# Patient Record
Sex: FEMALE | Race: WHITE | Employment: OTHER | URBAN - METROPOLITAN AREA
[De-identification: names, ages, dates, MRNs, and addresses within clinical notes are randomized per-mention and may not be internally consistent; named-entity substitution may affect disease eponyms.]

---

## 2017-01-24 ENCOUNTER — OFFICE VISIT (OUTPATIENT)
Dept: FAMILY MEDICINE CLINIC | Age: 72
End: 2017-01-24

## 2017-01-24 VITALS
TEMPERATURE: 98.3 F | WEIGHT: 142 LBS | HEART RATE: 74 BPM | BODY MASS INDEX: 26.83 KG/M2 | DIASTOLIC BLOOD PRESSURE: 65 MMHG | SYSTOLIC BLOOD PRESSURE: 145 MMHG

## 2017-01-24 DIAGNOSIS — K12.2 CELLULITIS AND ABSCESS OF ORAL SOFT TISSUES: Primary | ICD-10-CM

## 2017-01-24 RX ORDER — AZITHROMYCIN 250 MG/1
TABLET, FILM COATED ORAL
Qty: 6 TAB | Refills: 0 | Status: SHIPPED | OUTPATIENT
Start: 2017-01-24 | End: 2017-01-29

## 2017-01-24 NOTE — PATIENT INSTRUCTIONS
Periodontal Conditions: Care Instructions  Your Care Instructions    Periodontal conditions affect the gums, bone, and tissue that surround and support the teeth. The most common problems are caused by plaque. Plaque is a thin film of bacteria that sticks to teeth above and below the gum line. It can build up and harden into tartar. The bacteria in plaque and tartar can cause gum disease. Gingivitis is a disease that affects the gums (gingiva). The gums are the soft tissue that surrounds the teeth. Gingivitis causes red, swollen, tender gums that bleed easily when brushed, persistent bad breath, and sensitive teeth. Because it is not painful, many people do not get treatment when they should. Gingivitis can be reversed with good dental care. Periodontitis is a more advanced disease that affects more than the gums. The gums pull away from the teeth. This leaves deep pockets where bacteria can grow. The disease can damage the bones that support the teeth. The teeth may get loose and fall out. A periodontal condition should be treated as soon as it is found. Finding gum problems early, treating them right away, and having regular checkups bring the best results. You can treat mild periodontal conditions by brushing and flossing your teeth every day. Your dentist may prescribe a mouthwash to kill the bacteria that can damage teeth and gums. Your dentist may have you take antibiotics to treat infection from moderate periodontal disease. If your gums have pulled away from your teeth, you may need cleaning between the teeth and gums right down to the teeth roots. This is called root planing and scaling. If you have severe periodontal disease, you may need surgery to remove diseased gum tissue or repair bone damage. Follow-up care is a key part of your treatment and safety. Be sure to make and go to all appointments, and call your dentist if you are having problems.  It's also a good idea to know your test results and keep a list of the medicines you take. How can you care for yourself at home? · If your dentist prescribed antibiotics, take them as directed. Do not stop taking them just because you feel better. You need to take the full course of antibiotics. · Brush your teeth twice a day, in the morning and at night. ¨ Use a toothbrush with soft, rounded-end bristles and a head that is small enough to reach all parts of your teeth and mouth. Replace your toothbrush every 3 to 4 months. ¨ Use a fluoride toothpaste. ¨ Place the brush at a 45-degree angle where the teeth meet the gums. Press firmly, and gently rock the brush back and forth using small circular movements. ¨ Brush chewing surfaces vigorously with short back-and-forth strokes. ¨ Brush your tongue from back to front. · Floss at least once a day. Choose the type and flavor that you like best.  · Have your teeth cleaned by a professional at least twice a year. · Ask your dentist about using an antibacterial mouthwash to help reduce bacteria. · Rinse your mouth with water or chew sugar-free gum after meals if you can't brush your teeth. · Do not smoke or use smokeless tobacco. Tobacco use can cause periodontal disease. When should you call for help? Call your dentist now or seek immediate medical care if:  · You have symptoms of infection, such as:  ¨ Increased pain, swelling, warmth, or redness. ¨ Red streaks leading from the area. ¨ Pus draining from the area. ¨ A fever. Watch closely for changes in your health, and be sure to contact your dentist if:  · Your gums bleed easily when brushed. · You have other problems with your gums or mouth. · You do not get better as expected. Where can you learn more? Go to http://eleanor-gera.info/. Enter D484 in the search box to learn more about \"Periodontal Conditions: Care Instructions. \"  Current as of: August 9, 2016  Content Version: 11.1  © 7632-5400 [employer]. Care instructions adapted under license by [employer] (which disclaims liability or warranty for this information). If you have questions about a medical condition or this instruction, always ask your healthcare professional. [address] any warranty or liability for your use of this information.

## 2017-01-24 NOTE — PROGRESS NOTES
Subjective  Vero Jenkins is an 80 y.o. female  who presents for the evaluation of the swelling in the left side of the jaw spreading to the upper side of the neck . The swelling started 2 days ago, getting a little worse. The pt reports mild discomfort, but no pain reported. She reports mild dysphagia. She had similar problem before around 10 years ago and was treated with azithromycin. She denies trauma, oral procedure recent infection or travelling. She denies fever, rash, nausea, vomiting, SOB. Allergies - reviewed: Allergies   Allergen Reactions    Levaquin [Levofloxacin] Anaphylaxis     Throat and face became swollen.  Ace Inhibitors Vertigo    Hydrochlorothiazide Other (comments)     Hypotension and severe hyponatremia.  Penicillins Hives     While pregnant         Medications - reviewed:   Current Outpatient Prescriptions   Medication Sig    azithromycin (ZITHROMAX) 250 mg tablet Take 2 tablets on the first day, then take 1 tablet daily for the next 4 days    traMADol (ULTRAM) 50 mg tablet TAKE 1 TABLET BY MOUTH EVERY 6 HOURS AS NEEDED FOR PAIN    sodium chloride 1,000 mg soluble tablet TAKE TWO TABLETS BY MOUTH THREE TIMES DAILY WITH  MEALS    atenolol (TENORMIN) 25 mg tablet TAKE 1 TABLET BY MOUTH DAILY    atenolol (TENORMIN) 50 mg tablet TAKE ONE TABLET BY MOUTH ONCE DAILY    LORazepam (ATIVAN) 0.5 mg tablet TAKE 1 TABLET BY MOUTH TWICE DAILY AS NEEDED FOR ANXIETY    sodium chloride 1 gram tablet TAKE 2 TABLETS BY MOUTH THREE TIMES DAILY WITH MEALS    omeprazole (PRILOSEC) 20 mg capsule TAKE 1 CAPSULE BY MOUTH EVERY DAY    KLOR-CON 10 10 mEq tablet TAKE 1 TABLET BY MOUTH TWICE DAILY    furosemide (LASIX) 40 mg tablet TAKE 1 TABLET BY MOUTH EVERY DAY    amLODIPine (NORVASC) 5 mg tablet TAKE 1 TABLET BY MOUTH EVERY DAY    aspirin (ASPIRIN) 325 mg tablet Take 1 tablet by mouth daily.  multivitamin (ONE A DAY) tablet Take 1 Tab by mouth daily.      No current facility-administered medications for this visit. 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 Social History Main Topics    Smoking status: Former Smoker     Packs/day: 1.00     Years: 40.00     Types: Cigarettes    Smokeless tobacco: Former User     Quit date: 9/11/2014      Comment: Quit smoking September 2014.  Alcohol use 1.8 oz/week     3 Standard drinks or equivalent per week      Comment: Began regular drinking in 30's. Mostly beer. Quit beer 9/11/2014 but drinks mixed drinks on weekends.  Drug use: No    Sexual activity: Not on file     Other Topics Concern    Not on file     Social History Narrative         Family History - reviewed:  Family History   Problem Relation Age of Onset    Kidney Disease Mother      uncertain         Immunizations - reviewed:   Immunization History   Administered Date(s) Administered    Influenza Vaccine 01/21/2010, 11/09/2010, 11/22/2011, 10/31/2014, 11/03/2014    Influenza Vaccine (Quad) PF 11/11/2015, 10/26/2016    Pneumococcal Polysaccharide (PPSV-23) 09/22/2014, 09/30/2014    Varicella Virus Vaccine 05/05/2016         ROS  Review of Systems : negative unless highlighted  CONSTITUTIONAL: fevers. Chills. Anorexia. Weight loss. Weight gain. EYES: diplopia. Blurry vision. Visual changes.  Swelling of the left jaw. ENT: sinus congestion. Rhinorrhea. CARDIOVASCULAR: chest pain. palpitations  RESPIRATORY: dyspnea. Cough. Wheeze. Physical Exam  Visit Vitals    /65 (BP 1 Location: Left arm, BP Patient Position: Sitting)    Pulse 74    Temp 98.3 °F (36.8 °C) (Oral)    Wt 142 lb (64.4 kg)    BMI 26.83 kg/m2       General appearance - NAD. Appropriately conversational  HENT: Atraumatic. EOMI. Oral mucosa moist, slightly erythematous and inflamed on the left lower side . Remnat teeth with erythematous swelling of the gums in the left side. Swelling of the left side of the tissue of the left side of the jaw, hard to palpation, spreading to the upper side of the neck. Skin - normal coloration and normal turgor. No cyanosis, no rash. Slightly erythematous over the left side of the jaw. Assessment/Plan     Cellulitis and abscess of the oral soft tissue  -The Rx for Azithromycin 250 mg was sent to the pharmacy  -Swish the mouth with saline water  -Heating pads  -Tylenol/ ibuprofen for the pain relief if needed     Follow-up Disposition:  Return if symptoms worsen or fail to improve. I discussed the aforementioned diagnoses with the patient as well as the plan of care.      The patient was seen and discussed with Dr. Jere Streeter ( the attending physician)    Rodell Rinne, MD  Family Medicine Resident  PGY 1

## 2017-01-24 NOTE — MR AVS SNAPSHOT
Visit Information Date & Time Provider Department Dept. Phone Encounter #  
 1/24/2017  2:15 PM Salma Hansen, [address] [place] [address] Avenue [phone] [id number] Follow-up Instructions Return if symptoms worsen or fail to improve. Upcoming Health Maintenance Date Due Hepatitis C Screening 1945 DTaP/Tdap/Td series (1 - Tdap) 11/23/1967 GLAUCOMA SCREENING Q2Y 11/23/2011 OSTEOPOROSIS SCREENING (DEXA) 11/23/2011 MEDICARE YEARLY EXAM 11/23/2011 FOBT Q 1 YEAR AGE 50-75 9/2/2015 Pneumococcal 65+ Low/Medium Risk (2 of 2 - PCV13) 9/30/2015 BREAST CANCER SCRN MAMMOGRAM 4/27/2018 Allergies as of 1/24/2017  Review Complete On: 1/24/2017 By: Rico Argueta MD  
  
 Severity Noted Reaction Type Reactions Levaquin [Levofloxacin] High 04/09/2013    Anaphylaxis Throat and face became swollen. Ace Inhibitors  04/09/2013    Vertigo Hydrochlorothiazide  07/31/2013   Intolerance Other (comments) Hypotension and severe hyponatremia. Penicillins  04/09/2013    Hives While pregnant Current Immunizations  Reviewed on 1/24/2017 Name Date Influenza Vaccine 11/3/2014, 10/31/2014, 11/22/2011, 11/9/2010, 1/21/2010 Influenza Vaccine (Quad) PF 10/26/2016, 11/11/2015 Pneumococcal Polysaccharide (PPSV-23) 9/30/2014, 9/22/2014  1:37 PM  
 Varicella Virus Vaccine 5/5/2016 Reviewed by Rico Argueta MD on 1/24/2017 at  2:36 PM  
You Were Diagnosed With   
  
 Codes Comments Periodontal disease, unspecified    -  Primary ICD-10-CM: U92.4 ICD-9-CM: 523.9 Vitals BP Pulse Temp Weight(growth percentile) BMI OB Status 145/65 (BP 1 Location: Left arm, BP Patient Position: Sitting) 74 98.3 °F (36.8 °C) (Oral) 142 lb (64.4 kg) 26.83 kg/m2 Postmenopausal  
 Smoking Status Former Smoker Vitals History BMI and BSA Data  Body Mass Index Body Surface Area  
 26.83 kg/m 2 1.66 m 2  
  
 Preferred Pharmacy Pharmacy Name Phone [facility] [phone] Your Updated Medication List  
  
   
This list is accurate as of: 1/24/17  3:05 PM.  Always use your most recent med list. amLODIPine 5 mg tablet Commonly known as:  Arnav Reddyedson TAKE 1 TABLET BY MOUTH EVERY DAY  
  
 aspirin 325 mg tablet Commonly known as:  ASPIRIN Take 1 tablet by mouth daily. * atenolol 25 mg tablet Commonly known as:  TENORMIN  
TAKE 1 TABLET BY MOUTH DAILY  
  
 * atenolol 50 mg tablet Commonly known as:  TENORMIN  
TAKE ONE TABLET BY MOUTH ONCE DAILY  
  
 azithromycin 250 mg tablet Commonly known as:  Tracy Ax Take 2 tablets on the first day, then take 1 tablet daily for the next 4 days  
  
 furosemide 40 mg tablet Commonly known as:  LASIX TAKE 1 TABLET BY MOUTH EVERY DAY  
  
 KLOR-CON 10 10 mEq tablet Generic drug:  potassium chloride SR  
TAKE 1 TABLET BY MOUTH TWICE DAILY LORazepam 0.5 mg tablet Commonly known as:  ATIVAN  
TAKE 1 TABLET BY MOUTH TWICE DAILY AS NEEDED FOR ANXIETY  
  
 multivitamin tablet Commonly known as:  ONE A DAY Take 1 Tab by mouth daily. omeprazole 20 mg capsule Commonly known as:  PRILOSEC  
TAKE 1 CAPSULE BY MOUTH EVERY DAY  
  
 * sodium chloride 1 gram tablet TAKE 2 TABLETS BY MOUTH THREE TIMES DAILY WITH MEALS  
  
 * sodium chloride 1,000 mg soluble tablet TAKE TWO TABLETS BY MOUTH THREE TIMES DAILY WITH  MEALS  
  
 traMADol 50 mg tablet Commonly known as:  ULTRAM  
TAKE 1 TABLET BY MOUTH EVERY 6 HOURS AS NEEDED FOR PAIN  
  
 * Notice: This list has 4 medication(s) that are the same as other medications prescribed for you. Read the directions carefully, and ask your doctor or other care provider to review them with you. Prescriptions Sent to Pharmacy  Refills  
 azithromycin (ZITHROMAX) 250 mg tablet 0  
 Sig: Take 2 tablets on the first day, then take 1 tablet daily for the next 4 days Class: Normal  
 Pharmacy: [facility] [address] [place] [address] #: [phone] Follow-up Instructions Return if symptoms worsen or fail to improve. Patient Instructions Periodontal Conditions: Care Instructions Your Care Instructions Periodontal conditions affect the gums, bone, and tissue that surround and support the teeth. The most common problems are caused by plaque. Plaque is a thin film of bacteria that sticks to teeth above and below the gum line. It can build up and harden into tartar. The bacteria in plaque and tartar can cause gum disease. Gingivitis is a disease that affects the gums (gingiva). The gums are the soft tissue that surrounds the teeth. Gingivitis causes red, swollen, tender gums that bleed easily when brushed, persistent bad breath, and sensitive teeth. Because it is not painful, many people do not get treatment when they should. Gingivitis can be reversed with good dental care. Periodontitis is a more advanced disease that affects more than the gums. The gums pull away from the teeth. This leaves deep pockets where bacteria can grow. The disease can damage the bones that support the teeth. The teeth may get loose and fall out. A periodontal condition should be treated as soon as it is found. Finding gum problems early, treating them right away, and having regular checkups bring the best results. You can treat mild periodontal conditions by brushing and flossing your teeth every day. Your dentist may prescribe a mouthwash to kill the bacteria that can damage teeth and gums. Your dentist may have you take antibiotics to treat infection from moderate periodontal disease. If your gums have pulled away from your teeth, you may need cleaning between the teeth and gums right down to the teeth roots.  This is called root planing and scaling. If you have severe periodontal disease, you may need surgery to remove diseased gum tissue or repair bone damage. Follow-up care is a key part of your treatment and safety. Be sure to make and go to all appointments, and call your dentist if you are having problems. It's also a good idea to know your test results and keep a list of the medicines you take. How can you care for yourself at home? · If your dentist prescribed antibiotics, take them as directed. Do not stop taking them just because you feel better. You need to take the full course of antibiotics. · Brush your teeth twice a day, in the morning and at night. ¨ Use a toothbrush with soft, rounded-end bristles and a head that is small enough to reach all parts of your teeth and mouth. Replace your toothbrush every 3 to 4 months. ¨ Use a fluoride toothpaste. ¨ Place the brush at a 45-degree angle where the teeth meet the gums. Press firmly, and gently rock the brush back and forth using small circular movements. ¨ Brush chewing surfaces vigorously with short back-and-forth strokes. ¨ Brush your tongue from back to front. · Floss at least once a day. Choose the type and flavor that you like best. 
· Have your teeth cleaned by a professional at least twice a year. · Ask your dentist about using an antibacterial mouthwash to help reduce bacteria. · Rinse your mouth with water or chew sugar-free gum after meals if you can't brush your teeth. · Do not smoke or use smokeless tobacco. Tobacco use can cause periodontal disease. When should you call for help? Call your dentist now or seek immediate medical care if: 
· You have symptoms of infection, such as: 
¨ Increased pain, swelling, warmth, or redness. ¨ Red streaks leading from the area. ¨ Pus draining from the area. ¨ A fever. Watch closely for changes in your health, and be sure to contact your dentist if: 
· Your gums bleed easily when brushed. · You have other problems with your gums or mouth. · You do not get better as expected. Where can you learn more? Go to http://eleanor-gera.info/. Enter H194 in the search box to learn more about \"Periodontal Conditions: Care Instructions. \" Current as of: August 9, 2016 Content Version: 11.1 © 2936-1046 [employer]. Care instructions adapted under license by [employer] (which disclaims liability or warranty for this information). If you have questions about a medical condition or this instruction, always ask your healthcare professional. [address] any warranty or liability for your use of this information. Introducing [address]! Ladarius John introduces [employer] patient portal. Now you can access parts of your medical record, email your doctor's office, and request medication refills online. 1. In your internet browser, go to [email or web address] 2. Click on the First Time User? Click Here link in the Sign In box. You will see the New Member Sign Up page. 3. Enter your [employer] Access Code exactly as it appears below. You will not need to use this code after youve completed the sign-up process. If you do not sign up before the expiration date, you must request a new code. · [employer] Access Code: [email or web address] Expires: 4/24/2017  3:04 PM 
 
4. Enter the last four digits of your Social Security Number (xxxx) and Date of Birth (mm/dd/yyyy) as indicated and click Submit. You will be taken to the next sign-up page. 5. Create a [employer] ID. This will be your [employer] login ID and cannot be changed, so think of one that is secure and easy to remember. 6. Create a [employer] password. You can change your password at any time. 7. Enter your Password Reset Question and Answer. This can be used at a later time if you forget your password. 8. Enter your e-mail address.  You will receive e-mail notification when new information is available in [employer]. 9. Click Sign Up. You can now view and download portions of your medical record. 10. Click the Download Summary menu link to download a portable copy of your medical information. If you have questions, please visit the Frequently Asked Questions section of the [employer] website. Remember, [employer] is NOT to be used for urgent needs. For medical emergencies, dial 911. Now available from your iPhone and Android! Please provide this summary of care documentation to your next provider. Your primary care clinician is listed as Leilani James. If you have any questions after today's visit, please call [phone].

## 2017-01-24 NOTE — PROGRESS NOTES
Coordination of Care  1. Have you been to the ER, urgent care clinic since your last visit? Hospitalized since your last visit? No    2. Have you seen or consulted any other health care providers outside of the [address] [place] [address] since your last visit? Include any pap smears or colon screening. No    Medications  Medication Reconciliation Performed: no  Patient does need refills     Learning Assessment Complete?  yes

## 2017-01-25 RX ORDER — LORAZEPAM 0.5 MG/1
TABLET ORAL
Qty: 60 TAB | Refills: 0 | OUTPATIENT
Start: 2017-01-25 | End: 2017-03-29 | Stop reason: SDUPTHER

## 2017-01-25 RX ORDER — AMLODIPINE BESYLATE 5 MG/1
TABLET ORAL
Qty: 30 TAB | Refills: 5 | Status: SHIPPED | OUTPATIENT
Start: 2017-01-25 | End: 2017-08-23 | Stop reason: SDUPTHER

## 2017-01-26 RX ORDER — SODIUM CHLORIDE TAB 1 GM 1 G
TAB MISCELLANEOUS
Qty: 180 TAB | Refills: 5 | Status: SHIPPED | OUTPATIENT
Start: 2017-01-26 | End: 2017-07-25 | Stop reason: SDUPTHER

## 2017-02-01 RX ORDER — SODIUM CHLORIDE TAB 1 GM 1 G
TAB MISCELLANEOUS
Qty: 180 TAB | Refills: 0 | OUTPATIENT
Start: 2017-02-01

## 2017-02-01 RX ORDER — OMEPRAZOLE 20 MG/1
CAPSULE, DELAYED RELEASE ORAL
Qty: 30 CAP | Refills: 5 | Status: SHIPPED | OUTPATIENT
Start: 2017-02-01 | End: 2018-01-10 | Stop reason: SDUPTHER

## 2017-02-01 NOTE — TELEPHONE ENCOUNTER
Sodium chloride approved by Dr Ramirez Oliver previously.   Omeprazole approved by Dr Ramirez Oliver

## 2017-03-01 ENCOUNTER — OFFICE VISIT (OUTPATIENT)
Dept: FAMILY MEDICINE CLINIC | Age: 72
End: 2017-03-01

## 2017-03-01 VITALS
DIASTOLIC BLOOD PRESSURE: 66 MMHG | SYSTOLIC BLOOD PRESSURE: 153 MMHG | WEIGHT: 141.4 LBS | BODY MASS INDEX: 26.72 KG/M2 | TEMPERATURE: 98.5 F | HEART RATE: 67 BPM

## 2017-03-01 DIAGNOSIS — E22.2 SIADH (SYNDROME OF INAPPROPRIATE ADH PRODUCTION) (HCC): ICD-10-CM

## 2017-03-01 DIAGNOSIS — E87.1 HYPONATREMIA: Primary | ICD-10-CM

## 2017-03-01 RX ORDER — TRAMADOL HYDROCHLORIDE 50 MG/1
TABLET ORAL
Qty: 30 TAB | Refills: 0 | OUTPATIENT
Start: 2017-03-01 | End: 2017-12-04 | Stop reason: SDUPTHER

## 2017-03-01 NOTE — MR AVS SNAPSHOT
Visit Information Date & Time Provider Department Dept. Phone Encounter #  
 3/1/2017 11:10 AM Miri uTttle MD [address] [phone] [id number] Follow-up Instructions Return in about 3 months (around 6/1/2017). Upcoming Health Maintenance Date Due Hepatitis C Screening 1945 DTaP/Tdap/Td series (1 - Tdap) 11/23/1967 GLAUCOMA SCREENING Q2Y 11/23/2011 OSTEOPOROSIS SCREENING (DEXA) 11/23/2011 MEDICARE YEARLY EXAM 11/23/2011 FOBT Q 1 YEAR AGE 50-75 9/2/2015 Pneumococcal 65+ Low/Medium Risk (2 of 2 - PCV13) 9/30/2015 BREAST CANCER SCRN MAMMOGRAM 4/27/2018 Allergies as of 3/1/2017  Review Complete On: 3/1/2017 By: Bridget Del Rosario Severity Noted Reaction Type Reactions Levaquin [Levofloxacin] High 04/09/2013    Anaphylaxis Throat and face became swollen. Ace Inhibitors  04/09/2013    Vertigo Hydrochlorothiazide  07/31/2013   Intolerance Other (comments) Hypotension and severe hyponatremia. Penicillins  04/09/2013    Hives While pregnant Current Immunizations  Reviewed on 1/24/2017 Name Date Influenza Vaccine 11/3/2014, 10/31/2014, 11/22/2011, 11/9/2010, 1/21/2010 Influenza Vaccine (Quad) PF 10/26/2016, 11/11/2015 Pneumococcal Polysaccharide (PPSV-23) 9/30/2014, 9/22/2014  1:37 PM  
 Varicella Virus Vaccine 5/5/2016 Not reviewed this visit You Were Diagnosed With   
  
 Codes Comments Hyponatremia    -  Primary ICD-10-CM: E87.1 ICD-9-CM: 276.1 SIADH (syndrome of inappropriate ADH production) [facility]     ICD-10-CM: E22.2 ICD-9-CM: 253.6 Vitals BP  
  
  
  
  
  
 153/66 (BP 1 Location: Right arm, BP Patient Position: Sitting) Vitals History BMI and BSA Data Body Mass Index Body Surface Area  
 26.72 kg/m 2 1.66 m 2 Preferred Pharmacy Pharmacy Name Phone  [address] [facility] [place] [address] [phone] Your Updated Medication List  
  
   
This list is accurate as of: 3/1/17 11:55 AM.  Always use your most recent med list. amLODIPine 5 mg tablet Commonly known as:  Bonilla Guillermo TAKE 1 TABLET BY MOUTH EVERY DAY  
  
 aspirin 325 mg tablet Commonly known as:  ASPIRIN Take 1 tablet by mouth daily. * atenolol 25 mg tablet Commonly known as:  TENORMIN  
TAKE 1 TABLET BY MOUTH DAILY  
  
 * atenolol 50 mg tablet Commonly known as:  TENORMIN  
TAKE ONE TABLET BY MOUTH ONCE DAILY  
  
 furosemide 40 mg tablet Commonly known as:  LASIX TAKE 1 TABLET BY MOUTH EVERY DAY  
  
 KLOR-CON 10 10 mEq tablet Generic drug:  potassium chloride SR  
TAKE 1 TABLET BY MOUTH TWICE DAILY LORazepam 0.5 mg tablet Commonly known as:  ATIVAN  
TAKE 1 TABLET BY MOUTH TWICE DAILY AS NEEDED FOR ANXIETY  
  
 multivitamin tablet Commonly known as:  ONE A DAY Take 1 Tab by mouth daily. omeprazole 20 mg capsule Commonly known as:  PRILOSEC  
TAKE 1 CAPSULE BY MOUTH EVERY DAY  
  
 sodium chloride 1 gram tablet TAKE 2 TABLETS BY MOUTH THREE TIMES DAILY WITH MEALS  
  
 traMADol 50 mg tablet Commonly known as:  ULTRAM  
TAKE 1 TABLET BY MOUTH EVERY 6 HOURS AS NEEDED FOR PAIN  
  
 * Notice: This list has 2 medication(s) that are the same as other medications prescribed for you. Read the directions carefully, and ask your doctor or other care provider to review them with you. We Performed the Following METABOLIC PANEL, BASIC [41841 CPT(R)] Follow-up Instructions Return in about 3 months (around 6/1/2017). Patient Instructions Hyponatremia: Care Instructions Your Care Instructions Hyponatremia (say \"ap-yo-our-TREE-jadiel-uh\") means that you don't have enough sodium in your blood. It can cause nausea, vomiting, and headaches. Or you may not feel hungry.  In serious cases, it can cause seizures, a coma, or even death. Hyponatremia is not a disease. It is a problem caused by something else, such as medicines or exercising for a long time in hot weather. You can get hyponatremia if you lose a lot of fluids and then you drink a lot of water or other liquids that don't have much sodium. You can also get it if you have kidney, liver, heart, or other health problems. Treatment is focused on getting your sodium levels back to normal. 
Follow-up care is a key part of your treatment and safety. Be sure to make and go to all appointments, and call your doctor if you are having problems. It's also a good idea to know your test results and keep a list of the medicines you take. How can you care for yourself at home? · If your doctor recommends it, drink fluids that have sodium. Sports drinks are a good choice. Or you can eat salty foods. · If your doctor recommends it, limit the amount of water you drink. And limit fluids that are mostly water. These include tea, coffee, and juice. · Take your medicines exactly as prescribed. Call your doctor if you have any problems with your medicine. · Get your sodium levels tested when your doctor tells you to. When should you call for help? Call 911 anytime you think you may need emergency care. For example, call if: 
· You have a seizure. · You passed out (lost consciousness). Call your doctor now or seek immediate medical care if: 
· You are confused or it is hard to focus. · You have little or no appetite. · You feel sick to your stomach or you vomit. · You have a headache. · You have mood changes. · You feel more tired than usual. 
Watch closely for changes in your health, and be sure to contact your doctor if: 
· You do not get better as expected. Where can you learn more? Go to http://eleanor-gera.info/. Enter C239 in the search box to learn more about \"Hyponatremia: Care Instructions. \" Current as of: February 5, 2016 Content Version: 11.1 © 1682-4144 BView, [employer]orporated. Care instructions adapted under license by Familybuilder (which disclaims liability or warranty for this information). If you have questions about a medical condition or this instruction, always ask your healthcare professional. [address] any warranty or liability for your use of this information. Introducing hospitals & HEALTH SERVICES! [facility] introduces Pocket Gems patient portal. Now you can access parts of your medical record, email your doctor's office, and request medication refills online. 1. In your internet browser, go to [email or web address] 2. Click on the First Time User? Click Here link in the Sign In box. You will see the New Member Sign Up page. 3. Enter your Pocket Gems Access Code exactly as it appears below. You will not need to use this code after youve completed the sign-up process. If you do not sign up before the expiration date, you must request a new code. · Pocket Gems Access Code: [email or web address] Expires: 4/24/2017  3:04 PM 
 
4. Enter the last four digits of your Social Security Number (xxxx) and Date of Birth (mm/dd/yyyy) as indicated and click Submit. You will be taken to the next sign-up page. 5. Create a Pocket Gems ID. This will be your Pocket Gems login ID and cannot be changed, so think of one that is secure and easy to remember. 6. Create a Pocket Gems password. You can change your password at any time. 7. Enter your Password Reset Question and Answer. This can be used at a later time if you forget your password. 8. Enter your e-mail address. You will receive e-mail notification when new information is available in [address] 9. Click Sign Up. You can now view and download portions of your medical record. 10. Click the Download Summary menu link to download a portable copy of your medical information. If you have questions, please visit the Frequently Asked Questions section of the [employer] website. Remember, Samasource is NOT to be used for urgent needs. For medical emergencies, dial 911. Now available from your iPhone and Android! Please provide this summary of care documentation to your next provider. Your primary care clinician is listed as Leilani James. If you have any questions after today's visit, please call [phone].

## 2017-03-01 NOTE — PATIENT INSTRUCTIONS
Hyponatremia: Care Instructions  Your Care Instructions  Hyponatremia (say \"vm-no-dqq-TREE-jadiel-uh\") means that you don't have enough sodium in your blood. It can cause nausea, vomiting, and headaches. Or you may not feel hungry. In serious cases, it can cause seizures, a coma, or even death. Hyponatremia is not a disease. It is a problem caused by something else, such as medicines or exercising for a long time in hot weather. You can get hyponatremia if you lose a lot of fluids and then you drink a lot of water or other liquids that don't have much sodium. You can also get it if you have kidney, liver, heart, or other health problems. Treatment is focused on getting your sodium levels back to normal.  Follow-up care is a key part of your treatment and safety. Be sure to make and go to all appointments, and call your doctor if you are having problems. It's also a good idea to know your test results and keep a list of the medicines you take. How can you care for yourself at home? · If your doctor recommends it, drink fluids that have sodium. Sports drinks are a good choice. Or you can eat salty foods. · If your doctor recommends it, limit the amount of water you drink. And limit fluids that are mostly water. These include tea, coffee, and juice. · Take your medicines exactly as prescribed. Call your doctor if you have any problems with your medicine. · Get your sodium levels tested when your doctor tells you to. When should you call for help? Call 911 anytime you think you may need emergency care. For example, call if:  · You have a seizure. · You passed out (lost consciousness). Call your doctor now or seek immediate medical care if:  · You are confused or it is hard to focus. · You have little or no appetite. · You feel sick to your stomach or you vomit. · You have a headache. · You have mood changes.   · You feel more tired than usual.  Watch closely for changes in your health, and be sure to contact your doctor if:  · You do not get better as expected. Where can you learn more? Go to http://eleanor-gera.info/. Enter Q524 in the search box to learn more about \"Hyponatremia: Care Instructions. \"  Current as of: February 5, 2016  Content Version: 11.1  © 5796-4914 Pocket Tales. Care instructions adapted under license by Gymtrack (which disclaims liability or warranty for this information). If you have questions about a medical condition or this instruction, always ask your healthcare professional. [address] any warranty or liability for your use of this information.

## 2017-03-01 NOTE — PROGRESS NOTES
Coordination of Care  1. Have you been to the ER, urgent care clinic since your last visit? Hospitalized since your last visit? No    2. Have you seen or consulted any other health care providers outside of the [facility] [place] since your last visit? Include any pap smears or colon screening. No    Medications  Medication Reconciliation Performed: no  Patient does not need refills     Learning Assessment Complete?  yes

## 2017-03-01 NOTE — PROGRESS NOTES
HISTORY OF PRESENT ILLNESS  Michael Godfrey is a 80 y.o. female. HPI  81 yo female with a hx of HTN, SIADH with chronic hyponatremia presents for a Na check. She has no complaints today and feels well overall. Last Na check in August of 2016 showed Na of 139. She continues to take her sodium supplementation. ROS  Chest: No chest pain, sob   Neuro: No dizziness, HA    Physical Exam  General: NAD, pleasant  Heart: RRR, normal s1 s2 heard  Lungs: CTAB     ASSESSMENT and PLAN    ICD-10-CM ICD-9-CM    1. Hyponatremia D22.8 028.9 METABOLIC PANEL, BASIC   2. SIADH (syndrome of inappropriate ADH production) ([facility]) H44.6 278.2 METABOLIC PANEL, BASIC     Chronic Hyponatremia: Will check BMP today. Continue with sodium chloride 1g tabs BID. Precautions regarding hyponatremia sx provided and given AVS with further information. HTN: BP mildly elevated above JNC8 criteria. Currently on Atenolol 75mg and norvasc 5mg daily. F/U during next visit and make further adjustments as appropriate. Follow-up Disposition:  Return in about 3 months (around 6/1/2017).

## 2017-03-02 LAB
BUN SERPL-MCNC: 8 MG/DL (ref 8–27)
BUN/CREAT SERPL: 9 (ref 11–26)
CALCIUM SERPL-MCNC: 10.2 MG/DL (ref 8.7–10.3)
CHLORIDE SERPL-SCNC: 93 MMOL/L (ref 96–106)
CO2 SERPL-SCNC: 26 MMOL/L (ref 18–29)
CREAT SERPL-MCNC: 0.9 MG/DL (ref 0.57–1)
GLUCOSE SERPL-MCNC: 82 MG/DL (ref 65–99)
POTASSIUM SERPL-SCNC: 4.8 MMOL/L (ref 3.5–5.2)
SODIUM SERPL-SCNC: 137 MMOL/L (ref 134–144)

## 2017-03-29 RX ORDER — LORAZEPAM 0.5 MG/1
TABLET ORAL
Qty: 30 TAB | Refills: 1 | OUTPATIENT
Start: 2017-03-29 | End: 2017-06-15 | Stop reason: SDUPTHER

## 2017-04-27 DIAGNOSIS — I10 ESSENTIAL HYPERTENSION WITH GOAL BLOOD PRESSURE LESS THAN 140/90: ICD-10-CM

## 2017-04-27 RX ORDER — FUROSEMIDE 40 MG/1
TABLET ORAL
Qty: 90 TAB | Refills: 3 | Status: SHIPPED | OUTPATIENT
Start: 2017-04-27 | End: 2019-04-09

## 2017-05-25 DIAGNOSIS — I10 ESSENTIAL HYPERTENSION WITH GOAL BLOOD PRESSURE LESS THAN 140/90: ICD-10-CM

## 2017-05-25 RX ORDER — ATENOLOL 50 MG/1
TABLET ORAL
Qty: 90 TAB | Refills: 1 | Status: SHIPPED | OUTPATIENT
Start: 2017-05-25 | End: 2017-07-25 | Stop reason: SDUPTHER

## 2017-06-15 RX ORDER — LORAZEPAM 0.5 MG/1
TABLET ORAL
Qty: 30 TAB | Refills: 1 | OUTPATIENT
Start: 2017-06-15 | End: 2017-08-10 | Stop reason: SDUPTHER

## 2017-07-25 RX ORDER — ATENOLOL 25 MG/1
TABLET ORAL
Qty: 90 TAB | Refills: 1 | Status: SHIPPED | OUTPATIENT
Start: 2017-07-25 | End: 2017-10-26 | Stop reason: RX

## 2017-07-25 RX ORDER — SODIUM CHLORIDE TAB 1 GM 1 G
TAB MISCELLANEOUS
Qty: 180 TAB | Refills: 5 | Status: SHIPPED | OUTPATIENT
Start: 2017-07-25 | End: 2018-01-31 | Stop reason: SDUPTHER

## 2017-08-10 RX ORDER — LORAZEPAM 0.5 MG/1
TABLET ORAL
Qty: 30 TAB | Refills: 2 | OUTPATIENT
Start: 2017-08-10 | End: 2017-12-04 | Stop reason: ALTCHOICE

## 2017-08-10 RX ORDER — ATENOLOL 50 MG/1
TABLET ORAL
Refills: 1 | COMMUNITY
Start: 2017-05-25 | End: 2017-10-26 | Stop reason: ALTCHOICE

## 2017-08-10 NOTE — TELEPHONE ENCOUNTER
Incoming call from patient this morning requesting refill of lorazepam. Last office visit was March 2017. F/s good through 9/2017.

## 2017-08-24 RX ORDER — AMLODIPINE BESYLATE 5 MG/1
TABLET ORAL
Qty: 30 TAB | Refills: 5 | Status: SHIPPED | OUTPATIENT
Start: 2017-08-24 | End: 2017-12-04 | Stop reason: SDUPTHER

## 2017-09-24 DIAGNOSIS — E87.1 HYPONATREMIA: ICD-10-CM

## 2017-09-24 RX ORDER — POTASSIUM CHLORIDE 750 MG/1
TABLET, FILM COATED, EXTENDED RELEASE ORAL
Qty: 180 TAB | Refills: 0 | Status: CANCELLED | OUTPATIENT
Start: 2017-09-24

## 2017-09-27 ENCOUNTER — OFFICE VISIT (OUTPATIENT)
Dept: FAMILY MEDICINE CLINIC | Age: 72
End: 2017-09-27

## 2017-09-27 VITALS
TEMPERATURE: 98.5 F | DIASTOLIC BLOOD PRESSURE: 65 MMHG | WEIGHT: 142.6 LBS | BODY MASS INDEX: 26.92 KG/M2 | HEART RATE: 62 BPM | HEIGHT: 61 IN | SYSTOLIC BLOOD PRESSURE: 175 MMHG

## 2017-09-27 DIAGNOSIS — I10 ESSENTIAL HYPERTENSION: Primary | ICD-10-CM

## 2017-09-27 DIAGNOSIS — Z11.59 NEED FOR HEPATITIS C SCREENING TEST: ICD-10-CM

## 2017-09-27 DIAGNOSIS — E22.2 SIADH (SYNDROME OF INAPPROPRIATE ADH PRODUCTION) (HCC): ICD-10-CM

## 2017-09-27 DIAGNOSIS — E87.1 HYPONATREMIA: ICD-10-CM

## 2017-09-27 RX ORDER — POTASSIUM CHLORIDE 750 MG/1
TABLET, FILM COATED, EXTENDED RELEASE ORAL
Qty: 180 TAB | Refills: 1 | Status: SHIPPED | OUTPATIENT
Start: 2017-09-27 | End: 2018-10-09 | Stop reason: SDUPTHER

## 2017-09-27 NOTE — PATIENT INSTRUCTIONS

## 2017-09-27 NOTE — MR AVS SNAPSHOT
Visit Information Date & Time Provider Department Dept. Phone Encounter #  
 9/27/2017 10:50 AM Aris Carney [facility] [phone] [id number] Upcoming Health Maintenance Date Due Hepatitis C Screening 1945 DTaP/Tdap/Td series (1 - Tdap) 11/23/1967 GLAUCOMA SCREENING Q2Y 11/23/2011 MEDICARE YEARLY EXAM 11/23/2011 FOBT Q 1 YEAR AGE 50-75 9/2/2015 Pneumococcal 65+ Low/Medium Risk (2 of 2 - PCV13) 9/30/2015 INFLUENZA AGE 9 TO ADULT 8/1/2017 BREAST CANCER SCRN MAMMOGRAM 4/27/2018 Allergies as of 9/27/2017  Review Complete On: 9/27/2017 By: Aris Carney NP Severity Noted Reaction Type Reactions Levaquin [Levofloxacin] High 04/09/2013    Anaphylaxis Throat and face became swollen. Ace Inhibitors  04/09/2013    Vertigo Hydrochlorothiazide  07/31/2013   Intolerance Other (comments) Hypotension and severe hyponatremia. Penicillins  04/09/2013    Hives While pregnant Current Immunizations  Reviewed on 1/24/2017 Name Date Influenza Vaccine 11/3/2014, 10/31/2014, 11/22/2011, 11/9/2010, 1/21/2010 Influenza Vaccine (Quad) PF 10/26/2016, 11/11/2015 Pneumococcal Polysaccharide (PPSV-23) 9/30/2014, 9/22/2014  1:37 PM  
 Varicella Virus Vaccine 5/5/2016 Not reviewed this visit You Were Diagnosed With   
  
 Codes Comments Essential hypertension    -  Primary ICD-10-CM: I10 
ICD-9-CM: 401.9 SIADH (syndrome of inappropriate ADH production) ([facility]     ICD-10-CM: E22.2 ICD-9-CM: 253.6 Hyponatremia     ICD-10-CM: E87.1 ICD-9-CM: 276.1 Need for hepatitis C screening test     ICD-10-CM: Z11.59 
ICD-9-CM: V73.89 Vitals BP Pulse Temp Height(growth percentile) Weight(growth percentile) BMI  
 175/65 (BP 1 Location: Right arm, BP Patient Position: Sitting) 62 98.5 °F (36.9 °C) (Oral) 5' 1\" (1.549 m) 142 lb 9.6 oz (64.7 kg) 26.94 kg/m2 OB Status Smoking Status Postmenopausal Former Smoker Vitals History BMI and BSA Data Body Mass Index Body Surface Area  
 26.94 kg/m 2 1.67 m 2 Preferred Pharmacy Pharmacy Name Phone [facility] [address] [phone] Your Updated Medication List  
  
   
This list is accurate as of: 9/27/17 12:01 PM.  Always use your most recent med list. amLODIPine 5 mg tablet Commonly known as:  Lisa Tulio TAKE 1 TABLET BY MOUTH EVERY DAY  
  
 aspirin 325 mg tablet Commonly known as:  ASPIRIN Take 1 tablet by mouth daily. * atenolol 50 mg tablet Commonly known as:  TENORMIN  
TK 1 T PO QD  
  
 * atenolol 25 mg tablet Commonly known as:  TENORMIN  
TAKE 1 TABLET BY MOUTH DAILY  
  
 furosemide 40 mg tablet Commonly known as:  LASIX TAKE 1 TABLET BY MOUTH EVERY DAY  
  
 LORazepam 0.5 mg tablet Commonly known as:  ATIVAN  
TAKE 1 TABLET BY MOUTH TWICE DAILY AS NEEDED FOR ANXIETY  
  
 multivitamin tablet Commonly known as:  ONE A DAY Take 1 Tab by mouth daily. omeprazole 20 mg capsule Commonly known as:  PRILOSEC  
TAKE 1 CAPSULE BY MOUTH EVERY DAY  
  
 potassium chloride SR 10 mEq tablet Commonly known as:  KLOR-CON 10  
TAKE 1 TABLET BY MOUTH TWICE DAILY  
  
 sodium chloride 1 gram tablet TAKE 2 TABLETS BY MOUTH THREE TIMES DAILY WITH MEALS  
  
 traMADol 50 mg tablet Commonly known as:  ULTRAM  
TAKE 1 TABLET BY MOUTH EVERY 6 HOURS AS NEEDED FOR PAIN  
  
 * Notice: This list has 2 medication(s) that are the same as other medications prescribed for you. Read the directions carefully, and ask your doctor or other care provider to review them with you. Prescriptions Sent to Pharmacy Refills  
 potassium chloride SR (KLOR-CON 10) 10 mEq tablet 1 Sig: TAKE 1 TABLET BY MOUTH TWICE DAILY  Class: Normal  
 Pharmacy: [facility] [address] [place] [address] - [address] [facility] #: [phone] We Performed the Following HCV AB W/REFLEX VERIFICATION [[id number] Custom] To-Do List   
 09/27/2017 Lab:  METABOLIC PANEL, COMPREHENSIVE Patient Instructions DASH Diet: Care Instructions Your Care Instructions The DASH diet is an eating plan that can help lower your blood pressure. DASH stands for Dietary Approaches to Stop Hypertension. Hypertension is high blood pressure. The DASH diet focuses on eating foods that are high in calcium, potassium, and magnesium. These nutrients can lower blood pressure. The foods that are highest in these nutrients are fruits, vegetables, low-fat dairy products, nuts, seeds, and legumes. But taking calcium, potassium, and magnesium supplements instead of eating foods that are high in those nutrients does not have the same effect. The DASH diet also includes whole grains, fish, and poultry. The DASH diet is one of several lifestyle changes your doctor may recommend to lower your high blood pressure. Your doctor may also want you to decrease the amount of sodium in your diet. Lowering sodium while following the DASH diet can lower blood pressure even further than just the DASH diet alone. Follow-up care is a key part of your treatment and safety. Be sure to make and go to all appointments, and call your doctor if you are having problems. It's also a good idea to know your test results and keep a list of the medicines you take. How can you care for yourself at home? Following the DASH diet · Eat 4 to 5 servings of fruit each day. A serving is 1 medium-sized piece of fruit, ½ cup chopped or canned fruit, 1/4 cup dried fruit, or 4 ounces (½ cup) of fruit juice. Choose fruit more often than fruit juice. · Eat 4 to 5 servings of vegetables each day.  A serving is 1 cup of lettuce or raw leafy vegetables, ½ cup of chopped or cooked vegetables, or 4 ounces (½ cup) of vegetable juice. Choose vegetables more often than vegetable juice. · Get 2 to 3 servings of low-fat and fat-free dairy each day. A serving is 8 ounces of milk, 1 cup of yogurt, or 1 ½ ounces of cheese. · Eat 6 to 8 servings of grains each day. A serving is 1 slice of bread, 1 ounce of dry cereal, or ½ cup of cooked rice, pasta, or cooked cereal. Try to choose whole-grain products as much as possible. · Limit lean meat, poultry, and fish to 2 servings each day. A serving is 3 ounces, about the size of a deck of cards. · Eat 4 to 5 servings of nuts, seeds, and legumes (cooked dried beans, lentils, and split peas) each week. A serving is 1/3 cup of nuts, 2 tablespoons of seeds, or ½ cup of cooked beans or peas. · Limit fats and oils to 2 to 3 servings each day. A serving is 1 teaspoon of vegetable oil or 2 tablespoons of salad dressing. · Limit sweets and added sugars to 5 servings or less a week. A serving is 1 tablespoon jelly or jam, ½ cup sorbet, or 1 cup of lemonade. · Eat less than 2,300 milligrams (mg) of sodium a day. If you limit your sodium to 1,500 mg a day, you can lower your blood pressure even more. Tips for success · Start small. Do not try to make dramatic changes to your diet all at once. You might feel that you are missing out on your favorite foods and then be more likely to not follow the plan. Make small changes, and stick with them. Once those changes become habit, add a few more changes. · Try some of the following: ¨ Make it a goal to eat a fruit or vegetable at every meal and at snacks. This will make it easy to get the recommended amount of fruits and vegetables each day. ¨ Try yogurt topped with fruit and nuts for a snack or healthy dessert. ¨ Add lettuce, tomato, cucumber, and onion to sandwiches.  
¨ Combine a ready-made pizza crust with low-fat mozzarella cheese and lots of vegetable toppings. Try using tomatoes, squash, spinach, broccoli, carrots, cauliflower, and onions. ¨ Have a variety of cut-up vegetables with a low-fat dip as an appetizer instead of chips and dip. ¨ Sprinkle sunflower seeds or chopped almonds over salads. Or try adding chopped walnuts or almonds to cooked vegetables. ¨ Try some vegetarian meals using beans and peas. Add garbanzo or kidney beans to salads. Make burritos and tacos with mashed jones beans or black beans. Where can you learn more? Go to http://eleanorspigitgera.info/. Enter M684 in the search box to learn more about \"DASH Diet: Care Instructions. \" Current as of: April 3, 2017 Content Version: 11.3 © 2139-9023 Calera. Care instructions adapted under license by Skulpt (which disclaims liability or warranty for this information). If you have questions about a medical condition or this instruction, always ask your healthcare professional. [address] any warranty or liability for your use of this information. Introducing hospitals & HEALTH SERVICES! [facility] introduces Routezilla patient portal. Now you can access parts of your medical record, email your doctor's office, and request medication refills online. 1. In your internet browser, go to [email or web address] 2. Click on the First Time User? Click Here link in the Sign In box. You will see the New Member Sign Up page. 3. Enter your Routezilla Access Code exactly as it appears below. You will not need to use this code after youve completed the sign-up process. If you do not sign up before the expiration date, you must request a new code. · Routezilla Access Code: [email or web address] Expires: 12/26/2017 12:01 PM 
 
4. Enter the last four digits of your Social Security Number (xxxx) and Date of Birth (mm/dd/yyyy) as indicated and click Submit. You will be taken to the next sign-up page. 5. Create a Shareaholic ID. This will be your Shareaholic login ID and cannot be changed, so think of one that is secure and easy to remember. 6. Create a Shareaholic password. You can change your password at any time. 7. Enter your Password Reset Question and Answer. This can be used at a later time if you forget your password. 8. Enter your e-mail address. You will receive e-mail notification when new information is available in [address] 9. Click Sign Up. You can now view and download portions of your medical record. 10. Click the Download Summary menu link to download a portable copy of your medical information. If you have questions, please visit the Frequently Asked Questions section of the Shareaholic website. Remember, Shareaholic is NOT to be used for urgent needs. For medical emergencies, dial 911. Now available from your iPhone and Android! Please provide this summary of care documentation to your next provider. Your primary care clinician is listed as Duc De Los Santos. If you have any questions after today's visit, please call [phone].

## 2017-09-27 NOTE — PROGRESS NOTES
Subjective:     Chief Complaint   Patient presents with    Follow-up    Medication Refill        She  is a 80 y.o. female who presents for evaluation of HTN and chronic condition management. Pt reports overall good health. Denies any new or concerning CP, dizziness, low ext edema nor urinary retention. Has been on Norvasc 5mg x 2-3 years, well tolerated w/o CP, palpitations nor low ext edema. Denies need for refills. Declines vaccines, colon CA screening, M're wellness exam and FOBT.          ROS  Gen - no fever/chills  Resp - no dyspnea or cough  CV - no chest pain or DUMONT  Rest per HPI    Past Medical History:   Diagnosis Date    History of mammogram 10 years ago    Hypertension     Hyponatremia 09/11/2014    hospitalized for severe hyponatremia due to SIADH    Menopause     at age 37    Pap smear for cervical cancer screening 15 years ago     Past Surgical History:   Procedure Laterality Date    HX CATARACT REMOVAL Right 1/14/2014    HX CHOLECYSTECTOMY      HX THROMBECTOMY Right     leg    HX TUBAL LIGATION       Current Outpatient Prescriptions on File Prior to Visit   Medication Sig Dispense Refill    amLODIPine (NORVASC) 5 mg tablet TAKE 1 TABLET BY MOUTH EVERY DAY 30 Tab 5    atenolol (TENORMIN) 50 mg tablet TK 1 T PO QD  1    LORazepam (ATIVAN) 0.5 mg tablet TAKE 1 TABLET BY MOUTH TWICE DAILY AS NEEDED FOR ANXIETY 30 Tab 2    sodium chloride 1 gram tablet TAKE 2 TABLETS BY MOUTH THREE TIMES DAILY WITH MEALS 180 Tab 5    atenolol (TENORMIN) 25 mg tablet TAKE 1 TABLET BY MOUTH DAILY 90 Tab 1    furosemide (LASIX) 40 mg tablet TAKE 1 TABLET BY MOUTH EVERY DAY 90 Tab 3    potassium chloride SR (KLOR-CON 10) 10 mEq tablet TAKE 1 TABLET BY MOUTH TWICE DAILY 180 Tab 1    traMADol (ULTRAM) 50 mg tablet TAKE 1 TABLET BY MOUTH EVERY 6 HOURS AS NEEDED FOR PAIN 30 Tab 0    omeprazole (PRILOSEC) 20 mg capsule TAKE 1 CAPSULE BY MOUTH EVERY DAY 30 Cap 5    aspirin (ASPIRIN) 325 mg tablet Take 1 tablet by mouth daily. 30 tablet 0    multivitamin (ONE A DAY) tablet Take 1 Tab by mouth daily. No current facility-administered medications on file prior to visit. Objective:     Vitals:    09/27/17 1119 09/27/17 1123   BP: 183/62 175/65   Pulse: 63 62   Temp: 98.5 °F (36.9 °C)    TempSrc: Oral    Weight: 142 lb 9.6 oz (64.7 kg)    Height: 5' 1\" (1.549 m)        Physical Examination:  General appearance - alert, well appearing, and in no distress  Eyes -sclera anicteric  Neck - supple, no significant adenopathy, no thyromegaly  Chest - clear to auscultation, no wheezes, rales or rhonchi, symmetric air entry  Heart - normal rate, regular rhythm, normal S1, S2, no murmurs, rubs, clicks or gallops  Neurological - alert, oriented, no focal findings or movement disorder noted    Manual BP: L arm, sitting:  160/80     Assessment/ Plan:   Diagnoses and all orders for this visit:    1. Essential hypertension  -     METABOLIC PANEL, COMPREHENSIVE; Future    2. SIADH (syndrome of inappropriate ADH production) ([facility] [address]    3. Hyponatremia  -     potassium chloride SR (KLOR-CON 10) 10 mEq tablet; TAKE 1 TABLET BY MOUTH TWICE DAILY  -     METABOLIC PANEL, COMPREHENSIVE; Future    4. Need for hepatitis C screening test  -     HCV AB W/REFLEX VERIFICATION       Encouraged Pt to titrate Norvasc to 10mg as tolerated and to check BP at home/comm pharmacy 3-4x week for the next month. If BP < 150 consistently, will refill Norvasc at 10mg. Pt declined Rx today since she just got new Rx of 5mg and has had concerns r/t insurance. Pt requested to call for refill vs coming back for repeat appt and was agreeable as long as her BP was controlled < 140-150 SBP and she did not have SEs. Repeat BMP and check Hep C.     RTC in 3-6 months if BP improved and labs WNL. I have discussed the diagnosis with the patient and the intended plan as seen in the above orders.   The patient has received an after-visit summary and questions were answered concerning future plans. I have discussed medication side effects and warnings with the patient as well. The patient verbalizes understanding and agreement with the plan.     Follow-up Disposition: Not on File

## 2017-09-27 NOTE — PROGRESS NOTES
Coordination of Care  1. Have you been to the ER, urgent care clinic since your last visit? Hospitalized since your last visit? No    2. Have you seen or consulted any other health care providers outside of the [address] [place] [address] since your last visit? Include any pap smears or colon screening. No    Medications  Medication Reconciliation Performed: yes  Patient does need refills     Learning Assessment Complete?  yes

## 2017-09-28 LAB
ALBUMIN SERPL-MCNC: 4.4 G/DL (ref 3.5–4.8)
ALBUMIN/GLOB SERPL: 1.5 {RATIO} (ref 1.2–2.2)
ALP SERPL-CCNC: 73 IU/L (ref 39–117)
ALT SERPL-CCNC: 28 IU/L (ref 0–32)
AST SERPL-CCNC: 29 IU/L (ref 0–40)
BILIRUB SERPL-MCNC: 0.3 MG/DL (ref 0–1.2)
BUN SERPL-MCNC: 7 MG/DL (ref 8–27)
BUN/CREAT SERPL: 8 (ref 12–28)
CALCIUM SERPL-MCNC: 9.3 MG/DL (ref 8.7–10.3)
CHLORIDE SERPL-SCNC: 96 MMOL/L (ref 96–106)
CO2 SERPL-SCNC: 26 MMOL/L (ref 18–29)
COMMENT, 144067: NORMAL
CREAT SERPL-MCNC: 0.92 MG/DL (ref 0.57–1)
GLOBULIN SER CALC-MCNC: 2.9 G/DL (ref 1.5–4.5)
GLUCOSE SERPL-MCNC: 85 MG/DL (ref 65–99)
HCV AB S/CO SERPL IA: <0.1 S/CO RATIO (ref 0–0.9)
POTASSIUM SERPL-SCNC: 4.8 MMOL/L (ref 3.5–5.2)
PROT SERPL-MCNC: 7.3 G/DL (ref 6–8.5)
SODIUM SERPL-SCNC: 139 MMOL/L (ref 134–144)

## 2017-10-25 ENCOUNTER — TELEPHONE (OUTPATIENT)
Dept: FAMILY MEDICINE CLINIC | Age: 72
End: 2017-10-25

## 2017-10-25 NOTE — TELEPHONE ENCOUNTER
Patient called and left a vm on the [facility] [place] nurse line, to say her atenelol is on backorder through her pharmacy, needs an alternative.  Routing to provider Maxwell Elaine who saw patient last.

## 2017-10-26 RX ORDER — METOPROLOL TARTRATE 25 MG/1
25 TABLET, FILM COATED ORAL 2 TIMES DAILY
Qty: 60 TAB | Refills: 5 | Status: SHIPPED | OUTPATIENT
Start: 2017-10-26 | End: 2017-12-04 | Stop reason: SDUPTHER

## 2017-10-26 NOTE — TELEPHONE ENCOUNTER
Metroprolol 25mg PO BID good sub for atentolol. Recommend Pt check BP and HR 2-3x week and PRN for the next several weeks after starting to make sure her HTN is well controlled. Rx sent.

## 2017-10-27 NOTE — TELEPHONE ENCOUNTER
Patient notified by phone and instructed to check BP and HR for next 2 - 3 weeks to make sure HTN well controlled and to call back clinic with any concerns s/e or questions. This has been fully explained to the patient, who indicates understanding.

## 2017-10-31 ENCOUNTER — CLINICAL SUPPORT (OUTPATIENT)
Dept: FAMILY MEDICINE CLINIC | Age: 72
End: 2017-10-31

## 2017-10-31 DIAGNOSIS — Z23 ENCOUNTER FOR IMMUNIZATION: Primary | ICD-10-CM

## 2017-11-20 DIAGNOSIS — I10 ESSENTIAL HYPERTENSION WITH GOAL BLOOD PRESSURE LESS THAN 140/90: ICD-10-CM

## 2017-11-27 ENCOUNTER — TELEPHONE (OUTPATIENT)
Dept: FAMILY MEDICINE CLINIC | Age: 72
End: 2017-11-27

## 2017-11-27 RX ORDER — LORAZEPAM 0.5 MG/1
TABLET ORAL
Qty: 30 TAB | Refills: 0 | OUTPATIENT
Start: 2017-11-27

## 2017-11-27 RX ORDER — TRAMADOL HYDROCHLORIDE 50 MG/1
TABLET ORAL
Qty: 30 TAB | Refills: 0 | OUTPATIENT
Start: 2017-11-27

## 2017-11-27 RX ORDER — ATENOLOL 50 MG/1
TABLET ORAL
Qty: 90 TAB | Refills: 0 | OUTPATIENT
Start: 2017-11-27

## 2017-11-27 NOTE — TELEPHONE ENCOUNTER
Received fax from [facility], pt's Atenolol 25 mgs has been switched to Metoprolol already as Atenolol is on backorder. They said Pt is asking for the 50 mgs to be switched to Metoprolol as well. Forwarding to HOUSTON Maldonado NP

## 2017-11-27 NOTE — TELEPHONE ENCOUNTER
Patient left a message on the office voicemail stating that she needs refills sent in to her [address] for Tramadol, Lorazepam and her blood pressure medicine. Per chart review, a surescript message was sent to Dr. Sarah Ford on 11-20-17 regarding the requested refills. A Metoprolol prescription with refills was sent to the patient's pharmacy on 10-26-17. The patient was last seen at [facility] on 09-27-17 by Lorri Meléndez. Routing to him for review of the medication refill requests.  Eze Jason RN

## 2017-11-27 NOTE — TELEPHONE ENCOUNTER
Routing the provider's reply to the [facility] nurse pool. Roselia Woodruff, RN    Medication Refill (Lorazepam, Tramadol, blood pressure med.)            Babatunde Friend, NP   You 3 hours ago (11:03 AM)                 Also no UDS in labs nor pain contract on file (I took only a quick glance in media). Thank you,   Izaiah (Routing comment)                        Babatunde Friend, NP   You 3 hours ago (10:59 AM)                   Pt needs to be seen for those Rx, she last was Rx'd Tramadol in March and Ativan in Sept w/ 2 refills. She did not mention the Tramadol at [address] [place] [address].      Thank you,     Izaiah (Routing comment)

## 2017-11-27 NOTE — TELEPHONE ENCOUNTER
Spoke to the patient about the controlled substance refills, she said she understands. She takes the Tramadol for back pain which is aggravated by the cold weather. She understands she needs to be seen to refill. Declined to make appt, will try to see how she does without the meds, will call later this week if she needs to be seen.

## 2017-11-28 NOTE — TELEPHONE ENCOUNTER
Called patient for clarification. This phone call is about getting her refill on metoprolol, her question is, since she was on a total of 75mg of atenelol (she took a 50mg tab plus a 25mg tab daily) she is asking if she should be on 75mg of metoprolol daily as well. She is currently on the alternative metoprolol 25mg x two tablets daily. Per patient report she \"ran out of atenelol 50mg yesterday\" I think patient was taking the 50mg of atenelol PLUS the 25mg bid of metoprolol thinking she had to add it up to 75mg total. Vanita says atenelol is out of production currently. Called provider Kimo MAHONEY who gave verbal order for patient to take two metoprolol 25mg tablets in the morning and one 25 mg metoprolol tablet in the evening, and to instruct patient not to take atenelol plus metoprol. Patient was notified of this and she plans to take as directed above. She may also reverse and take one in the am and two in the pm of the 25mg metoprolol. She stated she tolerated the atenelol 50mg plus the 25mg bid of the metoprolol well. Explained to pt to please monitor her BP and pulse and to notify clinic of any BP changes or symptoms. Explained to patient that once she comes in to clinic on 12/4 the provider can then write a proper Rx for this and send on to her pharmacy.

## 2017-12-04 ENCOUNTER — OFFICE VISIT (OUTPATIENT)
Dept: FAMILY MEDICINE CLINIC | Age: 72
End: 2017-12-04

## 2017-12-04 VITALS
DIASTOLIC BLOOD PRESSURE: 74 MMHG | WEIGHT: 140 LBS | HEIGHT: 61 IN | SYSTOLIC BLOOD PRESSURE: 161 MMHG | HEART RATE: 80 BPM | BODY MASS INDEX: 26.43 KG/M2 | TEMPERATURE: 97.8 F

## 2017-12-04 DIAGNOSIS — G47.00 INSOMNIA, UNSPECIFIED TYPE: ICD-10-CM

## 2017-12-04 DIAGNOSIS — I10 ESSENTIAL HYPERTENSION: Primary | ICD-10-CM

## 2017-12-04 DIAGNOSIS — M54.50 CHRONIC MIDLINE LOW BACK PAIN WITHOUT SCIATICA: ICD-10-CM

## 2017-12-04 DIAGNOSIS — E22.2 SIADH (SYNDROME OF INAPPROPRIATE ADH PRODUCTION) (HCC): ICD-10-CM

## 2017-12-04 DIAGNOSIS — G89.29 CHRONIC MIDLINE LOW BACK PAIN WITHOUT SCIATICA: ICD-10-CM

## 2017-12-04 DIAGNOSIS — J06.9 VIRAL UPPER RESPIRATORY TRACT INFECTION: ICD-10-CM

## 2017-12-04 RX ORDER — TRAMADOL HYDROCHLORIDE 50 MG/1
TABLET ORAL
Qty: 30 TAB | Refills: 1 | Status: SHIPPED | OUTPATIENT
Start: 2017-12-04 | End: 2019-04-09

## 2017-12-04 RX ORDER — METOPROLOL TARTRATE 50 MG/1
50 TABLET ORAL 2 TIMES DAILY
Qty: 60 TAB | Refills: 5 | Status: SHIPPED | OUTPATIENT
Start: 2017-12-04 | End: 2018-06-06 | Stop reason: SDUPTHER

## 2017-12-04 RX ORDER — DOXEPIN HYDROCHLORIDE 10 MG/1
10 CAPSULE ORAL
Qty: 30 CAP | Refills: 5 | Status: SHIPPED | OUTPATIENT
Start: 2017-12-04 | End: 2019-04-09

## 2017-12-04 RX ORDER — AMLODIPINE BESYLATE 10 MG/1
TABLET ORAL
Qty: 30 TAB | Refills: 5 | Status: SHIPPED | OUTPATIENT
Start: 2017-12-04 | End: 2018-06-06 | Stop reason: SDUPTHER

## 2017-12-04 RX ORDER — LORATADINE 10 MG/1
10 TABLET ORAL DAILY
Qty: 30 TAB | Refills: 6 | Status: SHIPPED | OUTPATIENT
Start: 2017-12-04 | End: 2018-12-04 | Stop reason: ALTCHOICE

## 2017-12-04 NOTE — MR AVS SNAPSHOT
Visit Information Date & Time Provider Department Dept. Phone Encounter #  
 12/4/2017 10:50 AM Jennifer Sears [facility] [phone] [id number] Follow-up Instructions Return in about 4 weeks (around 1/1/2018). Upcoming Health Maintenance Date Due  
 GLAUCOMA SCREENING Q2Y 11/23/2011 FOBT Q 1 YEAR AGE 50-75 9/2/2015 BREAST CANCER SCRN MAMMOGRAM 4/27/2018 MEDICARE YEARLY EXAM 9/28/2018 DTaP/Tdap/Td series (2 - Td) 9/27/2027 Allergies as of 12/4/2017  Review Complete On: 12/4/2017 By: Jennifer Sears NP Severity Noted Reaction Type Reactions Levaquin [Levofloxacin] High 04/09/2013    Anaphylaxis Throat and face became swollen. Ace Inhibitors  04/09/2013    Vertigo Hydrochlorothiazide  07/31/2013   Intolerance Other (comments) Hypotension and severe hyponatremia. Penicillins  04/09/2013    Hives While pregnant Current Immunizations  Reviewed on 1/24/2017 Name Date Influenza Vaccine 11/3/2014, 10/31/2014, 11/22/2011, 11/9/2010, 1/21/2010 Influenza Vaccine (Quad) PF 10/31/2017, 10/26/2016, 11/11/2015 Pneumococcal Polysaccharide (PPSV-23) 9/30/2014, 9/22/2014  1:37 PM  
 Varicella Virus Vaccine 5/5/2016 Not reviewed this visit You Were Diagnosed With   
  
 Codes Comments Essential hypertension    -  Primary ICD-10-CM: I10 
ICD-9-CM: 401.9 SIADH (syndrome [facility] inappropriate [facility]) [facility]     ICD-10-CM: E22.2 ICD-9-CM: 253.6 Insomnia, unspecified type     ICD-10-CM: G47.00 ICD-9-CM: 780.52 Chronic midline low back pain without sciatica     ICD-10-CM: M54.5, G89.29 ICD-9-CM: 724.2, 338.29 Viral upper respiratory tract infection     ICD-10-CM: J06.9, B97.89 ICD-9-CM: 465.9 Vitals  BP Pulse Temp Height(growth percentile) Weight(growth percentile) BMI  
 161/74 (BP 1 Location: Right arm, BP Patient Position: Sitting) 80 97.8 °F (36.6 °C) (Oral) 5' 1\" (1.549 m) 140 lb (63.5 kg) 26.45 kg/m2 OB Status Smoking Status Postmenopausal Former Smoker Vitals History BMI and BSA Data Body Mass Index Body Surface Area  
 26.45 kg/m 2 1.65 m 2 Preferred Pharmacy Pharmacy Name Phone [facility] [address] [facility] Audie Hernandez [phone] Your Updated Medication List  
  
   
This list is accurate as of: 12/4/17 11:29 AM.  Always use your most recent med list. amLODIPine 10 mg tablet Commonly known as:  Voncile [place] TAKE 1 TABLET BY MOUTH EVERY DAY  
  
 aspirin 325 mg tablet Commonly known as:  ASPIRIN Take 1 tablet by mouth daily. doxepin 10 mg capsule Commonly known as:  SINEquan Take 1 Cap by mouth nightly. furosemide 40 mg tablet Commonly known as:  LASIX TAKE 1 TABLET BY MOUTH EVERY DAY  
  
 loratadine 10 mg tablet Commonly known as:  Ramandeep Osei Take 1 Tab by mouth daily. metoprolol tartrate 50 mg tablet Commonly known as:  LOPRESSOR Take 1 Tab by mouth two (2) times a day. multivitamin tablet Commonly known as:  ONE A DAY Take 1 Tab by mouth daily. omeprazole 20 mg capsule Commonly known as:  PRILOSEC  
TAKE 1 CAPSULE BY MOUTH EVERY DAY  
  
 potassium chloride SR 10 mEq tablet Commonly known as:  KLOR-CON 10  
TAKE 1 TABLET BY MOUTH TWICE DAILY  
  
 sodium chloride 1 gram tablet TAKE 2 TABLETS BY MOUTH THREE TIMES DAILY WITH MEALS  
  
 traMADol 50 mg tablet Commonly known as:  ULTRAM  
TAKE 1 TABLET BY MOUTH EVERY 6 HOURS AS NEEDED FOR PAIN Prescriptions Printed Refills  
 traMADol (ULTRAM) 50 mg tablet 1 Sig: TAKE 1 TABLET BY MOUTH EVERY 6 HOURS AS NEEDED FOR PAIN Class: Print Prescriptions Sent to Pharmacy Refills  
 metoprolol tartrate (LOPRESSOR) 50 mg tablet 5 Sig: Take 1 Tab by mouth two (2) times a day. Class: Normal  
 Pharmacy: [facility] [address] Ph #: [phone] Route: Oral  
 amLODIPine (NORVASC) 10 mg tablet 5 Sig: TAKE 1 TABLET BY MOUTH EVERY DAY Class: Normal  
 Pharmacy: [facility] [address] Ph #: [phone]  
 doxepin (SINEQUAN) 10 mg capsule 5 Sig: Take 1 Cap by mouth nightly. Class: Normal  
 Pharmacy: [facility] [address] Ph #: [phone] Route: Oral  
 loratadine (CLARITIN) 10 mg tablet 6 Sig: Take 1 Tab by mouth daily. Class: Normal  
 Pharmacy: [facility] [address] Ph #: [phone] Route: Oral  
  
Follow-up Instructions Return in about 4 weeks (around 1/1/2018). Patient Instructions Upper Respiratory Infection (Cold): Care Instructions Your Care Instructions An upper respiratory infection, or URI, is an infection of the nose, sinuses, or throat. URIs are spread by coughs, sneezes, and direct contact. The common cold is the most frequent kind of URI. The flu and sinus infections are other kinds of URIs. Almost all URIs are caused by viruses. Antibiotics won't cure them. But you can treat most infections with home care. This may include drinking lots of fluids and taking over-the-counter pain medicine. You will probably feel better in 4 to 10 days. The doctor has checked you carefully, but problems can develop later. If you notice any problems or new symptoms, get medical treatment right away. Follow-up care is a key part of your treatment and safety. Be sure to make and go to all appointments, and call your doctor if you are having problems. It's also a good idea to know your test results and keep a list of the medicines you take. How can you care for yourself at home? · To prevent dehydration, drink plenty of fluids, enough so that your urine is light yellow or clear like water. Choose water and other caffeine-free clear liquids until you feel better. If you have kidney, heart, or liver disease and have to limit fluids, talk with your doctor before you increase the amount of fluids you drink. · Take an over-the-counter pain medicine, such as acetaminophen (Tylenol), ibuprofen (Advil, Motrin), or naproxen (Aleve). Read and follow all instructions on the label. · Before you use cough and cold medicines, check the label. These medicines may not be safe for young children or for people with certain health problems. · Be careful when taking over-the-counter cold or flu medicines and Tylenol at the same time. Many of these medicines have acetaminophen, which is Tylenol. Read the labels to make sure that you are not taking more than the recommended dose. Too much acetaminophen (Tylenol) can be harmful. · Get plenty of rest. 
· Do not smoke or allow others to smoke around you. If you need help quitting, talk to your doctor about stop-smoking programs and medicines. These can increase your chances of quitting for good. When should you call for help? Call 911 anytime you think you may need emergency care. For example, call if: 
? · You have severe trouble breathing. ?Call your doctor now or seek immediate medical care if: 
? · You seem to be getting much sicker. ? · You have new or worse trouble breathing. ? · You have a new or higher fever. ? · You have a new rash. ? Watch closely for changes in your health, and be sure to contact your doctor if: 
? · You have a new symptom, such as a sore throat, an earache, or sinus pain. ? · You cough more deeply or more often, especially if you notice more mucus or a change in the color of your mucus. ? · You do not get better as expected. Where can you learn more? Go to http://eleanor-gera.info/. Enter A260 in the search box to learn more about \"Upper Respiratory Infection (Cold): Care Instructions. \" Current as of: May 12, 2017 Content Version: 11.4 © 2731-6624 Healthwise, Incorporated. Care instructions adapted under license by [employer] (which disclaims liability or warranty for this information). If you have questions about a medical condition or this instruction, always ask your healthcare professional. [facility] any warranty or liability for your use of this information. Introducing [facility] [place] [facility]! [facility] introduces [employer] patient portal. Now you can access parts of your medical record, email your doctor's office, and request medication refills online. 1. In your internet browser, go to [email or web address] 2. Click on the First Time User? Click Here link in the Sign In box. You will see the New Member Sign Up page. 3. Enter your [employer] Access Code exactly as it appears below. You will not need to use this code after youve completed the sign-up process. If you do not sign up before the expiration date, you must request a new code. · [employer] Access Code: [email or web address] Expires: 12/26/2017 11:01 AM 
 
4. Enter the last four digits of your Social Security Number (xxxx) and Date of Birth (mm/dd/yyyy) as indicated and click Submit. You will be taken to the next sign-up page. 5. Create a [employer] ID. This will be your [employer] login ID and cannot be changed, so think of one that is secure and easy to remember. 6. Create a [employer] password. You can change your password at any time. 7. Enter your Password Reset Question and Answer. This can be used at a later time if you forget your password. 8. Enter your e-mail address. You will receive e-mail notification when new information is available in [address] 9. Click Sign Up. You can now view and download portions of your medical record. 10. Click the Download Summary menu link to download a portable copy of your medical information. If you have questions, please visit the Frequently Asked Questions section of the [employer] website. Remember, [employer] is NOT to be used for urgent needs. For medical emergencies, dial 911. Now available from your iPhone and Android! Please provide this summary of care documentation to your next provider. Your primary care clinician is listed as Tico Fulton. If you have any questions after today's visit, please call [phone].

## 2017-12-04 NOTE — PROGRESS NOTES
Subjective:     Chief Complaint   Patient presents with    Hypertension        She  is a 71 y.o. female who presents for evaluation of HTN and SIADH. Has been taking BPs at home and notes her BPs have been running in the 180s/  HRs have been in the 80s. Has been taking total 75mg daily (1 tab in AM and 2 tabs at night). Started having \"chest congestion\" x  1 1/2 weeks. ROS  Gen - no fever/chills  Resp - no dyspnea or cough  CV - no chest pain or DUMONT  Rest per HPI    Past Medical History:   Diagnosis Date    History of mammogram 10 years ago    Hypertension     Hyponatremia 09/11/2014    hospitalized for severe hyponatremia due to SIADH    Menopause     at age 37    Pap smear for cervical cancer screening 15 years ago     Past Surgical History:   Procedure Laterality Date    HX CATARACT REMOVAL Right 1/14/2014    HX CHOLECYSTECTOMY      HX THROMBECTOMY Right     leg    HX TUBAL LIGATION       Current Outpatient Prescriptions on File Prior to Visit   Medication Sig Dispense Refill    metoprolol tartrate (LOPRESSOR) 25 mg tablet Take 1 Tab by mouth two (2) times a day. 60 Tab 5    potassium chloride SR (KLOR-CON 10) 10 mEq tablet TAKE 1 TABLET BY MOUTH TWICE DAILY 180 Tab 1    amLODIPine (NORVASC) 5 mg tablet TAKE 1 TABLET BY MOUTH EVERY DAY 30 Tab 5    LORazepam (ATIVAN) 0.5 mg tablet TAKE 1 TABLET BY MOUTH TWICE DAILY AS NEEDED FOR ANXIETY 30 Tab 2    sodium chloride 1 gram tablet TAKE 2 TABLETS BY MOUTH THREE TIMES DAILY WITH MEALS 180 Tab 5    furosemide (LASIX) 40 mg tablet TAKE 1 TABLET BY MOUTH EVERY DAY 90 Tab 3    omeprazole (PRILOSEC) 20 mg capsule TAKE 1 CAPSULE BY MOUTH EVERY DAY 30 Cap 5    aspirin (ASPIRIN) 325 mg tablet Take 1 tablet by mouth daily. 30 tablet 0    multivitamin (ONE A DAY) tablet Take 1 Tab by mouth daily.       traMADol (ULTRAM) 50 mg tablet TAKE 1 TABLET BY MOUTH EVERY 6 HOURS AS NEEDED FOR PAIN 30 Tab 0     No current facility-administered medications on file prior to visit. Objective:     Vitals:    12/04/17 1052 12/04/17 1055   BP: 169/74 161/74   Pulse: 80    Temp: 97.8 °F (36.6 °C)    TempSrc: Oral    Weight: 140 lb (63.5 kg)    Height: 5' 1\" (1.549 m)        Physical Examination:  General appearance - alert, well appearing, and in no distress  Eyes -sclera anicteric  Neck - supple, no significant adenopathy, no thyromegaly  Chest - clear to auscultation, no wheezes, rales or rhonchi, symmetric air entry  Heart - normal rate, regular rhythm, normal S1, S2, no murmurs, rubs, clicks or gallops  Neurological - alert, oriented, no focal findings or movement disorder noted  Moderate cerumen in bilateral canals, mod erythema in turbinates, HENT otherwise WNL        reviewed, matches Pt's record in CC/report. No other controlled substances given in last year by another provider. Manual BP:  160/90 L arm     Assessment/ Plan:   Diagnoses and all orders for this visit:    1. Essential hypertension  -     metoprolol tartrate (LOPRESSOR) 50 mg tablet; Take 1 Tab by mouth two (2) times a day. -     amLODIPine (NORVASC) 10 mg tablet; TAKE 1 TABLET BY MOUTH EVERY DAY    2. SIADH (syndrome of inappropriate ADH production) ([facility] [address]    3. Insomnia, unspecified type  -     doxepin (SINEQUAN) 10 mg capsule; Take 1 Cap by mouth nightly. 4. Chronic midline low back pain without sciatica  -     traMADol (ULTRAM) 50 mg tablet; TAKE 1 TABLET BY MOUTH EVERY 6 HOURS AS NEEDED FOR PAIN    5. Viral upper respiratory tract infection  -     loratadine (CLARITIN) 10 mg tablet; Take 1 Tab by mouth daily. Stop PRN ativan for sleep and swap to low dose doxepin, encouraged to start 1 cap 1 hour before bed and q2-3 nights as needed, titrate up to max 3 caps/QHS. Increase metoprolol to 50mg PO BID and increase Norvasc to 10mg. Pulse is WNL, low expectation of bradycardia from combination. Refill PRN tramadol given contraindications to NSAIDs. Start Tx for suspected viral URI w/ saline nasal spray/gargles and allergy Rx. ENT mildly abnormal, no suspicion of PNA given exam/Hx. RTC in 4-6 weeks for repeat BP     Spent > 15 minutes w/ patient on direct patient education and disease counseling. I have discussed the diagnosis with the patient and the intended plan as seen in the above orders. The patient has received an after-visit summary and questions were answered concerning future plans. I have discussed medication side effects and warnings with the patient as well. The patient verbalizes understanding and agreement with the plan. Follow-up Disposition:  Return in about 4 weeks (around 1/1/2018).

## 2017-12-04 NOTE — PROGRESS NOTES
Chief Complaint   Patient presents with    Hypertension     Coordination of Care  1. Have you been to the ER, urgent care clinic since your last visit? Hospitalized since your last visit? No    2. Have you seen or consulted any other health care providers outside of the [address] [place] [address] since your last visit? Include any pap smears or colon screening. No    Medications  Does the patient need refills? NO    Learning Assessment Complete?  yes

## 2017-12-04 NOTE — PATIENT INSTRUCTIONS
Upper Respiratory Infection (Cold): Care Instructions  Your Care Instructions    An upper respiratory infection, or URI, is an infection of the nose, sinuses, or throat. URIs are spread by coughs, sneezes, and direct contact. The common cold is the most frequent kind of URI. The flu and sinus infections are other kinds of URIs. Almost all URIs are caused by viruses. Antibiotics won't cure them. But you can treat most infections with home care. This may include drinking lots of fluids and taking over-the-counter pain medicine. You will probably feel better in 4 to 10 days. The doctor has checked you carefully, but problems can develop later. If you notice any problems or new symptoms, get medical treatment right away. Follow-up care is a key part of your treatment and safety. Be sure to make and go to all appointments, and call your doctor if you are having problems. It's also a good idea to know your test results and keep a list of the medicines you take. How can you care for yourself at home? · To prevent dehydration, drink plenty of fluids, enough so that your urine is light yellow or clear like water. Choose water and other caffeine-free clear liquids until you feel better. If you have kidney, heart, or liver disease and have to limit fluids, talk with your doctor before you increase the amount of fluids you drink. · Take an over-the-counter pain medicine, such as acetaminophen (Tylenol), ibuprofen (Advil, Motrin), or naproxen (Aleve). Read and follow all instructions on the label. · Before you use cough and cold medicines, check the label. These medicines may not be safe for young children or for people with certain health problems. · Be careful when taking over-the-counter cold or flu medicines and Tylenol at the same time. Many of these medicines have acetaminophen, which is Tylenol. Read the labels to make sure that you are not taking more than the recommended dose.  Too much acetaminophen (Tylenol) can be harmful. · Get plenty of rest.  · Do not smoke or allow others to smoke around you. If you need help quitting, talk to your doctor about stop-smoking programs and medicines. These can increase your chances of quitting for good. When should you call for help? Call 911 anytime you think you may need emergency care. For example, call if:  ? · You have severe trouble breathing. ?Call your doctor now or seek immediate medical care if:  ? · You seem to be getting much sicker. ? · You have new or worse trouble breathing. ? · You have a new or higher fever. ? · You have a new rash. ? Watch closely for changes in your health, and be sure to contact your doctor if:  ? · You have a new symptom, such as a sore throat, an earache, or sinus pain. ? · You cough more deeply or more often, especially if you notice more mucus or a change in the color of your mucus. ? · You do not get better as expected. Where can you learn more? Go to http://eleanor-gera.info/. Enter O885 in the search box to learn more about \"Upper Respiratory Infection (Cold): Care Instructions. \"  Current as of: May 12, 2017  Content Version: 11.4  © 3299-8286 Healthwise, Incorporated. Care instructions adapted under license by [employer] (which disclaims liability or warranty for this information). If you have questions about a medical condition or this instruction, always ask your healthcare professional. [place] [address] any warranty or liability for your use of this information.

## 2018-01-10 ENCOUNTER — OFFICE VISIT (OUTPATIENT)
Dept: FAMILY MEDICINE CLINIC | Age: 73
End: 2018-01-10

## 2018-01-10 VITALS
DIASTOLIC BLOOD PRESSURE: 76 MMHG | TEMPERATURE: 97.6 F | HEART RATE: 73 BPM | WEIGHT: 141.2 LBS | RESPIRATION RATE: 18 BRPM | BODY MASS INDEX: 26.68 KG/M2 | SYSTOLIC BLOOD PRESSURE: 162 MMHG

## 2018-01-10 DIAGNOSIS — K21.9 GASTROESOPHAGEAL REFLUX DISEASE, ESOPHAGITIS PRESENCE NOT SPECIFIED: ICD-10-CM

## 2018-01-10 DIAGNOSIS — I10 ESSENTIAL HYPERTENSION: Primary | ICD-10-CM

## 2018-01-10 RX ORDER — OMEPRAZOLE 20 MG/1
CAPSULE, DELAYED RELEASE ORAL
Qty: 30 CAP | Refills: 5 | Status: SHIPPED | OUTPATIENT
Start: 2018-01-10 | End: 2019-04-09

## 2018-01-10 NOTE — PATIENT INSTRUCTIONS

## 2018-01-10 NOTE — MR AVS SNAPSHOT
Visit Information Date & Time Provider Department Dept. Phone Encounter #  
 1/10/2018 10:50 AM Eric [facility] [phone] [id number] Follow-up Instructions Return in about 6 weeks (around 2/21/2018). Upcoming Health Maintenance Date Due  
 GLAUCOMA SCREENING Q2Y 11/23/2011 FOBT Q 1 YEAR AGE 50-75 9/2/2015 BREAST CANCER SCRN MAMMOGRAM 4/27/2018 MEDICARE YEARLY EXAM 9/28/2018 DTaP/Tdap/Td series (2 - Td) 9/27/2027 Allergies as of 1/10/2018  Review Complete On: 1/10/2018 By: Ben Del Toro RN Severity Noted Reaction Type Reactions Levaquin [Levofloxacin] High 04/09/2013    Anaphylaxis Throat and face became swollen. Ace Inhibitors  04/09/2013    Vertigo Hydrochlorothiazide  07/31/2013   Intolerance Other (comments) Hypotension and severe hyponatremia. Penicillins  04/09/2013    Hives While pregnant Current Immunizations  Reviewed on 1/24/2017 Name Date Influenza Vaccine 11/3/2014, 10/31/2014, 11/22/2011, 11/9/2010, 1/21/2010 Influenza Vaccine (Quad) PF 10/31/2017, 10/26/2016, 11/11/2015 Pneumococcal Polysaccharide (PPSV-23) 9/30/2014, 9/22/2014  1:37 PM  
 Varicella Virus Vaccine 5/5/2016 Not reviewed this visit You Were Diagnosed With   
  
 Codes Comments Essential hypertension    -  Primary ICD-10-CM: I10 
ICD-9-CM: 401.9 Gastroesophageal reflux disease, esophagitis presence not specified     ICD-10-CM: K21.9 ICD-9-CM: 530.81 Vitals BP Pulse Temp Resp Weight(growth percentile) BMI  
 162/76 (BP 1 Location: Right arm, BP Patient Position: Sitting) 73 97.6 °F (36.4 °C) (Oral) 18 141 lb 3.2 oz (64 kg) 26.68 kg/m2 OB Status Smoking Status Postmenopausal Former Smoker Vitals History BMI and BSA Data Body Mass Index Body Surface Area  
 26.68 kg/m 2 1.66 m 2 Preferred Pharmacy Pharmacy Name Phone [phone] [facility] Audie Hernandez [phone] Your Updated Medication List  
  
   
This list is accurate as of: 1/10/18 11:52 AM.  Always use your most recent med list. amLODIPine 10 mg tablet Commonly known as:  Lucie Callander TAKE 1 TABLET BY MOUTH EVERY DAY  
  
 aspirin 325 mg tablet Commonly known as:  ASPIRIN Take 1 tablet by mouth daily. doxepin 10 mg capsule Commonly known as:  SINEquan Take 1 Cap by mouth nightly. furosemide 40 mg tablet Commonly known as:  LASIX TAKE 1 TABLET BY MOUTH EVERY DAY  
  
 loratadine 10 mg tablet Commonly known as:  Andi Or Take 1 Tab by mouth daily. metoprolol tartrate 50 mg tablet Commonly known as:  LOPRESSOR Take 1 Tab by mouth two (2) times a day. multivitamin tablet Commonly known as:  ONE A DAY Take 1 Tab by mouth daily. omeprazole 20 mg capsule Commonly known as:  PRILOSEC  
TAKE 1 CAPSULE BY MOUTH EVERY DAY  
  
 potassium chloride SR 10 mEq tablet Commonly known as:  KLOR-CON 10  
TAKE 1 TABLET BY MOUTH TWICE DAILY  
  
 sodium chloride 1 gram tablet TAKE 2 TABLETS BY MOUTH THREE TIMES DAILY WITH MEALS  
  
 traMADol 50 mg tablet Commonly known as:  ULTRAM  
TAKE 1 TABLET BY MOUTH EVERY 6 HOURS AS NEEDED FOR PAIN Prescriptions Sent to Pharmacy Refills  
 omeprazole (PRILOSEC) 20 mg capsule 5 Sig: TAKE 1 CAPSULE BY MOUTH EVERY DAY Class: Normal  
 Pharmacy: [facility] [address] [place] [address] #: [phone] Follow-up Instructions Return in about 6 weeks (around 2/21/2018). Patient Instructions DASH Diet: Care Instructions Your Care Instructions The DASH diet is an eating plan that can help lower your blood pressure. DASH stands for Dietary Approaches to Stop Hypertension. Hypertension is high blood pressure. The DASH diet focuses on eating foods that are high in calcium, potassium, and magnesium. These nutrients can lower blood pressure. The foods that are highest in these nutrients are fruits, vegetables, low-fat dairy products, nuts, seeds, and legumes. But taking calcium, potassium, and magnesium supplements instead of eating foods that are high in those nutrients does not have the same effect. The DASH diet also includes whole grains, fish, and poultry. The DASH diet is one of several lifestyle changes your doctor may recommend to lower your high blood pressure. Your doctor may also want you to decrease the amount of sodium in your diet. Lowering sodium while following the DASH diet can lower blood pressure even further than just the DASH diet alone. Follow-up care is a key part of your treatment and safety. Be sure to make and go to all appointments, and call your doctor if you are having problems. It's also a good idea to know your test results and keep a list of the medicines you take. How can you care for yourself at home? Following the DASH diet · Eat 4 to 5 servings of fruit each day. A serving is 1 medium-sized piece of fruit, ½ cup chopped or canned fruit, 1/4 cup dried fruit, or 4 ounces (½ cup) of fruit juice. Choose fruit more often than fruit juice. · Eat 4 to 5 servings of vegetables each day. A serving is 1 cup of lettuce or raw leafy vegetables, ½ cup of chopped or cooked vegetables, or 4 ounces (½ cup) of vegetable juice. Choose vegetables more often than vegetable juice. · Get 2 to 3 servings of low-fat and fat-free dairy each day. A serving is 8 ounces of milk, 1 cup of yogurt, or 1 ½ ounces of cheese. · Eat 6 to 8 servings of grains each day. A serving is 1 slice of bread, 1 ounce of dry cereal, or ½ cup of cooked rice, pasta, or cooked cereal. Try to choose whole-grain products as much as possible. · Limit lean meat, poultry, and fish to 2 servings each day.  A serving is 3 ounces, about the size of a deck of cards. · Eat 4 to 5 servings of nuts, seeds, and legumes (cooked dried beans, lentils, and split peas) each week. A serving is 1/3 cup of nuts, 2 tablespoons of seeds, or ½ cup of cooked beans or peas. · Limit fats and oils to 2 to 3 servings each day. A serving is 1 teaspoon of vegetable oil or 2 tablespoons of salad dressing. · Limit sweets and added sugars to 5 servings or less a week. A serving is 1 tablespoon jelly or jam, ½ cup sorbet, or 1 cup of lemonade. · Eat less than 2,300 milligrams (mg) of sodium a day. If you limit your sodium to 1,500 mg a day, you can lower your blood pressure even more. Tips for success · Start small. Do not try to make dramatic changes to your diet all at once. You might feel that you are missing out on your favorite foods and then be more likely to not follow the plan. Make small changes, and stick with them. Once those changes become habit, add a few more changes. · Try some of the following: ¨ Make it a goal to eat a fruit or vegetable at every meal and at snacks. This will make it easy to get the recommended amount of fruits and vegetables each day. ¨ Try yogurt topped with fruit and nuts for a snack or healthy dessert. ¨ Add lettuce, tomato, cucumber, and onion to sandwiches. ¨ Combine a ready-made pizza crust with low-fat mozzarella cheese and lots of vegetable toppings. Try using tomatoes, squash, spinach, broccoli, carrots, cauliflower, and onions. ¨ Have a variety of cut-up vegetables with a low-fat dip as an appetizer instead of chips and dip. ¨ Sprinkle sunflower seeds or chopped almonds over salads. Or try adding chopped walnuts or almonds to cooked vegetables. ¨ Try some vegetarian meals using beans and peas. Add garbanzo or kidney beans to salads. Make burritos and tacos with mashed jones beans or black beans. Where can you learn more? Go to http://webster-gera.info/. Enter J157 in the search box to learn more about \"DASH Diet: Care Instructions. \" Current as of: September 21, 2016 Content Version: 11.4 © 0028-9160 Healthwise, [employer]. Care instructions adapted under license by [employer] (which disclaims liability or warranty for this information). If you have questions about a medical condition or this instruction, always ask your healthcare professional. [address] any warranty or liability for your use of this information. Introducing [facility]! Anabel Darling introduces woohoo mobile marketing patient portal. Now you can access parts of your medical record, email your doctor's office, and request medication refills online. 1. In your internet browser, go to [email or web address] 2. Click on the First Time User? Click Here link in the Sign In box. You will see the New Member Sign Up page. 3. Enter your woohoo mobile marketing Access Code exactly as it appears below. You will not need to use this code after youve completed the sign-up process. If you do not sign up before the expiration date, you must request a new code. · woohoo mobile marketing Access Code: [email or web address] Expires: 4/10/2018 11:52 AM 
 
4. Enter the last four digits of your Social Security Number (xxxx) and Date of Birth (mm/dd/yyyy) as indicated and click Submit. You will be taken to the next sign-up page. 5. Create a woohoo mobile marketing ID. This will be your woohoo mobile marketing login ID and cannot be changed, so think of one that is secure and easy to remember. 6. Create a woohoo mobile marketing password. You can change your password at any time. 7. Enter your Password Reset Question and Answer. This can be used at a later time if you forget your password. 8. Enter your e-mail address. You will receive e-mail notification when new information is available in [address] 9. Click Sign Up. You can now view and download portions of your medical record. 10. Click the Download Summary menu link to download a portable copy of your medical information. If you have questions, please visit the Frequently Asked Questions section of the Leaders2020 website. Remember, Leaders2020 is NOT to be used for urgent needs. For medical emergencies, dial 911. Now available from your iPhone and Android! Please provide this summary of care documentation to your next provider. Your primary care clinician is listed as Katesvetlana Henry. Millicent Allison. If you have any questions after today's visit, please call [phone].

## 2018-01-10 NOTE — PROGRESS NOTES
Subjective:     Chief Complaint   Patient presents with    Follow-up     BP        She  is a 71 y.o. female who presents for evaluation of HTN and SIADH. Since LOV, Pt reports tolerating new regimen. Pt has not been checking BPs at home r/t holidays. Sleep is improved since starting low dose Doxepin. No other new health concerns nor S&S. Takes Lasix and omeprazole PRN. Is almost out of refills r/t prior Rx expiring. Confirms she took both Rx this AM, approx 1 1/2 hours before appt. Does report poor sleep last night. No other acute concerns today. ROS  Gen - no fever/chills  Resp - no dyspnea or cough  CV - no chest pain or DUMONT  Rest per HPI    Past Medical History:   Diagnosis Date    History of mammogram 10 years ago    Hypertension     Hyponatremia 09/11/2014    hospitalized for severe hyponatremia due to SIADH    Menopause     at age 37    Pap smear for cervical cancer screening 15 years ago     Past Surgical History:   Procedure Laterality Date    HX CATARACT REMOVAL Right 1/14/2014    HX CHOLECYSTECTOMY      HX THROMBECTOMY Right     leg    HX TUBAL LIGATION       Current Outpatient Prescriptions on File Prior to Visit   Medication Sig Dispense Refill    metoprolol tartrate (LOPRESSOR) 50 mg tablet Take 1 Tab by mouth two (2) times a day. 60 Tab 5    amLODIPine (NORVASC) 10 mg tablet TAKE 1 TABLET BY MOUTH EVERY DAY 30 Tab 5    doxepin (SINEQUAN) 10 mg capsule Take 1 Cap by mouth nightly. 30 Cap 5    traMADol (ULTRAM) 50 mg tablet TAKE 1 TABLET BY MOUTH EVERY 6 HOURS AS NEEDED FOR PAIN 30 Tab 1    loratadine (CLARITIN) 10 mg tablet Take 1 Tab by mouth daily.  30 Tab 6    potassium chloride SR (KLOR-CON 10) 10 mEq tablet TAKE 1 TABLET BY MOUTH TWICE DAILY 180 Tab 1    sodium chloride 1 gram tablet TAKE 2 TABLETS BY MOUTH THREE TIMES DAILY WITH MEALS 180 Tab 5    furosemide (LASIX) 40 mg tablet TAKE 1 TABLET BY MOUTH EVERY DAY 90 Tab 3    omeprazole (PRILOSEC) 20 mg capsule TAKE 1 CAPSULE BY MOUTH EVERY DAY 30 Cap 5    aspirin (ASPIRIN) 325 mg tablet Take 1 tablet by mouth daily. 30 tablet 0    multivitamin (ONE A DAY) tablet Take 1 Tab by mouth daily. No current facility-administered medications on file prior to visit. Objective:     Vitals:    01/10/18 1051 01/10/18 1055   BP: 146/66 162/76   Pulse: 71 73   Resp: 18    Temp: 97.6 °F (36.4 °C)    TempSrc: Oral    Weight: 141 lb 3.2 oz (64 kg)        Physical Examination:  General appearance - alert, well appearing, and in no distress  Eyes -sclera anicteric  Neck - supple, no significant adenopathy, no thyromegaly  Chest - clear to auscultation, no wheezes, rales or rhonchi, symmetric air entry  Heart - normal rate, regular rhythm, normal S1, S2, no murmurs, rubs, clicks or gallops  Neurological - alert, oriented, no focal findings or movement disorder noted    Manual BP, L Arm:  160/85    Assessment/ Plan:   Diagnoses and all orders for this visit:    1. Essential hypertension    2. Gastroesophageal reflux disease, esophagitis presence not specified  -     omeprazole (PRILOSEC) 20 mg capsule; TAKE 1 CAPSULE BY MOUTH EVERY DAY      ? Elevation r/t sleep vs poorly controlled HTN on new regimen? Advised Pt to check BPs 2-3x week for the next month and bring log to NOV. If > 150s consistently, urged Pt to check BP more frequently and/or call clinic for HTN guidance. Check labs at [facility]. Refill Prilosec per Pt request.       I have discussed the diagnosis with the patient and the intended plan as seen in the above orders. The patient has received an after-visit summary and questions were answered concerning future plans. I have discussed medication side effects and warnings with the patient as well. The patient verbalizes understanding and agreement with the plan. Follow-up Disposition:  Return in about 6 weeks (around 2/21/2018).

## 2018-01-10 NOTE — PROGRESS NOTES
Coordination of Care  1. Have you been to the ER, urgent care clinic since your last visit? Hospitalized since your last visit? No    2. Have you seen or consulted any other health care providers outside of the [address] [place] [address] since your last visit? Include any pap smears or colon screening. No    Medications  Does the patient need refills? YES    Learning Assessment Complete?  yes

## 2018-02-05 RX ORDER — SODIUM CHLORIDE TAB 1 GM 1 G
TAB MISCELLANEOUS
Qty: 180 TAB | Refills: 0 | Status: SHIPPED | OUTPATIENT
Start: 2018-02-05 | End: 2018-03-12 | Stop reason: SDUPTHER

## 2018-03-12 RX ORDER — SODIUM CHLORIDE TAB 1 GM 1 G
TAB MISCELLANEOUS
Qty: 180 TAB | Refills: 0 | Status: SHIPPED | OUTPATIENT
Start: 2018-03-12 | End: 2018-04-09 | Stop reason: SDUPTHER

## 2018-04-09 RX ORDER — SODIUM CHLORIDE TAB 1 GM 1 G
TAB MISCELLANEOUS
Qty: 180 TAB | Refills: 0 | Status: SHIPPED | OUTPATIENT
Start: 2018-04-09 | End: 2018-05-08 | Stop reason: SDUPTHER

## 2018-05-09 RX ORDER — SODIUM CHLORIDE TAB 1 GM 1 G
TAB MISCELLANEOUS
Qty: 180 TAB | Refills: 0 | Status: SHIPPED | OUTPATIENT
Start: 2018-05-09 | End: 2018-06-06 | Stop reason: SDUPTHER

## 2018-06-06 DIAGNOSIS — I10 ESSENTIAL HYPERTENSION: ICD-10-CM

## 2018-06-06 RX ORDER — AMLODIPINE BESYLATE 10 MG/1
TABLET ORAL
Qty: 30 TAB | Refills: 0 | Status: SHIPPED | OUTPATIENT
Start: 2018-06-06 | End: 2018-07-10 | Stop reason: SDUPTHER

## 2018-06-06 RX ORDER — SODIUM CHLORIDE TAB 1 GM 1 G
TAB MISCELLANEOUS
Qty: 180 TAB | Refills: 0 | Status: SHIPPED | OUTPATIENT
Start: 2018-06-06 | End: 2018-07-10 | Stop reason: SDUPTHER

## 2018-07-10 DIAGNOSIS — I10 ESSENTIAL HYPERTENSION: ICD-10-CM

## 2018-07-11 RX ORDER — SODIUM CHLORIDE TAB 1 GM 1 G
TAB MISCELLANEOUS
Qty: 180 TAB | Refills: 0 | Status: SHIPPED | OUTPATIENT
Start: 2018-07-11 | End: 2018-08-08 | Stop reason: SDUPTHER

## 2018-07-11 RX ORDER — AMLODIPINE BESYLATE 10 MG/1
TABLET ORAL
Qty: 30 TAB | Refills: 0 | Status: SHIPPED | OUTPATIENT
Start: 2018-07-11 | End: 2018-08-08 | Stop reason: SDUPTHER

## 2018-08-08 DIAGNOSIS — I10 ESSENTIAL HYPERTENSION: ICD-10-CM

## 2018-08-09 DIAGNOSIS — I10 ESSENTIAL HYPERTENSION: ICD-10-CM

## 2018-08-09 RX ORDER — SODIUM CHLORIDE TAB 1 GM 1 G
TAB MISCELLANEOUS
Qty: 180 TAB | Refills: 0 | Status: SHIPPED | OUTPATIENT
Start: 2018-08-09 | End: 2018-09-06 | Stop reason: SDUPTHER

## 2018-08-09 RX ORDER — AMLODIPINE BESYLATE 10 MG/1
TABLET ORAL
Qty: 30 TAB | Refills: 0 | Status: SHIPPED | OUTPATIENT
Start: 2018-08-09 | End: 2018-08-09 | Stop reason: SDUPTHER

## 2018-08-10 ENCOUNTER — OFFICE VISIT (OUTPATIENT)
Dept: FAMILY MEDICINE CLINIC | Age: 73
End: 2018-08-10

## 2018-08-10 VITALS
TEMPERATURE: 97.9 F | WEIGHT: 126 LBS | HEART RATE: 71 BPM | DIASTOLIC BLOOD PRESSURE: 69 MMHG | BODY MASS INDEX: 23.81 KG/M2 | SYSTOLIC BLOOD PRESSURE: 150 MMHG

## 2018-08-10 DIAGNOSIS — E22.2 SIADH (SYNDROME OF INAPPROPRIATE ADH PRODUCTION) (HCC): Primary | ICD-10-CM

## 2018-08-10 RX ORDER — AMLODIPINE BESYLATE 10 MG/1
TABLET ORAL
Qty: 30 TAB | Refills: 0 | Status: SHIPPED | OUTPATIENT
Start: 2018-08-10 | End: 2018-09-06 | Stop reason: SDUPTHER

## 2018-08-10 NOTE — PROGRESS NOTES
Coordination of Care  1. Have you been to the ER, urgent care clinic since your last visit? Hospitalized since your last visit? No    2. Have you seen or consulted any other health care providers outside of the [address] [place] [address] since your last visit? Include any pap smears or colon screening. No    Does the patient need refills? YES    Learning Assessment Complete?  yes  Depression Screening complete in the past 12 months? yes

## 2018-08-10 NOTE — MR AVS SNAPSHOT
[address] [address] [phone] Patient: Ilana Rodarte MRN:  [id number] Visit Information Date & Time Provider Department Dept. Phone Encounter #  
 8/10/2018  1:15 PM Dariana Yadav Eric [facility] [phone] [id number] Follow-up Instructions Return in about 6 months (around 2/10/2019). Upcoming Health Maintenance Date Due  
 GLAUCOMA SCREENING Q2Y 2011 FOBT Q 1 YEAR AGE 50-75 2015 BREAST CANCER SCRN MAMMOGRAM 2018 Influenza Age 5 to Adult 2018 MEDICARE YEARLY EXAM 2018 DTaP/Tdap/Td series (2 - Td) 2027 Allergies as of 8/10/2018  Review Complete On: 8/10/2018 By: Dariana Yadav NP Severity Noted Reaction Type Reactions Levaquin [Levofloxacin] High 2013    Anaphylaxis Throat and face became swollen. Ace Inhibitors  2013    Vertigo Hydrochlorothiazide  2013   Intolerance Other (comments) Hypotension and severe hyponatremia. Penicillins  2013    Hives While pregnant Current Immunizations  Reviewed on 2017 Name Date Influenza Vaccine 11/3/2014, 10/31/2014, 2011, 2010, 2010 Influenza Vaccine (Quad) PF 10/31/2017, 10/26/2016, 2015 Pneumococcal Polysaccharide (PPSV-23) 2014, 2014  1:37 PM  
 Varicella Virus Vaccine 2016 Not reviewed this visit You Were Diagnosed With   
  
 Codes Comments SIADH (syndrome of inappropriate ADH production) ([facility]    -  Primary ICD-10-CM: E22.2 ICD-9-CM: 253.6 Vitals BP Pulse Temp Weight(growth percentile) BMI OB Status 150/69 (BP 1 Location: Left arm, BP Patient Position: Sitting) 71 97.9 °F (36.6 °C) (Oral) 126 lb (57.2 kg) 23.81 kg/m2 Postmenopausal  
 Smoking Status Former Smoker Vitals History BMI and BSA Data Body Mass Index Body Surface Area 23.81 kg/m 2 1.57 m 2 Preferred Pharmacy Pharmacy Name Phone [phone] [facility] Audie Hernandez [phone] Your Updated Medication List  
  
   
This list is accurate as of 8/10/18  1:42 PM.  Always use your most recent med list. amLODIPine 10 mg tablet Commonly known as:  Simona Carole TAKE 1 TABLET BY MOUTH EVERY DAY  
  
 aspirin 325 mg tablet Commonly known as:  ASPIRIN Take 1 tablet by mouth daily. doxepin 10 mg capsule Commonly known as:  SINEquan Take 1 Cap by mouth nightly. furosemide 40 mg tablet Commonly known as:  LASIX TAKE 1 TABLET BY MOUTH EVERY DAY  
  
 loratadine 10 mg tablet Commonly known as:  Hiram Cortes Take 1 Tab by mouth daily. metoprolol tartrate 50 mg tablet Commonly known as:  LOPRESSOR  
TAKE 1 TABLET BY MOUTH TWICE DAILY  
  
 multivitamin tablet Commonly known as:  ONE A DAY Take 1 Tab by mouth daily. omeprazole 20 mg capsule Commonly known as:  PRILOSEC  
TAKE 1 CAPSULE BY MOUTH EVERY DAY  
  
 potassium chloride SR 10 mEq tablet Commonly known as:  KLOR-CON 10  
TAKE 1 TABLET BY MOUTH TWICE DAILY  
  
 sodium chloride 1 gram tablet TAKE 2 TABLETS BY MOUTH THREE TIMES DAILY WITH FOOD  
  
 traMADol 50 mg tablet Commonly known as:  ULTRAM  
TAKE 1 TABLET BY MOUTH EVERY 6 HOURS AS NEEDED FOR PAIN Follow-up Instructions Return in about 6 months (around 2/10/2019). To-Do List   
 08/10/2018 Lab:  METABOLIC PANEL, BASIC Introducing [facility]! [facility] introduces Pharmalink patient portal. Now you can access parts of your medical record, email your doctor's office, and request medication refills online. 1. In your internet browser, go to [email or web address] 2. Click on the First Time User? Click Here link in the Sign In box. You will see the New Member Sign Up page. 3. Enter your TRELYS Access Code exactly as it appears below. You will not need to use this code after youve completed the sign-up process. If you do not sign up before the expiration date, you must request a new code. · TRELYS Access Code: [email or web address] Expires: 11/8/2018  1:42 PM 
 
4. Enter the last four digits of your Social Security Number (xxxx) and Date of Birth (mm/dd/yyyy) as indicated and click Submit. You will be taken to the next sign-up page. 5. Create a TRELYS ID. This will be your TRELYS login ID and cannot be changed, so think of one that is secure and easy to remember. 6. Create a TRELYS password. You can change your password at any time. 7. Enter your Password Reset Question and Answer. This can be used at a later time if you forget your password. 8. Enter your e-mail address. You will receive e-mail notification when new information is available in [address] 9. Click Sign Up. You can now view and download portions of your medical record. 10. Click the Download Summary menu link to download a portable copy of your medical information. If you have questions, please visit the Frequently Asked Questions section of the TRELYS website. Remember, TRELYS is NOT to be used for urgent needs. For medical emergencies, dial 911. Now available from your iPhone and Android! Please provide this summary of care documentation to your next provider. Your primary care clinician is listed as Mahin Pham. If you have any questions after today's visit, please call [phone].

## 2018-08-10 NOTE — PROGRESS NOTES
Assessment/Plan:       ICD-10-CM ICD-9-CM    1. SIADH (syndrome of inappropriate ADH production) ([facility]) Q67.2 948.2 METABOLIC PANEL, COMPREHENSIVE      CANCELED: METABOLIC PANEL, BASIC     Follow-up Disposition:  Return in about 6 months (around 2/10/2019). [facility] [address] [place] [address] - [address] [place] [id number]  Phone: [phone] Fax: [phone]      [facility]  Subjective:     Chief Complaint   Patient presents with    Hypertension     f/u   Judi Aly is a 71 y.o. WHITE OR  female. HPI:   She  has a past medical history of History of mammogram (10 years ago); Hypertension; Hyponatremia (09/11/2014); Menopause; and Pap smear for cervical cancer screening (15 years ago). Review of Systems: Negative for: fever, chest pain, shortness of breath, leg swelling, exertional dyspnea, palpitations. Current Medications:   Current Outpatient Prescriptions on File Prior to Visit   Medication Sig    amLODIPine (NORVASC) 10 mg tablet TAKE 1 TABLET BY MOUTH EVERY DAY    sodium chloride 1 gram tablet TAKE 2 TABLETS BY MOUTH THREE TIMES DAILY WITH FOOD    metoprolol tartrate (LOPRESSOR) 50 mg tablet TAKE 1 TABLET BY MOUTH TWICE DAILY    omeprazole (PRILOSEC) 20 mg capsule TAKE 1 CAPSULE BY MOUTH EVERY DAY    doxepin (SINEQUAN) 10 mg capsule Take 1 Cap by mouth nightly.  traMADol (ULTRAM) 50 mg tablet TAKE 1 TABLET BY MOUTH EVERY 6 HOURS AS NEEDED FOR PAIN    loratadine (CLARITIN) 10 mg tablet Take 1 Tab by mouth daily.  potassium chloride SR (KLOR-CON 10) 10 mEq tablet TAKE 1 TABLET BY MOUTH TWICE DAILY    furosemide (LASIX) 40 mg tablet TAKE 1 TABLET BY MOUTH EVERY DAY    aspirin (ASPIRIN) 325 mg tablet Take 1 tablet by mouth daily.  multivitamin (ONE A DAY) tablet Take 1 Tab by mouth daily. No current facility-administered medications on file prior to visit. FIT testing?   Social History: She  reports that she has quit smoking. Her smoking use included Cigarettes. She has a 40.00 pack-year smoking history. She quit smokeless tobacco use about 3 years ago. She reports that she drinks about 1.8 oz of alcohol per week  She reports that she does not use illicit drugs. Vodka 3 on Sat and Sun with oj and pineapple. It used to be considerably more than this. Objective:   1.5 miles a day, about 2 weeks ago. Vitals:    08/10/18 1311   BP: 150/69   Pulse: 71   Temp: 97.9 °F (36.6 °C)   TempSrc: Oral   Weight: 126 lb (57.2 kg)    No LMP recorded. Patient is postmenopausal.   Wt Readings from Last 2 Encounters:   08/10/18 126 lb (57.2 kg)   01/10/18 141 lb 3.2 oz (64 kg)     Wt Readings from Last 3 Encounters:   08/10/18 126 lb (57.2 kg)   01/10/18 141 lb 3.2 oz (64 kg)   12/04/17 140 lb (63.5 kg)   Physical Examination:  General appearance - well developed, no acute distress. Chest - clear to auscultation. Heart - regular rate and rhythm without murmurs, rubs, or gallops. Abdomen - bowel sounds present x 4, NT, ND. Extremities - pulses intact. No peripheral edema. Assessment/Plan:   Diagnoses and all orders for this visit:    1. SIADH (syndrome of inappropriate ADH production) ([facility] [address]  -     METABOLIC PANEL, COMPREHENSIVE    Follow-up Disposition:  Return in about 6 months (around 2/10/2019). OK for 6 months  Azalea Landin, MICHAEL, FNP-BC, BC-ADM  Dori Leal expressed understanding of this plan.

## 2018-08-11 LAB
ALBUMIN SERPL-MCNC: 4.4 G/DL (ref 3.5–4.8)
ALBUMIN/GLOB SERPL: 1.5 {RATIO} (ref 1.2–2.2)
ALP SERPL-CCNC: 128 IU/L (ref 39–117)
ALT SERPL-CCNC: 19 IU/L (ref 0–32)
AST SERPL-CCNC: 23 IU/L (ref 0–40)
BILIRUB SERPL-MCNC: 0.6 MG/DL (ref 0–1.2)
BUN SERPL-MCNC: 8 MG/DL (ref 8–27)
BUN/CREAT SERPL: 9 (ref 12–28)
CALCIUM SERPL-MCNC: 9.7 MG/DL (ref 8.7–10.3)
CHLORIDE SERPL-SCNC: 98 MMOL/L (ref 96–106)
CO2 SERPL-SCNC: 26 MMOL/L (ref 20–29)
CREAT SERPL-MCNC: 0.91 MG/DL (ref 0.57–1)
GLOBULIN SER CALC-MCNC: 3 G/DL (ref 1.5–4.5)
GLUCOSE SERPL-MCNC: 92 MG/DL (ref 65–99)
POTASSIUM SERPL-SCNC: 4.2 MMOL/L (ref 3.5–5.2)
PROT SERPL-MCNC: 7.4 G/DL (ref 6–8.5)
SODIUM SERPL-SCNC: 143 MMOL/L (ref 134–144)

## 2018-09-05 DIAGNOSIS — I10 ESSENTIAL HYPERTENSION: ICD-10-CM

## 2018-09-06 RX ORDER — SODIUM CHLORIDE TAB 1 GM 1 G
TAB MISCELLANEOUS
Qty: 180 TAB | Refills: 0 | Status: SHIPPED | OUTPATIENT
Start: 2018-09-06 | End: 2018-10-09 | Stop reason: SDUPTHER

## 2018-09-06 RX ORDER — AMLODIPINE BESYLATE 10 MG/1
TABLET ORAL
Qty: 30 TAB | Refills: 0 | Status: SHIPPED | OUTPATIENT
Start: 2018-09-06 | End: 2018-10-09 | Stop reason: SDUPTHER

## 2018-09-06 RX ORDER — METOPROLOL TARTRATE 50 MG/1
TABLET ORAL
Qty: 180 TAB | Refills: 0 | Status: SHIPPED | OUTPATIENT
Start: 2018-09-06 | End: 2018-10-30 | Stop reason: SDUPTHER

## 2018-09-06 NOTE — TELEPHONE ENCOUNTER
There are 3 separate refill request encounters for this pt. One is for pt's metoprolol filled by Jaskaran Manrique NP. One is for Amlodipine and one is for Sodium Chloride 1 gm tablets both filled by Dr. Margarita Lyn. Routing to Jaskaran Manrique NP for review. Pt had OV on 8/10/18 with LK and was told to return in 6 months. There was one RX sent on 8/10 by Dr. Margarita Lyn for pt's Amlodipine for #30. Pt is requesting 90 day supply for refills.  Christa Diallo RN

## 2018-09-07 RX ORDER — AMLODIPINE BESYLATE 10 MG/1
TABLET ORAL
Qty: 30 TAB | Refills: 0 | Status: SHIPPED | OUTPATIENT
Start: 2018-09-07 | End: 2018-10-13 | Stop reason: SDUPTHER

## 2018-09-07 NOTE — TELEPHONE ENCOUNTER
I refilled this for the pt but you saw her a few weeks ago and I just wanted to let you know.  (I am doing refills for Dr Herber Lopez and I have never met the pt)

## 2018-10-02 ENCOUNTER — OFFICE VISIT (OUTPATIENT)
Dept: URGENT CARE | Age: 73
End: 2018-10-02

## 2018-10-02 VITALS
TEMPERATURE: 97.6 F | SYSTOLIC BLOOD PRESSURE: 184 MMHG | HEART RATE: 106 BPM | HEIGHT: 62 IN | RESPIRATION RATE: 20 BRPM | BODY MASS INDEX: 22.26 KG/M2 | OXYGEN SATURATION: 95 % | DIASTOLIC BLOOD PRESSURE: 85 MMHG | WEIGHT: 121 LBS

## 2018-10-02 DIAGNOSIS — R05.9 COUGH: ICD-10-CM

## 2018-10-02 DIAGNOSIS — J40 BRONCHITIS: Primary | ICD-10-CM

## 2018-10-02 RX ORDER — AZITHROMYCIN 250 MG/1
TABLET, FILM COATED ORAL
Qty: 6 TAB | Refills: 1 | Status: SHIPPED | OUTPATIENT
Start: 2018-10-02 | End: 2018-12-04 | Stop reason: ALTCHOICE

## 2018-10-02 RX ORDER — PREDNISONE 5 MG/1
5 TABLET ORAL DAILY
Qty: 21 TAB | Refills: 0 | Status: SHIPPED | OUTPATIENT
Start: 2018-10-02 | End: 2018-12-04 | Stop reason: ALTCHOICE

## 2018-10-02 RX ORDER — ALBUTEROL SULFATE 90 UG/1
1 AEROSOL, METERED RESPIRATORY (INHALATION)
Qty: 1 INHALER | Refills: 0 | Status: SHIPPED | OUTPATIENT
Start: 2018-10-02 | End: 2018-12-04 | Stop reason: ALTCHOICE

## 2018-10-02 RX ORDER — BENZONATATE 100 MG/1
100 CAPSULE ORAL
Qty: 21 CAP | Refills: 0 | Status: SHIPPED | OUTPATIENT
Start: 2018-10-02 | End: 2018-10-09

## 2018-10-02 NOTE — MR AVS SNAPSHOT
[place] [address] 
[phone] Patient: Vita Velazquez MRN: [id number] :1945 Visit Information Date & Time Provider Department Dept. Phone Encounter #  
 10/2/2018  9:30 AM [address] [facility] [phone] [id number] Upcoming Health Maintenance Date Due Shingrix Vaccine Age 50> (1 of 2) 1996 GLAUCOMA SCREENING Q2Y 2011 FOBT Q 1 YEAR AGE 50-75 2015 BREAST CANCER SCRN MAMMOGRAM 2018 Influenza Age 5 to Adult 2018 MEDICARE YEARLY EXAM 2018 DTaP/Tdap/Td series (2 - Td) 2027 Allergies as of 10/2/2018  Review Complete On: 10/2/2018 By: Bill Nugent DO Severity Noted Reaction Type Reactions Levaquin [Levofloxacin] High 2013    Anaphylaxis Throat and face became swollen. Ace Inhibitors  2013    Vertigo Hydrochlorothiazide  2013   Intolerance Other (comments) Hypotension and severe hyponatremia. Penicillins  2013    Hives While pregnant Current Immunizations  Reviewed on 2017 Name Date Influenza Vaccine 11/3/2014, 10/31/2014, 2011, 2010, 2010 Influenza Vaccine (Quad) PF 10/31/2017, 10/26/2016, 2015 Pneumococcal Polysaccharide (PPSV-23) 2014, 2014  1:37 PM  
 Varicella Virus Vaccine 2016 Not reviewed this visit You Were Diagnosed With   
  
 Codes Comments Bronchitis    -  Primary ICD-10-CM: F35 ICD-9-CM: 705 Cough     ICD-10-CM: R05 ICD-9-CM: 965. 2 Vitals BP Pulse Temp Resp Height(growth percentile) Weight(growth percentile) 184/85 (!) 106 97.6 °F (36.4 °C) 20 5' 2\" (1.575 m) 121 lb (54.9 kg) SpO2 BMI OB Status Smoking Status 95% 22.13 kg/m2 Postmenopausal Former Smoker Vitals History BMI and BSA Data  Body Mass Index Body Surface Area  
 22.13 kg/m 2 1.55 m 2  
  
  
 Preferred Pharmacy Pharmacy Name Phone [address] [facility] Audie Hernandez [phone] Your Updated Medication List  
  
   
This list is accurate as of 10/2/18 10:18 AM.  Always use your most recent med list.  
  
  
  
  
 albuterol 90 mcg/actuation inhaler Commonly known as:  PROVENTIL HFA, VENTOLIN HFA, PROAIR HFA Take 1 Puff by inhalation every four (4) hours as needed for Wheezing. * amLODIPine 10 mg tablet Commonly known as:  Lilly Pee TAKE 1 TABLET BY MOUTH EVERY DAY  
  
 * amLODIPine 10 mg tablet Commonly known as:  Lilly Pee TAKE 1 TABLET BY MOUTH EVERY DAY  
  
 aspirin 325 mg tablet Commonly known as:  ASPIRIN Take 1 tablet by mouth daily. azithromycin 250 mg tablet Commonly known as:  Serg La Take as directed but follow first pack with the second taking one pill daily until the second pack is complete  
  
 benzonatate 100 mg capsule Commonly known as:  TESSALON PERLES Take 1 Cap by mouth three (3) times daily as needed for Cough for up to 7 days. doxepin 10 mg capsule Commonly known as:  SINEquan Take 1 Cap by mouth nightly. furosemide 40 mg tablet Commonly known as:  LASIX TAKE 1 TABLET BY MOUTH EVERY DAY  
  
 inhalational spacing device 1 Each by Does Not Apply route as needed. loratadine 10 mg tablet Commonly known as:  Karn Justus Take 1 Tab by mouth daily. metoprolol tartrate 50 mg tablet Commonly known as:  LOPRESSOR  
TAKE 1 TABLET BY MOUTH TWICE DAILY  
  
 multivitamin tablet Commonly known as:  ONE A DAY Take 1 Tab by mouth daily. omeprazole 20 mg capsule Commonly known as:  PRILOSEC  
TAKE 1 CAPSULE BY MOUTH EVERY DAY  
  
 potassium chloride SR 10 mEq tablet Commonly known as:  KLOR-CON 10  
TAKE 1 TABLET BY MOUTH TWICE DAILY predniSONE 5 mg tablet Commonly known as:  Michelle Eugene  
 Take 1 Tab by mouth daily. Take 6 pills on day 1, 5 pills on day 2, 4 pills on day 3, 3 pills on day 4, 2 pills on day 5 and one pill on day 6  
  
 sodium chloride 1 gram tablet TAKE 2 TABLETS BY MOUTH THREE TIMES DAILY WITH FOOD  
  
 traMADol 50 mg tablet Commonly known as:  ULTRAM  
TAKE 1 TABLET BY MOUTH EVERY 6 HOURS AS NEEDED FOR PAIN  
  
 * Notice: This list has 2 medication(s) that are the same as other medications prescribed for you. Read the directions carefully, and ask your doctor or other care provider to review them with you. Prescriptions Printed Refills  
 predniSONE (DELTASONE) 5 mg tablet 0 Sig: Take 1 Tab by mouth daily. Take 6 pills on day 1, 5 pills on day 2, 4 pills on day 3, 3 pills on day 4, 2 pills on day 5 and one pill on day 6 Class: Print Route: Oral  
  
Prescriptions Sent to Pharmacy Refills  
 albuterol (PROVENTIL HFA, VENTOLIN HFA, PROAIR HFA) 90 mcg/actuation inhaler 0 Sig: Take 1 Puff by inhalation every four (4) hours as needed for Wheezing. Class: Normal  
 [facility]acy: [facility] [address] Ph #: [phone] Route: Inhalation  
 inhalational spacing device 0 Si Each by Does Not Apply route as needed. Class: Normal  
 Pharmacy: [facility] [address] Ph #: [phone] Route: Does Not Apply  
 benzonatate (TESSALON PERLES) 100 mg capsule 0 Sig: Take 1 Cap by mouth three (3) times daily as needed for Cough for up to 7 days. Class: Normal  
 Pharmacy: [facility] [address] Ph #: [phone] Route: Oral  
 azithromycin (ZITHROMAX Z-DOMINICK) 250 mg tablet 1 Sig: Take as directed but follow first pack with the second taking one pill daily until the second pack is complete  Class: Normal  
 Pharmacy: [facility] [address] [place] [address] - [address] [facility] #: [phone] To-Do List   
 10/02/2018 Imaging:  XR CHEST PA LAT Patient Instructions Rest and seek medical care for increased problems, any questions or concern including but not limited to the ones discussed with you here today. You need to seek immediate emergency evaluation for any increased or persistent symptoms. At a minimum, you need a 2 day recheck as your original oxygen level here was low. Stop smoking. Bronchitis: Care Instructions Your Care Instructions Bronchitis is inflammation of the bronchial tubes, which carry air to the lungs. The tubes swell and produce mucus, or phlegm. The mucus and inflamed bronchial tubes make you cough. You may have trouble breathing. Most cases of bronchitis are caused by viruses like those that cause colds. Antibiotics usually do not help and they may be harmful. Bronchitis usually develops rapidly and lasts about 2 to 3 weeks in otherwise healthy people. Follow-up care is a key part of your treatment and safety. Be sure to make and go to all appointments, and call your doctor if you are having problems. It's also a good idea to know your test results and keep a list of the medicines you take. How can you care for yourself at home? · Take all medicines exactly as prescribed. Call your doctor if you think you are having a problem with your medicine. · Get some extra rest. 
· Take an over-the-counter pain medicine, such as acetaminophen (Tylenol), ibuprofen (Advil, Motrin), or naproxen (Aleve) to reduce fever and relieve body aches. Read and follow all instructions on the label. · Do not take two or more pain medicines at the same time unless the doctor told you to. Many pain medicines have acetaminophen, which is Tylenol. Too much acetaminophen (Tylenol) can be harmful. · Take an over-the-counter cough medicine that contains dextromethorphan to help quiet a dry, hacking cough so that you can sleep. Avoid cough medicines that have more than one active ingredient. Read and follow all instructions on the label. · Breathe moist air from a humidifier, hot shower, or sink filled with hot water. The heat and moisture will thin mucus so you can cough it out. · Do not smoke. Smoking can make bronchitis worse. If you need help quitting, talk to your doctor about stop-smoking programs and medicines. These can increase your chances of quitting for good. When should you call for help? Call 911 anytime you think you may need emergency care. For example, call if: 
  · You have severe trouble breathing.  
 Call your doctor now or seek immediate medical care if: 
  · You have new or worse trouble breathing.  
  · You cough up dark brown or bloody mucus (sputum).  
  · You have a new or higher fever.  
  · You have a new rash.  
 Watch closely for changes in your health, and be sure to contact your doctor if: 
  · You cough more deeply or more often, especially if you notice more mucus or a change in the color of your mucus.  
  · You are not getting better as expected. Where can you learn more? Go to http://eleanor-gera.info/. Enter H333 in the search box to learn more about \"Bronchitis: Care Instructions. \" Current as of: December 6, 2017 Content Version: 11.7 © 4594-2028 [employer]. Care instructions adapted under license by [employer] (which disclaims liability or warranty for this information). If you have questions about a medical condition or this instruction, always ask your healthcare professional. [address] any warranty or liability for your use of this information. Introducing [facility]!    
 [facility] [employer] introduces [employer] patient portal. Now you can access parts of your medical record, email your doctor's office, and request medication refills online. 1. In your internet browser, go to [email or web address]. [email or web address] 2. Click on the First Time User? Click Here link in the Sign In box. You will see the New Member Sign Up page. 3. Enter your [employer] Access Code exactly as it appears below. You will not need to use this code after youve completed the sign-up process. If you do not sign up before the expiration date, you must request a new code. · [employer] Access Code: [email or web address] Expires: 11/8/2018  1:42 PM 
 
4. Enter the last four digits of your Social Security Number (xxxx) and Date of Birth (mm/dd/yyyy) as indicated and click Submit. You will be taken to the next sign-up page. 5. Create a [employer] ID. This will be your [employer] login ID and cannot be changed, so think of one that is secure and easy to remember. 6. Create a [employer] password. You can change your password at any time. 7. Enter your Password Reset Question and Answer. This can be used at a later time if you forget your password. 8. Enter your e-mail address. You will receive e-mail notification when new information is available in [address] 9. Click Sign Up. You can now view and download portions of your medical record. 10. Click the Download Summary menu link to download a portable copy of your medical information. If you have questions, please visit the Frequently Asked Questions section of the [employer] website. Remember, [employer] is NOT to be used for urgent needs. For medical emergencies, dial 911. Now available from your iPhone and Android! Please provide this summary of care documentation to your next provider. Your primary care clinician is listed as Duyen Booker. Esa Demarco. If you have any questions after today's visit, please call [phone].

## 2018-10-02 NOTE — PROGRESS NOTES
Patient is a 71 y.o. female presenting with cold symptoms. The history is provided by the patient. Cold Symptoms   The history is provided by the patient. This is a new problem. The current episode started more than 2 days ago (6 days of cold and then progressed to her chest). The problem occurs constantly. The problem has been gradually worsening. The cough is non-productive. There has been no fever. Associated symptoms include rhinorrhea and wheezing (some on and off-none now). Pertinent negatives include no chest pain, no sore throat, no shortness of breath, no nausea and no vomiting. She has tried decongestants for the symptoms. The treatment provided no relief. She is a smoker. Her past medical history is significant for bronchitis. Past Medical History:   Diagnosis Date    History of mammogram 10 years ago    Hypertension     Hyponatremia 09/11/2014    hospitalized for severe hyponatremia due to SIADH    Menopause     at age 37    Pap smear for cervical cancer screening 15 years ago        Past Surgical History:   Procedure Laterality Date    HX CATARACT REMOVAL Right 1/14/2014    HX CHOLECYSTECTOMY      HX THROMBECTOMY Right     leg    HX TUBAL LIGATION           Family History   Problem Relation Age of Onset    Kidney Disease Mother      uncertain        Social History     Social History    Marital status:      Spouse name: N/A    Number of children: N/A    Years of education: N/A     Occupational History    Not on file. Social History Main Topics    Smoking status: Former Smoker     Packs/day: 1.00     Years: 40.00     Types: Cigarettes    Smokeless tobacco: Former User     Quit date: 9/11/2014      Comment: Quit smoking September 2014.     Alcohol use 1.8 oz/week     3 Standard drinks or equivalent per week    Drug use: No    Sexual activity: Not on file     Other Topics Concern    Not on file     Social History Narrative                ALLERGIES: Levaquin [levofloxacin]; Ace inhibitors; Hydrochlorothiazide; and Penicillins    Review of Systems   HENT: Positive for congestion, postnasal drip and rhinorrhea. Negative for sinus pressure and sore throat. Respiratory: Positive for cough, chest tightness (some on and off better with cough) and wheezing (some on and off-none now). Negative for shortness of breath. Cardiovascular: Negative for chest pain. Gastrointestinal: Negative for nausea and vomiting. Musculoskeletal: Negative. Neurological: Negative. Vitals:    10/02/18 0932 10/02/18 1016   BP: 184/85    Pulse: (!) 110 (!) 106   Resp: 20    Temp: 97.6 °F (36.4 °C)    SpO2: 95%    Weight: 121 lb (54.9 kg)    Height: 5' 2\" (1.575 m)        Physical Exam   Constitutional: She is oriented to person, place, and time. She appears well-developed and well-nourished. HENT:   Head: Normocephalic. Mouth/Throat: Oropharynx is clear and moist. No oropharyngeal exudate. PND, boggy nasal mucosa, no sinus pain   Eyes: Conjunctivae are normal.   Neck: Normal range of motion. Neck supple. No tracheal deviation present. No thyromegaly present. Cardiovascular: Regular rhythm and normal heart sounds. No murmur heard. Tachy 106   Pulmonary/Chest: Effort normal. No respiratory distress. She has no wheezes. She has no rales. She exhibits no tenderness. Decreased breath sounds thrroughout, coarse cough. Pt speakign in full sentences and with nonlabored respirations. Ambulatory   Musculoskeletal: Normal range of motion. She exhibits no edema or tenderness. Lymphadenopathy:     She has no cervical adenopathy. Neurological: She is alert and oriented to person, place, and time. Skin: Skin is warm. No erythema. Psychiatric: She has a normal mood and affect. Her behavior is normal.   Nursing note and vitals reviewed. MDM    Procedures                       Pt instructed to stop smoking and avoid decongestant cold medications.    She is aware of needing a follow up CT of her chest as the CXR was not normal. She verbalized understanding and will follow up. ICD-10-CM ICD-9-CM    1. Bronchitis J40 490    2. Cough R05 786.2 XR CHEST PA LAT     Medications Ordered Today   Medications    predniSONE (DELTASONE) 5 mg tablet     Sig: Take 1 Tab by mouth daily. Take 6 pills on day 1, 5 pills on day 2, 4 pills on day 3, 3 pills on day 4, 2 pills on day 5 and one pill on day 6     Dispense:  21 Tab     Refill:  0    albuterol (PROVENTIL HFA, VENTOLIN HFA, PROAIR HFA) 90 mcg/actuation inhaler     Sig: Take 1 Puff by inhalation every four (4) hours as needed for Wheezing. Dispense:  1 Inhaler     Refill:  0    inhalational spacing device     Si Each by Does Not Apply route as needed. Dispense:  1 Device     Refill:  0    benzonatate (TESSALON PERLES) 100 mg capsule     Sig: Take 1 Cap by mouth three (3) times daily as needed for Cough for up to 7 days. Dispense:  21 Cap     Refill:  0    azithromycin (ZITHROMAX Z-DOMINICK) 250 mg tablet     Sig: Take as directed but follow first pack with the second taking one pill daily until the second pack is complete     Dispense:  6 Tab     Refill:  1     The patients condition was discussed with the patient and they understand. The patient is to follow up with primary care doctor ,If signs and symptoms become worse the pt is to go to the ER. The patient is to take medications as prescribed.

## 2018-10-02 NOTE — PATIENT INSTRUCTIONS
Rest and seek medical care for increased problems, any questions or concern including but not limited to the ones discussed with you here today. You need to seek immediate emergency evaluation for any increased or persistent symptoms. At a minimum, you need a 2 day recheck as your original oxygen level here was low. Stop smoking. Bronchitis: Care Instructions  Your Care Instructions    Bronchitis is inflammation of the bronchial tubes, which carry air to the lungs. The tubes swell and produce mucus, or phlegm. The mucus and inflamed bronchial tubes make you cough. You may have trouble breathing. Most cases of bronchitis are caused by viruses like those that cause colds. Antibiotics usually do not help and they may be harmful. Bronchitis usually develops rapidly and lasts about 2 to 3 weeks in otherwise healthy people. Follow-up care is a key part of your treatment and safety. Be sure to make and go to all appointments, and call your doctor if you are having problems. It's also a good idea to know your test results and keep a list of the medicines you take. How can you care for yourself at home? · Take all medicines exactly as prescribed. Call your doctor if you think you are having a problem with your medicine. · Get some extra rest.  · Take an over-the-counter pain medicine, such as acetaminophen (Tylenol), ibuprofen (Advil, Motrin), or naproxen (Aleve) to reduce fever and relieve body aches. Read and follow all instructions on the label. · Do not take two or more pain medicines at the same time unless the doctor told you to. Many pain medicines have acetaminophen, which is Tylenol. Too much acetaminophen (Tylenol) can be harmful. · Take an over-the-counter cough medicine that contains dextromethorphan to help quiet a dry, hacking cough so that you can sleep. Avoid cough medicines that have more than one active ingredient. Read and follow all instructions on the label.   · Breathe moist air from a humidifier, hot shower, or sink filled with hot water. The heat and moisture will thin mucus so you can cough it out. · Do not smoke. Smoking can make bronchitis worse. If you need help quitting, talk to your doctor about stop-smoking programs and medicines. These can increase your chances of quitting for good. When should you call for help? Call 911 anytime you think you may need emergency care. For example, call if:    · You have severe trouble breathing.    Call your doctor now or seek immediate medical care if:    · You have new or worse trouble breathing.     · You cough up dark brown or bloody mucus (sputum).     · You have a new or higher fever.     · You have a new rash.    Watch closely for changes in your health, and be sure to contact your doctor if:    · You cough more deeply or more often, especially if you notice more mucus or a change in the color of your mucus.     · You are not getting better as expected. Where can you learn more? Go to http://eleanor-gera.info/. Enter H333 in the search box to learn more about \"Bronchitis: Care Instructions. \"  Current as of: December 6, 2017  Content Version: 11.7  © 8493-8325 [employer]. Care instructions adapted under license by [employer] (which disclaims liability or warranty for this information). If you have questions about a medical condition or this instruction, always ask your healthcare professional. [place] [address] any warranty or liability for your use of this information.

## 2018-10-09 DIAGNOSIS — E87.1 HYPONATREMIA: ICD-10-CM

## 2018-10-09 DIAGNOSIS — I10 ESSENTIAL HYPERTENSION: ICD-10-CM

## 2018-10-11 RX ORDER — AMLODIPINE BESYLATE 10 MG/1
TABLET ORAL
Qty: 30 TAB | Refills: 0 | Status: SHIPPED | OUTPATIENT
Start: 2018-10-11 | End: 2018-10-13

## 2018-10-11 RX ORDER — POTASSIUM CHLORIDE 750 MG/1
TABLET, FILM COATED, EXTENDED RELEASE ORAL
Qty: 60 TAB | Refills: 0 | Status: SHIPPED | OUTPATIENT
Start: 2018-10-11 | End: 2018-10-13

## 2018-10-11 RX ORDER — SODIUM CHLORIDE TAB 1 GM 1 G
TAB MISCELLANEOUS
Qty: 180 TAB | Refills: 0 | Status: SHIPPED | OUTPATIENT
Start: 2018-10-11 | End: 2018-10-13

## 2018-10-11 NOTE — TELEPHONE ENCOUNTER
Nurse spoke with Valeriy ORTA.P. Nurse received verbal order from N.P to approved for patient Amlodipine, Sodium and Potassium for 1 month supply no refills, verbal orders was repeated back to the provider. chart to be send to N. P. for further review. Nurse also spoke with patient, patient is very thankful that she will be able to  her medications but for the future would like to receive prescription until her follow up appt. Is due, patient states that recently she is having trouble getting refills approved. Per patient \"this is the fourth time in 6 months that I have to keep going and calling the pharmacy and your office for refills\". Routing to the provider for review as well as our clinic manager.

## 2018-10-11 NOTE — TELEPHONE ENCOUNTER
Patient called yesterday and today and left messages that she had gone to her pharmacy to request refills and has gotten no reply, patient states that she is running out of her Amlopine, sodium and potassium and is very concern. Routing to provider for review.

## 2018-10-13 DIAGNOSIS — E87.1 HYPONATREMIA: ICD-10-CM

## 2018-10-13 DIAGNOSIS — I10 ESSENTIAL HYPERTENSION: ICD-10-CM

## 2018-10-13 DIAGNOSIS — E22.2 SIADH (SYNDROME OF INAPPROPRIATE ADH PRODUCTION) (HCC): Primary | ICD-10-CM

## 2018-10-13 RX ORDER — SODIUM CHLORIDE TAB 1 GM 1 G
TAB MISCELLANEOUS
Qty: 180 TAB | Refills: 4 | Status: SHIPPED | OUTPATIENT
Start: 2018-10-13 | End: 2019-04-09 | Stop reason: SDUPTHER

## 2018-10-13 RX ORDER — POTASSIUM CHLORIDE 750 MG/1
TABLET, FILM COATED, EXTENDED RELEASE ORAL
Qty: 60 TAB | Refills: 4 | Status: SHIPPED | OUTPATIENT
Start: 2018-10-13 | End: 2019-11-15 | Stop reason: SDUPTHER

## 2018-10-13 RX ORDER — AMLODIPINE BESYLATE 10 MG/1
TABLET ORAL
Qty: 30 TAB | Refills: 4 | Status: SHIPPED | OUTPATIENT
Start: 2018-10-13 | End: 2018-12-04 | Stop reason: SDUPTHER

## 2018-10-30 DIAGNOSIS — I10 ESSENTIAL HYPERTENSION: ICD-10-CM

## 2018-10-30 RX ORDER — METOPROLOL TARTRATE 50 MG/1
TABLET ORAL
Qty: 180 TAB | Refills: 0 | Status: SHIPPED | OUTPATIENT
Start: 2018-10-30 | End: 2018-12-04 | Stop reason: SDUPTHER

## 2018-10-30 NOTE — TELEPHONE ENCOUNTER
Patient stopped by the clinic today to ask about more refills for her maintenance medications. In light of the closing of [facility], she stated that she plans to establish care at a practice not far from here on [address]. She would like enough refills of her metoprolol, amlodipine, potassium and sodium to give her time to establish care at the other clinic. Routing to [facility] who agreed to refill patient's meds.  Mg Wilkins RN

## 2018-11-09 RX ORDER — SODIUM CHLORIDE TAB 1 GM 1 G
TAB MISCELLANEOUS
Qty: 180 TAB | Refills: 0 | OUTPATIENT
Start: 2018-11-09

## 2018-11-09 NOTE — TELEPHONE ENCOUNTER
Patient called and left voicemail that she had gone to her pharmacy to request refills on her maintenance medications. Also nurse noted that this refill request was open for request for sodium chloride 1 gram. Chart has been reviewed, and it is noted that patient has recently received refills for metoprolol, sodium chloride, potassium chloride and amlodipine. Patient has been made aware of this. Nurse called patient's pharmacy and they do have RXs on file and will fill them for patient and will inform patient once it is ready.  Delia Coker RN

## 2018-12-04 ENCOUNTER — OFFICE VISIT (OUTPATIENT)
Dept: FAMILY MEDICINE CLINIC | Age: 73
End: 2018-12-04

## 2018-12-04 VITALS
BODY MASS INDEX: 22.07 KG/M2 | DIASTOLIC BLOOD PRESSURE: 70 MMHG | OXYGEN SATURATION: 97 % | HEART RATE: 65 BPM | RESPIRATION RATE: 17 BRPM | HEIGHT: 62 IN | TEMPERATURE: 96.6 F | WEIGHT: 119.9 LBS | SYSTOLIC BLOOD PRESSURE: 140 MMHG

## 2018-12-04 DIAGNOSIS — I10 ESSENTIAL HYPERTENSION: ICD-10-CM

## 2018-12-04 RX ORDER — METOPROLOL TARTRATE 50 MG/1
TABLET ORAL
Qty: 180 TAB | Refills: 0 | Status: SHIPPED | OUTPATIENT
Start: 2018-12-04 | End: 2019-04-09 | Stop reason: SDUPTHER

## 2018-12-04 RX ORDER — AMLODIPINE BESYLATE 10 MG/1
TABLET ORAL
Qty: 90 TAB | Refills: 1 | Status: SHIPPED | OUTPATIENT
Start: 2018-12-04 | End: 2019-04-09 | Stop reason: SDUPTHER

## 2018-12-04 RX ORDER — ASPIRIN 81 MG/1
81 TABLET ORAL DAILY
COMMUNITY

## 2018-12-04 NOTE — PROGRESS NOTES
Here to establish. Prev clinic closing as PCP sick and retired. CMPs include HTN O.K. Today. Takes meds for SIADH. Last lytes nml. Needs BP meds refilled. Discussed new Shingles vaccine. Visit Vitals /70 (BP 1 Location: Right arm, BP Patient Position: Sitting) Pulse 65 Temp 96.6 °F (35.9 °C) (Oral) Resp 17 Ht 5' 1.81\" (1.57 m) Wt 119 lb 14.4 oz (54.4 kg) SpO2 97% BMI 22.06 kg/m² Patient alert and cooperative. Reviewed above. Assessment: 1. HTN, borderline systolic today. Plan: 1. Refilled current meds for six months. 2. Return in three months for annual labs and wellness. 3. Follow here otherwise prn.

## 2018-12-04 NOTE — PROGRESS NOTES
Marissa Leal  Identified pt with two pt identifiers(name and ). Chief Complaint Patient presents with  New Patient [facility] 1. Have you been to the ER, urgent care clinic since your last visit? Hospitalized since your last visit? No 
 
2. Have you seen or consulted any other health care providers outside of the [address] [place] [address] since your last visit? Include any pap smears or colon screening. No 
 
In the event something were to happen to you and you were unable to speak on your behalf, do you have an Advance Directive/ Living Will in place stating your wishes? YES If yes, do we have a copy on file NO If no, would you like information:     
 
 
 
Would you like to sign up for [employer] today, if you have not already done so? No 
If not, would you like information on MyChart, and how to sign up at a later time? No 
 
 
Medication reconciliation up to date and corrected with patient at this time. Today's provider has been notified of reason for visit, vitals and flowsheets obtained on patients. Reviewed record in preparation for visit, huddled with provider and have obtained necessary documentation. Health Maintenance Due Topic  Shingrix Vaccine Age 50> (1 of 2)  MEDICARE YEARLY EXAM   
 
 
Wt Readings from Last 3 Encounters:  
18 119 lb 14.4 oz (54.4 kg) 10/02/18 121 lb (54.9 kg) 08/10/18 126 lb (57.2 kg) Temp Readings from Last 3 Encounters:  
10/02/18 97.6 °F (36.4 °C)  
08/10/18 97.9 °F (36.6 °C) (Oral) 01/10/18 97.6 °F (36.4 °C) (Oral) BP Readings from Last 3 Encounters:  
10/02/18 184/85  
08/10/18 150/69  
01/10/18 162/76 Pulse Readings from Last 3 Encounters:  
10/02/18 (!) 106  
08/10/18 71  
01/10/18 73 Vitals:  
 18 1256 18 1305 BP: 144/61 140/70 Pulse: 65 Resp: 17 Temp: 96.6 °F (35.9 °C) TempSrc: Oral   
SpO2: 97% Weight: 119 lb 14.4 oz (54.4 kg) Height: 5' 1.81\" (1.57 m) PainSc:   0 - No pain Learning Assessment: 
:  
 
Learning Assessment 5/12/2014 PRIMARY LEARNER Patient PRIMARY LANGUAGE ENGLISH  
LEARNER PREFERENCE PRIMARY READING  
ANSWERED BY patient RELATIONSHIP SELF Depression Screening: 
:  
 
PHQ over the last two weeks 8/10/2018 Little interest or pleasure in doing things Not at all Feeling down, depressed, irritable, or hopeless Not at all Total Score PHQ 2 0 Fall Risk Assessment: 
:  
 
Fall Risk Assessment, last 12 mths 12/4/2018 Able to walk? Yes Fall in past 12 months? No  
 
 
Abuse Screening: 
:  
 
Abuse Screening Questionnaire 12/4/2018 Do you ever feel afraid of your partner? Gloriperla Slesolomon Are you in a relationship with someone who physically or mentally threatens you? Rachel Bill Is it safe for you to go home? Y  
 
 
ADL Screening: 
:  
 

## 2019-04-09 ENCOUNTER — OFFICE VISIT (OUTPATIENT)
Dept: FAMILY MEDICINE CLINIC | Age: 74
End: 2019-04-09

## 2019-04-09 VITALS
HEART RATE: 66 BPM | BODY MASS INDEX: 22.45 KG/M2 | TEMPERATURE: 97.1 F | RESPIRATION RATE: 24 BRPM | SYSTOLIC BLOOD PRESSURE: 149 MMHG | DIASTOLIC BLOOD PRESSURE: 66 MMHG | OXYGEN SATURATION: 95 % | HEIGHT: 62 IN | WEIGHT: 122 LBS

## 2019-04-09 DIAGNOSIS — I10 ESSENTIAL HYPERTENSION: ICD-10-CM

## 2019-04-09 DIAGNOSIS — I10 ELEVATED BLOOD PRESSURE READING IN OFFICE WITH DIAGNOSIS OF HYPERTENSION: ICD-10-CM

## 2019-04-09 DIAGNOSIS — E22.2 SIADH (SYNDROME OF INAPPROPRIATE ADH PRODUCTION) (HCC): ICD-10-CM

## 2019-04-09 DIAGNOSIS — Z71.89 ADVANCED DIRECTIVES, COUNSELING/DISCUSSION: ICD-10-CM

## 2019-04-09 DIAGNOSIS — Z00.00 MEDICARE ANNUAL WELLNESS VISIT, INITIAL: Primary | ICD-10-CM

## 2019-04-09 DIAGNOSIS — E83.119 HEMOCHROMATOSIS, UNSPECIFIED HEMOCHROMATOSIS TYPE: ICD-10-CM

## 2019-04-09 RX ORDER — METOPROLOL TARTRATE 50 MG/1
TABLET ORAL
Qty: 180 TAB | Refills: 3 | Status: SHIPPED | OUTPATIENT
Start: 2019-04-09 | End: 2020-04-13 | Stop reason: SDUPTHER

## 2019-04-09 RX ORDER — SODIUM CHLORIDE TAB 1 GM 1 G
TAB MISCELLANEOUS
Qty: 540 TAB | Refills: 3 | Status: SHIPPED | OUTPATIENT
Start: 2019-04-09 | End: 2020-04-13 | Stop reason: SDUPTHER

## 2019-04-09 RX ORDER — AMLODIPINE BESYLATE 10 MG/1
TABLET ORAL
Qty: 90 TAB | Refills: 3 | Status: SHIPPED | OUTPATIENT
Start: 2019-04-09 | End: 2020-04-13 | Stop reason: SDUPTHER

## 2019-04-09 NOTE — PATIENT INSTRUCTIONS
Medicare Wellness Visit, Female The best way to live healthy is to have a lifestyle where you eat a well-balanced diet, exercise regularly, limit alcohol use, and quit all forms of tobacco/nicotine, if applicable. Regular preventive services are another way to keep healthy. Preventive services (vaccines, screening tests, monitoring & exams) can help personalize your care plan, which helps you manage your own care. Screening tests can find health problems at the earliest stages, when they are easiest to treat. [facility] follows the current, evidence-based guidelines published by the [facility] ([facility]) when recommending preventive services for our patients. Because we follow these guidelines, sometimes recommendations change over time as research supports it. (For example, mammograms used to be recommended annually. Even though Medicare will still pay for an annual mammogram, the newer guidelines recommend a mammogram every two years for women of average risk.) Of course, you and your doctor may decide to screen more often for some diseases, based on your risk and your health status. Preventive services for you include: - Medicare offers their members a free annual wellness visit, which is time for you and your primary care provider to discuss and plan for your preventive service needs. Take advantage of this benefit every year! 
-All adults over the age of 72 should receive the recommended pneumonia vaccines. Current USPSTF guidelines recommend a series of two vaccines for the best pneumonia protection.  
-All adults should have a flu vaccine yearly and a tetanus vaccine every 10 years. All adults age 61 and older should receive a shingles vaccine once in their lifetime.   
-A bone mass density test is recommended when a woman turns 65 to screen for osteoporosis. This test is only recommended one time, as a screening. Some providers will use this same test as a disease monitoring tool if you already have osteoporosis. -All adults age 38-68 who are overweight should have a diabetes screening test once every three years.  
-Other screening tests and preventive services for persons with diabetes include: an eye exam to screen for diabetic retinopathy, a kidney function test, a foot exam, and stricter control over your cholesterol.  
-Cardiovascular screening for adults with routine risk involves an electrocardiogram (ECG) at intervals determined by your doctor.  
-Colorectal cancer screenings should be done for adults age 54-65 with no increased risk factors for colorectal cancer. There are a number of acceptable methods of screening for this type of cancer. Each test has its own benefits and drawbacks. Discuss with your doctor what is most appropriate for you during your annual wellness visit. The different tests include: colonoscopy (considered the best screening method), a fecal occult blood test, a fecal DNA test, and sigmoidoscopy. -Breast cancer screenings are recommended every other year for women of normal risk, age 54-69. 
-Cervical cancer screenings for women over age 72 are only recommended with certain risk factors.  
-All adults born between [facility] should be screened once for Hepatitis C. Here is a list of your current Health Maintenance items (your personalized list of preventive services) with a due date: 
Health Maintenance Due Topic Date Due  
 Annual Well Visit  09/28/2018

## 2019-04-09 NOTE — PROGRESS NOTES
Takes sodium tabs daily. HTN-stable. Needs refills on meds. Will stay on LS ASA for hx of mini CVA and blood clot right leg. Needs labs done. On replacement for SIADH-stable. Takes K+ replacement as well. Patient denies chest pain, dyspnea, unexpected weight change, unexpected pain, mood or memory changes. Visit Vitals /66 (BP 1 Location: Left arm, BP Patient Position: Sitting) Pulse 66 Temp 97.1 °F (36.2 °C) (Oral) Resp 24 Ht 5' 1.61\" (1.565 m) Wt 122 lb (55.3 kg) SpO2 95% BMI 22.59 kg/m² Patient alert and cooperative. Reviewed above. Assessment: 1. HTN, high reading in office, but otherwise controlled on meds. 2. SIADH, on potassium and sodium supplements. Plan: 1. Check labs. 2. Refilled BP meds and sodium tablets. 3. Return for wellness in a year. 4. Follow otherwise here prn.

## 2019-04-09 NOTE — PROGRESS NOTES
This is an Initial Medicare Annual Wellness Exam (AWV) (Performed 12 months after IPPE or effective date of Medicare Part B enrollment, Once in a lifetime) I have reviewed the patient's medical history in detail and updated the computerized patient record. Eye doctor 4 yrs but has a appt this this month. No dentist.  Full upper. No mammo. No colonoscopy. DEXA 4 yrs ago. No signif hx past yr. History Past Medical History:  
Diagnosis Date  History of mammogram 10 years ago  Hypertension  Hyponatremia 09/11/2014  
 hospitalized for severe hyponatremia due to SIADH  Menopause   
 at age 37  
 Pap smear for cervical cancer screening 15 years ago Past Surgical History:  
Procedure Laterality Date  HX CATARACT REMOVAL Right 1/14/2014  HX CHOLECYSTECTOMY  HX THROMBECTOMY Right   
 leg  HX TUBAL LIGATION Current Outpatient Medications Medication Sig Dispense Refill  aspirin delayed-release 81 mg tablet Take  by mouth daily.  calcium carbonate/vitamin D3 (CALCIUM 500 + D PO) Take  by mouth.  amLODIPine (NORVASC) 10 mg tablet TAKE 1 TABLET BY MOUTH EVERY DAY 90 Tab 1  
 metoprolol tartrate (LOPRESSOR) 50 mg tablet TAKE 1 TABLET BY MOUTH TWICE DAILY 180 Tab 0  
 sodium chloride 1 gram tablet TAKE 2 TABLETS BY MOUTH THREE TIMES DAILY WITH FOOD 180 Tab 4  potassium chloride SR (KLOR-CON 10) 10 mEq tablet TAKE 1 TABLET BY MOUTH TWICE DAILY (Patient taking differently: Take  by mouth daily. TAKE 1 TABLET BY MOUTH TWICE DAILY) 60 Tab 4  
 multivitamin (ONE A DAY) tablet Take 1 Tab by mouth daily.  omeprazole (PRILOSEC) 20 mg capsule TAKE 1 CAPSULE BY MOUTH EVERY DAY 30 Cap 5  
 doxepin (SINEQUAN) 10 mg capsule Take 1 Cap by mouth nightly. 30 Cap 5  
 traMADol (ULTRAM) 50 mg tablet TAKE 1 TABLET BY MOUTH EVERY 6 HOURS AS NEEDED FOR PAIN 30 Tab 1  
 furosemide (LASIX) 40 mg tablet TAKE 1 TABLET BY MOUTH EVERY DAY 90 Tab 3 Allergies Allergen Reactions  Levaquin [Levofloxacin] Anaphylaxis Throat and face became swollen.  Ace Inhibitors Vertigo  Hydrochlorothiazide Other (comments) Hypotension and severe hyponatremia.  Penicillins Hives While pregnant Family History Problem Relation Age of Onset  Kidney Disease Mother   
     uncertain Social History Tobacco Use  Smoking status: Former Smoker Packs/day: 1.00 Years: 40.00 Pack years: 40.00 Types: Cigarettes  Smokeless tobacco: Former User Quit date: 9/11/2014  Tobacco comment: Quit smoking September 2014. Substance Use Topics  Alcohol use: Yes Alcohol/week: 1.8 oz Types: 3 Standard drinks or equivalent per week Patient Active Problem List  
Diagnosis Code  Essential hypertension I10  
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  
 
3 most recent PHQ Screens 4/9/2019 Little interest or pleasure in doing things Not at all Feeling down, depressed, irritable, or hopeless Not at all Total Score PHQ 2 0 Alcohol Risk Factor Screening: On any occasion in the past three months you have had more than 3 drinks containing alcohol. Vodka on weekends. Functional Ability and Level of Safety:  
 
Hearing Loss Hearing is good. Activities of Daily Living The home contains: grab bars Patient does total self care Fall Risk Fall Risk Assessment, last 12 mths 4/9/2019 Able to walk? Yes Fall in past 12 months? No  
 
 
Abuse Screen Patient is not abused Cognitive Screening Evaluation of Cognitive Function: 
Has your family/caregiver stated any concerns about your memory: no 
Normal 
 
Patient Care Team  
Patient Care Team: 
Chris Perez MD as PCP - General (Family Practice) Camille Carney Assessment/Plan Education and counseling provided: Are appropriate based on today's review and evaluation End-of-Life planning (with patient's consent) Pneumococcal Vaccine Influenza Vaccine Cardiovascular screening blood test 
Bone mass measurement (DEXA) Screening for glaucoma Diabetes screening test 
 
Diagnoses and all orders for this visit: 
 
1. Medicare annual wellness visit, initial 
 
2. Essential hypertension 
-     amLODIPine (NORVASC) 10 mg tablet; TAKE 1 TABLET BY MOUTH EVERY DAY 
-     metoprolol tartrate (LOPRESSOR) 50 mg tablet; TAKE 1 TABLET BY MOUTH TWICE DAILY 
-     CBC WITH AUTOMATED DIFF 
-     URINALYSIS W/ RFLX MICROSCOPIC 
-     TSH 3RD GENERATION 
-     METABOLIC PANEL, COMPREHENSIVE 
-     LIPID PANEL 3. Advanced directives, counseling/discussion 4. Elevated blood pressure reading in office with diagnosis of hypertension 5. SIADH (syndrome of inappropriate ADH production) ([address] -     METABOLIC PANEL, COMPREHENSIVE Other orders 
-     sodium chloride 1 gram tablet; TAKE 3 TABLETS BY MOUTH TWO TIMES DAILY WITH FOOD Health Maintenance Due Topic Date Due  MEDICARE YEARLY EXAM  09/28/2018

## 2019-04-09 NOTE — PROGRESS NOTES
Marie Leal  Identified pt with two pt identifiers(name and ). Chief Complaint Patient presents with [address] [place] [address] Annual Wellness Visit  Labs 1. Have you been to the ER, urgent care clinic since your last visit? Hospitalized since your last visit? No 
 
2. Have you seen or consulted any other health care providers outside of the [address] [place] [address] since your last visit? Include any pap smears or colon screening. No 
 
 
Would you like to sign up for MyChart today, if you have not already done so? No 
If not, would you like information on MyChart, and how to sign up at a later time? No 
 
 
Medication reconciliation up to date and corrected with patient at this time. Today's provider has been notified of reason for visit, vitals and flowsheets obtained on patients. Reviewed record in preparation for visit, huddled with provider and have obtained necessary documentation. Health Maintenance Due Topic  Pneumococcal 65+ years (2 of 2 - PCV13)  MEDICARE YEARLY EXAM   
 
 
Wt Readings from Last 3 Encounters:  
19 122 lb (55.3 kg) 18 119 lb 14.4 oz (54.4 kg) 10/02/18 121 lb (54.9 kg) Temp Readings from Last 3 Encounters:  
19 97.1 °F (36.2 °C) (Oral) 18 96.6 °F (35.9 °C) (Oral) 10/02/18 97.6 °F (36.4 °C) BP Readings from Last 3 Encounters:  
19 149/66  
18 140/70  
10/02/18 184/85 Pulse Readings from Last 3 Encounters:  
19 66  
18 65  
10/02/18 (!) 106 Vitals:  
 19 1054 19 1059 BP: 158/73 149/66 Pulse: 66 Resp: 24 Temp: 97.1 °F (36.2 °C) TempSrc: Oral   
SpO2: 95% Weight: 122 lb (55.3 kg) Height: 5' 1.61\" (1.565 m) PainSc:   0 - No pain Learning Assessment: 
:  
 
Learning Assessment 2014 PRIMARY LEARNER Patient PRIMARY LANGUAGE ENGLISH  
LEARNER PREFERENCE PRIMARY READING  
ANSWERED BY patient RELATIONSHIP SELF Depression Screening: 
:  
 
 3 most recent PHQ Screens 4/9/2019 Little interest or pleasure in doing things Not at all Feeling down, depressed, irritable, or hopeless Not at all Total Score PHQ 2 0 Fall Risk Assessment: 
:  
 
Fall Risk Assessment, last 12 mths 4/9/2019 Able to walk? Yes Fall in past 12 months? No  
 
 
Abuse Screening: 
:  
 
Abuse Screening Questionnaire 4/9/2019 12/4/2018 Do you ever feel afraid of your partner? Densvetlana Juarezs Are you in a relationship with someone who physically or mentally threatens you? Den Rylan Is it safe for you to go home? Y Y  
 
 
ADL Screening: 
:  
 

## 2019-04-09 NOTE — ACP (ADVANCE CARE PLANNING)
Advance Care Planning    Advance Care Planning (ACP) Provider Conversation Snapshot    Date of ACP Conversation: 04/09/19  Persons included in Conversation:  patient  Length of ACP Conversation in minutes:  <16 minutes (Non-Billable)    Authorized Decision Maker (if patient is incapable of making informed decisions): This person is:    Other Legally Authorized Decision Maker (e.g. Next of Kin)  Sister-Karen        For Patients with Decision Making Capacity:   Values/Goals: Exploration of values, goals, and preferences if recovery is not expected, even with continued medical treatment in the event of:  Imminent death  Severe, permanent brain injury    Conversation Outcomes / Follow-Up Plan:   Reviewed existing Advance Directive

## 2019-04-09 NOTE — ACP (ADVANCE CARE PLANNING)
In the event something were to happen to you and you were unable to speak on your behalf, do you have an Advance Directive/ Living Will in place stating your wishes? YES    If yes, do we have a copy on file NO    Patient brought in ACP with her to office visit today for Dr. Brittaney Salinas to review and be scanned into her chart.

## 2019-04-10 LAB
ALBUMIN SERPL-MCNC: 4.4 G/DL (ref 3.5–4.8)
ALBUMIN/GLOB SERPL: 1.4 {RATIO} (ref 1.2–2.2)
ALP SERPL-CCNC: 112 IU/L (ref 39–117)
ALT SERPL-CCNC: 18 IU/L (ref 0–32)
AST SERPL-CCNC: 26 IU/L (ref 0–40)
BASOPHILS # BLD AUTO: 0 X10E3/UL (ref 0–0.2)
BASOPHILS NFR BLD AUTO: 0 %
BILIRUB SERPL-MCNC: 0.7 MG/DL (ref 0–1.2)
BUN SERPL-MCNC: 7 MG/DL (ref 8–27)
BUN/CREAT SERPL: 9 (ref 12–28)
CALCIUM SERPL-MCNC: 9.7 MG/DL (ref 8.7–10.3)
CHLORIDE SERPL-SCNC: 97 MMOL/L (ref 96–106)
CHOLEST SERPL-MCNC: 188 MG/DL (ref 100–199)
CO2 SERPL-SCNC: 27 MMOL/L (ref 20–29)
CREAT SERPL-MCNC: 0.8 MG/DL (ref 0.57–1)
EOSINOPHIL # BLD AUTO: 0.2 X10E3/UL (ref 0–0.4)
EOSINOPHIL NFR BLD AUTO: 2 %
ERYTHROCYTE [DISTWIDTH] IN BLOOD BY AUTOMATED COUNT: 14.7 % (ref 12.3–15.4)
GLOBULIN SER CALC-MCNC: 3.1 G/DL (ref 1.5–4.5)
GLUCOSE SERPL-MCNC: 98 MG/DL (ref 65–99)
GLUCOSE UR QL: NORMAL
HCT VFR BLD AUTO: 49.9 % (ref 34–46.6)
HDLC SERPL-MCNC: 56 MG/DL
HGB BLD-MCNC: 17.2 G/DL (ref 11.1–15.9)
IMM GRANULOCYTES # BLD AUTO: 0 X10E3/UL (ref 0–0.1)
IMM GRANULOCYTES NFR BLD AUTO: 0 %
INTERPRETATION, 910389: NORMAL
KETONES UR QL STRIP: NORMAL
LDLC SERPL CALC-MCNC: 103 MG/DL (ref 0–99)
LYMPHOCYTES # BLD AUTO: 3.6 X10E3/UL (ref 0.7–3.1)
LYMPHOCYTES NFR BLD AUTO: 37 %
MCH RBC QN AUTO: 34.3 PG (ref 26.6–33)
MCHC RBC AUTO-ENTMCNC: 34.5 G/DL (ref 31.5–35.7)
MCV RBC AUTO: 100 FL (ref 79–97)
MONOCYTES # BLD AUTO: 1 X10E3/UL (ref 0.1–0.9)
MONOCYTES NFR BLD AUTO: 10 %
NEUTROPHILS # BLD AUTO: 4.8 X10E3/UL (ref 1.4–7)
NEUTROPHILS NFR BLD AUTO: 51 %
PH UR STRIP: NORMAL [PH]
PLATELET # BLD AUTO: 320 X10E3/UL (ref 150–379)
POTASSIUM SERPL-SCNC: 4 MMOL/L (ref 3.5–5.2)
PROT SERPL-MCNC: 7.5 G/DL (ref 6–8.5)
PROT UR QL STRIP: NORMAL
RBC # BLD AUTO: 5.01 X10E6/UL (ref 3.77–5.28)
SODIUM SERPL-SCNC: 140 MMOL/L (ref 134–144)
SP GR UR: NORMAL
TRIGL SERPL-MCNC: 147 MG/DL (ref 0–149)
TSH SERPL DL<=0.005 MIU/L-ACNC: 1.27 UIU/ML (ref 0.45–4.5)
VLDLC SERPL CALC-MCNC: 29 MG/DL (ref 5–40)
WBC # BLD AUTO: 9.6 X10E3/UL (ref 3.4–10.8)

## 2019-04-10 NOTE — PROGRESS NOTES
Writer left message on the voice mail informing the pt to call the clinic when available. Message: review lab results with patient

## 2019-04-12 LAB
FERRITIN SERPL-MCNC: 70 NG/ML (ref 15–150)
SPECIMEN STATUS REPORT, ROLRST: NORMAL

## 2019-04-16 ENCOUNTER — TELEPHONE (OUTPATIENT)
Dept: FAMILY MEDICINE CLINIC | Age: 74
End: 2019-04-16

## 2019-04-16 NOTE — TELEPHONE ENCOUNTER
Writer called patient regarding results and recommendations from Dr. Deidre Paz. Writer spoke with patient. Patient verified . Writer notified patient. Patient verbalized understanding and appreciation.

## 2019-04-16 NOTE — TELEPHONE ENCOUNTER
----- Message from Beckie Rizzo sent at 4/16/2019 12:02 PM EDT -----  Regarding: Dr. Monroe Salinas   Pt is inquiring about her lab results. Best contact number is [phone].

## 2019-09-03 NOTE — PROGRESS NOTES
----- Message from Carly Soria NP sent at 9/3/2019  3:53 PM CDT -----  Regarding: OCP Refill  Please call pt and let her know  refilled her OCP's to last her until her annual in November.  Thanks   I saw and evaluated the patient, performing the key elements of the service. I discussed the findings, assessment and plan with the resident and agree with the resident's findings and plan as documented in the resident's note. Her BP IS within JNC-8 criteria for the elderly without diabetes or CKD.

## 2019-11-13 DIAGNOSIS — E87.1 HYPONATREMIA: ICD-10-CM

## 2019-11-15 DIAGNOSIS — E87.1 HYPONATREMIA: ICD-10-CM

## 2019-11-15 NOTE — TELEPHONE ENCOUNTER
PCP: Christopher Sun MD    Last appt: Visit date not found  No future appointments.     Requested Prescriptions     Pending Prescriptions Disp Refills    potassium chloride SR (KLOR-CON 10) 10 mEq tablet [Pharmacy Med Name: POTASSIUM CL 10MEQ ER TABLETS] 60 Tab 0     Sig: TAKE 1 TABLET BY MOUTH TWICE DAILY       Prior labs and Blood pressures:  BP Readings from Last 3 Encounters:   04/09/19 149/66   12/04/18 140/70   10/02/18 184/85     Lab Results   Component Value Date/Time    Sodium 140 04/09/2019 12:06 PM    Potassium 4.0 04/09/2019 12:06 PM    Chloride 97 04/09/2019 12:06 PM    CO2 27 04/09/2019 12:06 PM    Anion gap 9 09/22/2014 03:05 AM    Glucose 98 04/09/2019 12:06 PM    BUN 7 (L) 04/09/2019 12:06 PM    Creatinine 0.80 04/09/2019 12:06 PM    BUN/Creatinine ratio 9 (L) 04/09/2019 12:06 PM    GFR est AA 85 04/09/2019 12:06 PM    GFR est non-AA 74 04/09/2019 12:06 PM    Calcium 9.7 04/09/2019 12:06 PM     No results found for: HBA1C, HGBE8, YFB3VPMC, IVV3TMFU  Lab Results   Component Value Date/Time    Cholesterol, total 188 04/09/2019 12:06 PM    HDL Cholesterol 56 04/09/2019 12:06 PM    LDL, calculated 103 (H) 04/09/2019 12:06 PM    VLDL, calculated 29 04/09/2019 12:06 PM    Triglyceride 147 04/09/2019 12:06 PM    CHOL/HDL Ratio 3.7 09/13/2014 04:32 AM     Lab Results   Component Value Date/Time    Vitamin D 25-Hydroxy 36.3 04/10/2013 09:27 AM       Lab Results   Component Value Date/Time    TSH 1.270 04/09/2019 12:06 PM

## 2019-11-17 RX ORDER — POTASSIUM CHLORIDE 750 MG/1
TABLET, FILM COATED, EXTENDED RELEASE ORAL
Qty: 60 TAB | Refills: 0 | OUTPATIENT
Start: 2019-11-17

## 2019-11-18 RX ORDER — POTASSIUM CHLORIDE 750 MG/1
20 TABLET, FILM COATED, EXTENDED RELEASE ORAL 2 TIMES DAILY
Qty: 180 TAB | Refills: 1 | Status: SHIPPED | OUTPATIENT
Start: 2019-11-18 | End: 2020-04-13 | Stop reason: SDUPTHER

## 2020-04-13 ENCOUNTER — VIRTUAL VISIT (OUTPATIENT)
Dept: FAMILY MEDICINE CLINIC | Age: 75
End: 2020-04-13

## 2020-04-13 DIAGNOSIS — Z71.89 ADVANCED DIRECTIVES, COUNSELING/DISCUSSION: ICD-10-CM

## 2020-04-13 DIAGNOSIS — I10 ESSENTIAL HYPERTENSION: ICD-10-CM

## 2020-04-13 DIAGNOSIS — E22.2 SIADH (SYNDROME OF INAPPROPRIATE ADH PRODUCTION) (HCC): ICD-10-CM

## 2020-04-13 DIAGNOSIS — E87.1 HYPONATREMIA: ICD-10-CM

## 2020-04-13 DIAGNOSIS — Z00.00 MEDICARE ANNUAL WELLNESS VISIT, SUBSEQUENT: Primary | ICD-10-CM

## 2020-04-13 RX ORDER — SODIUM CHLORIDE TAB 1 GM 1 G
TAB MISCELLANEOUS
Qty: 540 TAB | Refills: 0 | Status: SHIPPED | OUTPATIENT
Start: 2020-04-13 | End: 2020-05-19

## 2020-04-13 RX ORDER — METOPROLOL TARTRATE 50 MG/1
TABLET ORAL
Qty: 180 TAB | Refills: 0 | Status: SHIPPED | OUTPATIENT
Start: 2020-04-13 | End: 2020-09-10

## 2020-04-13 RX ORDER — POTASSIUM CHLORIDE 750 MG/1
20 TABLET, FILM COATED, EXTENDED RELEASE ORAL 2 TIMES DAILY
Qty: 180 TAB | Refills: 0 | Status: SHIPPED | OUTPATIENT
Start: 2020-04-13 | End: 2020-04-15

## 2020-04-13 RX ORDER — AMLODIPINE BESYLATE 10 MG/1
TABLET ORAL
Qty: 90 TAB | Refills: 0 | Status: SHIPPED | OUTPATIENT
Start: 2020-04-13 | End: 2020-09-10

## 2020-04-13 NOTE — PROGRESS NOTES
**THIS IS A VIRTUAL VISIT VIA A VIDEO SYNCHRONOUS DISCUSSION OVER DOXY. ME PATIENT AGREED TO HAVE THEIR CARE DELIVERED OVER A [employer] VIDEO VISIT IN PLACE OF THEIR REGULARLY SCHEDULED OFFICE VISIT**     This is the Subsequent Medicare Annual Wellness Exam, performed 12 months or more after the Initial AWV or the last Subsequent AWV    Consent: Chaitanya Peterson, who was seen by synchronous (real-time) audio-video technology, and/or her healthcare decision maker, is aware that this patient-initiated, Telehealth encounter on 4/13/2020 is a billable service. While AWVs are fully covered by Medicare, any services rendered on this date that are not included in an AWV are subject to additional billing, with coverage as determined by her insurance carrier. She is aware that she may receive a bill for any such additional services and has provided verbal consent to proceed: Yes. I have reviewed the patient's medical history in detail and updated the computerized patient record. Not driving. Eye doctor past yr. Had cataracts 4-5 yrs ago. Left eye film. Full upper. No dentist.  No mammo. No colon. DEXA 4-5 yrs ago. No signif hx past yr. Will get routine labs 2-3 mos. Will refill current meds for CMPs of HTN, SIADH both stable.     History     Patient Active Problem List   Diagnosis Code    Essential hypertension I10    Menopause Z78.0    Glaucoma H40.9    SIADH (syndrome of inappropriate ADH production) ([facility] [address] E22.2    Alcohol abuse, in remission F10.11    Elevated blood pressure reading in office with diagnosis of hypertension I10     Past Medical History:   Diagnosis Date    History of mammogram 10 years ago    Hypertension     Hyponatremia 09/11/2014    hospitalized for severe hyponatremia due to SIADH    Menopause     at age 37    Pap smear for cervical cancer screening 15 years ago      Past Surgical History:   Procedure Laterality Date    HX CATARACT REMOVAL Right 1/14/2014    HX CHOLECYSTECTOMY  HX THROMBECTOMY Right     leg    HX TUBAL LIGATION       Current Outpatient Medications   Medication Sig Dispense Refill    potassium chloride SR (KLOR-CON 10) 10 mEq tablet Take 2 Tabs by mouth two (2) times a day. TAKE 1 TABLET BY MOUTH TWICE DAILY 180 Tab 1    amLODIPine (NORVASC) 10 mg tablet TAKE 1 TABLET BY MOUTH EVERY DAY 90 Tab 3    sodium chloride 1 gram tablet TAKE 3 TABLETS BY MOUTH TWO TIMES DAILY WITH FOOD 540 Tab 3    metoprolol tartrate (LOPRESSOR) 50 mg tablet TAKE 1 TABLET BY MOUTH TWICE DAILY 180 Tab 3    aspirin delayed-release 81 mg tablet Take  by mouth daily.  multivitamin (ONE A DAY) tablet Take 1 Tab by mouth daily.  calcium carbonate/vitamin D3 (CALCIUM 500 + D PO) Take  by mouth. Allergies   Allergen Reactions    Levaquin [Levofloxacin] Anaphylaxis     Throat and face became swollen.  Ace Inhibitors Vertigo    Hydrochlorothiazide Other (comments)     Hypotension and severe hyponatremia.  Penicillins Hives     While pregnant       Family History   Problem Relation Age of Onset    Kidney Disease Mother         uncertain     Social History     Tobacco Use    Smoking status: Former Smoker     Packs/day: 1.00     Years: 40.00     Pack years: 40.00     Types: Cigarettes    Smokeless tobacco: Former User     Quit date: 9/11/2014    Tobacco comment: Quit smoking September 2014. Substance Use Topics    Alcohol use:  Yes     Alcohol/week: 3.0 standard drinks     Types: 3 Standard drinks or equivalent per week       Depression Risk Factor Screening:     3 most recent PHQ Screens 4/9/2019   Little interest or pleasure in doing things Not at all   Feeling down, depressed, irritable, or hopeless Not at all   Total Score PHQ 2 0       Alcohol Risk Factor Screening:   Do you average 1 drink per night or more than 7 drinks a week:  No    On any one occasion in the past three months have you have had more than 3 drinks containing alcohol:  No      Functional Ability and Level of Safety:   Hearing: Hearing is good. Activities of Daily Living: The home contains: grab bars  Patient does total self care    Ambulation: with no difficulty    Fall Risk:  Fall Risk Assessment, last 12 mths 4/13/2020   Able to walk? Yes   Fall in past 12 months? No       Abuse Screen:  Patient is not abused    Cognitive Screening   Has your family/caregiver stated any concerns about your memory: no  Cognitive Screening: ND    Patient Care Team   Patient Care Team:  Nelson Plata MD as PCP - [facility] Empaneled Provider  Crystal Prince    Assessment/Plan   Education and counseling provided:  Are appropriate based on today's review and evaluation  End-of-Life planning (with patient's consent)  Pneumococcal Vaccine  Influenza Vaccine  Cardiovascular screening blood test  Bone mass measurement (DEXA)  Screening for glaucoma  Diabetes screening test    Diagnoses and all orders for this visit:    1. Medicare annual wellness visit, subsequent    2. SIADH (syndrome of inappropriate ADH production) (HCC)  -     METABOLIC PANEL, COMPREHENSIVE    3. Essential hypertension  -     CBC WITH AUTOMATED DIFF  -     URINALYSIS W/ RFLX MICROSCOPIC  -     TSH 3RD GENERATION  -     METABOLIC PANEL, COMPREHENSIVE  -     LIPID PANEL  -     amLODIPine (NORVASC) 10 mg tablet; TAKE 1 TABLET BY MOUTH EVERY DAY  -     metoprolol tartrate (LOPRESSOR) 50 mg tablet; TAKE 1 TABLET BY MOUTH TWICE DAILY    4. Hyponatremia  -     potassium chloride SR (KLOR-CON 10) 10 mEq tablet; Take 2 Tabs by mouth two (2) times a day.  TAKE 1 TABLET BY MOUTH TWICE DAILY    Other orders  -     sodium chloride 1 gram tablet; TAKE 3 TABLETS BY MOUTH TWO TIMES DAILY WITH FOOD        Health Maintenance Due   Topic Date Due    GLAUCOMA SCREENING Q2Y  11/23/2011    FOBT Q1Y Age 50-75  09/02/2015    Breast Cancer Screen Mammogram  04/27/2018    Medicare Yearly Exam  04/09/2020       Ilene Walsh is a 69 y.o. female being evaluated by a video visit encounter for concerns as above. A caregiver was present when appropriate. Due to this being a TeleHealth encounter (During JCNTJ-61 public health emergency), evaluation of the following organ systems was limited: Vitals/Constitutional/EENT/Resp/CV/GI//MS/Neuro/Skin/Heme-Lymph-Imm. Pursuant to the emergency declaration under the [facility], [facility] waiver authority and the [employer] Act, this Virtual  Visit was conducted, with patient's (and/or legal guardian's) consent, to reduce the patient's risk of exposure to COVID-19 and provide necessary medical care. Services were provided through a video synchronous discussion virtually to substitute for in-person clinic visit. Patient and provider were located at their individual homes.     Etelvina Lobo MD

## 2020-04-13 NOTE — PROGRESS NOTES
Chief Complaint   Patient presents with   [facility] Annual Wellness Visit       1. Have you been to the ER, urgent care clinic since your last visit? Hospitalized since your last visit? No    2. Have you seen or consulted any other health care providers outside of the [address] [place] [address] since your last visit? Include any pap smears or colon screening.  No

## 2020-04-13 NOTE — ACP (ADVANCE CARE PLANNING)
Advance Care Planning    Advance Care Planning (ACP) Provider Conversation Snapshot    Date of ACP Conversation: 04/13/20  Persons included in Conversation:  patient  Length of ACP Conversation in minutes:  <16 minutes (Non-Billable)    Authorized Decision Maker (if patient is incapable of making informed decisions):    This person is:   Next of Kin by law (only applies in absence of above)  Sister-Karen Tovarer          For Patients with Decision Making Capacity:   Values/Goals: Exploration of values, goals, and preferences if recovery is not expected, even with continued medical treatment in the event of:  Imminent death  Severe, permanent brain injury    Conversation Outcomes / Follow-Up Plan:   Reviewed current documents

## 2020-04-13 NOTE — PATIENT INSTRUCTIONS
Medicare Wellness Visit, Female The best way to live healthy is to have a lifestyle where you eat a well-balanced diet, exercise regularly, limit alcohol use, and quit all forms of tobacco/nicotine, if applicable. Regular preventive services are another way to keep healthy. Preventive services (vaccines, screening tests, monitoring & exams) can help personalize your care plan, which helps you manage your own care. Screening tests can find health problems at the earliest stages, when they are easiest to treat. [facility] follows the current, evidence-based guidelines published by the [facility] [place] ([facility]) when recommending preventive services for our patients. Because we follow these guidelines, sometimes recommendations change over time as research supports it. (For example, mammograms used to be recommended annually. Even though Medicare will still pay for an annual mammogram, the newer guidelines recommend a mammogram every two years for women of average risk). Of course, you and your doctor may decide to screen more often for some diseases, based on your risk and your co-morbidities (chronic disease you are already diagnosed with). Preventive services for you include: - Medicare offers their members a free annual wellness visit, which is time for you and your primary care provider to discuss and plan for your preventive service needs. Take advantage of this benefit every year! 
-All adults over the age of 72 should receive the recommended pneumonia vaccines. Current USPSTF guidelines recommend a series of two vaccines for the best pneumonia protection.  
-All adults should have a flu vaccine yearly and a tetanus vaccine every 10 years.  
-All adults age 48 and older should receive the shingles vaccines (series of two vaccines). -All adults age 38-68 who are overweight should have a diabetes screening test once every three years. -All adults born between [birth year] and 1964 should be screened once for Hepatitis C. 
-Other screening tests and preventive services for persons with diabetes include: an eye exam to screen for diabetic retinopathy, a kidney function test, a foot exam, and stricter control over your cholesterol.  
-Cardiovascular screening for adults with routine risk involves an electrocardiogram (ECG) at intervals determined by your doctor.  
-Colorectal cancer screenings should be done for adults age 54-65 with no increased risk factors for colorectal cancer. There are a number of acceptable methods of screening for this type of cancer. Each test has its own benefits and drawbacks. Discuss with your doctor what is most appropriate for you during your annual wellness visit. The different tests include: colonoscopy (considered the best screening method), a fecal occult blood test, a fecal DNA test, and sigmoidoscopy. 
 
-A bone mass density test is recommended when a woman turns 65 to screen for osteoporosis. This test is only recommended one time, as a screening. Some providers will use this same test as a disease monitoring tool if you already have osteoporosis. -Breast cancer screenings are recommended every other year for women of normal risk, age 54-69. 
-Cervical cancer screenings for women over age 72 are only recommended with certain risk factors. Here is a list of your current Health Maintenance items (your personalized list of preventive services) with a due date: 
Health Maintenance Due Topic Date Due  Glaucoma Screening   11/23/2011  Colon Cancer Stool Test  09/02/2015  Mammogram  04/27/2018 [facility] Annual Well Visit  04/09/2020

## 2020-04-14 ENCOUNTER — TELEPHONE (OUTPATIENT)
Dept: FAMILY MEDICINE CLINIC | Age: 75
End: 2020-04-14

## 2020-04-14 DIAGNOSIS — E87.1 HYPONATREMIA: ICD-10-CM

## 2020-04-14 NOTE — TELEPHONE ENCOUNTER
Spoke with pharmacist, Agueda Jason. Informed Agueda Jason that per Dr. Thompson Doing 2 tabs twice daily.

## 2020-04-14 NOTE — TELEPHONE ENCOUNTER
Pharmacy states rx sig is unclear for potassium chloride. Does pt Take 2 Tabs by mouth two (2) times a day or TAKE 1 TABLET BY MOUTH TWICE DAILY.

## 2020-04-14 NOTE — TELEPHONE ENCOUNTER
Judi Leonard MD  You 22 minutes ago (2:08 PM)      Brandon Scottie should be 2 tabs twice daily as per her old script.     Routing comment

## 2020-04-15 RX ORDER — POTASSIUM CHLORIDE 750 MG/1
TABLET, FILM COATED, EXTENDED RELEASE ORAL
Qty: 360 TAB | Refills: 0 | Status: SHIPPED | OUTPATIENT
Start: 2020-04-15 | End: 2020-12-09 | Stop reason: SDUPTHER

## 2020-05-13 ENCOUNTER — APPOINTMENT (OUTPATIENT)
Dept: MRI IMAGING | Age: 75
DRG: 074 | End: 2020-05-13
Attending: INTERNAL MEDICINE
Payer: MEDICARE

## 2020-05-13 ENCOUNTER — HOSPITAL ENCOUNTER (INPATIENT)
Age: 75
LOS: 6 days | Discharge: NURSING, REHAB OR LONG-TERM CARE | DRG: 074 | End: 2020-05-19
Attending: STUDENT IN AN ORGANIZED HEALTH CARE EDUCATION/TRAINING PROGRAM | Admitting: INTERNAL MEDICINE
Payer: MEDICARE

## 2020-05-13 ENCOUNTER — APPOINTMENT (OUTPATIENT)
Dept: VASCULAR SURGERY | Age: 75
DRG: 074 | End: 2020-05-13
Attending: INTERNAL MEDICINE
Payer: MEDICARE

## 2020-05-13 ENCOUNTER — APPOINTMENT (OUTPATIENT)
Dept: MRI IMAGING | Age: 75
DRG: 074 | End: 2020-05-13
Attending: PSYCHIATRY & NEUROLOGY
Payer: MEDICARE

## 2020-05-13 ENCOUNTER — APPOINTMENT (OUTPATIENT)
Dept: CT IMAGING | Age: 75
DRG: 074 | End: 2020-05-13
Attending: STUDENT IN AN ORGANIZED HEALTH CARE EDUCATION/TRAINING PROGRAM
Payer: MEDICARE

## 2020-05-13 ENCOUNTER — APPOINTMENT (OUTPATIENT)
Dept: CT IMAGING | Age: 75
DRG: 074 | End: 2020-05-13
Attending: INTERNAL MEDICINE
Payer: MEDICARE

## 2020-05-13 ENCOUNTER — APPOINTMENT (OUTPATIENT)
Dept: GENERAL RADIOLOGY | Age: 75
DRG: 074 | End: 2020-05-13
Attending: STUDENT IN AN ORGANIZED HEALTH CARE EDUCATION/TRAINING PROGRAM
Payer: MEDICARE

## 2020-05-13 ENCOUNTER — APPOINTMENT (OUTPATIENT)
Dept: NON INVASIVE DIAGNOSTICS | Age: 75
DRG: 074 | End: 2020-05-13
Attending: INTERNAL MEDICINE
Payer: MEDICARE

## 2020-05-13 DIAGNOSIS — G82.20 PARAPARESIS (HCC): ICD-10-CM

## 2020-05-13 DIAGNOSIS — I63.9 CEREBROVASCULAR ACCIDENT (CVA), UNSPECIFIED MECHANISM (HCC): Primary | ICD-10-CM

## 2020-05-13 DIAGNOSIS — R29.898 WEAKNESS OF BOTH LOWER EXTREMITIES: ICD-10-CM

## 2020-05-13 LAB
ALBUMIN SERPL-MCNC: 3.3 G/DL (ref 3.5–5)
ALBUMIN/GLOB SERPL: 0.8 {RATIO} (ref 1.1–2.2)
ALP SERPL-CCNC: 136 U/L (ref 45–117)
ALT SERPL-CCNC: 26 U/L (ref 12–78)
ANION GAP SERPL CALC-SCNC: 6 MMOL/L (ref 5–15)
APPEARANCE UR: CLEAR
AST SERPL-CCNC: 27 U/L (ref 15–37)
AV PEAK GRADIENT: 22.16 MMHG
AV VELOCITY RATIO: 0.79
BACTERIA URNS QL MICRO: NEGATIVE /HPF
BASOPHILS # BLD: 0.1 K/UL (ref 0–0.1)
BASOPHILS NFR BLD: 1 % (ref 0–1)
BILIRUB SERPL-MCNC: 0.2 MG/DL (ref 0.2–1)
BILIRUB UR QL: NEGATIVE
BUN SERPL-MCNC: 6 MG/DL (ref 6–20)
BUN/CREAT SERPL: 7 (ref 12–20)
CALCIUM SERPL-MCNC: 8.8 MG/DL (ref 8.5–10.1)
CHLORIDE SERPL-SCNC: 101 MMOL/L (ref 97–108)
CHOLEST SERPL-MCNC: 171 MG/DL
CO2 SERPL-SCNC: 29 MMOL/L (ref 21–32)
COLOR UR: ABNORMAL
COMMENT, HOLDF: NORMAL
CREAT SERPL-MCNC: 0.85 MG/DL (ref 0.55–1.02)
CRP SERPL-MCNC: 0.4 MG/DL (ref 0–0.6)
DIFFERENTIAL METHOD BLD: ABNORMAL
ECHO AV AREA PEAK VELOCITY: 2.2 CM2
ECHO AV PEAK GRADIENT: 3.7 MMHG
ECHO AV PEAK VELOCITY: 95.96 CM/S
ECHO AV REGURGITANT PHT: 983.4 CM
ECHO LV E' LATERAL VELOCITY: 5.94 CM/S
ECHO LV E' SEPTAL VELOCITY: 4.71 CM/S
ECHO LV INTERNAL DIMENSION DIASTOLIC: 3.48 CM (ref 3.9–5.3)
ECHO LV INTERNAL DIMENSION SYSTOLIC: 2.36 CM
ECHO LV IVSD: 0.88 CM (ref 0.6–0.9)
ECHO LV MASS 2D: 92.9 G (ref 67–162)
ECHO LV MASS INDEX 2D: 61.3 G/M2 (ref 43–95)
ECHO LV POSTERIOR WALL DIASTOLIC: 0.89 CM (ref 0.6–0.9)
ECHO LVOT DIAM: 1.9 CM
ECHO LVOT PEAK GRADIENT: 2.3 MMHG
ECHO LVOT PEAK VELOCITY: 75.36 CM/S
ECHO MV A VELOCITY: 69.13 CM/S
ECHO MV AREA PHT: 4.2 CM2
ECHO MV E DECELERATION TIME (DT): 182.2 MS
ECHO MV E VELOCITY: 56.26 CM/S
ECHO MV E/A RATIO: 0.81
ECHO MV E/E' LATERAL: 9.47
ECHO MV E/E' RATIO (AVERAGED): 10.71
ECHO MV E/E' SEPTAL: 11.94
ECHO MV PRESSURE HALF TIME (PHT): 52.8 MS
ECHO RV TAPSE: 1.83 CM (ref 1.5–2)
ECHO TV REGURGITANT MAX VELOCITY: 209.13 CM/S
ECHO TV REGURGITANT PEAK GRADIENT: 17.5 MMHG
EOSINOPHIL # BLD: 0.5 K/UL (ref 0–0.4)
EOSINOPHIL NFR BLD: 6 % (ref 0–7)
EPITH CASTS URNS QL MICRO: ABNORMAL /LPF
ERYTHROCYTE [DISTWIDTH] IN BLOOD BY AUTOMATED COUNT: 13.3 % (ref 11.5–14.5)
EST. AVERAGE GLUCOSE BLD GHB EST-MCNC: 97 MG/DL
FOLATE SERPL-MCNC: 33.3 NG/ML (ref 5–21)
GLOBULIN SER CALC-MCNC: 3.9 G/DL (ref 2–4)
GLUCOSE BLD STRIP.AUTO-MCNC: 82 MG/DL (ref 65–100)
GLUCOSE SERPL-MCNC: 91 MG/DL (ref 65–100)
GLUCOSE UR STRIP.AUTO-MCNC: NEGATIVE MG/DL
HBA1C MFR BLD: 5 % (ref 4–5.6)
HCT VFR BLD AUTO: 48.7 % (ref 35–47)
HDLC SERPL-MCNC: 54 MG/DL
HDLC SERPL: 3.2 {RATIO} (ref 0–5)
HGB BLD-MCNC: 16 G/DL (ref 11.5–16)
HGB UR QL STRIP: ABNORMAL
IMM GRANULOCYTES # BLD AUTO: 0 K/UL (ref 0–0.04)
IMM GRANULOCYTES NFR BLD AUTO: 0 % (ref 0–0.5)
INR PPP: 1.1 (ref 0.9–1.1)
KETONES UR QL STRIP.AUTO: NEGATIVE MG/DL
LDLC SERPL CALC-MCNC: 86.6 MG/DL (ref 0–100)
LEUKOCYTE ESTERASE UR QL STRIP.AUTO: ABNORMAL
LIPID PROFILE,FLP: ABNORMAL
LVFS 2D: 32.11 %
LYMPHOCYTES # BLD: 3.6 K/UL (ref 0.8–3.5)
LYMPHOCYTES NFR BLD: 40 % (ref 12–49)
MAGNESIUM SERPL-MCNC: 2 MG/DL (ref 1.6–2.4)
MCH RBC QN AUTO: 32.6 PG (ref 26–34)
MCHC RBC AUTO-ENTMCNC: 32.9 G/DL (ref 30–36.5)
MCV RBC AUTO: 99.2 FL (ref 80–99)
MONOCYTES # BLD: 0.5 K/UL (ref 0–1)
MONOCYTES NFR BLD: 6 % (ref 5–13)
MV DEC SLOPE: 3.09
NEUTS SEG # BLD: 4.3 K/UL (ref 1.8–8)
NEUTS SEG NFR BLD: 47 % (ref 32–75)
NITRITE UR QL STRIP.AUTO: NEGATIVE
NRBC # BLD: 0 K/UL (ref 0–0.01)
NRBC BLD-RTO: 0 PER 100 WBC
PH UR STRIP: 6 [PH] (ref 5–8)
PHOSPHATE SERPL-MCNC: 3.4 MG/DL (ref 2.6–4.7)
PISA AR MAX VEL: 235.36 CM/S
PLATELET # BLD AUTO: 293 K/UL (ref 150–400)
PMV BLD AUTO: 8.7 FL (ref 8.9–12.9)
POTASSIUM SERPL-SCNC: 3.4 MMOL/L (ref 3.5–5.1)
PROT SERPL-MCNC: 7.2 G/DL (ref 6.4–8.2)
PROT UR STRIP-MCNC: NEGATIVE MG/DL
PROTHROMBIN TIME: 10.9 SEC (ref 9–11.1)
RBC # BLD AUTO: 4.91 M/UL (ref 3.8–5.2)
RBC #/AREA URNS HPF: ABNORMAL /HPF (ref 0–5)
SAMPLES BEING HELD,HOLD: NORMAL
SERVICE CMNT-IMP: NORMAL
SODIUM SERPL-SCNC: 136 MMOL/L (ref 136–145)
SP GR UR REFRACTOMETRY: >1.03 (ref 1–1.03)
TRIGL SERPL-MCNC: 152 MG/DL (ref ?–150)
TROPONIN I SERPL-MCNC: <0.05 NG/ML
TROPONIN I SERPL-MCNC: <0.05 NG/ML
TSH SERPL DL<=0.05 MIU/L-ACNC: 1.13 UIU/ML (ref 0.36–3.74)
UR CULT HOLD, URHOLD: NORMAL
UROBILINOGEN UR QL STRIP.AUTO: 0.2 EU/DL (ref 0.2–1)
VIT B12 SERPL-MCNC: 310 PG/ML (ref 193–986)
VLDLC SERPL CALC-MCNC: 30.4 MG/DL
WBC # BLD AUTO: 9 K/UL (ref 3.6–11)
WBC URNS QL MICRO: ABNORMAL /HPF (ref 0–4)

## 2020-05-13 PROCEDURE — 70551 MRI BRAIN STEM W/O DYE: CPT

## 2020-05-13 PROCEDURE — 36415 COLL VENOUS BLD VENIPUNCTURE: CPT

## 2020-05-13 PROCEDURE — 93306 TTE W/DOPPLER COMPLETE: CPT

## 2020-05-13 PROCEDURE — 70450 CT HEAD/BRAIN W/O DYE: CPT

## 2020-05-13 PROCEDURE — 84100 ASSAY OF PHOSPHORUS: CPT

## 2020-05-13 PROCEDURE — 97535 SELF CARE MNGMENT TRAINING: CPT

## 2020-05-13 PROCEDURE — 74011636320 HC RX REV CODE- 636/320: Performed by: RADIOLOGY

## 2020-05-13 PROCEDURE — 82746 ASSAY OF FOLIC ACID SERUM: CPT

## 2020-05-13 PROCEDURE — 0042T CT CODE NEURO PERF W CBF: CPT

## 2020-05-13 PROCEDURE — 74011250637 HC RX REV CODE- 250/637: Performed by: STUDENT IN AN ORGANIZED HEALTH CARE EDUCATION/TRAINING PROGRAM

## 2020-05-13 PROCEDURE — 97530 THERAPEUTIC ACTIVITIES: CPT

## 2020-05-13 PROCEDURE — 93005 ELECTROCARDIOGRAM TRACING: CPT

## 2020-05-13 PROCEDURE — 84484 ASSAY OF TROPONIN QUANT: CPT

## 2020-05-13 PROCEDURE — 82607 VITAMIN B-12: CPT

## 2020-05-13 PROCEDURE — 86140 C-REACTIVE PROTEIN: CPT

## 2020-05-13 PROCEDURE — 82962 GLUCOSE BLOOD TEST: CPT

## 2020-05-13 PROCEDURE — 85025 COMPLETE CBC W/AUTO DIFF WBC: CPT

## 2020-05-13 PROCEDURE — 70496 CT ANGIOGRAPHY HEAD: CPT

## 2020-05-13 PROCEDURE — 74011250637 HC RX REV CODE- 250/637: Performed by: INTERNAL MEDICINE

## 2020-05-13 PROCEDURE — 83036 HEMOGLOBIN GLYCOSYLATED A1C: CPT

## 2020-05-13 PROCEDURE — 74011250636 HC RX REV CODE- 250/636: Performed by: INTERNAL MEDICINE

## 2020-05-13 PROCEDURE — 72146 MRI CHEST SPINE W/O DYE: CPT

## 2020-05-13 PROCEDURE — 81001 URINALYSIS AUTO W/SCOPE: CPT

## 2020-05-13 PROCEDURE — 97161 PT EVAL LOW COMPLEX 20 MIN: CPT

## 2020-05-13 PROCEDURE — 51798 US URINE CAPACITY MEASURE: CPT

## 2020-05-13 PROCEDURE — 74011250637 HC RX REV CODE- 250/637: Performed by: PSYCHIATRY & NEUROLOGY

## 2020-05-13 PROCEDURE — 80061 LIPID PANEL: CPT

## 2020-05-13 PROCEDURE — 93970 EXTREMITY STUDY: CPT

## 2020-05-13 PROCEDURE — 80053 COMPREHEN METABOLIC PANEL: CPT

## 2020-05-13 PROCEDURE — 94762 N-INVAS EAR/PLS OXIMTRY CONT: CPT

## 2020-05-13 PROCEDURE — 72148 MRI LUMBAR SPINE W/O DYE: CPT

## 2020-05-13 PROCEDURE — 85610 PROTHROMBIN TIME: CPT

## 2020-05-13 PROCEDURE — 77010033678 HC OXYGEN DAILY

## 2020-05-13 PROCEDURE — 74011000258 HC RX REV CODE- 258: Performed by: RADIOLOGY

## 2020-05-13 PROCEDURE — 65660000000 HC RM CCU STEPDOWN

## 2020-05-13 PROCEDURE — 94760 N-INVAS EAR/PLS OXIMETRY 1: CPT

## 2020-05-13 PROCEDURE — 74176 CT ABD & PELVIS W/O CONTRAST: CPT

## 2020-05-13 PROCEDURE — 84443 ASSAY THYROID STIM HORMONE: CPT

## 2020-05-13 PROCEDURE — 99285 EMERGENCY DEPT VISIT HI MDM: CPT

## 2020-05-13 PROCEDURE — 71045 X-RAY EXAM CHEST 1 VIEW: CPT

## 2020-05-13 PROCEDURE — 83735 ASSAY OF MAGNESIUM: CPT

## 2020-05-13 PROCEDURE — 97165 OT EVAL LOW COMPLEX 30 MIN: CPT

## 2020-05-13 RX ORDER — BISACODYL 5 MG
5 TABLET, DELAYED RELEASE (ENTERIC COATED) ORAL DAILY PRN
Status: DISCONTINUED | OUTPATIENT
Start: 2020-05-13 | End: 2020-05-19 | Stop reason: HOSPADM

## 2020-05-13 RX ORDER — IBUPROFEN 200 MG
1 TABLET ORAL EVERY 24 HOURS
Status: DISCONTINUED | OUTPATIENT
Start: 2020-05-13 | End: 2020-05-19 | Stop reason: HOSPADM

## 2020-05-13 RX ORDER — HEPARIN SODIUM 5000 [USP'U]/ML
5000 INJECTION, SOLUTION INTRAVENOUS; SUBCUTANEOUS EVERY 8 HOURS
Status: DISCONTINUED | OUTPATIENT
Start: 2020-05-13 | End: 2020-05-19 | Stop reason: HOSPADM

## 2020-05-13 RX ORDER — ASPIRIN 325 MG
325 TABLET ORAL ONCE
Status: COMPLETED | OUTPATIENT
Start: 2020-05-13 | End: 2020-05-13

## 2020-05-13 RX ORDER — ASPIRIN 325 MG
325 TABLET, DELAYED RELEASE (ENTERIC COATED) ORAL DAILY
Status: DISCONTINUED | OUTPATIENT
Start: 2020-05-13 | End: 2020-05-14

## 2020-05-13 RX ORDER — POTASSIUM CHLORIDE 750 MG/1
20 TABLET, FILM COATED, EXTENDED RELEASE ORAL 2 TIMES DAILY
Status: DISCONTINUED | OUTPATIENT
Start: 2020-05-13 | End: 2020-05-13

## 2020-05-13 RX ORDER — ACETAMINOPHEN 325 MG/1
650 TABLET ORAL
Status: DISCONTINUED | OUTPATIENT
Start: 2020-05-13 | End: 2020-05-19 | Stop reason: HOSPADM

## 2020-05-13 RX ORDER — AMLODIPINE BESYLATE 5 MG/1
10 TABLET ORAL DAILY
Status: DISCONTINUED | OUTPATIENT
Start: 2020-05-13 | End: 2020-05-19 | Stop reason: HOSPADM

## 2020-05-13 RX ORDER — METOPROLOL TARTRATE 50 MG/1
50 TABLET ORAL 2 TIMES DAILY
Status: DISCONTINUED | OUTPATIENT
Start: 2020-05-13 | End: 2020-05-19 | Stop reason: HOSPADM

## 2020-05-13 RX ORDER — GADOTERATE MEGLUMINE 376.9 MG/ML
10 INJECTION INTRAVENOUS
Status: DISPENSED | OUTPATIENT
Start: 2020-05-13 | End: 2020-05-14

## 2020-05-13 RX ORDER — SODIUM CHLORIDE 0.9 % (FLUSH) 0.9 %
10 SYRINGE (ML) INJECTION
Status: COMPLETED | OUTPATIENT
Start: 2020-05-13 | End: 2020-05-13

## 2020-05-13 RX ORDER — ATORVASTATIN CALCIUM 20 MG/1
20 TABLET, FILM COATED ORAL
Status: DISCONTINUED | OUTPATIENT
Start: 2020-05-13 | End: 2020-05-14

## 2020-05-13 RX ORDER — POTASSIUM CHLORIDE 750 MG/1
40 TABLET, FILM COATED, EXTENDED RELEASE ORAL
Status: COMPLETED | OUTPATIENT
Start: 2020-05-13 | End: 2020-05-13

## 2020-05-13 RX ORDER — ONDANSETRON 2 MG/ML
4 INJECTION INTRAMUSCULAR; INTRAVENOUS
Status: DISCONTINUED | OUTPATIENT
Start: 2020-05-13 | End: 2020-05-19 | Stop reason: HOSPADM

## 2020-05-13 RX ORDER — POTASSIUM CHLORIDE 750 MG/1
20 TABLET, FILM COATED, EXTENDED RELEASE ORAL 2 TIMES DAILY
Status: DISCONTINUED | OUTPATIENT
Start: 2020-05-13 | End: 2020-05-19 | Stop reason: HOSPADM

## 2020-05-13 RX ORDER — SODIUM CHLORIDE TAB 1 GM 1 G
3 TAB MISCELLANEOUS 2 TIMES DAILY WITH MEALS
Status: DISCONTINUED | OUTPATIENT
Start: 2020-05-13 | End: 2020-05-19 | Stop reason: HOSPADM

## 2020-05-13 RX ORDER — ATORVASTATIN CALCIUM 40 MG/1
40 TABLET, FILM COATED ORAL
Status: DISCONTINUED | OUTPATIENT
Start: 2020-05-13 | End: 2020-05-13

## 2020-05-13 RX ORDER — SODIUM CHLORIDE, SODIUM LACTATE, POTASSIUM CHLORIDE, CALCIUM CHLORIDE 600; 310; 30; 20 MG/100ML; MG/100ML; MG/100ML; MG/100ML
75 INJECTION, SOLUTION INTRAVENOUS CONTINUOUS
Status: DISCONTINUED | OUTPATIENT
Start: 2020-05-13 | End: 2020-05-14

## 2020-05-13 RX ORDER — CLONAZEPAM 0.5 MG/1
0.5 TABLET ORAL
Status: DISPENSED | OUTPATIENT
Start: 2020-05-13 | End: 2020-05-14

## 2020-05-13 RX ORDER — CLONAZEPAM 1 MG/1
0.5 TABLET ORAL
Status: COMPLETED | OUTPATIENT
Start: 2020-05-13 | End: 2020-05-13

## 2020-05-13 RX ADMIN — SODIUM CHLORIDE 100 ML: 900 INJECTION, SOLUTION INTRAVENOUS at 04:11

## 2020-05-13 RX ADMIN — AMLODIPINE BESYLATE 10 MG: 5 TABLET ORAL at 10:01

## 2020-05-13 RX ADMIN — HEPARIN SODIUM 5000 UNITS: 5000 INJECTION INTRAVENOUS; SUBCUTANEOUS at 10:02

## 2020-05-13 RX ADMIN — HEPARIN SODIUM 5000 UNITS: 5000 INJECTION INTRAVENOUS; SUBCUTANEOUS at 17:51

## 2020-05-13 RX ADMIN — METOPROLOL TARTRATE 50 MG: 50 TABLET, FILM COATED ORAL at 17:51

## 2020-05-13 RX ADMIN — METOPROLOL TARTRATE 50 MG: 50 TABLET, FILM COATED ORAL at 10:01

## 2020-05-13 RX ADMIN — CLONAZEPAM 0.5 MG: 1 TABLET ORAL at 10:29

## 2020-05-13 RX ADMIN — Medication 10 ML: at 21:18

## 2020-05-13 RX ADMIN — IOPAMIDOL 20 ML: 755 INJECTION, SOLUTION INTRAVENOUS at 04:12

## 2020-05-13 RX ADMIN — SODIUM CHLORIDE, SODIUM LACTATE, POTASSIUM CHLORIDE, AND CALCIUM CHLORIDE 75 ML/HR: 600; 310; 30; 20 INJECTION, SOLUTION INTRAVENOUS at 13:34

## 2020-05-13 RX ADMIN — IOPAMIDOL 100 ML: 755 INJECTION, SOLUTION INTRAVENOUS at 04:12

## 2020-05-13 RX ADMIN — ASPIRIN 325 MG: 325 TABLET, DELAYED RELEASE ORAL at 12:32

## 2020-05-13 RX ADMIN — ASPIRIN 325 MG ORAL TABLET 325 MG: 325 PILL ORAL at 05:30

## 2020-05-13 RX ADMIN — Medication 10 ML: at 04:12

## 2020-05-13 RX ADMIN — POTASSIUM CHLORIDE 20 MEQ: 750 TABLET, FILM COATED, EXTENDED RELEASE ORAL at 17:51

## 2020-05-13 RX ADMIN — Medication 3 G: at 12:32

## 2020-05-13 RX ADMIN — POTASSIUM CHLORIDE 40 MEQ: 750 TABLET, FILM COATED, EXTENDED RELEASE ORAL at 10:01

## 2020-05-13 RX ADMIN — Medication 3 G: at 17:51

## 2020-05-13 NOTE — PROGRESS NOTES
Speech pathology note  Reviewed chart and note patient admitted with bilateral lower extremity numbness and tingling with concern for CVA. Note MRI showed no acute intracranial abnormality, mild chronic microvascular ischemic disease, and small chronic infarcts in the left corona radiata/basal ganglia, left cerebellum and bilateral anita. Note patient passed the STAND and a regular diet was ordered. Note SLP evaluated patient after last CV[facility] in 2014 [facility]. Discussed case with RN who reported no SLP-related concerns. Formal SLP evaluation not clinically indicated at this time. Will sign off. Please re-consult if further needs arise. Thank you.     Palma Gasser, Glorious Snellen., CCC-SLP

## 2020-05-13 NOTE — PROGRESS NOTES
Problem: Self Care Deficits Care Plan (Adult)  Goal: *Acute Goals and Plan of Care (Insert Text)  Description:   FUNCTIONAL STATUS PRIOR TO ADMISSION: Patient was independent and active without use of DME. Patient was independent for basic and instrumental ADLs, robsoniyl assists with driving but she is typically active, walking multiple times/day. HOME SUPPORT: The patient lived alone with granddaughter & daughter to provide assistance for driving. Occupational Therapy Goals  Initiated 5/13/2020  1. Patient will perform standing grooming tasks x5 minutes with minimal assistance/contact guard assist within 7 day(s). 2.  Patient will perform lower body dressing with minimal assistance/contact guard assist within 7 day(s). 3.  Patient will perform simple home management with minimal assistance/contact guard assist within 7 day(s). 4.  Patient will perform toilet transfers with minimal assistance/contact guard assist within 7 day(s). 5.  Patient will perform all aspects of toileting with minimal assistance/contact guard assist within 7 day(s). 6.  Patient will participate in upper extremity therapeutic exercise/activities with supervision/set-up for 5 minutes within 7 day(s). 7.  Patient will utilize energy conservation techniques during functional activities with verbal and visual cues within 7 day(s). Outcome: Not Met     OCCUPATIONAL THERAPY EVALUATION  Patient: Augusto Pantoja (69 y.o. female)  Date: 5/13/2020  Primary Diagnosis: Acute CVA (cerebrovascular accident) [facility]) [I63.9]        Precautions: Fall, Bed Alarm      ASSESSMENT  Based on the objective data described below, the patient presents with impaired balance, BLE sensation (LLE>RLE with LLE hypersensitivity), strength, coordination (BLE>BUE), safety awareness, activity tolerance, pain management in LLE, and lower body access s/p admission for CVA workup, imaging negative for acute infarct.  PTA, patient IND, living alone in an apartment, relies on family for transportation. She now presents far from her baseline, requiring Min-Mod A for brief bed mobility & standing balance, unable to flatten L foot onto the floor. She was able to don her socks seated EOB however required up to Mod A for fair sitting balance and decreased reach to LLE. Educated on BE FAST, patient acknowledging understanding. Recommend d/c to IPR as she is far from her IND baseline. Current Level of Function Impacting Discharge (ADLs/self-care): Min A for upper body ADLs, Mod-Total A for lower body ADLs, and Mod A for brief standing     Functional Outcome Measure: The patient scored Total: 20/100 on the Barthel Index outcome measure which is indicative of 80% impaired ability to care for basic self needs/dependency on others; inferred 75% dependency on others for instrumental ADLs. Other factors to consider for discharge: fall risk, poor balance, PLOF, impaired BLE ROM & sensation     Patient will benefit from skilled therapy intervention to address the above noted impairments. PLAN :  Recommendations and Planned Interventions: self care training, functional mobility training, therapeutic exercise, balance training, therapeutic activities, endurance activities, neuromuscular re-education, patient education, home safety training, and family training/education    Frequency/Duration: Patient will be followed by occupational therapy 5 times a week to address goals. Recommendation for discharge: (in order for the patient to meet his/her long term goals)  Therapy 3 hours per day 5-7 days per week    This discharge recommendation:  A follow-up discussion with the attending provider and/or case management is planned    IF patient discharges home will need the following DME: To be determined (TBD)         SUBJECTIVE:   Patient stated I've had spasms in my legs before but nothing like this.     OBJECTIVE DATA SUMMARY:   HISTORY:   Past Medical History: Diagnosis Date    History of mammogram 10 years ago    Hypertension     Hyponatremia 09/11/2014    hospitalized for severe hyponatremia due to SIADH    Menopause     at age 37    Pap smear for cervical cancer screening 15 years ago     Past Surgical History:   Procedure Laterality Date    HX CATARACT REMOVAL Right 1/14/2014    HX CHOLECYSTECTOMY      HX THROMBECTOMY Right     leg    HX TUBAL LIGATION         Expanded or extensive additional review of patient history:     Home Situation  Home Environment: Private residence  # Steps to Enter: 0  One/Two Story Residence: One story  Living Alone: Yes  Support Systems: Child(jc)  Patient Expects to be Discharged to[de-identified] Private residence  Current DME Used/Available at Home: Shower chair, Grab bars, Walker, rolling, Cane, straight  Tub or Shower Type: Tub/Shower combination    Hand dominance: Right    EXAMINATION OF PERFORMANCE DEFICITS:  Cognitive/Behavioral Status:  Neurologic State: Alert  Orientation Level: Oriented X4  Cognition: Appropriate decision making; Follows commands  Perception: Appears intact  Perseveration: No perseveration noted  Safety/Judgement: Awareness of environment;Decreased awareness of need for assistance;Decreased awareness of need for safety;Decreased insight into deficits    Skin: Appears intact    Edema: None    Hearing: Auditory  Auditory Impairment: None    Vision/Perceptual:    Tracking: Able to track stimulus in all quadrants w/o difficulty                 Diplopia: No    Acuity: Within Defined Limits         Range of Motion:  AROM: Generally decreased, functional  PROM: Within functional limits                      Strength:  Strength: Generally decreased, functional                Coordination:  Coordination: Generally decreased, functional  Fine Motor Skills-Upper: Left Intact; Right Intact(generally decreased)    Gross Motor Skills-Upper: Left Intact; Right Intact(generally decreased)    Tone & Sensation:     Sensation: Impaired(BLE L>R decreased sensation & hypersensitive LLE)                      Balance:  Sitting: Impaired; Without support  Sitting - Static: Good (unsupported); Fair (occasional)  Sitting - Dynamic: Fair (occasional)  Standing: Impaired; Without support  Standing - Static: Fair;Constant support  Standing - Dynamic : Poor;Constant support    Functional Mobility and Transfers for ADLs:  Bed Mobility:  Supine to Sit: Minimum assistance  Sit to Supine: Moderate assistance  Scooting: Minimum assistance    Transfers:  Sit to Stand: Moderate assistance  Stand to Sit: Moderate assistance  Toilet Transfer : Moderate assistance;Assist x2(infer to [facility] per mobility, unsafe at this time)    ADL Assessment:  Feeding: Setup    Oral Facial Hygiene/Grooming: Setup(infer seated EOB d/t poor stand balance)    Bathing: Moderate assistance    Upper Body Dressing: Contact guard assistance    Lower Body Dressing: Moderate assistance    Toileting: Maximum assistance                ADL Intervention and task modifications:     Lower Body Dressing Assistance  Dressing Assistance: Moderate assistance  Pants With Elastic Waist: Moderate assistance(simulated)  Socks: Moderate assistance(A for distal reach to RLE & sit balance)  Leg Crossed Method Used: Yes  Position Performed: Seated edge of bed;Standing  Cues: Physical assistance; Tactile cues provided;Verbal cues provided;Visual cues provided    Toileting  Toileting Assistance: Total assistance(dependent)  Bladder Hygiene:  Total assistance (dependent)(purewick, poor balance)    Cognitive Retraining  Safety/Judgement: Awareness of environment;Decreased awareness of need for assistance;Decreased awareness of need for safety;Decreased insight into deficits    Therapeutic Exercise:  Sit<>stand x1 with Mod A, extreme difficulty with R heel sliding, returned to supine with Mod A     Functional Measure:  Barthel Index:    Bathin  Bladder: 5  Bowels: 5  Groomin  Dressin  Feedin  Mobility: 0  Stairs: 0  Toilet Use: 0  Transfer (Bed to Chair and Back): 0  Total: 20/100        The Barthel ADL Index: Guidelines  1. The index should be used as a record of what a patient does, not as a record of what a patient could do. 2. The main aim is to establish degree of independence from any help, physical or verbal, however minor and for whatever reason. 3. The need for supervision renders the patient not independent. 4. A patient's performance should be established using the best available evidence. Asking the patient, friends/relatives and nurses are the usual sources, but direct observation and common sense are also important. However direct testing is not needed. 5. Usually the patient's performance over the preceding 24-48 hours is important, but occasionally longer periods will be relevant. 6. Middle categories imply that the patient supplies over 50 per cent of the effort. 7. Use of aids to be independent is allowed. Ruslan Thomas., Barthel, D.W. ([id number]). Functional evaluation: the Barthel Index. [address] [facility] (14)2. ERIKA Hayden, Yuan Bio., [facility] [place], [address] [place] [address] (1999). Measuring the change indisability after inpatient rehabilitation; comparison of the responsiveness of the Barthel Index and Functional [place] Measure. Journal of Neurology, Neurosurgery, and Psychiatry, 66(4), 123-781. STACY Solares, ZACH Brewer, & Wai Marie, MMuniraA. (2004.) Assessment of post-stroke quality of life in cost-effectiveness studies: The usefulness of the Barthel Index and the EuroQoL-5D.  Quality of Life Research, 15, 122-34         Occupational Therapy Evaluation Charge Determination   History Examination Decision-Making   LOW Complexity : Brief history review  LOW Complexity : 1-3 performance deficits relating to physical, cognitive , or psychosocial skils that result in activity limitations and / or participation restrictions  LOW Complexity : No comorbidities that affect functional and no verbal or physical assistance needed to complete eval tasks       Based on the above components, the patient evaluation is determined to be of the following complexity level: LOW   Pain Rating:  BLE soreness (LLE>RLE, hypersensitive)    Activity Tolerance:   Fair  Please refer to the flowsheet for vital signs taken during this treatment. After treatment patient left in no apparent distress:    Supine in bed, Call bell within reach, and Side rails x 3    COMMUNICATION/EDUCATION:   The patients plan of care was discussed with: Physical therapist and Registered nurse. Patient was educated regarding her deficit(s) of impaired balance, sensation, and functional reach as this relates to her CVA workup. She demonstrated Good understanding as evidenced by verbal acknowledgment. Patient and/or family was verbally educated on the BE FAST acronym for signs/symptoms of CVA and TIA. BE FAST was written on patient's communication board  for visual education and reinforcement. All questions answered with patient indicating good understanding. Home safety education was provided and the patient/caregiver indicated understanding., Patient/family have participated as able in goal setting and plan of care. , and Patient/family agree to work toward stated goals and plan of care. This patients plan of care is appropriate for delegation to JEWEL.     Thank you for this referral.  Miles Claude, OTD, OTR/L  Time Calculation: 26 mins

## 2020-05-13 NOTE — PROGRESS NOTES
Neurocritical Care Code Stroke Documentation    Symptoms:   Numbness and tingling to left lower leg   Last Known Well:     Medical hx:  HTN, SIADH   Anticoagulation:  ASA 81 mg    VAN:   Negative   NIHSS:   1a-LOC:0    1b-Month/Age:0    1c-Open/Close Hand:0    2-Best Gaze:0    3-Visual Fields:0    4-Facial Palsy:0    5a-Left Arm:0    5b-Right Arm:0    6a-Left Le    6b-Right Le    7-Limb Ataxia:0    8-Sensory:1    9-Best Language:0    10-Dysarthria:0    11-Extinction/Inattention:0  TOTAL SCORE:2   Imaging:   CT: no acute process    CTA: Negative for LVO   Plan:   TPA Candidate: NO, out of window     Discussed with: Eugene Gary NP  Neurocritical Care Nurse Practitioner  [phone]

## 2020-05-13 NOTE — ED NOTES
Bedside and Verbal shift change report given to Bogdan (oncoming nurse) by Roselia Callaway (offgoing nurse). Report included the following information SBAR, ED Summary, MAR and Recent Results.

## 2020-05-13 NOTE — ED PROVIDER NOTES
Patient is a 76-year-old female with a history of hypertension and hyponatremia who was brought in by EMS from home with a chief complaint of left leg cramping and weakness. Patient states she woke up at 2:30 AM to use the bathroom, felt due to left leg weakness and since then is not been able to ambulate independently. Last known well was when she went to bed at 10 PM yesterday evening, approximately 6 hours ago. No other symptoms reported. Patient states history of hyponatremia that has caused cramps in the past, however, this feels different. Patient states she typically walks 1 mile a day and is otherwise healthy. She denies any other focal symptoms, including headache, vision changes, speech problems, facial droop, weakness in upper extremities. A Code S level 2 was called upon my initial eval.      Leg Pain    Associated symptoms include numbness (L leg). Pertinent negatives include no back pain and no neck pain.         Past Medical History:   Diagnosis Date    History of mammogram 10 years ago    Hypertension     Hyponatremia 09/11/2014    hospitalized for severe hyponatremia due to SIADH    Menopause     at age 37    Pap smear for cervical cancer screening 15 years ago       Past Surgical History:   Procedure Laterality Date    HX CATARACT REMOVAL Right 1/14/2014    HX CHOLECYSTECTOMY      HX THROMBECTOMY Right     leg    HX TUBAL LIGATION           Family History:   Problem Relation Age of Onset    Kidney Disease Mother         uncertain       Social History     Socioeconomic History    Marital status:      Spouse name: Not on file    Number of children: Not on file    Years of education: Not on file    Highest education level: Not on file   Occupational History    Not on file   Social Needs    Financial resource strain: Not on file    Food insecurity     Worry: Not on file     Inability: Not on file    Transportation needs     Medical: Not on file     Non-medical: Not on file   Tobacco Use    Smoking status: Former Smoker     Packs/day: 1.00     Years: 40.00     Pack years: 40.00     Types: Cigarettes    Smokeless tobacco: Former User     Quit date: 9/11/2014    Tobacco comment: Quit smoking September 2014. Substance and Sexual Activity    Alcohol use: Yes     Alcohol/week: 3.0 standard drinks     Types: 3 Standard drinks or equivalent per week    Drug use: No    Sexual activity: Not on file   Lifestyle    Physical activity     Days per week: Not on file     Minutes per session: Not on file    Stress: Not on file   Relationships    Social connections     Talks on phone: Not on file     Gets together: Not on file     Attends Latter-day service: Not on file     Active member of club or organization: Not on file     Attends meetings of clubs or organizations: Not on file     Relationship status: Not on file    Intimate partner violence     Fear of current or ex partner: Not on file     Emotionally abused: Not on file     Physically abused: Not on file     Forced sexual activity: Not on file   Other Topics Concern    Not on file   Social History Narrative    Not on file         ALLERGIES: Levaquin [levofloxacin]; Ace inhibitors; Hydrochlorothiazide; and Penicillins    Review of Systems   Constitutional: Negative for fever. Respiratory: Negative for cough and shortness of breath. Cardiovascular: Negative for chest pain. Gastrointestinal: Negative for abdominal pain and vomiting. Genitourinary: Negative for dysuria. Musculoskeletal: Negative for back pain and neck pain. Neurological: Positive for weakness (L leg) and numbness (L leg). Negative for facial asymmetry, speech difficulty and headaches. All other systems reviewed and are negative. Vitals:    05/13/20 0338   BP: 116/51   Pulse: 62   Resp: 16   Temp: 97.7 °F (36.5 °C)   SpO2: 93%            Physical Exam  Vitals signs and nursing note reviewed.    Constitutional:       General: She is not in acute distress. Appearance: She is well-developed. HENT:      Head: Normocephalic and atraumatic. Eyes:      Conjunctiva/sclera: Conjunctivae normal.   Neck:      Musculoskeletal: Normal range of motion and neck supple. Cardiovascular:      Rate and Rhythm: Normal rate and regular rhythm. Heart sounds: Normal heart sounds. Pulmonary:      Effort: Pulmonary effort is normal. No respiratory distress. Breath sounds: Normal breath sounds. Abdominal:      Palpations: Abdomen is soft. Tenderness: There is no abdominal tenderness. There is no guarding. Musculoskeletal: Normal range of motion. Skin:     General: Skin is warm and dry. Neurological:      Mental Status: She is alert and oriented to person, place, and time. Cranial Nerves: No dysarthria or facial asymmetry. Motor: Weakness (3/5 motor strength in LLE. All other extremities 5/5. ) present. MDM  Number of Diagnoses or Management Options  Cerebrovascular accident (CVA), unspecified mechanism ([address]): new and requires workup  Risk of Complications, Morbidity, and/or Mortality  Presenting problems: high  Diagnostic procedures: high  Management options: high    Critical Care  Total time providing critical care: 30-74 minutes (Total critical care time spend exclusive of procedures:  32 minutes.  )         Procedures    3:57 AM  I consulted Dr. Emmie Moon, telemetry neurologist on call. Patient out of TPA window. Agrees with obtaining CTA. Recommends admission for full stroke work-up. Can start ASA, give 325 mg. EKG interpretation: 4:24  Rhythm: normal sinus rhythm; and regular . Rate (approx.): 68; Axis: left axis deviation; Intervals: normal ; ST/T wave: no STEMI; EKG documented and interpreted by Mony Finch MD, ED MD. Princess Diaz for Admission  5:04 AM    ED Room Number: QF38/28  Patient Name and age: Alejandro Leal 69 y.o.  female  Working Diagnosis:   1.  Cerebrovascular accident (CVA), unspecified mechanism ([address]        COVID-19 Suspicion:  no    Code Status:  Full Code  Readmission: no  Isolation Requirements:  no  Recommended Level of Care:  telemetry  Department:[facility] Adult ED - 21   Other:  Teleneuro consulted, not tPA candidate.

## 2020-05-13 NOTE — PROGRESS NOTES
Occupational Therapy  05/13/20    Order acknowledged, chart reviewed. Neurology at bedside upon attempt, will follow up later today vs tomorrow as able & appropriate.      Thank you,   Chrissy Cheek, OTFRNAKIE, OTR/L

## 2020-05-13 NOTE — PROGRESS NOTES
Admission Medication Reconciliation:    Information obtained from:  patient, rx query, chart review/encounter notes  RxQuery data available¹:  YES    Comments/Recommendations: Updated PTA meds/reviewed patient's allergies. 1)  Confirmed medications with patient. Unable to note last doses. Current medication history aligns with refill history. 2)  Medication changes (since last review): Added  - none    Adjusted  - none    Removed  - none       ¹RxQuery pharmacy benefit data reflects medications filled and processed through the patient's insurance, however   this data does NOT capture whether the medication was picked up or is currently being taken by the patient. Allergies:  Levaquin [levofloxacin]; Ace inhibitors; Hydrochlorothiazide; and Penicillins    Significant PMH/Disease States:   Past Medical History:   Diagnosis Date    History of mammogram 10 years ago    Hypertension     Hyponatremia 09/11/2014    hospitalized for severe hyponatremia due to SIADH    Menopause     at age 37    Pap smear for cervical cancer screening 15 years ago     Chief Complaint for this Admission:    Chief Complaint   Patient presents with    Leg Pain     Prior to Admission Medications:   Prior to Admission Medications   Prescriptions Last Dose Informant Taking? amLODIPine (NORVASC) 10 mg tablet   Yes   Sig: TAKE 1 TABLET BY MOUTH EVERY DAY   aspirin delayed-release 81 mg tablet   Yes   Sig: Take 81 mg by mouth daily. calcium carbonate/vitamin D3 (CALCIUM 500 + D PO)   Yes   Sig: Take 1 Tab by mouth two (2) times daily (with meals). metoprolol tartrate (LOPRESSOR) 50 mg tablet   Yes   Sig: TAKE 1 TABLET BY MOUTH TWICE DAILY   multivitamin (ONE A DAY) tablet   Yes   Sig: Take 1 Tab by mouth daily.    potassium chloride SR (KLOR-CON 10) 10 mEq tablet   Yes   Sig: TAKE 2 TABLETS BY MOUTH TWICE DAILY   sodium chloride 1 gram tablet   Yes   Sig: TAKE 3 TABLETS BY MOUTH TWO TIMES DAILY WITH FOOD      Facility-Administered Medications: None       Please contact the main inpatient pharmacy with any questions or concerns at [phone] and we will direct you to the clinical pharmacist covering this patient's care while in-house.    Nery Solis

## 2020-05-13 NOTE — CONSULTS
NEUROLOGY   INPATIENT EVALUATION/CONSULTATION       PATIENT NAME: Spencer Caputo    MRN: [id number]    REASON FOR CONSULTATION: Bilateral lower extremity sensorimotor deficits, urinary retention    05/13/20      HISTORY OF PRESENT ILLNESS:  Spencer Caputo is a 69 y.o. right hand dominant female with history significant for multiple medical problems as noted below who presented to [facility] ER this morning with complaints of predominantly left lower extremity weakness and numbness. States that she went to bed without incident and at baseline around 10pm but upon awakening noted left leg weakness with cramps, numbness up to her hip and inability to urinate, with milder symptoms in the right leg. In the ER, code stroke was activated with the teleneurologist recommending CTA with plans to admit for a stroke evaluation. These things were completed with no significant findings other than 60% stenosis of right carotid, including MRI which demonstrates chronic findings. She denies any illness, injury or past symptoms such as her current ones. Other than urinary retention she does not comment on bowel dysfunction and denies maryann saddle anesthesia. Is unable to identify anything which may have precipitated this, denies back pain. No symptoms in the arms or above the neck. No recent falls. Typically is active, walking 1 mile per day on average.      PAST MEDICAL HISTORY:  Past Medical History:   Diagnosis Date    History of mammogram 10 years ago    Hypertension     Hyponatremia 09/11/2014    hospitalized for severe hyponatremia due to SIADH    Menopause     at age 37    Pap smear for cervical cancer screening 15 years ago       PAST SURGICAL HISTORY:  Past Surgical History:   Procedure Laterality Date    HX CATARACT REMOVAL Right 1/14/2014    HX CHOLECYSTECTOMY      HX THROMBECTOMY Right     leg    HX TUBAL LIGATION         FAMILY HISTORY:   Family History   Problem Relation Age of Onset    Kidney Disease Mother         uncertain         SOCIAL HISTORY:  Social History     Socioeconomic History    Marital status:      Spouse name: Not on file    Number of children: Not on file    Years of education: Not on file    Highest education level: Not on file   Tobacco Use    Smoking status: Former Smoker     Packs/day: 1.00     Years: 40.00     Pack years: 40.00     Types: Cigarettes    Smokeless tobacco: Former User     Quit date: 9/11/2014    Tobacco comment: Quit smoking September 2014. Substance and Sexual Activity    Alcohol use:  Yes     Alcohol/week: 3.0 standard drinks     Types: 3 Standard drinks or equivalent per week    Drug use: No         MEDICATIONS:   Current Facility-Administered Medications   Medication Dose Route Frequency Provider Last Rate Last Dose    amLODIPine (NORVASC) tablet 10 mg  10 mg Oral DAILY Trenton Sanz MD   10 mg at 05/13/20 1001    aspirin delayed-release tablet 325 mg  325 mg Oral DAILY Trenton Sanz MD   325 mg at 05/13/20 1232    metoprolol tartrate (LOPRESSOR) tablet 50 mg  50 mg Oral BID Trenton Sanz MD   50 mg at 05/13/20 1001    lactated Ringers infusion  75 mL/hr IntraVENous CONTINUOUS Trenton Sanz MD 75 mL/hr at 05/13/20 1334 75 mL/hr at 05/13/20 1334    ondansetron (ZOFRAN) injection 4 mg  4 mg IntraVENous Q6H PRN Trenton Sanz MD        bisacodyL (DULCOLAX) tablet 5 mg  5 mg Oral DAILY PRN Trenton Sanz MD        acetaminophen (TYLENOL) tablet 650 mg  650 mg Oral Q4H PRN Trenton Sanz MD        heparin (porcine) injection 5,000 Units  5,000 Units SubCUTAneous Q8H Trenton Sanz MD   5,000 Units at 05/13/20 1002    sodium chloride tablet 3 g  3 g Oral BID WITH MEALS Trenton Sanz MD   3 g at 05/13/20 1232    potassium chloride SR (KLOR-CON 10) tablet 20 mEq  20 mEq Oral BID Trenton Sanz MD        atorvastatin (LIPITOR) tablet 20 mg  20 mg Oral QHS Georgia Rodriguez MD        nicotine (Eleanora Mansi) 14 mg/24 hr patch 1 Patch  1 Patch TransDERmal Q24H Jessy Rodriguez MD             ALLERGIES:  Allergies   Allergen Reactions    Levaquin [Levofloxacin] Anaphylaxis     Throat and face became swollen.  Ace Inhibitors Vertigo    Hydrochlorothiazide Other (comments)     Hypotension and severe hyponatremia.  Penicillins Hives     While pregnant         REVIEW OF SYSTEMS:  Pertinent positives/negatives as per HPI, otherwise 14 point review of systems is negative. PHYSICAL EXAM:  Vital Signs:   Visit Vitals  /88 (BP 1 Location: Left arm, BP Patient Position: At rest)   Pulse 72   Temp 97.7 °F (36.5 °C)   Resp 20   Ht 5' 1\" (1.549 m)   Wt 54 kg (119 lb 0.8 oz)   SpO2 95%   BMI 22.49 kg/m²     Physical examination:  Pleasant female resting comfortably in bed in no distress. HEENT is unremarkable, neck is supple. Cardiovascular demonstrates constant S1/S2, regular rate/rhythm. Pulmonary demonstrates equal air entry bilaterally, distant breath sounds noted. Abdomen is non tender, non distended. Extremities are warm/dry, no peripheral edema is noted. Neurologically is alert and oriented X 4, attention is intact. Speech is clear, language is fluent without evidence for aphasia. Cranial nerves II-XII are grossly intact. Motorically, bulk/tone intact in upper extremities where 5/5 strength is noted. In right leg, iliopsoas is 4-/5, knee extension 5/5, knee flexion 4/5, dorsiflexion/plantar flexion 4+/5. In left leg, iliopsoas is 4-/5, knee extension approximately 4+/5 (limited by pain), knee flexion 4-/5 (?, limited by pain in hamstring), dorsiflexion < 3/5, plantar flexion is 4+/5. Sensation is diminshed to cold touch up to patella, no sensory level noted on trunk. Coordination intact in upper extremities. Reflexes 1+ in upper extremity, 2+ patella with babinski appearing present bilaterally (versus strong withdrawal from tickle). PERTINENT DATA:    CT Results (maximum last 3):       MRI Results (maximum last 3): Results from [place] encounter on 05/13/20   MRI BRAIN WO CONT    Narrative EXAM: MRI BRAIN WO CONT    INDICATION: CVA. Bilateral lower extremity numbness and tingling. COMPARISON: CTA head and neck 5/13/2020, MRI brain 9/13/2014. CONTRAST: None. TECHNIQUE:    Multiplanar multisequence acquisition without contrast of the brain. FINDINGS:  The ventricles are normal in size and position. Scattered periventricular deep  white matter T2/FLAIR hyperintensities, consistent with mild chronic  microvascular ischemic disease. There are small chronic infarcts noted in the  left corona radiata/basal ganglia, left cerebellum and bilateral anita. There is  no acute infarct, hemorrhage, extra-axial fluid collection, or mass effect. There is no cerebellar tonsillar herniation. Expected arterial flow-voids are  present. Scattered mucosal thickening in the bilateral frontal sinuses and ethmoidal air  cells without air-fluid level. Small bilateral mastoid effusions. The orbital  contents are within normal limits with bilateral lens implants. No significant  osseous or scalp lesions are identified. Impression IMPRESSION:     1. No acute intracranial abnormality. 2. Mild chronic microvascular ischemic disease. Small chronic infarcts in the  left corona radiata/basal ganglia, left cerebellum and bilateral anita. Results from [place] encounter on 09/11/14   MRI BRAIN WO CONT    Narrative **Final Report**       ICD Codes / Adm. Diagnosis: 276.1  805.4 / Hyposmolality and/or hyponatre    Closed fracture of lumbar ve  Examination:  MR BRAIN WO CON  - [id number] - Sep 13 2014 10:02AM  Accession No:  [id number]  Reason:        REPORT:  INDICATION:  Stroke. Abnormal CT. MRI of the brain is performed without contrast.    Diffusion imaging shows restriction in the central posterior lower anita   which could represent ischemia or possibly central pontine myelinolysis.     There are no other areas of diffusion restriction/ischemia. The area seen on   CT in the left basal ganglia/periventricular white matter has MRI appearance   of T2 hyperintensity similar to other additional bilateral periventricular   white matter foci, nonspecific, but suggesting chronic ischemic foci. There is no mass, bleed, shift, hydrocephalus or extra-axial fluid   collection. IMPRESSION:  1. Abnormal pontine signal as discussed. 2. Chronic bilateral cerebral periventricular white matter ischemic foci. 3. No bleed or shift.               Signing/Reading Doctor: Isabella Alejandro ([id number])    Approved: Isabella Alejandro ([id number])  Sep 13 2014 10:19AM                                 ASSESSMENT:      Sherrie Schulz is a 69year old RH dominant female with history of hypertension and tobacco dependence as well as chronic hyponatremia who presents with bilateral lower extremity sensorimotor deficits along with urinary retention    RECOMMENDATIONS:  Acute onset paraparesis, urinary retention:  MRI brain negative, history/examination more concerning for possible throracolumbar process, including conus medullaris as well as sacral nerve roots (caudia equina)  Would also consider inflammatory causes and structural as one  Lastly, could consider exaggerated symptoms secondary to left leg pain, but not a prominent concern  Request stat MRI of thoracic and lumbar spines to begin with, will follow-up and proceed accordingly  Regarding stroke, does not fit clinically or radiographically at this time, would continue aspirin and atorvastatin however given findings of chronic infarct on MRI    Neurology will continue to follow, please call with questions, concerns      Aida Vivar MD

## 2020-05-13 NOTE — PROGRESS NOTES
Problem: Pain  Goal: *Control of Pain  Outcome: Progressing Towards Goal     Problem: Pressure Injury - Risk of  Goal: *Prevention of pressure injury  Description: Document Sameer Scale and appropriate interventions in the flowsheet. Outcome: Progressing Towards Goal  Note: Pressure Injury Interventions: Activity Interventions: Pressure redistribution bed/mattress(bed type), PT/OT evaluation    Mobility Interventions: PT/OT evaluation, Pressure redistribution bed/mattress (bed type)                          Problem: Falls - Risk of  Goal: *Absence of Falls  Description: Document Katey Fall Risk and appropriate interventions in the flowsheet.   Outcome: Progressing Towards Goal  Note: Fall Risk Interventions:  Mobility Interventions: Bed/chair exit alarm, Communicate number of staff needed for ambulation/transfer, PT Consult for mobility concerns, PT Consult for assist device competence         Medication Interventions: Bed/chair exit alarm, Evaluate medications/consider consulting pharmacy, Patient to call before getting OOB    Elimination Interventions: Call light in reach, Stay With Me (per policy), Toileting schedule/hourly rounds, Toilet paper/wipes in reach

## 2020-05-13 NOTE — H&P
[address]  HISTORY AND PHYSICAL    Name:  Bright Craig  MR#:  [id number]  :  1945  ACCOUNT #:  [de-identified]  ADMIT DATE:  2020      The patient was seen, evaluated and admitted by me on 2020. PRIMARY CARE PHYSICIAN:  Анна Nagel MD    SOURCE OF INFORMATION:  Patient. CHIEF COMPLAINT:  Bilateral lower extremity numbness and tingling. HISTORY OF PRESENT ILLNESS:  This is a 71-year-old woman with past medical history significant for hypertension and SIADH, who was in her usual state of health until the day of her presentation at the emergency room when the patient developed bilateral lower extremity numbness and tingling. The numbness and tingling is worse in the left lower extremity than the right lower extremity. The patient woke up at about 02:30 a.m. to use the bathroom, that is when she developed the symptoms and as the result of the numbness and tingling, the patient fell on the way to the bathroom. Since then, the patient has not been able to ambulate independently. The patient did not have any symptoms at 10:00 p.m. when she went to bed. The patient was brought to the emergency room for further evaluation. When the patient arrived at the emergency room, code stroke was called. CT scan of the head was obtained and this was negative for acute pathology. CTA of the head and neck was also obtained. 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 This was discussed with the neurointerventional surgeon, who did not advise any intervention. The patient was also seen in the emergency room by neurologist through the Teleneurology Service at the request of the emergency room physician. It was stated that the patient is clearly outside the t-PA window but the teleneurologist advised aspirin therapy and admission for full stroke workup. The patient was then referred to the hospitalist service for that purpose.   She was last admitted to this [facility] from 09/11/2014 to 09/22/2014. The patient was admitted with significant hyponatremia attributed to SIADH. The patient has been on sodium as well as potassium supplement since then. The patient denies sick contact or contact with any person with COVID-19 virus infection. She stated that she has been self-quarantine at home. PAST MEDICAL HISTORY:  1. Hypertension. 2.  Hyponatremia secondary to SIADH. ALLERGIES:  THE PATIENT IS ALLERGIC TO LEVAQUIN, ACE INHIBITOR, HYDROCHLOROTHIAZIDE, PENICILLIN. MEDICATIONS:  1.  Norvasc 10 mg daily. 2.  Aspirin 81 mg daily. 3.  Lopressor 50 mg twice daily. 4.  Multivitamin 1 tablet daily. 5.  Potassium chloride 10 mEq 2 tablets twice daily. 6.  Sodium chloride 1 g three tablets twice daily. FAMILY HISTORY:  This was reviewed. Her mother had kidney disease, the type of kidney disease is not known. PAST SURGICAL HISTORY:  This is significant for:  1. Right cataract extraction. 2.  Cholecystectomy. 3.  Tubal ligation. 4.  Right leg thrombectomy. SOCIAL HISTORY:  The patient smokes about a pack of cigarettes daily. Admits to social consumption of alcohol. REVIEW OF SYSTEMS:  HEAD, EYES, EARS, NOSE, AND THROAT:  No headache, no dizziness, no blurring of vision, no photophobia. RESPIRATORY SYSTEM:  No cough, no shortness of breath, no hemoptysis. CARDIOVASCULAR SYSTEM:  No chest pain, no orthopnea, no palpitations. GASTROINTESTINAL SYSTEM:  No nausea or vomiting. No diarrhea, no constipation. GENITOURINARY SYSTEM:  No dysuria, no urgency, no frequency. All other systems are reviewed and they are negative. PHYSICAL EXAMINATION:  GENERAL APPEARANCE:  The patient appeared ill, in moderate distress. VITAL SIGNS:  On arrival at the emergency room; temperature 97.5, pulse 62, respiratory rate 16, blood pressure 116/51, oxygen saturation 93% on room air. HEENT:  Head:  Normocephalic, atraumatic. Eyes:  Normal eye movement.   No redness, no drainage, no discharge. Ears:  Normal external ears with no evidence of drainage. Nose:  No deformity, no drainage. Mouth and Throat:  No visible oral lesion. Dry oral mucosa. NECK:  Neck is supple. No JVD, no thyromegaly. CHEST:  Clear breath sounds. No wheezing, no crackles. HEART:  Normal S1 and S2, regular. No clinically appreciable murmur. ABDOMEN:  Soft, nontender. Normal bowel sounds. CNS:  Alert and oriented x3. No gross focal neurological deficit. EXTREMITIES:  No edema. Pulses 2+ bilaterally. MUSCULOSKELETAL SYSTEM:  Bilateral calf tenderness noted. SKIN:  No active skin lesions seen in the exposed part of the body. PSYCHIATRY:  Normal mood and affect. LYMPHATIC SYSTEM:  No cervical lymphadenopathy. DIAGNOSTIC DATA:  EKG shows normal sinus rhythm, and nonspecific ST and T-waves abnormalities. CT scan of the head without contrast, no acute findings. CTA of the head and neck shows left vertebral artery occlusion. Chest x-ray, no acute findings. LABORATORY DATA:  Hematology:  WBC 9.0, hemoglobin 16.0, hematocrit 48.7, platelets 762. Coagulation profile:  INR 1.1, PT 10.9. Chemistry:  Sodium 136, potassium 3.4, chloride 101, CO2 of 29, glucose 91, BUN 6, creatinine 0.85, calcium 8.8, total bilirubin 0.2, ALT 26, AST 27, alkaline phosphatase 136, total protein 7.2, albumin level 3.3, globulin 3.9. Coagulation profile, troponin less than 0.05.    ASSESSMENT:  1. Suspected acute cerebrovascular accident. 2.  Hypertension. 3.  Syndrome of inappropriate antidiuretic hormone secretion. 4.  Bilateral leg pain. 5.  Fall. 6.  Left vertebral artery occlusion. 7.  Tobacco abuse. 8.  Hypokalemia. PLAN:  1. Suspected acute CVA:  We will admit the patient for further evaluation and treatment. We will start the patient on full-dose aspirin 325 mg daily as advised by the teleneurologist.  We will obtain MRI of the brain.   We will obtain echocardiogram.  We will check lipid profile. We will check hemoglobin A1c level. We will check C-reactive protein level to evaluate the patient for systemic inflammation. We will check A89 and folic acid level. Inpatient neurology consult will be requested. We will await further recommendation from the inpatient neurologist to assist in further evaluation and management of suspected acute CVA. We will also start the patient on Lipitor. 2.  Hypertension: We will resume preadmission medication. We will monitor the patient's blood pressure closely. 3.  SIADH:  We will continue with sodium supplements. 4.  Bilateral leg pain:  The patient presented with bilateral calf tingling and numbness as well as cramps. We will obtain ultrasound of the lower extremity to evaluate the patient for DVT. 5.  Fall:  The patient fell at home as the result of the leg numbness and tingling and unable to ambulate independently after the fall. CT scan of the head did not show any acute pathology. We will obtain a CT scan of the abdomen and pelvis to evaluate the patient for fracture and internal organ injury including hematoma. 6.  Left vertebral artery occlusion: This is based on the CTA of the head and neck. This has been discussed with the neurointerventional surgeon by the emergency room physician. No intervention advised. This is chronic according to the report. We will continue with aspirin and Lipitor. We will await further recommendation from the inpatient neurologist.  7.  Tobacco abuse: The patient advised to quit smoking. We will place the patient on Nicoderm patch. 8.  Hypokalemia: We will replace potassium and repeat potassium level. We will also check magnesium level. OTHER ISSUES:  Code status: The patient is a full code. We will place the patient on heparin for DVT prophylaxis. FUNCTIONAL STATUS PRIOR TO ADMISSION:  The patient is ambulatory with no assistant or device.     COVID PRECAUTION:  The patient was wearing a face mask. I was wearing a cap, face mask, and gloves for this patient's encounter.         Jerry Castillo MD      [id number]  D:  05/13/2020 6:36  T:  05/13/2020 8:12  JOB #:  [id number]  CC:  Azalia Brock MD

## 2020-05-13 NOTE — PROGRESS NOTES
Orders received, chart reviewed and patient evaluated by physical therapy. Pending progression with skilled acute physical therapy, recommend:  Therapy 3 hours per day 5-7 days per week    Recommend with nursing patient to complete as able in order to maintain strength, endurance and independence: modified chair position in bed. Thank you for your assistance. Full evaluation to follow.

## 2020-05-13 NOTE — ED TRIAGE NOTES
Pt complaint of left calf numbness/tingling and right with tingling started around 2200 (LKW) when pt was going to bed. Pt has a hx of low sodium and takes potassium pills.  Pt states , \"mild pain 3/10'\" B/S 86

## 2020-05-13 NOTE — PROGRESS NOTES
Problem: Mobility Impaired (Adult and Pediatric)  Goal: *Acute Goals and Plan of Care (Insert Text)  Description:   FUNCTIONAL STATUS PRIOR TO ADMISSION: Patient was independent and active without use of DME.    HOME SUPPORT PRIOR TO ADMISSION: The patient lived alone with daughter and neighbors to provide assistance with groceries. Physical Therapy Goals  Initiated 5/13/2020  1. Patient will move from supine to sit and sit to supine , scoot up and down and roll side to side in bed with modified independence within 7 day(s). 2.  Patient will transfer from bed to chair and chair to bed with modified independence using the least restrictive device within 7 day(s). 3.  Patient will perform sit to stand with modified independence within 7 day(s). 4.  Patient will ambulate with supervision/set-up for 150 feet with the least restrictive device within 7 day(s). 5.  Patient will improve Meyer Balance score by 7 points within 7 days. Outcome: Progressing Towards Goal   PHYSICAL THERAPY EVALUATION- NEURO POPULATION  Patient: Rd Cox (69 y.o. female)  Date: 5/13/2020  Primary Diagnosis: Acute CVA (cerebrovascular accident) [facility]) [I63.9]        Precautions:          ASSESSMENT  Based on the objective data described below, the patient presents with decreased activity tolerance, balance deficits, LLE pain, and weakness. Pt required increased time to transfer toward EOB requiring assistance with LLE and trunk. Pt seated EOB for approx 8-10 minutes presenting with 2 episodes of excessive posterior trunk leaning requiring min A to correct. Pt performed sit<>stand x mod A and unable to WB through L foot reporting pain. Pt assisted back to EOB then supine. Pt able to assist with scooting toward HOB. Pt then with episode of emesis. Pt assisted and positioned for comfort. RN aware of emesis and L foot pain.      Current Level of Function Impacting Discharge (mobility/balance): mod A for transfers    Functional Outcome Measure: The patient scored Total: 4/56 on the [facility] Assessment which is indicative of high fall risk. Other factors to consider for discharge: high fall risk, lives alone, L foot pain, weakness, requires assistance to stand, unable to transfer to chair     Patient will benefit from skilled therapy intervention to address the above noted impairments. PLAN :  Recommendations and Planned Interventions: bed mobility training, transfer training, gait training, therapeutic exercises, neuromuscular re-education, patient and family training/education, and therapeutic activities      Frequency/Duration: Patient will be followed by physical therapy:  5 times a week to address goals. Recommendation for discharge: (in order for the patient to meet his/her long term goals)  Therapy 3 hours per day 5-7 days per week    This discharge recommendation:  Has not yet been discussed the attending provider and/or case management    IF patient discharges home will need the following DME: wheelchair         SUBJECTIVE:   Patient stated my daughter lives near me.     OBJECTIVE DATA SUMMARY:   HISTORY:    Past Medical History:   Diagnosis Date    History of mammogram 10 years ago    Hypertension     Hyponatremia 09/11/2014    hospitalized for severe hyponatremia due to SIADH    Menopause     at age 37    Pap smear for cervical cancer screening 15 years ago     Past Surgical History:   Procedure Laterality Date    HX CATARACT REMOVAL Right 1/14/2014    HX CHOLECYSTECTOMY      HX THROMBECTOMY Right     leg    HX TUBAL LIGATION         Personal factors and/or comorbidities impacting plan of care: [facility]    Home Situation  Home Environment: Private residence  # Steps to Enter: 0  One/Two Story Residence: One story  Living Alone: Yes  Support Systems: Child(jc), Friends \ neighbors  Current DME Used/[facility] at [facility]: Shower chair, Grab bars, Walker, rolling, Cane, straight  Tub or Shower Type: Tub/Shower combination    EXAMINATION/PRESENTATION/DECISION MAKING:   Critical Behavior:  Neurologic State: Alert, Appropriate for age, Eyes open spontaneously  Orientation Level: Appropriate for age, Oriented X4  Cognition: Appropriate decision making, Appropriate for age attention/concentration, Appropriate safety awareness, Follows commands     Hearing: Auditory  Auditory Impairment: None  Skin:  intact  Edema: none noted  Range Of Motion:  AROM: Generally decreased, functional           PROM: Within functional limits           Strength:    Strength: Generally decreased, functional                    Tone & Sensation:                  Sensation: Impaired               Coordination:  Coordination: Generally decreased, functional  Vision:      Functional Mobility:  Bed Mobility:     Supine to Sit: Minimum assistance;Bed Modified  Sit to Supine: Moderate assistance  Scooting: Minimum assistance  Transfers:  Sit to Stand: Moderate assistance  Stand to Sit: Moderate assistance                       Balance:   Sitting: Impaired; Without support  Sitting - Static: Good (unsupported)  Sitting - Dynamic: Fair (occasional)  Standing: Impaired; With support  Standing - Static: Fair  Standing - Dynamic : Fair    Functional Measure  Meyer Balance Test:    Sitting to Standin  Standing Unsupported: 0  Sitting with Back Unsupported: 3  Standing to Sittin  Transfers: 1  Standing Unsupported with Eyes Closed: 0  Standing Unsupported with Feet Together: 0  Reach Forward with Outstretched Arm: 0   Object: 0  Turn to Look Over Shoulders: 0  Turn 360 Degrees: 0  Alternate Foot on Step/Stool: 0  Standing Unsupported One Foot in Front: 0  Stand on One Le  Total:          56=Maximum possible score;   0-20=High fall risk  21-40=Moderate fall risk   41-56=Low fall risk        Physical Therapy Evaluation Charge Determination   History Examination Presentation Decision-Making   MEDIUM  Complexity : 1-2 comorbidities / personal factors will impact the outcome/ POC  LOW Complexity : 1-2 Standardized tests and measures addressing body structure, function, activity limitation and / or participation in recreation  MEDIUM Complexity : Evolving with changing characteristics  LOW Complexity : FOTO score of       Based on the above components, the patient evaluation is determined to be of the following complexity level: LOW         Activity Tolerance:   Fair and requires rest breaks  Please refer to the flowsheet for vital signs taken during this treatment. After treatment patient left in no apparent distress:   Supine in bed, Call bell within reach, and Side rails x 3    COMMUNICATION/EDUCATION:   The patients plan of care was discussed with: Occupational therapist and Registered nurse. Patient was educated regarding her deficit(s) of balance and weakness as this relates to her diagnosis of CVA. She demonstrated Good understanding as evidenced by head nodding. Patient and/or family was verbally educated on the BE FAST acronym for signs/symptoms of CVA and TIA. BE FAST was written on patient's communication board  for visual education and reinforcement. All questions answered with patient indicating good understanding. Fall prevention education was provided and the patient/caregiver indicated understanding., Patient/family have participated as able in goal setting and plan of care. , and Patient/family agree to work toward stated goals and plan of care.     Thank you for this referral.  Abhishek Velasco, PT, DPT   Time Calculation: 22 mins

## 2020-05-13 NOTE — ROUTINE PROCESS
TRANSFER - OUT REPORT: 
 
Verbal report given to Mag RN on [address]  being transferred to [facility]  for routine progression of care Report consisted of patients Situation, Background, Assessment and  
Recommendations(SBAR). Information from the following report(s) SBAR, ED Summary, [facility] and Recent Results was reviewed with the receiving nurse. Lines:  
Peripheral IV 05/13/20 Right Antecubital (Active) Site Assessment Clean, dry, & intact 5/13/2020  4:01 AM  
Phlebitis Assessment 0 5/13/2020  4:01 AM  
Infiltration Assessment 0 5/13/2020  4:01 AM  
Dressing Status Clean, dry, & intact 5/13/2020  4:01 AM  
Dressing Type Transparent 5/13/2020  4:01 AM  
Hub Color/Line Status Pink;Patent; Flushed 5/13/2020  4:01 AM  
Action Taken Blood drawn 5/13/2020  4:01 AM  
  
 
Opportunity for questions and clarification was provided.

## 2020-05-13 NOTE — PROGRESS NOTES
Bedside and Verbal shift change report given to Antonina Lilly RN (oncoming nurse) by Sammy Becerra RN (offgoing nurse). Report included the following information SBAR, Kardex, MAR, Cardiac Rhythm NSR and Dual Neuro Assessment.

## 2020-05-14 LAB
ALBUMIN SERPL-MCNC: 3.4 G/DL (ref 3.5–5)
ALBUMIN/GLOB SERPL: 0.8 {RATIO} (ref 1.1–2.2)
ALP SERPL-CCNC: 129 U/L (ref 45–117)
ALT SERPL-CCNC: 24 U/L (ref 12–78)
ANION GAP SERPL CALC-SCNC: 7 MMOL/L (ref 5–15)
AST SERPL-CCNC: 28 U/L (ref 15–37)
ATRIAL RATE: 68 BPM
BASOPHILS # BLD: 0.1 K/UL (ref 0–0.1)
BASOPHILS NFR BLD: 1 % (ref 0–1)
BILIRUB SERPL-MCNC: 0.7 MG/DL (ref 0.2–1)
BUN SERPL-MCNC: 8 MG/DL (ref 6–20)
BUN/CREAT SERPL: 12 (ref 12–20)
CALCIUM SERPL-MCNC: 9 MG/DL (ref 8.5–10.1)
CALCULATED P AXIS, ECG09: 85 DEGREES
CALCULATED R AXIS, ECG10: -57 DEGREES
CALCULATED T AXIS, ECG11: -31 DEGREES
CHLORIDE SERPL-SCNC: 100 MMOL/L (ref 97–108)
CK SERPL-CCNC: 165 U/L (ref 26–192)
CO2 SERPL-SCNC: 26 MMOL/L (ref 21–32)
CREAT SERPL-MCNC: 0.65 MG/DL (ref 0.55–1.02)
DIAGNOSIS, 93000: NORMAL
DIFFERENTIAL METHOD BLD: ABNORMAL
EOSINOPHIL # BLD: 0 K/UL (ref 0–0.4)
EOSINOPHIL NFR BLD: 0 % (ref 0–7)
ERYTHROCYTE [DISTWIDTH] IN BLOOD BY AUTOMATED COUNT: 13.2 % (ref 11.5–14.5)
GLOBULIN SER CALC-MCNC: 4.1 G/DL (ref 2–4)
GLUCOSE SERPL-MCNC: 86 MG/DL (ref 65–100)
HCT VFR BLD AUTO: 47.4 % (ref 35–47)
HCYS SERPL-SCNC: 13.4 UMOL/L (ref 3.7–13.9)
HGB BLD-MCNC: 15.9 G/DL (ref 11.5–16)
IMM GRANULOCYTES # BLD AUTO: 0 K/UL (ref 0–0.04)
IMM GRANULOCYTES NFR BLD AUTO: 0 % (ref 0–0.5)
LYMPHOCYTES # BLD: 2.7 K/UL (ref 0.8–3.5)
LYMPHOCYTES NFR BLD: 24 % (ref 12–49)
MCH RBC QN AUTO: 32.6 PG (ref 26–34)
MCHC RBC AUTO-ENTMCNC: 33.5 G/DL (ref 30–36.5)
MCV RBC AUTO: 97.3 FL (ref 80–99)
MONOCYTES # BLD: 1 K/UL (ref 0–1)
MONOCYTES NFR BLD: 9 % (ref 5–13)
NEUTS SEG # BLD: 7.4 K/UL (ref 1.8–8)
NEUTS SEG NFR BLD: 66 % (ref 32–75)
NRBC # BLD: 0 K/UL (ref 0–0.01)
NRBC BLD-RTO: 0 PER 100 WBC
P-R INTERVAL, ECG05: 176 MS
PLATELET # BLD AUTO: 282 K/UL (ref 150–400)
PMV BLD AUTO: 8.8 FL (ref 8.9–12.9)
POTASSIUM SERPL-SCNC: 4.1 MMOL/L (ref 3.5–5.1)
PROT SERPL-MCNC: 7.5 G/DL (ref 6.4–8.2)
Q-T INTERVAL, ECG07: 456 MS
QRS DURATION, ECG06: 68 MS
QTC CALCULATION (BEZET), ECG08: 484 MS
RBC # BLD AUTO: 4.87 M/UL (ref 3.8–5.2)
SODIUM SERPL-SCNC: 133 MMOL/L (ref 136–145)
VENTRICULAR RATE, ECG03: 68 BPM
WBC # BLD AUTO: 11.2 K/UL (ref 3.6–11)

## 2020-05-14 PROCEDURE — 97110 THERAPEUTIC EXERCISES: CPT

## 2020-05-14 PROCEDURE — 97535 SELF CARE MNGMENT TRAINING: CPT

## 2020-05-14 PROCEDURE — 51798 US URINE CAPACITY MEASURE: CPT

## 2020-05-14 PROCEDURE — 85025 COMPLETE CBC W/AUTO DIFF WBC: CPT

## 2020-05-14 PROCEDURE — 97530 THERAPEUTIC ACTIVITIES: CPT

## 2020-05-14 PROCEDURE — 94760 N-INVAS EAR/PLS OXIMETRY 1: CPT

## 2020-05-14 PROCEDURE — 74011250637 HC RX REV CODE- 250/637: Performed by: INTERNAL MEDICINE

## 2020-05-14 PROCEDURE — 74011250637 HC RX REV CODE- 250/637: Performed by: PSYCHIATRY & NEUROLOGY

## 2020-05-14 PROCEDURE — 77010033678 HC OXYGEN DAILY

## 2020-05-14 PROCEDURE — 82550 ASSAY OF CK (CPK): CPT

## 2020-05-14 PROCEDURE — 65660000000 HC RM CCU STEPDOWN

## 2020-05-14 PROCEDURE — 83921 ORGANIC ACID SINGLE QUANT: CPT

## 2020-05-14 PROCEDURE — 83090 ASSAY OF HOMOCYSTEINE: CPT

## 2020-05-14 PROCEDURE — 74011250636 HC RX REV CODE- 250/636: Performed by: INTERNAL MEDICINE

## 2020-05-14 PROCEDURE — 80053 COMPREHEN METABOLIC PANEL: CPT

## 2020-05-14 PROCEDURE — 36415 COLL VENOUS BLD VENIPUNCTURE: CPT

## 2020-05-14 PROCEDURE — 86341 ISLET CELL ANTIBODY: CPT

## 2020-05-14 RX ORDER — BACLOFEN 10 MG/1
5 TABLET ORAL
Status: DISCONTINUED | OUTPATIENT
Start: 2020-05-14 | End: 2020-05-19 | Stop reason: HOSPADM

## 2020-05-14 RX ADMIN — POTASSIUM CHLORIDE 20 MEQ: 750 TABLET, FILM COATED, EXTENDED RELEASE ORAL at 09:26

## 2020-05-14 RX ADMIN — HEPARIN SODIUM 5000 UNITS: 5000 INJECTION INTRAVENOUS; SUBCUTANEOUS at 02:05

## 2020-05-14 RX ADMIN — POTASSIUM CHLORIDE 20 MEQ: 750 TABLET, FILM COATED, EXTENDED RELEASE ORAL at 17:25

## 2020-05-14 RX ADMIN — AMLODIPINE BESYLATE 10 MG: 5 TABLET ORAL at 09:25

## 2020-05-14 RX ADMIN — Medication 3 G: at 17:25

## 2020-05-14 RX ADMIN — ASPIRIN 325 MG: 325 TABLET, DELAYED RELEASE ORAL at 09:26

## 2020-05-14 RX ADMIN — HEPARIN SODIUM 5000 UNITS: 5000 INJECTION INTRAVENOUS; SUBCUTANEOUS at 17:26

## 2020-05-14 RX ADMIN — METOPROLOL TARTRATE 50 MG: 50 TABLET, FILM COATED ORAL at 18:00

## 2020-05-14 RX ADMIN — METOPROLOL TARTRATE 50 MG: 50 TABLET, FILM COATED ORAL at 09:25

## 2020-05-14 RX ADMIN — Medication 3 G: at 09:26

## 2020-05-14 NOTE — PROGRESS NOTES
Transition of Care Plan   RUR- 11 % Low Risks    Disposition: Patient presents from home and is expected to be going to an inpatient rehab facility,Therapy recommends-Inpatient Rehab, referral sent to [employer]; pending medical progression CVA work up  [address]: [facility] ([address] [place] [address]) phone [phone]/DEANA TBD at discharge   [address] Follow up: PCP/Specialist(s)     Reason for Admission:   Left calf numbness/tingling, right side tingling                    RUR Score:    11 % low                  Plan for utilizing home health: Therapy recommends IPR        PCP: First and Last name:  Dr. Chandrakant Abreu MD    Name of Practice: [facility] [address] [facility]    Are you a current patient: Yes/No:  Yes    Approximate date of last visit:                      Current Advanced Directive/Advance Care Plan: Advanced Directive is on file; Dave Grossman [phone] (sister who lives in [place]) is the mPOA, Edith Mckeon is ; offered to re-do the AMD, declined and wants to leave the one on file     ACP conversation completed and documented. Transition of Care Plan:    Patient is expected to go to an inpatient rehab facility, pending medical progression. Reviewed chart for transitions of care,and discussed in rounds. CM met with patient at bedside to explain role and offer support. Patient is alert and oriented x4, and confirmed demographics. Patient lives alone in an apartment on the first floor with no steps to enter. Patient is very independent up until this incident which brought her to the hospital. Ms. Hood Barrera states NO use of   DMEs to ambulate. She utilizes [facility] located on the [facility].  Patient is expected to be transported by daughter if she progresses medically and becomes able to transfer/ambulate with minimum assistance, or may utilize ambulance in the even of needing max assist.     The Plan for Transition of Care is related to the following treatment goals: Inpatient Rehab     The Patient  was provided with a choice of provider and agrees   with the discharge plan. [x] Yes [] No    Freedom of choice list was provided with basic dialogue that supports the patient's individualized plan of care/goals, treatment preferences and shares the quality data associated with the providers. [x] Yes [] No                1st choice: [address]   2nd choice: [address] Acute Rehab      A [place] of Choice letter was provided, signed by the patient/family, placed on the bedside chart for record. A referral was sent to [address]  via all [facility]. CM advised that the chosen rehab location may not be available sometimes due to various reasons including bed unavailability, and patient may have to go to a different location, verbalized understanding and agreeable. Care Management Interventions  PCP Verified by CM: Yes(April 2020)  Palliative Care Criteria Met (RRAT>21 & CHF Dx)?: No  Mode of Transport at Discharge:  Other (see comment)(family vs [facility] TBD at discharge )  Transition of Care Consult (CM Consult): Discharge Planning  Physical Therapy Consult: Yes  Occupational Therapy Consult: Yes  Speech Therapy Consult: Yes  Current Support Network: Own Home  Confirm Follow Up Transport: Family(family vs [facility] TBD at discharge )  The Patient and/or Patient Representative was Provided with a Choice of Provider and Agrees with the Discharge Plan?: Yes  Freedom of Choice List was Provided with Basic Dialogue that Supports the Patient's Individualized Plan of Care/Goals, Treatment Preferences and Shares the Quality Data Associated with the Providers?: Yes   Resource Information Provided?: No  Discharge Location  Discharge Placement: Rehab hospital/unit acute    LEONIE Baez

## 2020-05-14 NOTE — PROGRESS NOTES
0730- assumed care of pt this am. Pt is alert and oriented with bilateral LE numbness and tingling from foot to hips. Pt has been retaining urine and had to be straight cath'ed twice since admission( no urge). 1330- pt has not voided today, suggested bladder scan, pt would like to wait. 1426- pt still has not voided, waiting on bladder scan machine. 1442- 1024 ml in bladder on bladder scan, MD paged. 1500- MD paced order for mariscal, mariscal placed, 1100ml out.

## 2020-05-14 NOTE — ACP (ADVANCE CARE PLANNING)
Advance Care Planning     Advance Care Planning Clinical Specialist  Conversation Note      Date of ACP Conversation: 5/14/2020    Conversation Conducted with:   Patient with Decision Making Capacity    ACP Clinical Specialist: LEONIE Lozano    *When Decision Maker makes decisions on behalf of the incapacitated patient: Decision Maker is asked to consider and make decisions based on patient values, known preferences, or best interests. Health Care Decision Maker:    Current Designated Health Care Decision Maker:   Primary Decision Maker: Gladys Loja [facility] - [phone]  (If there is a [address] named in the [facility] Makers\" box in the ACP activity, but it is not visible above, be sure to open that field and then select the health care decision maker relationship (ie \"primary\") in the blank space to the right of the name.) Validate  this information as still accurate & up-to-date; edit [place] field as needed.)    Note: Assess and validate information in current ACP documents, as indicated. Note: If the relationship of these Decision-Makers to the patient does NOT follow your state's Next of Kin hierarchy, recommend that patient complete ACP document that meets state-specific requirements to allow them to act on the patient's behalf when appropriate. Care Preferences    Hospitalization: \"If your health worsens and it becomes clear that your chance of recovery is unlikely, what would your preference be regarding hospitalization? \"    Choice:  []  The patient wants hospitalization  [x]  The patient prefers comfort-focused treatment without hospitalization. Ventilation: \"If you were in your present state of health and suddenly became very ill and were unable to breathe on your own, what would your preference be about the use of a ventilator (breathing machine) if it were available to you? \"      If patient would desire the use of a ventilator (breathing machine), answer \"yes\", if not \"no\":    \"If your health worsens and it becomes clear that your chance of recovery is unlikely, what would your preference be about the use of a ventilator (breathing machine) if it were available to you? \"     If patient would desire the use of a ventilator (breathing machine), answer \"yes\", if not \"no\" NO      Resuscitation  \"CPR works best to restart the heart when there is a sudden event, like a heart attack, in someone who is otherwise healthy. Unfortunately, CPR does not typically restart the heart for people who have serious health conditions or who are very sick. \"    \"In the event your heart stopped as a result of an underlying serious health condition, would you want attempts to be made to restart your heart (answer \"yes\" for attempt to resuscitate) or would you prefer a natural death (answer \"no\" for do not attempt to resuscitate)? \" yes      NOTE: If the patient has a valid advance directive AND now provides care preference(s) that are inconsistent with that prior directive, advise the patient to consider either: creating a new advance directive that complies with state-specific requirements; or, if that is not possible, orally revoking that prior directive in accordance with state-specific requirements, which must be documented in the EHR. [x] Yes  [] No   Educated Patient / Jarvis Renee regarding differences between Advance Directives and portable DNR orders.     Length of ACP Conversation in minutes:      Conversation Outcomes:  [x] ACP discussion completed  [x] Existing advance directive reviewed with patient; no changes to patient's previously recorded wishes   [] New Advance Directive completed   [] Portable Do Not Rescitate prepared for Provider review and signature  [] POLST/POST/MOLST/MOST prepared for Provider review and signature      Follow-up plan:    [] Schedule follow-up conversation to continue planning  [] Referred individual to Provider for additional questions/concerns   [] Advised patient/agent/surrogate to review completed ACP document and update if needed with changes in condition, patient preferences or care setting     [] This note routed to one or more involved healthcare providers       LEONIE Garcia

## 2020-05-14 NOTE — PROGRESS NOTES
[address] [facility] Adult  Hospitalist Group                                                                                          Hospitalist Progress Note  Noemi Alarcon MD  Answering service: [phone] from in house phone        Date of Service:  2020  NAME:  Carmen Cuellar  :  1945  MRN:  [id number]      Admission Summary:     Patient was in her usual state of health until the day of her presentation at the emergency room when the patient developed bilateral lower extremity numbness and tingling. The numbness and tingling is worse in the left lower extremity than the right lower extremity. The patient woke up at about 02:30 a.m. to use the bathroom, that is when she developed the symptoms and as the result of the numbness and tingling, the patient fell on the way to the bathroom. Since then, the patient has not been able to ambulate independently. The patient did not have any symptoms at 10:00 p.m. when she went to bed. The patient was brought to the emergency room for further evaluation. When the patient arrived at the emergency room, code stroke was called. CT scan of the head was obtained and this was negative for acute pathology. CTA of the head and neck was also obtained. 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 This was discussed with the neurointerventional surgeon, who did not advise any intervention. The patient was also seen in the emergency room by neurologist through the Teleneurology Service at the request of the emergency room physician. It was stated that the patient is clearly outside the t-PA window but the teleneurologist advised aspirin therapy and admission for full stroke workup. Interval history / Subjective:       No acute complaint today.   Still with weakness in the left leg and numbness in the right leg     Assessment & Plan:     Leg weakness  -Patient with left leg weakness with some sensory disturbance in the right leg  -MRI of the brain shows no CVA, MRI of the thoracic and lumbar spine without explanatory finding for her leg weakness  -Negative for DVT  -Neurology following, working up for metabolic and autoimmune causes  -Physical therapy also following    Urinary retention  -Camacho placed today    Hyponatremia  -History of SIADH   -Discontinue IV fluid and observe    Hypertension  -Blood pressure stable  -Continue amlodipine and metoprolol    Tobacco use  -On nicotine replacement    Code status: FULL  DVT prophylaxis: Heparin    Care Plan discussed with: Patient/Family  Anticipated Disposition: SNF/LTC  Anticipated Discharge: 24 hours to 48 hours     Hospital Problems  Date Reviewed: 5/13/2020          Codes Class Noted POA    * (Principal) Acute CVA (cerebrovascular accident) [facility]) ICD-10-CM: I63.9  ICD-9-CM: 434.91  5/13/2020 Yes                Review of Systems:   A comprehensive review of systems was negative except for that written in the HPI. Vital Signs:    Last 24hrs VS reviewed since prior progress note. Most recent are:  Visit Vitals  BP (!) 126/107   Pulse 69   Temp 98.1 °F (36.7 °C)   Resp 23   Ht 5' 1\" (1.549 m)   Wt 54 kg (119 lb 0.8 oz)   SpO2 97%   BMI 22.49 kg/m²         Intake/Output Summary (Last 24 hours) at 5/14/2020 1704  Last data filed at 5/14/2020 1503  Gross per 24 hour   Intake    Output 2425 ml   Net -2425 ml        Physical Examination:             Constitutional:  No acute distress, cooperative, pleasant    ENT:  Oral mucosa moist, oropharynx benign. Resp:  CTA bilaterally. No wheezing/rhonchi/rales. No accessory muscle use   CV:  Regular rhythm, normal rate, no murmurs, gallops, rubs    GI:  Soft, non distended, non tender. normoactive bowel sounds, no hepatosplenomegaly     Musculoskeletal:  No edema, warm, 2+ pulses throughout    Neurologic:  Moves all extremities.   AAOx3, CN II-XII reviewed     Skin:  Good turgor, no rashes or ulcers       Data Review:    Review and/or order of clinical lab test      Labs:     Recent Labs     05/14/20  0210 05/13/20  0350   WBC 11.2* 9.0   HGB 15.9 16.0   HCT 47.4* 48.7*    293     Recent Labs     05/14/20  0210 05/13/20  1027 05/13/20  0350   *  --  136   K 4.1  --  3.4*     --  101   CO2 26  --  29   BUN 8  --  6   CREA 0.65  --  0.85   GLU 86  --  91   CA 9.0  --  8.8   MG  --  2.0  --    PHOS  --  3.4  --      Recent Labs     05/14/20  0210 05/13/20  0350   SGOT 28 27   ALT 24 26   * 136*   TBILI 0.7 0.2   TP 7.5 7.2   ALB 3.4* 3.3*   GLOB 4.1* 3.9     Recent Labs     05/13/20  0350   INR 1.1   PTP 10.9      No results for input(s): FE, TIBC, PSAT, FERR in the last 72 hours. Lab Results   Component Value Date/Time    Folate 33.3 (H) 05/13/2020 10:27 AM      No results for input(s): PH, PCO2, PO2 in the last 72 hours.   Recent Labs     05/14/20  1401 05/13/20  1027 05/13/20  0350     --   --    TROIQ  --  <0.05 <0.05     Lab Results   Component Value Date/Time    Cholesterol, total 171 05/13/2020 10:27 AM    HDL Cholesterol 54 05/13/2020 10:27 AM    LDL, calculated 86.6 05/13/2020 10:27 AM    Triglyceride 152 (H) 05/13/2020 10:27 AM    CHOL/HDL Ratio 3.2 05/13/2020 10:27 AM     Lab Results   Component Value Date/Time    Glucose (POC) 82 05/13/2020 04:13 AM     Lab Results   Component Value Date/Time    Color YELLOW/STRAW 05/13/2020 08:10 AM    Appearance CLEAR 05/13/2020 08:10 AM    Specific gravity >1.030 (H) 05/13/2020 08:10 AM    pH (UA) 6.0 05/13/2020 08:10 AM    Protein Negative 05/13/2020 08:10 AM    Glucose Negative 05/13/2020 08:10 AM    Ketone Negative 05/13/2020 08:10 AM    Bilirubin Negative 05/13/2020 08:10 AM    Urobilinogen 0.2 05/13/2020 08:10 AM    Nitrites Negative 05/13/2020 08:10 AM    Leukocyte Esterase MODERATE (A) 05/13/2020 08:10 AM    Epithelial cells FEW 05/13/2020 08:10 AM    Bacteria Negative 05/13/2020 08:10 AM    WBC 5-10 05/13/2020 08:10 AM    RBC 0-5 05/13/2020 08:10 AM         Medications Reviewed:     Current Facility-Administered Medications   Medication Dose Route Frequency    baclofen (LIORESAL) tablet 5 mg  5 mg Oral TID PRN    amLODIPine (NORVASC) tablet 10 mg  10 mg Oral DAILY    metoprolol tartrate (LOPRESSOR) tablet 50 mg  50 mg Oral BID    lactated Ringers infusion  75 mL/hr IntraVENous CONTINUOUS    ondansetron (ZOFRAN) injection 4 mg  4 mg IntraVENous Q6H PRN    bisacodyL (DULCOLAX) tablet 5 mg  5 mg Oral DAILY PRN    acetaminophen (TYLENOL) tablet 650 mg  650 mg Oral Q4H PRN    heparin (porcine) injection 5,000 Units  5,000 Units SubCUTAneous Q8H    sodium chloride tablet 3 g  3 g Oral BID WITH MEALS    potassium chloride SR (KLOR-CON 10) tablet 20 mEq  20 mEq Oral BID    nicotine (NICODERM CQ) 14 mg/24 hr patch 1 Patch  1 Patch TransDERmal Q24H     ______________________________________________________________________  EXPECTED LENGTH OF STAY: 3d 21h  ACTUAL LENGTH OF STAY:          1                 Merlyn Jain MD

## 2020-05-14 NOTE — PROGRESS NOTES
Neurology Progress Note    Patient ID:  Trevon Becerra  [id number]  89 y.o.  1945    Subjective:      No significant events noted over the past twenty-four hours. MRI of thoracic and lumbar spine were completed without contrast due to patient tolerance, without significant findings noted. Today, patient feels that her symptoms in the right leg have improved slightly, continues to experience same symptoms on left. Current Facility-Administered Medications   Medication Dose Route Frequency    baclofen (LIORESAL) tablet 5 mg  5 mg Oral TID PRN    amLODIPine (NORVASC) tablet 10 mg  10 mg Oral DAILY    metoprolol tartrate (LOPRESSOR) tablet 50 mg  50 mg Oral BID    lactated Ringers infusion  75 mL/hr IntraVENous CONTINUOUS    ondansetron (ZOFRAN) injection 4 mg  4 mg IntraVENous Q6H PRN    bisacodyL (DULCOLAX) tablet 5 mg  5 mg Oral DAILY PRN    acetaminophen (TYLENOL) tablet 650 mg  650 mg Oral Q4H PRN    heparin (porcine) injection 5,000 Units  5,000 Units SubCUTAneous Q8H    sodium chloride tablet 3 g  3 g Oral BID WITH MEALS    potassium chloride SR (KLOR-CON 10) tablet 20 mEq  20 mEq Oral BID    nicotine (NICODERM CQ) 14 mg/24 hr patch 1 Patch  1 Patch TransDERmal Q24H    clonazePAM (KlonoPIN) tablet 0.5 mg  0.5 mg Oral ONCE PRN          Objective:     Patient Vitals for the past 8 hrs:   BP Temp Pulse Resp SpO2   05/14/20 0557 160/73 98.2 °F (36.8 °C) 91 20 100 %       No intake/output data recorded.   05/12 1901 - 05/14 0700  In: -   Out: 1325 [Urine:1325]    Lab Review   Recent Results (from the past 24 hour(s))   CBC WITH AUTOMATED DIFF    Collection Time: 05/14/20  2:10 AM   Result Value Ref Range    WBC 11.2 (H) 3.6 - 11.0 K/uL    RBC 4.87 3.80 - 5.20 M/uL    HGB 15.9 11.5 - 16.0 g/dL    HCT 47.4 (H) 35.0 - 47.0 %    MCV 97.3 80.0 - 99.0 FL    MCH 32.6 26.0 - 34.0 PG    MCHC 33.5 30.0 - 36.5 g/dL    RDW 13.2 11.5 - 14.5 %    PLATELET 634 122 - 979 K/uL    MPV 8.8 (L) 8.9 - 12.9 FL    NRBC 0.0 0  WBC    ABSOLUTE NRBC 0.00 0.00 - 0.01 K/uL    NEUTROPHILS 66 32 - 75 %    LYMPHOCYTES 24 12 - 49 %    MONOCYTES 9 5 - 13 %    EOSINOPHILS 0 0 - 7 %    BASOPHILS 1 0 - 1 %    IMMATURE GRANULOCYTES 0 0.0 - 0.5 %    ABS. NEUTROPHILS 7.4 1.8 - 8.0 K/UL    ABS. LYMPHOCYTES 2.7 0.8 - 3.5 K/UL    ABS. MONOCYTES 1.0 0.0 - 1.0 K/UL    ABS. EOSINOPHILS 0.0 0.0 - 0.4 K/UL    ABS. BASOPHILS 0.1 0.0 - 0.1 K/UL    ABS. IMM. GRANS. 0.0 0.00 - 0.04 K/UL    DF AUTOMATED     METABOLIC PANEL, COMPREHENSIVE    Collection Time: 05/14/20  2:10 AM   Result Value Ref Range    Sodium 133 (L) 136 - 145 mmol/L    Potassium 4.1 3.5 - 5.1 mmol/L    Chloride 100 97 - 108 mmol/L    CO2 26 21 - 32 mmol/L    Anion gap 7 5 - 15 mmol/L    Glucose 86 65 - 100 mg/dL    BUN 8 6 - 20 MG/DL    Creatinine 0.65 0.55 - 1.02 MG/DL    BUN/Creatinine ratio 12 12 - 20      GFR est AA >60 >60 ml/min/1.73m2    GFR est non-AA >60 >60 ml/min/1.73m2    Calcium 9.0 8.5 - 10.1 MG/DL    Bilirubin, total 0.7 0.2 - 1.0 MG/DL    ALT (SGPT) 24 12 - 78 U/L    AST (SGOT) 28 15 - 37 U/L    Alk. phosphatase 129 (H) 45 - 117 U/L    Protein, total 7.5 6.4 - 8.2 g/dL    Albumin 3.4 (L) 3.5 - 5.0 g/dL    Globulin 4.1 (H) 2.0 - 4.0 g/dL    A-G Ratio 0.8 (L) 1.1 - 2.2       Physical examination:  Pleasant female resting comfortably in chair in no clear distress, legs extended. HEENT is unremarkable, neck appears supple. Cardiovascular, abdominal and pulmonary examinations deferred. Extremities are warm/dry. Neurologically, patient appears alert and oriented, attention intact. Speech clear, language fluent. Cranial nerves II - XII grossly intact. Motorically, patient has 5/5 strength in upper extremities. Right iliopsoas is 4 to 4+/5 appears 5/5 throughout otherwise. Left iliopsoas is 4-/5, hip adduction/abduction is 4+/5 bilaterally. Knee extension 5/5 on left, knee flexion 4/5. Dorsiflexion is <3/5.  Remainder of examination deferred, tone remains increased in left upper extremity.     Assessment:     Laura Lloyd is a 69year old female with past medical history significant for intermittent leg cramps who presented with apparent acute onset of predominantly left lower extremity sensorimotor deficits with stiffness, with minimal symptoms on right    Plan:   Sensorimotor deficits, increased tone:  Remains with ongoing urinary retention, increased tone in left leg with weakness, minimal weakness in right and sensory disturbance  MRI brain, thoracic spine and lumbar spine do not appear particularly explanatory  Will check GAD65, methylmalonic acid, homocysteine, ACE and copper to assess for possible autoimmune disease, metabolic component  May need to consider NCS/EMG, CSF analysis pending course also cervical MRI (though no upper extremity symptoms/neck pain, as well as repeating lower spine MRIs with contrast which patient was not able to tolerate)  For symptomatic treatment, recommend ongoing conservative therapy (stretching, staying mobile) but will add low-dose baclofen as well    Neurology will continue to follow, please call with questions, concerns       Signed:  Rochelle Capellan MD  5/14/2020  1:47 PM

## 2020-05-14 NOTE — PROGRESS NOTES
I have seen and examined and evaluated the patient personally. Agree with assessment and plan documented by my partner Dr. Irlanda Jose on 5/13/2020 in H&P.  -Neurology on board. -MRI brain done. -MRI T LS spine ordered. Done, report pending. -LE duplex unremarkable for DVT 5/13.  -Echocardiogram done, report pending  -Lipid/A1c/B12/folate/CRP noted. LDL 86.6/A1c 5.0/negative troponins x2/B12 310/CRP 0.4/folate 33.  -Would recommend to supplement B12 given level on low normal side, with the goal range of 500600 more appropriate.

## 2020-05-14 NOTE — PROGRESS NOTES
Problem: Self Care Deficits Care Plan (Adult)  Goal: *Acute Goals and Plan of Care (Insert Text)  Description:   FUNCTIONAL STATUS PRIOR TO ADMISSION: Patient was independent and active without use of DME. Patient was independent for basic and instrumental ADLs, robsoniyl assists with driving but she is typically active, walking multiple times/day. HOME SUPPORT: The patient lived alone with granddaughter & daughter to provide assistance for driving. Occupational Therapy Goals  Initiated 5/13/2020  1. Patient will perform standing grooming tasks x5 minutes with minimal assistance/contact guard assist within 7 day(s). 2.  Patient will perform lower body dressing with minimal assistance/contact guard assist within 7 day(s). 3.  Patient will perform simple home management with minimal assistance/contact guard assist within 7 day(s). 4.  Patient will perform toilet transfers with minimal assistance/contact guard assist within 7 day(s). 5.  Patient will perform all aspects of toileting with minimal assistance/contact guard assist within 7 day(s). 6.  Patient will participate in upper extremity therapeutic exercise/activities with supervision/set-up for 5 minutes within 7 day(s). 7.  Patient will utilize energy conservation techniques during functional activities with verbal and visual cues within 7 day(s). Outcome: Progressing Towards Goal    OCCUPATIONAL THERAPY TREATMENT  Patient: Carmen Cuellar (69 y.o. female)  Date: 5/14/2020  Diagnosis: Acute CVA (cerebrovascular accident) [facility]) [I63.9]   Acute CVA (cerebrovascular accident) [facility])       Precautions:    Chart, occupational therapy assessment, plan of care, and goals were reviewed. ASSESSMENT  Patient continues with skilled OT services and is progressing towards goals. Patient cleared by RN to be seen, received in chair and agreeable to treatment.  Patient completed upper and lower body bathing with min A while sitting in chair - required assist for balance in tailor sitting while bathing. Patient completed upper body dressing to benjamín new gown with min A. Patient completed lower body dressing to ebnjamín socks with min A to keep LLE on R knee for tailor sitting. Patient completed grooming sitting in chair with setup. Patient left sitting up in chair with call bell in reach, RN aware and all needs met. Will continue to follow, continue to recommend IPR once medically cleared for D/C. Patient VSS on 3L NC. Current Level of Function Impacting Discharge (ADLs): up to max A for ADLs    Other factors to consider for discharge: was IND/ mod I at baseline. PLAN :  Patient continues to benefit from skilled intervention to address the above impairments. Continue treatment per established plan of care. to address goals. Recommend with staff: Recommend with nursing patient to complete as able in order to maintain strength, endurance and independence: ADLs with supervision/setup and once Egress Test completed OOB to chair 3x/day and mobilizing to the [facility] for toileting with 2 assist. Thank you for your assistance. Recommend next OT session: full dressing routine, toileting in bathroom    Recommendation for discharge: (in order for the patient to meet his/her long term goals)  Therapy 3 hours per day 5-7 days per week    This discharge recommendation:  Has been made in collaboration with the attending provider and/or case management    IF patient discharges home will need the following DME: bedside commode, transfer bench, walker: rolling, and wheelchair       SUBJECTIVE:   Patient stated That feels so much better.     OBJECTIVE DATA SUMMARY:   Cognitive/Behavioral Status:  Neurologic State: Alert  Orientation Level: Oriented X4  Cognition: Appropriate for age attention/concentration; Follows commands  Perception: Appears intact  Perseveration: No perseveration noted  Safety/Judgement: Awareness of environment; Fall prevention    Functional Mobility and Transfers for ADLs:  Bed Mobility: Per PT report  Supine to Sit: Stand-by assistance; Additional time;Assist x1;Bed Modified(HOB elevated; pt using R right rail for assist)  Sit to Supine: (remained OOB in chair)    Transfers: Per PT report  Sit to Stand: Moderate assistance;Assist x1  Bed to Chair: Moderate assistance;Assist x1    Balance:  Sitting: Impaired  Sitting - Static: Good (unsupported)  Sitting - Dynamic: Fair (occasional)  Standing: Impaired; With support  Standing - Static: Constant support; Fair  Standing - Dynamic : Constant support;Poor    ADL Intervention:  Grooming  Position Performed: Seated in chair  Washing Face: Set-up  Brushing Teeth: Set-up  Brushing/Combing Hair: Set-up    Upper Body Bathing  Bathing Assistance: Minimum assistance(back only)  Position Performed: Seated in chair  Cues: Physical assistance;Verbal cues provided    Lower Body Bathing  Lower Body : Minimum assistance(assist to maintain balance in tailor sitting)  Position Performed: Seated in chair  Cues: Physical assistance;Verbal cues provided    Upper Body [address]: Minimum  assistance(to tie behind head)    Lower Body Dressing Assistance  Pants With Button/Zipper: Minimum assistance  Leg Crossed Method Used: Yes  Position Performed: Seated in chair  Cues: Don;Physical assistance;Verbal cues provided    Cognitive Retraining  Safety/Judgement: Awareness of environment; Fall prevention    Pain:  Reporting no pain    Activity Tolerance:   Good, requires rest breaks, and observed SOB with activity  Please refer to the flowsheet for vital signs taken during this treatment. After treatment patient left in no apparent distress:   Sitting in chair, Call bell within reach, and RN aware     COMMUNICATION/COLLABORATION:   The patients plan of care was discussed with: Physical therapist and Registered nurse.      Julia Carpenter OT  Time Calculation: 23 mins

## 2020-05-14 NOTE — PROGRESS NOTES
Problem: TIA/CVA Stroke: Day 2 Until Discharge  Goal: Activity/Safety  Outcome: Progressing Towards Goal  Goal: Diagnostic Test/Procedures  Outcome: Progressing Towards Goal

## 2020-05-14 NOTE — PROGRESS NOTES
Problem: Mobility Impaired (Adult and Pediatric)  Goal: *Acute Goals and Plan of Care (Insert Text)  Description:   FUNCTIONAL STATUS PRIOR TO ADMISSION: Patient was independent and active without use of DME.    HOME SUPPORT PRIOR TO ADMISSION: The patient lived alone with daughter and neighbors to provide assistance with groceries. Physical Therapy Goals  Initiated 5/13/2020  1. Patient will move from supine to sit and sit to supine , scoot up and down and roll side to side in bed with modified independence within 7 day(s). 2.  Patient will transfer from bed to chair and chair to bed with modified independence using the least restrictive device within 7 day(s). 3.  Patient will perform sit to stand with modified independence within 7 day(s). 4.  Patient will ambulate with supervision/set-up for 150 feet with the least restrictive device within 7 day(s). 5.  Patient will improve Meyer Balance score by 7 points within 7 days. Outcome: Progressing Towards Goal  PHYSICAL THERAPY TREATMENT  Patient: Sharron Barlow (69 y.o. female)  Date: 5/14/2020  Diagnosis: Acute CVA (cerebrovascular accident) [facility]) [I63.9]   Acute CVA (cerebrovascular accident) [facility])       Precautions:    Chart, physical therapy assessment, plan of care and goals were reviewed. ASSESSMENT  Patient continues with skilled PT services and is progressing towards goals. She was able to come to EOB w/ HOB elevated and while using R bed rail for assist (SBA +time). She completed sit<>stand and stand pivot to pt's right >chair. Pt only tolerating toe down WBing, still w/ little to no weight. Noted decreased ROM in Left ankle vs Right; left heel cords tight and pt c/o tightness in calf. Strength of LLE  about 2/5 (MMT). Completed passive stretch and ROM to Left ankle. Encouraged DF/PF stretch w/ankle pumps and (B) foot/ankle ROM).         Current Level of Function Impacting Discharge (mobility/balance): SBA-Mod A for functional transfers. PLAN :  Patient continues to benefit from skilled intervention to address the above impairments. Continue treatment per established plan of care. to address goals. Recommendation for discharge: (in order for the patient to meet his/her long term goals)  Therapy 3 hours per day 5-7 days per week    This discharge recommendation:  Has been made in collaboration with the attending provider and/or case management    IF patient discharges home will need the following DME: to be determined (TBD)       SUBJECTIVE:   Patient stated The night everything happened, it felt like a cramp in my calf.     OBJECTIVE DATA SUMMARY:   Critical Behavior:  Neurologic State: Alert  Orientation Level: Oriented X4  Cognition: Appropriate decision making, Appropriate for age attention/concentration, Appropriate safety awareness, Follows commands  Safety/Judgement: Awareness of environment, Decreased awareness of need for assistance, Decreased awareness of need for safety, Decreased insight into deficits  Functional Mobility Training:  Bed Mobility:     Supine to Sit: Stand-by assistance; Additional time;Assist x1;Bed Modified(HOB elevated; pt using R right rail for assist)  Sit to Supine: (remained OOB in chair)           Transfers:  Sit to Stand: Moderate assistance;Assist x1  Stand to Sit: Moderate assistance;Assist x1  Stand Pivot Transfers: Moderate assistance;Assist x1     Bed to Chair: Moderate assistance;Assist x1                    Balance:  Sitting: Impaired  Sitting - Static: Good (unsupported)  Sitting - Dynamic: Fair (occasional)  Standing: Impaired; With support  Standing - Static: Constant support; Fair  Standing - Dynamic : Constant support;Poor          Therapeutic Exercises:   Passive ROM/stretch of LLE (DF/PF)  Pain Rating:  C/o tightness in heel cords and in back of LLE.  Pain w/ WBing (demonstrated toe-down WBing)    Activity Tolerance:   Fair  Please refer to the flowsheet for vital signs taken during this treatment. After treatment patient left in no apparent distress:   Sitting in chair and Call bell within reach    COMMUNICATION/COLLABORATION:   The patients plan of care was discussed with: Registered nurse.      Wendy East PTA   Time Calculation: 37 mins

## 2020-05-15 LAB
ANION GAP SERPL CALC-SCNC: 5 MMOL/L (ref 5–15)
APPEARANCE CSF: CLEAR
BUN SERPL-MCNC: 8 MG/DL (ref 6–20)
BUN/CREAT SERPL: 11 (ref 12–20)
CALCIUM SERPL-MCNC: 9 MG/DL (ref 8.5–10.1)
CHLORIDE SERPL-SCNC: 99 MMOL/L (ref 97–108)
CO2 SERPL-SCNC: 29 MMOL/L (ref 21–32)
COLOR CSF: COLORLESS
COMMENT, HOLDF: NORMAL
CREAT SERPL-MCNC: 0.7 MG/DL (ref 0.55–1.02)
GAD65 AB SER-ACNC: <5 U/ML (ref 0–5)
GLUCOSE CSF-MCNC: 65 MG/DL (ref 40–70)
GLUCOSE SERPL-MCNC: 99 MG/DL (ref 65–100)
POTASSIUM SERPL-SCNC: 3.7 MMOL/L (ref 3.5–5.1)
PROT CSF-MCNC: 51 MG/DL (ref 15–45)
RBC # CSF: 36 /CU MM
SAMPLES BEING HELD,HOLD: NORMAL
SODIUM SERPL-SCNC: 133 MMOL/L (ref 136–145)
TUBE # CSF: 1
TUBE # CSF: 1
TUBE # CSF: 4
WBC # CSF: 1 /CU MM (ref 0–5)

## 2020-05-15 PROCEDURE — 74011250636 HC RX REV CODE- 250/636: Performed by: PSYCHIATRY & NEUROLOGY

## 2020-05-15 PROCEDURE — 74011250636 HC RX REV CODE- 250/636: Performed by: INTERNAL MEDICINE

## 2020-05-15 PROCEDURE — 77010033678 HC OXYGEN DAILY

## 2020-05-15 PROCEDURE — 36415 COLL VENOUS BLD VENIPUNCTURE: CPT

## 2020-05-15 PROCEDURE — 97535 SELF CARE MNGMENT TRAINING: CPT

## 2020-05-15 PROCEDURE — 82784 ASSAY IGA/IGD/IGG/IGM EACH: CPT

## 2020-05-15 PROCEDURE — 94760 N-INVAS EAR/PLS OXIMETRY 1: CPT

## 2020-05-15 PROCEDURE — 82945 GLUCOSE OTHER FLUID: CPT

## 2020-05-15 PROCEDURE — 82164 ANGIOTENSIN I ENZYME TEST: CPT

## 2020-05-15 PROCEDURE — 84157 ASSAY OF PROTEIN OTHER: CPT

## 2020-05-15 PROCEDURE — 83655 ASSAY OF LEAD: CPT

## 2020-05-15 PROCEDURE — 80048 BASIC METABOLIC PNL TOTAL CA: CPT

## 2020-05-15 PROCEDURE — 65660000000 HC RM CCU STEPDOWN

## 2020-05-15 PROCEDURE — 74011250637 HC RX REV CODE- 250/637: Performed by: INTERNAL MEDICINE

## 2020-05-15 PROCEDURE — 82525 ASSAY OF COPPER: CPT

## 2020-05-15 PROCEDURE — 89050 BODY FLUID CELL COUNT: CPT

## 2020-05-15 PROCEDURE — 87205 SMEAR GRAM STAIN: CPT

## 2020-05-15 PROCEDURE — 009U3ZX DRAINAGE OF SPINAL CANAL, PERCUTANEOUS APPROACH, DIAGNOSTIC: ICD-10-PCS | Performed by: PSYCHIATRY & NEUROLOGY

## 2020-05-15 PROCEDURE — 74011250637 HC RX REV CODE- 250/637: Performed by: PSYCHIATRY & NEUROLOGY

## 2020-05-15 RX ORDER — IPRATROPIUM BROMIDE AND ALBUTEROL SULFATE 2.5; .5 MG/3ML; MG/3ML
3 SOLUTION RESPIRATORY (INHALATION)
Status: DISCONTINUED | OUTPATIENT
Start: 2020-05-15 | End: 2020-05-19 | Stop reason: HOSPADM

## 2020-05-15 RX ORDER — LORAZEPAM 2 MG/ML
1 INJECTION INTRAMUSCULAR ONCE
Status: COMPLETED | OUTPATIENT
Start: 2020-05-15 | End: 2020-05-15

## 2020-05-15 RX ADMIN — BACLOFEN 5 MG: 10 TABLET ORAL at 09:49

## 2020-05-15 RX ADMIN — POTASSIUM CHLORIDE 20 MEQ: 750 TABLET, FILM COATED, EXTENDED RELEASE ORAL at 09:44

## 2020-05-15 RX ADMIN — HEPARIN SODIUM 5000 UNITS: 5000 INJECTION INTRAVENOUS; SUBCUTANEOUS at 09:43

## 2020-05-15 RX ADMIN — METOPROLOL TARTRATE 50 MG: 50 TABLET, FILM COATED ORAL at 09:44

## 2020-05-15 RX ADMIN — LORAZEPAM 1 MG: 2 INJECTION INTRAMUSCULAR; INTRAVENOUS at 14:54

## 2020-05-15 RX ADMIN — BACLOFEN 5 MG: 10 TABLET ORAL at 19:07

## 2020-05-15 RX ADMIN — HEPARIN SODIUM 5000 UNITS: 5000 INJECTION INTRAVENOUS; SUBCUTANEOUS at 18:07

## 2020-05-15 RX ADMIN — Medication 3 G: at 18:07

## 2020-05-15 RX ADMIN — Medication 3 G: at 09:45

## 2020-05-15 RX ADMIN — METOPROLOL TARTRATE 50 MG: 50 TABLET, FILM COATED ORAL at 18:07

## 2020-05-15 RX ADMIN — AMLODIPINE BESYLATE 10 MG: 5 TABLET ORAL at 09:44

## 2020-05-15 RX ADMIN — HEPARIN SODIUM 5000 UNITS: 5000 INJECTION INTRAVENOUS; SUBCUTANEOUS at 01:21

## 2020-05-15 RX ADMIN — POTASSIUM CHLORIDE 20 MEQ: 750 TABLET, FILM COATED, EXTENDED RELEASE ORAL at 18:08

## 2020-05-15 NOTE — PROGRESS NOTES
Problem: Self Care Deficits Care Plan (Adult)  Goal: *Acute Goals and Plan of Care (Insert Text)  Description:   FUNCTIONAL STATUS PRIOR TO ADMISSION: Patient was independent and active without use of DME. Patient was independent for basic and instrumental ADLs, robsoniyl assists with driving but she is typically active, walking multiple times/day. HOME SUPPORT: The patient lived alone with granddaughter & daughter to provide assistance for driving. Occupational Therapy Goals  Initiated 5/13/2020  1. Patient will perform standing grooming tasks x5 minutes with minimal assistance/contact guard assist within 7 day(s). 2.  Patient will perform lower body dressing with minimal assistance/contact guard assist within 7 day(s). 3.  Patient will perform simple home management with minimal assistance/contact guard assist within 7 day(s). 4.  Patient will perform toilet transfers with minimal assistance/contact guard assist within 7 day(s). 5.  Patient will perform all aspects of toileting with minimal assistance/contact guard assist within 7 day(s). 6.  Patient will participate in upper extremity therapeutic exercise/activities with supervision/set-up for 5 minutes within 7 day(s). 7.  Patient will utilize energy conservation techniques during functional activities with verbal and visual cues within 7 day(s). Outcome: Progressing Towards Goal    OCCUPATIONAL THERAPY TREATMENT  Patient: Zander Masterson (69 y.o. female)  Date: 5/15/2020  Diagnosis: Acute CVA (cerebrovascular accident) [facility]) [I63.9]   Acute CVA (cerebrovascular accident) [facility])       Precautions:    Chart, occupational therapy assessment, plan of care, and goals were reviewed. ASSESSMENT  Patient continues with skilled OT services and is progressing towards goals. Patient cleared by RN to be seen, received in chair and agreeable to treatment.  Patient required mod Ax2 using RW to transfer to [facility] to  and max Ax2 to transfer back to chair to L side. Noted patient continues to have significant flexor tone in LLE with inability to place foot flat on floor. Patient experiencing cramping/spasms in LLE during mobility. Patient able to complete bowel hygiene sitting with CGA, currently total A for bladder hygiene 2* mariscal (infer CGA without mariscal). Patient returned to chair at end of session with call bell in reach, RN aware and all needs met. VSS throughout session on 2L NC. Patient is enthusiastic about therapy and is an excellent candidate for IPR. Will continue to follow. Current Level of Function Impacting Discharge (ADLs): setup-total A for ADLs, min-max A x1-2 for mobility     Other factors to consider for discharge: was IND/mod I at baseline. PLAN :  Patient continues to benefit from skilled intervention to address the above impairments. Continue treatment per established plan of care. to address goals. Recommend with staff: Recommend with nursing patient to complete as able in order to maintain strength, endurance and independence: ADLs with supervision/setup and once Egress Test completed OOB to chair 3x/day and mobilizing to the [facility] for toileting with 2 assist (with gait belt). Thank you for your assistance. Recommend next OT session: [facility] transfer    Recommendation for discharge: (in order for the patient to meet his/her long term goals)  Therapy 3 hours per day 5-7 days per week    This discharge recommendation:  Has been made in collaboration with the attending provider and/or case management    IF patient discharges home will need the following DME: bedside commode, gait belt, hospital bed, transfer bench, walker: rolling, and wheelchair       SUBJECTIVE:   Patient stated That was way harder than I thought it would be.     OBJECTIVE DATA SUMMARY:   Cognitive/Behavioral Status:  Neurologic State: Alert  Orientation Level: Oriented X4  Cognition: Appropriate for age attention/concentration; Follows commands  Perception: Cues to maintain midline in standing; Tactile;Verbal  Perseveration: No perseveration noted  Safety/Judgement: Awareness of environment; Fall prevention    Functional Mobility and Transfers for ADLs:  Bed Mobility:  Supine to Sit: Stand-by assistance    Transfers:  Sit to Stand: Moderate assistance; Additional time;Assist x2;Adaptive equipment  Functional Transfers  Toilet Transfer : Moderate assistance;Maximum assistance; Adaptive equipment; Additional time;Assist x2(mod to R side, max to L side and 2* fatigue)  Cues: Physical assistance; Tactile cues provided;Verbal cues provided  Adaptive Equipment: Bedside commode;Walker (comment)  Bed to Chair: Moderate assistance    Balance:  Sitting: Impaired  Sitting - Static: Good (unsupported)  Sitting - Dynamic: Fair (occasional)  Standing: Impaired; With support  Standing - Static: Constant support;Fair;Poor  Standing - Dynamic : Constant support;Poor    ADL Intervention:  Toileting  Toileting Assistance: Maximum assistance  Bladder Hygiene: Total assistance (dependent)(mariscal)  Bowel Hygiene: Contact guard assistance  Clothing Management: Total assistance (dependent)  Cues: Physical assistance for pants down;Physical assistance for pants up; Tactile cues provided;Verbal cues provided  Adaptive Equipment: Walker    Cognitive Retraining  Safety/Judgement: Awareness of environment; Fall prevention    Pain:  Reporting spasms in LLE, did not quantify pain level    Activity Tolerance:   Good, desaturates with exertion and requires oxygen, requires rest breaks, and observed SOB with activity  Please refer to the flowsheet for vital signs taken during this treatment. After treatment patient left in no apparent distress:   Sitting in chair, Call bell within reach, Bed / chair alarm activated, and RN aware     COMMUNICATION/COLLABORATION:   The patients plan of care was discussed with: Physical therapist and Registered nurse.      Ellen Saenz OT  Time Calculation: 34 mins

## 2020-05-15 NOTE — PROGRESS NOTES
Problem: Mobility Impaired (Adult and Pediatric)  Goal: *Acute Goals and Plan of Care (Insert Text)  Description:   FUNCTIONAL STATUS PRIOR TO ADMISSION: Patient was independent and active without use of DME.    HOME SUPPORT PRIOR TO ADMISSION: The patient lived alone with daughter and neighbors to provide assistance with groceries. Physical Therapy Goals  Initiated 5/13/2020  1. Patient will move from supine to sit and sit to supine , scoot up and down and roll side to side in bed with modified independence within 7 day(s). 2.  Patient will transfer from bed to chair and chair to bed with modified independence using the least restrictive device within 7 day(s). 3.  Patient will perform sit to stand with modified independence within 7 day(s). 4.  Patient will ambulate with supervision/set-up for 150 feet with the least restrictive device within 7 day(s). 5.  Patient will improve Meyer Balance score by 7 points within 7 days. Outcome: Progressing Towards Goal    PHYSICAL THERAPY TREATMENT  Patient: Elva Bronson (69 y.o. female)  Date: 5/15/2020  Diagnosis: Acute CVA (cerebrovascular accident) [facility]) [I63.9]   Acute CVA (cerebrovascular accident) [facility])       Precautions:  fall  Chart, physical therapy assessment, plan of care and goals were reviewed. ASSESSMENT  Patient continues with skilled PT services and is slowly progressing towards goals. She was received in supine agreeable to PT with her LLE resting in a position of hip and knee flexion and hip external rotation. Focused on gentle AAROM to her LLE in an effort to get her to full knee extension resting on the bed as well as some gentle dorsiflex. ROM of her LLE is hampered by some intermittent spasms. Also worked on some sit <>stand and placement of her left foot flat on the floor with attempts and weightbearing on it (also hampered by spasms as well as pt having to look at her left foot to know where it is, impaired proprioception).  Able to complete a bed to chair transfer with mod assist and increased time. BP elevated and tachycardia with work, discussed with her nurse (see chart below). She tried all that was asked of her and is an excellent candidate for inpatient rehab. For the weekend, recommend patient to complete as able in order to maintain strength, endurance and independence with nursing: OOB to chair 3x/day with assist x 1 and use the gait belt. PT to follow-up with patient after the weekend. Current Level of Function Impacting Discharge (mobility/balance): up to mod assist just for bed to chair, impaired sitting and standing balance. Vitals:       05/15/20 0855 05/15/20 0914 05/15/20 0916   BP:   166/86   174/81   BP 1 Location:   Right arm   Right arm   BP Patient Position:   Supine; At rest Standing during activity Sitting;Post activity   Pulse:   95 (!) 117 (!) 107   Resp:   21   21   Temp:           SpO2: on 2 liters 0f 02   95%   97%           Other factors to consider for discharge: high fall risk, far form baseline of independent, currently using supplemental 02 and at baseline just on room air. PLAN :  Patient continues to benefit from skilled intervention to address the above impairments. Continue treatment per established plan of care. to address goals. Recommendation for discharge: (in order for the patient to meet his/her long term goals)  Therapy 3 hours per day 5-7 days per week    This discharge recommendation:  A follow-up discussion with the attending provider and/or case management is planned    IF patient discharges home will need the following DME: to be determined (TBD), non if rehab. SUBJECTIVE:   Patient stated Yes,I would like to be up to the chair for my meals.     OBJECTIVE DATA SUMMARY:   Chart checked, pt cleared by nursing  Critical Behavior:  Neurologic State: Alert  Orientation Level: Oriented X4  Cognition: Appropriate safety awareness, Appropriate for age attention/concentration, Appropriate decision making, Follows commands  Safety/Judgement: Awareness of environment, Fall prevention  Functional Mobility Training:  Bed Mobility:     Supine to Sit: Stand-by assistance              Transfers:  Sit to Stand: Minimum assistance; Moderate assistance  Stand to Sit: Minimum assistance        Bed to Chair: Moderate assistance                    Balance:  Sitting: Impaired  Sitting - Static: Good (unsupported)  Sitting - Dynamic: Fair (occasional)  Standing: Impaired; With support  Standing - Static: Constant support; Fair  Standing - Dynamic : Constant support;Poor  Ambulation/Gait Training:                                                        Stairs: Therapeutic Exercises:     Pain Rating:      Activity Tolerance:   See assessment above   Please refer to the flowsheet for vital signs taken during this treatment. After treatment patient left in no apparent distress:   Sitting in chair, Call bell within reach, Bed / chair alarm activated, and LEs elevated     COMMUNICATION/COLLABORATION:   The patients plan of care was discussed with: Occupational therapist and Registered nurse.      Kane Khoury

## 2020-05-15 NOTE — PROGRESS NOTES
Full note to follow. Vitals:     05/15/20 0855 05/15/20 0914 05/15/20 0916   BP:  166/86  174/81   BP 1 Location:  Right arm  Right arm   BP Patient Position:  Supine; At rest Standing during activity Sitting;Post activity   Pulse:  95 (!) 117 (!) 107   Resp:  21  21   Temp:       SpO2: on 2 liters 0f 02  95%  97%   Weight:       Height:             Miller [place], PT

## 2020-05-15 NOTE — PROGRESS NOTES
Neurology Progress Note    Patient ID:  Chaitanya Peterson  [id number]  64 y.o.  1945    Subjective:      No significant events noted over the past twenty-four hours. Continues to feel about the same though she may note some weakness in her arms now. Otherwise, other than mentioning increased seafood intake including crab legs, she denies any other relevant antecedent history previously neglected. She states that she is essentially a lifelong smoker.      Current Facility-Administered Medications   Medication Dose Route Frequency    baclofen (LIORESAL) tablet 5 mg  5 mg Oral TID PRN    amLODIPine (NORVASC) tablet 10 mg  10 mg Oral DAILY    metoprolol tartrate (LOPRESSOR) tablet 50 mg  50 mg Oral BID    ondansetron (ZOFRAN) injection 4 mg  4 mg IntraVENous Q6H PRN    bisacodyL (DULCOLAX) tablet 5 mg  5 mg Oral DAILY PRN    acetaminophen (TYLENOL) tablet 650 mg  650 mg Oral Q4H PRN    heparin (porcine) injection 5,000 Units  5,000 Units SubCUTAneous Q8H    sodium chloride tablet 3 g  3 g Oral BID WITH MEALS    potassium chloride SR (KLOR-CON 10) tablet 20 mEq  20 mEq Oral BID    nicotine (NICODERM CQ) 14 mg/24 hr patch 1 Patch  1 Patch TransDERmal Q24H          Objective:     Patient Vitals for the past 8 hrs:   BP Temp Pulse Resp SpO2 Weight   05/15/20 1010 162/74 97.6 °F (36.4 °C) 93 18 97 %    05/15/20 0944 174/81  98      05/15/20 0916 174/81  (!) 107 21 97 %    05/15/20 0914   (!) 117      05/15/20 0855 166/86  95 21 95 %    05/15/20 0800     97 %    05/15/20 0605 144/67 97.9 °F (36.6 °C) 84 21 94 % 55.3 kg (121 lb 14.6 oz)       Lab Review   Recent Results (from the past 24 hour(s))   CK    Collection Time: 05/14/20  2:01 PM   Result Value Ref Range     26 - 192 U/L   HOMOCYSTEINE, PLASMA    Collection Time: 05/14/20  2:01 PM   Result Value Ref Range    Homocysteine, plasma 13.4 3.7 - 40.0 umol/L   METABOLIC PANEL, BASIC    Collection Time: 05/15/20  1:36 AM   Result Value Ref Range    Sodium 133 (L) 136 - 145 mmol/L    Potassium 3.7 3.5 - 5.1 mmol/L    Chloride 99 97 - 108 mmol/L    CO2 29 21 - 32 mmol/L    Anion gap 5 5 - 15 mmol/L    Glucose 99 65 - 100 mg/dL    BUN 8 6 - 20 MG/DL    Creatinine 0.70 0.55 - 1.02 MG/DL    BUN/Creatinine ratio 11 (L) 12 - 20      GFR est AA >60 >60 ml/min/1.73m2    GFR est non-AA >60 >60 ml/min/1.73m2    Calcium 9.0 8.5 - 10.1 MG/DL     Physical examination:  Pleasant female sitting comfortably in chair in no apparent distress. HEENT is unremarkable, neck supple. Cardiovascular, pulmonary and abdominal examinations deferred. Neurologically, patient is alert and oriented, attention intact. Speech is clear, language is fluent. Cranial nerves II -XII appear grossly unremarkable. Motorically, deltoid is 5/5 bilaterally, tricep 4+/5 on right, 4/5 on left, bicep 5/5, wrist extension 4+/5 bilaterally, wrist flexion 5/5. In the lower extremities, right iliopsoas is 4-/5 today, left is 3-/5. Hip abduction is 4-/5 on left, 5/5 on right, knee extension/felxion is 4+/5 on right, not tested on left due to significant pain from spasm. Remains unable to dorsiflex left foot with gravity eliminated, plantar flexion is 4+/5. Sensory testing demonstrates length dependent gradient to knee bilaterally. Assessment:     Laura Lloyd is a 69year old RH dominant female with history of chronic tobacco dependence and hypertension who presented with symptoms of left leg sensorimotor deficits with cramps & variable involvement of the right lower extremity    Plan:   Bilateral lower extremity sensorimotor deficits, cramps:  Patient continues to endorse acute onset of symptoms, just took first dose of baclofen today  Etiology remains unclear, may have previously been missed but patient has some weakness in upper extremities today, will obtain cervical spine with MRI  Homocysteine near upper limit of normal (ordered due to low normal B12), MMA pending.  TSH, HbA1c unremarkable  Also has serum copper pending, ACE, SPEP/CELESTINO, UPEP, heavy metal screen (due to recent increase in shellfish consumption)  Anti-CAROLINA remains pending as well though sensory symptoms would appear atypical for stiff person syndrome  Based on repeat assessment including loss of reflexes, will plan for CSF analysis this afternoon to evaluate for evidence of AIDP   Will cancel MRI cervical spine for now     Signed:  Peggy Benson MD  5/15/2020  1:06 PM

## 2020-05-15 NOTE — PROGRESS NOTES
Bedside and Verbal shift change report given to EVONNE Real (oncoming nurse) by Girish Celis RN (offgoing nurse). Report included the following information SBAR, Kardex, Intake/Output, MAR, Recent Results, Cardiac Rhythm NSR and Dual Neuro Assessment.

## 2020-05-15 NOTE — PROGRESS NOTES
[address] [facility] Adult  Hospitalist Group                                                                                          Hospitalist Progress Note  Home Carreon MD  Answering service: [phone] OR [phone] from in house phone        Date of Service:  5/15/2020  NAME:  Sha Box  :  1945  MRN:  [id number]      Admission Summary:     Patient was in her usual state of health until the day of her presentation at the emergency room when the patient developed bilateral lower extremity numbness and tingling. The numbness and tingling is worse in the left lower extremity than the right lower extremity. The patient woke up at about 02:30 a.m. to use the bathroom, that is when she developed the symptoms and as the result of the numbness and tingling, the patient fell on the way to the bathroom. Since then, the patient has not been able to ambulate independently. The patient did not have any symptoms at 10:00 p.m. when she went to bed. The patient was brought to the emergency room for further evaluation. When the patient arrived at the emergency room, code stroke was called. CT scan of the head was obtained and this was negative for acute pathology. CTA of the head and neck was also obtained. 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 This was discussed with the neurointerventional surgeon, who did not advise any intervention. The patient was also seen in the emergency room by neurologist through the [facility] at the request of the emergency room physician. It was stated that the patient is clearly outside the t-PA window but the teleneurologist advised aspirin therapy and admission for full stroke workup. Interval history / Subjective:       No acute complaint today.   Still with weakness in the left leg and numbness in the right leg     Assessment & Plan:     Leg weakness  -Patient with left leg weakness with some sensory disturbance in the right leg  -MRI of the brain shows no CVA, MRI of the thoracic and lumbar spine without explanatory finding for her leg weakness  -Negative for DVT  -Neurology following, working up for metabolic and autoimmune causes  -LP today to evaluate for AIDP  -Physical therapy also following    Urinary retention  -Camacho placed today    Hyponatremia  -History of SIADH   -Discontinue IV fluid and observe    Hypertension  -Blood pressure stable  -Continue amlodipine and metoprolol    Tobacco use  -On nicotine replacement    Code status: FULL  DVT prophylaxis: Heparin    Care Plan discussed with: Patient/Family  Anticipated Disposition: SNF/LTC  Anticipated Discharge: 24 hours to 48 hours     Hospital Problems  Date Reviewed: 5/13/2020          Codes Class Noted POA    * (Principal) Acute CVA (cerebrovascular accident) [facility]) ICD-10-CM: I63.9  ICD-9-CM: 434.91  5/13/2020 Yes                Review of Systems:   A comprehensive review of systems was negative except for that written in the HPI. Vital Signs:    Last 24hrs VS reviewed since prior progress note. Most recent are:  Visit Vitals  /66 (BP 1 Location: Right arm, BP Patient Position: Sitting)   Pulse 72   Temp 96.6 °F (35.9 °C)   Resp 20   Ht 5' 1\" (1.549 m)   Wt 55.3 kg (121 lb 14.6 oz)   SpO2 96%   BMI 23.04 kg/m²         Intake/Output Summary (Last 24 hours) at 5/15/2020 1515  Last data filed at 5/15/2020 0800  Gross per 24 hour   Intake    Output 2300 ml   Net -2300 ml        Physical Examination:             Constitutional:  No acute distress, cooperative, pleasant    ENT:  Oral mucosa moist, oropharynx benign. Resp:  CTA bilaterally. No wheezing/rhonchi/rales. No accessory muscle use   CV:  Regular rhythm, normal rate, no murmurs, gallops, rubs    GI:  Soft, non distended, non tender. normoactive bowel sounds, no hepatosplenomegaly     Musculoskeletal:  No edema, warm, 2+ pulses throughout    Neurologic:  Moves all extremities.   AAOx3, CN II-XII reviewed Skin:  Good turgor, no rashes or ulcers       Data Review:    Review and/or order of clinical lab test      Labs:     Recent Labs     05/14/20  0210 05/13/20  0350   WBC 11.2* 9.0   HGB 15.9 16.0   HCT 47.4* 48.7*    293     Recent Labs     05/15/20  0136 05/14/20  0210 05/13/20  1027 05/13/20  0350   * 133*  --  136   K 3.7 4.1  --  3.4*   CL 99 100  --  101   CO2 29 26  --  29   BUN 8 8  --  6   CREA 0.70 0.65  --  0.85   GLU 99 86  --  91   CA 9.0 9.0  --  8.8   MG  --   --  2.0  --    PHOS  --   --  3.4  --      Recent Labs     05/14/20  0210 05/13/20  0350   SGOT 28 27   ALT 24 26   * 136*   TBILI 0.7 0.2   TP 7.5 7.2   ALB 3.4* 3.3*   GLOB 4.1* 3.9     Recent Labs     05/13/20  0350   INR 1.1   PTP 10.9      No results for input(s): FE, TIBC, PSAT, FERR in the last 72 hours. Lab Results   Component Value Date/Time    Folate 33.3 (H) 05/13/2020 10:27 AM      No results for input(s): PH, PCO2, PO2 in the last 72 hours.   Recent Labs     05/14/20  1401 05/13/20  1027 05/13/20  0350     --   --    TROIQ  --  <0.05 <0.05     Lab Results   Component Value Date/Time    Cholesterol, total 171 05/13/2020 10:27 AM    HDL Cholesterol 54 05/13/2020 10:27 AM    LDL, calculated 86.6 05/13/2020 10:27 AM    Triglyceride 152 (H) 05/13/2020 10:27 AM    CHOL/HDL Ratio 3.2 05/13/2020 10:27 AM     Lab Results   Component Value Date/Time    Glucose (POC) 82 05/13/2020 04:13 AM     Lab Results   Component Value Date/Time    Color YELLOW/STRAW 05/13/2020 08:10 AM    Appearance CLEAR 05/13/2020 08:10 AM    Specific gravity >1.030 (H) 05/13/2020 08:10 AM    pH (UA) 6.0 05/13/2020 08:10 AM    Protein Negative 05/13/2020 08:10 AM    Glucose Negative 05/13/2020 08:10 AM    Ketone Negative 05/13/2020 08:10 AM    Bilirubin Negative 05/13/2020 08:10 AM    Urobilinogen 0.2 05/13/2020 08:10 AM    Nitrites Negative 05/13/2020 08:10 AM    Leukocyte Esterase MODERATE (A) 05/13/2020 08:10 AM    Epithelial cells FEW 05/13/2020 08:10 AM    Bacteria Negative 05/13/2020 08:10 AM    WBC 5-10 05/13/2020 08:10 AM    RBC 0-5 05/13/2020 08:10 AM         Medications Reviewed:     Current Facility-Administered Medications   Medication Dose Route Frequency    baclofen (LIORESAL) tablet 5 mg  5 mg Oral TID PRN    amLODIPine (NORVASC) tablet 10 mg  10 mg Oral DAILY    metoprolol tartrate (LOPRESSOR) tablet 50 mg  50 mg Oral BID    ondansetron (ZOFRAN) injection 4 mg  4 mg IntraVENous Q6H PRN    bisacodyL (DULCOLAX) tablet 5 mg  5 mg Oral DAILY PRN    acetaminophen (TYLENOL) tablet 650 mg  650 mg Oral Q4H PRN    heparin (porcine) injection 5,000 Units  5,000 Units SubCUTAneous Q8H    sodium chloride tablet 3 g  3 g Oral BID WITH MEALS    potassium chloride SR (KLOR-CON 10) tablet 20 mEq  20 mEq Oral BID    nicotine (NICODERM CQ) 14 mg/24 hr patch 1 Patch  1 Patch TransDERmal Q24H     ______________________________________________________________________  EXPECTED LENGTH OF STAY: 3d 21h  ACTUAL LENGTH OF STAY:          2                 Atif Ronquillo MD

## 2020-05-15 NOTE — PROCEDURES
LUMBAR PUNCTURE PROCEDURE NOTE    INFORMED CONSENT    Patient:  Medical Record:   Date:      Procedure: Lumbar Puncture     The risks, benefits and anticipated outcomes of the procedure, the risks and benefits of the alternatives to the procedure and the roles and tasks of the personnel to be involved were discussed with the [facility] and the patient consents to the procedure and agrees to proceed. I verify that I personally obtained patient's consent. Elier Thomason MD    Date: May 15th, 2020    [facility] TIME OUT:  Time out verification includes: Two Patient Identifiers    PREOPERATIVE/PROCEDURAL VERIFICATION:  Patient verified by: Name and Date of Birth  Procedure to be performed: Lumbar puncture  Site of the procedure confirmed:Yes  Site: L4/L5 intervebral disc space      PROCEDURE NOTE:   Opening pressure not obtained  The patient was prepped and draped in sterile fashion. L4-L5 space palpated. 4 cc of lidocaine used for analgesia. A 20gauge LP needle was inserted and 14 cc of pink-tinged  cerebrospinal fluid was collected. The patient tolerated the procedure well and there were no complications.     Boogie Dupree MD   05/15/20 3:43 PM

## 2020-05-16 PROBLEM — R29.898 LOWER EXTREMITY WEAKNESS: Status: ACTIVE | Noted: 2020-05-13

## 2020-05-16 LAB
COPPER SERPL-MCNC: 120 UG/DL (ref 72–166)
Lab: ABNORMAL
METHYLMALONATE SERPL-SCNC: 409 NMOL/L (ref 0–378)

## 2020-05-16 PROCEDURE — 74011250636 HC RX REV CODE- 250/636: Performed by: INTERNAL MEDICINE

## 2020-05-16 PROCEDURE — 74011250637 HC RX REV CODE- 250/637: Performed by: PSYCHIATRY & NEUROLOGY

## 2020-05-16 PROCEDURE — 77010033678 HC OXYGEN DAILY

## 2020-05-16 PROCEDURE — 65660000000 HC RM CCU STEPDOWN

## 2020-05-16 PROCEDURE — 94760 N-INVAS EAR/PLS OXIMETRY 1: CPT

## 2020-05-16 PROCEDURE — 84630 ASSAY OF ZINC: CPT

## 2020-05-16 PROCEDURE — 74011250637 HC RX REV CODE- 250/637: Performed by: INTERNAL MEDICINE

## 2020-05-16 PROCEDURE — 36415 COLL VENOUS BLD VENIPUNCTURE: CPT

## 2020-05-16 PROCEDURE — 82390 ASSAY OF CERULOPLASMIN: CPT

## 2020-05-16 RX ADMIN — Medication 3 G: at 17:19

## 2020-05-16 RX ADMIN — POTASSIUM CHLORIDE 20 MEQ: 750 TABLET, FILM COATED, EXTENDED RELEASE ORAL at 08:08

## 2020-05-16 RX ADMIN — HEPARIN SODIUM 5000 UNITS: 5000 INJECTION INTRAVENOUS; SUBCUTANEOUS at 01:47

## 2020-05-16 RX ADMIN — BACLOFEN 5 MG: 10 TABLET ORAL at 20:37

## 2020-05-16 RX ADMIN — HEPARIN SODIUM 5000 UNITS: 5000 INJECTION INTRAVENOUS; SUBCUTANEOUS at 17:21

## 2020-05-16 RX ADMIN — METOPROLOL TARTRATE 50 MG: 50 TABLET, FILM COATED ORAL at 17:18

## 2020-05-16 RX ADMIN — METOPROLOL TARTRATE 50 MG: 50 TABLET, FILM COATED ORAL at 08:08

## 2020-05-16 RX ADMIN — BACLOFEN 5 MG: 10 TABLET ORAL at 09:53

## 2020-05-16 RX ADMIN — HEPARIN SODIUM 5000 UNITS: 5000 INJECTION INTRAVENOUS; SUBCUTANEOUS at 09:53

## 2020-05-16 RX ADMIN — Medication 3 G: at 08:08

## 2020-05-16 RX ADMIN — POTASSIUM CHLORIDE 20 MEQ: 750 TABLET, FILM COATED, EXTENDED RELEASE ORAL at 17:19

## 2020-05-16 RX ADMIN — AMLODIPINE BESYLATE 10 MG: 5 TABLET ORAL at 08:08

## 2020-05-16 NOTE — PROGRESS NOTES
Problem: Patient Education: Go to Patient Education Activity  Goal: Patient/Family Education  Outcome: Progressing Towards Goal     Problem: Pain  Goal: *Control of Pain  Outcome: Progressing Towards Goal  Goal: *PALLIATIVE CARE:  Alleviation of Pain  Outcome: Progressing Towards Goal     Problem: Patient Education: Go to Patient Education Activity  Goal: Patient/Family Education  Outcome: Progressing Towards Goal     Problem: Pressure Injury - Risk of  Goal: *Prevention of pressure injury  Description: Document Sameer Scale and appropriate interventions in the flowsheet. Outcome: Progressing Towards Goal  Note: Pressure Injury Interventions:  Sensory Interventions: Check visual cues for pain, Float heels, Keep linens dry and wrinkle-free, Maintain/enhance activity level, Minimize linen layers, Pressure redistribution bed/mattress (bed type), Turn and reposition approx. every two hours (pillows and wedges if needed)    Moisture Interventions: Internal/External urinary devices, Minimize layers, Maintain skin hydration (lotion/cream)    Activity Interventions: Increase time out of bed, Pressure redistribution bed/mattress(bed type), PT/OT evaluation    Mobility Interventions: HOB 30 degrees or less, Float heels, Pressure redistribution bed/mattress (bed type), Turn and reposition approx. every two hours(pillow and wedges)    Nutrition Interventions: Document food/fluid/supplement intake    Friction and Shear Interventions: Foam dressings/transparent film/skin sealants, HOB 30 degrees or less, Lift sheet, Lift team/patient mobility team, Minimize layers                Problem: Patient Education: Go to Patient Education Activity  Goal: Patient/Family Education  Outcome: Progressing Towards Goal     Problem: Falls - Risk of  Goal: *Absence of Falls  Description: Document Katey Fall Risk and appropriate interventions in the flowsheet.   Outcome: Progressing Towards Goal  Note: Fall Risk Interventions:  Mobility Interventions: Communicate number of staff needed for ambulation/transfer, Patient to call before getting OOB, Utilize gait belt for transfers/ambulation    Mentation Interventions: Bed/chair exit alarm, Adequate sleep, hydration, pain control, Toileting rounds, More frequent rounding, Update white board    Medication Interventions: Evaluate medications/consider consulting pharmacy, Patient to call before getting OOB, Teach patient to arise slowly, Utilize gait belt for transfers/ambulation    Elimination Interventions: Call light in reach, Patient to call for help with toileting needs, Toileting schedule/hourly rounds, Toilet paper/wipes in reach, Stay With Me (per policy)              Problem: Patient Education: Go to Patient Education Activity  Goal: Patient/Family Education  Outcome: Progressing Towards Goal     Problem: Patient Education: Go to Patient Education Activity  Goal: Patient/Family Education  Outcome: Progressing Towards Goal     Problem: Patient Education: Go to Patient Education Activity  Goal: Patient/Family Education  Outcome: Progressing Towards Goal     Problem: TIA/CVA Stroke: 0-24 hours  Goal: Off Pathway (Use only if patient is Off Pathway)  Outcome: Progressing Towards Goal  Goal: Activity/Safety  Outcome: Progressing Towards Goal  Goal: Consults, if ordered  Outcome: Progressing Towards Goal  Goal: Diagnostic Test/Procedures  Outcome: Progressing Towards Goal  Goal: Nutrition/Diet  Outcome: Progressing Towards Goal  Goal: Discharge Planning  Outcome: Progressing Towards Goal  Goal: Medications  Outcome: Progressing Towards Goal  Goal: Respiratory  Outcome: Progressing Towards Goal  Goal: Treatments/Interventions/Procedures  Outcome: Progressing Towards Goal  Goal: Minimize risk of bleeding post-thrombolytic infusion  Outcome: Progressing Towards Goal  Goal: Monitor for complications post-thrombolytic infusion  Outcome: Progressing Towards Goal  Goal: Psychosocial  Outcome: Progressing Towards Goal  Goal: *Hemodynamically stable  Outcome: Progressing Towards Goal  Goal: *Neurologically stable  Description: Absence of additional neurological deficits    Outcome: Progressing Towards Goal  Goal: *Verbalizes anxiety and depression are reduced or absent  Outcome: Progressing Towards Goal  Goal: *Absence of Signs of Aspiration on Current Diet  Outcome: Progressing Towards Goal  Goal: *Absence of deep venous thrombosis signs and symptoms(Stroke Metric)  Outcome: Progressing Towards Goal  Goal: *Ability to perform ADLs and demonstrates progressive mobility and function  Outcome: Progressing Towards Goal  Goal: *Stroke education started(Stroke Metric)  Outcome: Progressing Towards Goal  Goal: *Dysphagia screen performed(Stroke Metric)  Outcome: Progressing Towards Goal  Goal: *Rehab consulted(Stroke Metric)  Outcome: Progressing Towards Goal     Problem: TIA/CVA Stroke: Day 2 Until Discharge  Goal: Off Pathway (Use only if patient is Off Pathway)  Outcome: Progressing Towards Goal  Goal: Activity/Safety  Outcome: Progressing Towards Goal  Goal: Diagnostic Test/Procedures  Outcome: Progressing Towards Goal  Goal: Nutrition/Diet  Outcome: Progressing Towards Goal  Goal: Discharge Planning  Outcome: Progressing Towards Goal  Goal: Medications  Outcome: Progressing Towards Goal  Goal: Respiratory  Outcome: Progressing Towards Goal  Goal: Treatments/Interventions/Procedures  Outcome: Progressing Towards Goal  Goal: Psychosocial  Outcome: Progressing Towards Goal  Goal: *Verbalizes anxiety and depression are reduced or absent  Outcome: Progressing Towards Goal  Goal: *Absence of aspiration  Outcome: Progressing Towards Goal  Goal: *Absence of deep venous thrombosis signs and symptoms(Stroke Metric)  Outcome: Progressing Towards Goal  Goal: *Optimal pain control at patient's stated goal  Outcome: Progressing Towards Goal  Goal: *Tolerating diet  Outcome: Progressing Towards Goal  Goal: *Ability to perform ADLs and demonstrates progressive mobility and function  Outcome: Progressing Towards Goal  Goal: *Stroke education continued(Stroke Metric)  Outcome: Progressing Towards Goal     Problem: Ischemic Stroke: Discharge Outcomes  Goal: *Verbalizes anxiety and depression are reduced or absent  Outcome: Progressing Towards Goal  Goal: *Verbalize understanding of risk factor modification(Stroke Metric)  Outcome: Progressing Towards Goal  Goal: *Hemodynamically stable  Outcome: Progressing Towards Goal  Goal: *Absence of aspiration pneumonia  Outcome: Progressing Towards Goal  Goal: *Aware of needed dietary changes  Outcome: Progressing Towards Goal  Goal: *Verbalize understanding of prescribed medications including anti-coagulants, anti-lipid, and/or anti-platelets(Stroke Metric)  Outcome: Progressing Towards Goal  Goal: *Tolerating diet  Outcome: Progressing Towards Goal  Goal: *Aware of follow-up diagnostics related to anticoagulants  Outcome: Progressing Towards Goal  Goal: *Ability to perform ADLs and demonstrates progressive mobility and function  Outcome: Progressing Towards Goal  Goal: *Absence of DVT(Stroke Metric)  Outcome: Progressing Towards Goal  Goal: *Absence of aspiration  Outcome: Progressing Towards Goal  Goal: *Optimal pain control at patient's stated goal  Outcome: Progressing Towards Goal  Goal: *Home safety concerns addressed  Outcome: Progressing Towards Goal  Goal: *Describes available resources and support systems  Outcome: Progressing Towards Goal  Goal: *Verbalizes understanding of activation of EMS(911) for stroke symptoms(Stroke Metric)  Outcome: Progressing Towards Goal  Goal: *Understands and describes signs and symptoms to report to providers(Stroke Metric)  Outcome: Progressing Towards Goal  Goal: *Neurolgocially stable (absence of additional neurological deficits)  Outcome: Progressing Towards Goal  Goal: *Verbalizes importance of follow-up with primary care physician(Stroke Metric)  Outcome: Progressing Towards Goal  Goal: *Smoking cessation discussed,if applicable(Stroke Metric)  Outcome: Progressing Towards Goal  Goal: *Depression screening completed(Stroke Metric)  Outcome: Progressing Towards Goal

## 2020-05-16 NOTE — PROGRESS NOTES
Neurology Progress Note    Patient ID:  Robert Garcia  [id number]  36 y.o.  1945    Subjective:      Patient denies significant change in LE weakness since admission. She reports sx started as cramping into the legs R>LLE 4 days ago. She noted weakness and paresthesias when attempting to stand. She has ongoing urinary/bowel retention requiring catheterization. She is not able to ambulate. She has stiffness into the distal LLE. UE function appears preserved. Admits to longer standing dysesthesias of the plantar surface of both feet x 1 month. Denies significant cervicalgia/LBP. S/P LP yesterday with minimally elevated protein. Current Facility-Administered Medications   Medication Dose Route Frequency    albuterol-ipratropium (DUO-NEB) 2.5 MG-0.5 MG/3 ML  3 mL Nebulization Q4H PRN    baclofen (LIORESAL) tablet 5 mg  5 mg Oral TID PRN    amLODIPine (NORVASC) tablet 10 mg  10 mg Oral DAILY    metoprolol tartrate (LOPRESSOR) tablet 50 mg  50 mg Oral BID    ondansetron (ZOFRAN) injection 4 mg  4 mg IntraVENous Q6H PRN    bisacodyL (DULCOLAX) tablet 5 mg  5 mg Oral DAILY PRN    acetaminophen (TYLENOL) tablet 650 mg  650 mg Oral Q4H PRN    heparin (porcine) injection 5,000 Units  5,000 Units SubCUTAneous Q8H    sodium chloride tablet 3 g  3 g Oral BID WITH MEALS    potassium chloride SR (KLOR-CON 10) tablet 20 mEq  20 mEq Oral BID    nicotine (NICODERM CQ) 14 mg/24 hr patch 1 Patch  1 Patch TransDERmal Q24H          Objective:     Patient Vitals for the past 8 hrs:   BP Temp Pulse Resp SpO2   05/16/20 1343 122/77 98.2 °F (36.8 °C) 77 16 98 %   05/16/20 1001 140/57 97.6 °F (36.4 °C) 78 15 95 %   05/16/20 0838     93 %       No intake/output data recorded.   05/14 1901 - 05/16 0700  In: -   Out: 3600 [Urine:3600]    Lab Review   Recent Results (from the past 24 hour(s))   SAMPLES BEING HELD    Collection Time: 05/15/20  3:30 PM   Result Value Ref Range    SAMPLES BEING HELD 1CSF TUBE3 COMMENT        Add-on orders for these samples will be processed based on acceptable specimen integrity and analyte stability, which may vary by analyte. CELL COUNT, CSF    Collection Time: 05/15/20  3:44 PM   Result Value Ref Range    CSF TUBE NO. 4      CSF COLOR COLORLESS COL      CSF APPEARANCE CLEAR CLEAR      CSF RBCS 36 (H) 0 /cu mm    CSF WBCS 1 0 - 5 /cu mm   CULTURE, CSF W GRAM STAIN    Collection Time: 05/15/20  3:44 PM   Result Value Ref Range    Special Requests: TUBE 2     GRAM STAIN RARE WBCS SEEN      GRAM STAIN NO ORGANISMS SEEN      Culture result: NO GROWTH AFTER 18 HOURS     GLUCOSE, CSF    Collection Time: 05/15/20  3:44 PM   Result Value Ref Range    Tube No. 1      Glucose,CSF 65 40 - 70 MG/DL   PROTEIN, CSF    Collection Time: 05/15/20  3:44 PM   Result Value Ref Range    Tube No. 1      Protein,CSF 51 (H) 15 - 45 MG/DL   NEUROLOGICAL EXAM:    Appearance: The patient is well developed, well nourished, provides a coherent history and is in no acute distress. Mental Status: Oriented to time, place and person. Mood and affect appropriate. Cranial Nerves:   Intact visual fields. Fundi are benign. LUIS, EOM's full, no nystagmus, no ptosis. Facial sensation is normal. Corneal reflexes are intact. Facial movement is symmetric. Hearing is normal bilaterally. Palate is midline with normal sternocleidomastoid and trapezius muscles are normal. Tongue is midline. Motor:  LLE distal increased tone 4/5, prox 4-/5, RLE 4+/5, UE 5/5 b/l   Reflexes:   Deep tendon reflexes 1+/4 and symmetrical UE, 0/4 b/l LE. No clonus, extensor plantar responses b/l   Sensory:   Decreased sensation R>LLE   Gait:  Deferred 2/2 weakness   Tremor:   No tremor noted. Cerebellar:  No cerebellar signs present.              Assessment:     Principal Problem:    Lower extremity weakness (5/13/2020)    69year old female admitted with subacute non-progressive weakness/paresthesias of the distal lower extremities L>R since 5/12/20, extensor plantar response, increased tone distal LLE, urinary/bowel retention. UMN pathology would be atypical for AIDP and CSF analysis shows only mild protein elevation. MRI B/T/L WO reviewed without associated structural pathology. Will obtain imaging with contrast and include DWI sequences including cervical images to exclude any associated cord pathology/lesions or less likely ischemia. Copper levels are pending to exclude copper deficiency myeloneuropathy. Plan:   MRI C/T/L WWO  F/U Copper, CELESTINO, ACE.  Send ceruloplasmin, zinc.     Signed:  Brie Bishop DO  5/16/2020  2:46 PM

## 2020-05-16 NOTE — PROGRESS NOTES
[address] [facility] Adult  Hospitalist Group                                                                                          Hospitalist Progress Note  Yuri Fuentes MD  Answering service: [phone] OR [phone] from in house phone        Date of Service:  2020  NAME:  Zander Masterson  :  1945  MRN:  [id number]      Admission Summary:     Patient was in her usual state of health until the day of her presentation at the emergency room when the patient developed bilateral lower extremity numbness and tingling. The numbness and tingling is worse in the left lower extremity than the right lower extremity. The patient woke up at about 02:30 a.m. to use the bathroom, that is when she developed the symptoms and as the result of the numbness and tingling, the patient fell on the way to the bathroom. Since then, the patient has not been able to ambulate independently. The patient did not have any symptoms at 10:00 p.m. when she went to bed. The patient was brought to the emergency room for further evaluation. When the patient arrived at the emergency room, code stroke was called. CT scan of the head was obtained and this was negative for acute pathology. CTA of the head and neck was also obtained. 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 This was discussed with the neurointerventional surgeon, who did not advise any intervention. The patient was also seen in the emergency room by neurologist through the [facility] at the request of the emergency room physician. It was stated that the patient is clearly outside the t-PA window but the teleneurologist advised aspirin therapy and admission for full stroke workup. Interval history / Subjective:       No acute complaint today.   Still with weakness in the left leg and numbness in the right leg     Assessment & Plan:     Leg weakness  -Patient with left leg weakness with some sensory disturbance in the right leg  -MRI of the brain shows no CVA, MRI of the thoracic and lumbar spine without explanatory finding for her leg weakness  -Negative for DVT  -Neurology following, working up for metabolic and autoimmune causes  -LP 5/15 to evaluate for AIDP  -Physical therapy also following    Urinary retention  -Camacho placed today    Hyponatremia  -History of SIADH   -Discontinue IV fluid and observe    Hypertension  -Blood pressure stable  -Continue amlodipine and metoprolol    Tobacco use  -On nicotine replacement    Code status: FULL  DVT prophylaxis: Heparin    Care Plan discussed with: Patient/Family  Anticipated Disposition: SNF/LTC  Anticipated Discharge: 24 hours to 48 hours     Hospital Problems  Date Reviewed: 5/13/2020          Codes Class Noted POA    * (Principal) Lower extremity weakness ICD-10-CM: R29.898  ICD-9-CM: 729.89  5/13/2020 Yes                Review of Systems:   A comprehensive review of systems was negative except for that written in the HPI. Vital Signs:    Last 24hrs VS reviewed since prior progress note. Most recent are:  Visit Vitals  /77 (BP 1 Location: Right arm, BP Patient Position: At rest)   Pulse 77   Temp 98.2 °F (36.8 °C)   Resp 16   Ht 5' 1\" (1.549 m)   Wt 52.3 kg (115 lb 4.8 oz)   SpO2 98%   BMI 21.79 kg/m²         Intake/Output Summary (Last 24 hours) at 5/16/2020 1535  Last data filed at 5/16/2020 6368  Gross per 24 hour   Intake    Output 1000 ml   Net -1000 ml        Physical Examination:             Constitutional:  No acute distress, cooperative, pleasant    ENT:  Oral mucosa moist, oropharynx benign. Resp:  CTA bilaterally. No wheezing/rhonchi/rales. No accessory muscle use   CV:  Regular rhythm, normal rate, no murmurs, gallops, rubs    GI:  Soft, non distended, non tender. normoactive bowel sounds, no hepatosplenomegaly     Musculoskeletal:  No edema, warm, 2+ pulses throughout    Neurologic:  Moves all extremities.   AAOx3, CN II-XII reviewed     Skin:  Good turgor, no rashes or ulcers       Data Review:    Review and/or order of clinical lab test      Labs:     Recent Labs     05/14/20  0210   WBC 11.2*   HGB 15.9   HCT 47.4*        Recent Labs     05/15/20  0136 05/14/20  0210   * 133*   K 3.7 4.1   CL 99 100   CO2 29 26   BUN 8 8   CREA 0.70 0.65   GLU 99 86   CA 9.0 9.0     Recent Labs     05/14/20  0210   SGOT 28   ALT 24   *   TBILI 0.7   TP 7.5   ALB 3.4*   GLOB 4.1*     No results for input(s): INR, PTP, APTT, INREXT, INREXT in the last 72 hours. No results for input(s): FE, TIBC, PSAT, FERR in the last 72 hours. Lab Results   Component Value Date/Time    Folate 33.3 (H) 05/13/2020 10:27 AM      No results for input(s): PH, PCO2, PO2 in the last 72 hours.   Recent Labs     05/14/20  1401        Lab Results   Component Value Date/Time    Cholesterol, total 171 05/13/2020 10:27 AM    HDL Cholesterol 54 05/13/2020 10:27 AM    LDL, calculated 86.6 05/13/2020 10:27 AM    Triglyceride 152 (H) 05/13/2020 10:27 AM    CHOL/HDL Ratio 3.2 05/13/2020 10:27 AM     Lab Results   Component Value Date/Time    Glucose (POC) 82 05/13/2020 04:13 AM     Lab Results   Component Value Date/Time    Color YELLOW/STRAW 05/13/2020 08:10 AM    Appearance CLEAR 05/13/2020 08:10 AM    Specific gravity >1.030 (H) 05/13/2020 08:10 AM    pH (UA) 6.0 05/13/2020 08:10 AM    Protein Negative 05/13/2020 08:10 AM    Glucose Negative 05/13/2020 08:10 AM    Ketone Negative 05/13/2020 08:10 AM    Bilirubin Negative 05/13/2020 08:10 AM    Urobilinogen 0.2 05/13/2020 08:10 AM    Nitrites Negative 05/13/2020 08:10 AM    Leukocyte Esterase MODERATE (A) 05/13/2020 08:10 AM    Epithelial cells FEW 05/13/2020 08:10 AM    Bacteria Negative 05/13/2020 08:10 AM    WBC 5-10 05/13/2020 08:10 AM    RBC 0-5 05/13/2020 08:10 AM         Medications Reviewed:     Current Facility-Administered Medications   Medication Dose Route Frequency    albuterol-ipratropium (DUO-NEB) 2.5 MG-0.5 MG/3 ML  3 mL Nebulization Q4H PRN    baclofen (LIORESAL) tablet 5 mg  5 mg Oral TID PRN    amLODIPine (NORVASC) tablet 10 mg  10 mg Oral DAILY    metoprolol tartrate (LOPRESSOR) tablet 50 mg  50 mg Oral BID    ondansetron (ZOFRAN) injection 4 mg  4 mg IntraVENous Q6H PRN    bisacodyL (DULCOLAX) tablet 5 mg  5 mg Oral DAILY PRN    acetaminophen (TYLENOL) tablet 650 mg  650 mg Oral Q4H PRN    heparin (porcine) injection 5,000 Units  5,000 Units SubCUTAneous Q8H    sodium chloride tablet 3 g  3 g Oral BID WITH MEALS    potassium chloride SR (KLOR-CON 10) tablet 20 mEq  20 mEq Oral BID    nicotine (NICODERM CQ) 14 mg/24 hr patch 1 Patch  1 Patch TransDERmal Q24H     ______________________________________________________________________  EXPECTED LENGTH OF STAY: 3d 21h  ACTUAL LENGTH OF STAY:          3                 Brenda Chris MD

## 2020-05-17 ENCOUNTER — APPOINTMENT (OUTPATIENT)
Dept: MRI IMAGING | Age: 75
DRG: 074 | End: 2020-05-17
Attending: PSYCHIATRY & NEUROLOGY
Payer: MEDICARE

## 2020-05-17 PROCEDURE — 74011250636 HC RX REV CODE- 250/636: Performed by: FAMILY MEDICINE

## 2020-05-17 PROCEDURE — A9575 INJ GADOTERATE MEGLUMI 0.1ML: HCPCS | Performed by: FAMILY MEDICINE

## 2020-05-17 PROCEDURE — 72156 MRI NECK SPINE W/O & W/DYE: CPT

## 2020-05-17 PROCEDURE — 77010033678 HC OXYGEN DAILY

## 2020-05-17 PROCEDURE — 74011250637 HC RX REV CODE- 250/637: Performed by: FAMILY MEDICINE

## 2020-05-17 PROCEDURE — 74011250636 HC RX REV CODE- 250/636: Performed by: INTERNAL MEDICINE

## 2020-05-17 PROCEDURE — 72149 MRI LUMBAR SPINE W/DYE: CPT

## 2020-05-17 PROCEDURE — 65660000000 HC RM CCU STEPDOWN

## 2020-05-17 PROCEDURE — 74011250637 HC RX REV CODE- 250/637: Performed by: INTERNAL MEDICINE

## 2020-05-17 PROCEDURE — 74011250637 HC RX REV CODE- 250/637: Performed by: PSYCHIATRY & NEUROLOGY

## 2020-05-17 PROCEDURE — 94760 N-INVAS EAR/PLS OXIMETRY 1: CPT

## 2020-05-17 PROCEDURE — 72147 MRI CHEST SPINE W/DYE: CPT

## 2020-05-17 RX ORDER — GADOTERATE MEGLUMINE 376.9 MG/ML
10 INJECTION INTRAVENOUS
Status: COMPLETED | OUTPATIENT
Start: 2020-05-17 | End: 2020-05-17

## 2020-05-17 RX ORDER — LORAZEPAM 1 MG/1
1 TABLET ORAL ONCE
Status: COMPLETED | OUTPATIENT
Start: 2020-05-17 | End: 2020-05-17

## 2020-05-17 RX ADMIN — METOPROLOL TARTRATE 50 MG: 50 TABLET, FILM COATED ORAL at 08:13

## 2020-05-17 RX ADMIN — HEPARIN SODIUM 5000 UNITS: 5000 INJECTION INTRAVENOUS; SUBCUTANEOUS at 01:44

## 2020-05-17 RX ADMIN — AMLODIPINE BESYLATE 10 MG: 5 TABLET ORAL at 08:12

## 2020-05-17 RX ADMIN — HEPARIN SODIUM 5000 UNITS: 5000 INJECTION INTRAVENOUS; SUBCUTANEOUS at 08:46

## 2020-05-17 RX ADMIN — BACLOFEN 5 MG: 10 TABLET ORAL at 08:13

## 2020-05-17 RX ADMIN — GADOTERATE MEGLUMINE 10 ML: 376.9 INJECTION INTRAVENOUS at 12:37

## 2020-05-17 RX ADMIN — BACLOFEN 5 MG: 10 TABLET ORAL at 23:11

## 2020-05-17 RX ADMIN — METOPROLOL TARTRATE 50 MG: 50 TABLET, FILM COATED ORAL at 17:26

## 2020-05-17 RX ADMIN — Medication 3 G: at 17:26

## 2020-05-17 RX ADMIN — Medication 3 G: at 08:13

## 2020-05-17 RX ADMIN — LORAZEPAM 1 MG: 1 TABLET ORAL at 11:22

## 2020-05-17 RX ADMIN — POTASSIUM CHLORIDE 20 MEQ: 750 TABLET, FILM COATED, EXTENDED RELEASE ORAL at 17:27

## 2020-05-17 RX ADMIN — POTASSIUM CHLORIDE 20 MEQ: 750 TABLET, FILM COATED, EXTENDED RELEASE ORAL at 08:13

## 2020-05-17 RX ADMIN — HEPARIN SODIUM 5000 UNITS: 5000 INJECTION INTRAVENOUS; SUBCUTANEOUS at 17:26

## 2020-05-17 NOTE — PROGRESS NOTES
Problem: Patient Education: Go to Patient Education Activity  Goal: Patient/Family Education  Outcome: Progressing Towards Goal     Problem: Pain  Goal: *Control of Pain  Outcome: Progressing Towards Goal  Goal: *PALLIATIVE CARE:  Alleviation of Pain  Outcome: Progressing Towards Goal     Problem: Patient Education: Go to Patient Education Activity  Goal: Patient/Family Education  Outcome: Progressing Towards Goal     Problem: Pressure Injury - Risk of  Goal: *Prevention of pressure injury  Description: Document Sameer Scale and appropriate interventions in the flowsheet. Outcome: Progressing Towards Goal  Note: Pressure Injury Interventions:  Sensory Interventions: Assess changes in LOC, Float heels, Keep linens dry and wrinkle-free, Maintain/enhance activity level, Minimize linen layers, Pressure redistribution bed/mattress (bed type), Turn and reposition approx. every two hours (pillows and wedges if needed)    Moisture Interventions: Absorbent underpads, Internal/External urinary devices, Minimize layers    Activity Interventions: Increase time out of bed, Pressure redistribution bed/mattress(bed type)    Mobility Interventions: HOB 30 degrees or less, Pressure redistribution bed/mattress (bed type), Turn and reposition approx. every two hours(pillow and wedges)    Nutrition Interventions: Document food/fluid/supplement intake    Friction and Shear Interventions: Lift sheet, Lift team/patient mobility team, Minimize layers, Feet elevated on foot rest, HOB 30 degrees or less                Problem: Patient Education: Go to Patient Education Activity  Goal: Patient/Family Education  Outcome: Progressing Towards Goal     Problem: Falls - Risk of  Goal: *Absence of Falls  Description: Document Katey Fall Risk and appropriate interventions in the flowsheet.   Outcome: Progressing Towards Goal  Note: Fall Risk Interventions:  Mobility Interventions: Bed/chair exit alarm, Communicate number of staff needed for ambulation/transfer, Patient to call before getting OOB, Utilize gait belt for transfers/ambulation    Mentation Interventions: Adequate sleep, hydration, pain control, More frequent rounding, Toileting rounds, Update white board    Medication Interventions: Evaluate medications/consider consulting pharmacy, Assess postural VS orthostatic hypotension, Patient to call before getting OOB, Teach patient to arise slowly    Elimination Interventions: Call light in reach, Patient to call for help with toileting needs, Stay With Me (per policy)              Problem: Patient Education: Go to Patient Education Activity  Goal: Patient/Family Education  Outcome: Progressing Towards Goal     Problem: Patient Education: Go to Patient Education Activity  Goal: Patient/Family Education  Outcome: Progressing Towards Goal     Problem: Patient Education: Go to Patient Education Activity  Goal: Patient/Family Education  Outcome: Progressing Towards Goal     Problem: TIA/CVA Stroke: Day 2 Until Discharge  Goal: Off Pathway (Use only if patient is Off Pathway)  Outcome: Progressing Towards Goal  Goal: Activity/Safety  Outcome: Progressing Towards Goal  Goal: Diagnostic Test/Procedures  Outcome: Progressing Towards Goal  Goal: Nutrition/Diet  Outcome: Progressing Towards Goal  Goal: Discharge Planning  Outcome: Progressing Towards Goal  Goal: Medications  Outcome: Progressing Towards Goal  Goal: Respiratory  Outcome: Progressing Towards Goal  Goal: Treatments/Interventions/Procedures  Outcome: Progressing Towards Goal  Goal: Psychosocial  Outcome: Progressing Towards Goal  Goal: *Verbalizes anxiety and depression are reduced or absent  Outcome: Progressing Towards Goal  Goal: *Absence of aspiration  Outcome: Progressing Towards Goal  Goal: *Absence of deep venous thrombosis signs and symptoms(Stroke Metric)  Outcome: Progressing Towards Goal  Goal: *Optimal pain control at patient's stated goal  Outcome: Progressing Towards Goal  Goal: *Tolerating diet  Outcome: Progressing Towards Goal  Goal: *Ability to perform ADLs and demonstrates progressive mobility and function  Outcome: Progressing Towards Goal  Goal: *Stroke education continued(Stroke Metric)  Outcome: Progressing Towards Goal     Problem: Ischemic Stroke: Discharge Outcomes  Goal: *Verbalizes anxiety and depression are reduced or absent  Outcome: Progressing Towards Goal  Goal: *Verbalize understanding of risk factor modification(Stroke Metric)  Outcome: Progressing Towards Goal  Goal: *Hemodynamically stable  Outcome: Progressing Towards Goal  Goal: *Absence of aspiration pneumonia  Outcome: Progressing Towards Goal  Goal: *Aware of needed dietary changes  Outcome: Progressing Towards Goal  Goal: *Verbalize understanding of prescribed medications including anti-coagulants, anti-lipid, and/or anti-platelets(Stroke Metric)  Outcome: Progressing Towards Goal  Goal: *Tolerating diet  Outcome: Progressing Towards Goal  Goal: *Aware of follow-up diagnostics related to anticoagulants  Outcome: Progressing Towards Goal  Goal: *Ability to perform ADLs and demonstrates progressive mobility and function  Outcome: Progressing Towards Goal  Goal: *Absence of DVT(Stroke Metric)  Outcome: Progressing Towards Goal  Goal: *Absence of aspiration  Outcome: Progressing Towards Goal  Goal: *Optimal pain control at patient's stated goal  Outcome: Progressing Towards Goal  Goal: *Home safety concerns addressed  Outcome: Progressing Towards Goal  Goal: *Describes available resources and support systems  Outcome: Progressing Towards Goal  Goal: *Verbalizes understanding of activation of EMS(911) for stroke symptoms(Stroke Metric)  Outcome: Progressing Towards Goal  Goal: *Understands and describes signs and symptoms to report to providers(Stroke Metric)  Outcome: Progressing Towards Goal  Goal: *Neurolgocially stable (absence of additional neurological deficits)  Outcome: Progressing Towards Goal  Goal: *Verbalizes importance of follow-up with primary care physician(Stroke Metric)  Outcome: Progressing Towards Goal  Goal: *Smoking cessation discussed,if applicable(Stroke Metric)  Outcome: Progressing Towards Goal  Goal: *Depression screening completed(Stroke Metric)  Outcome: Progressing Towards Goal

## 2020-05-17 NOTE — PROGRESS NOTES
[facility]                                                                                          Hospitalist Progress Note  Yuri Fuentes MD  Answering service: [phone] from in house phone        Date of Service:  2020  NAME:  Zander Masterson  :  1945  MRN:  [id number]      Admission Summary:     Patient was in her usual state of health until the day of her presentation at the emergency room when the patient developed bilateral lower extremity numbness and tingling. The numbness and tingling is worse in the left lower extremity than the right lower extremity. The patient woke up at about 02:30 a.m. to use the bathroom, that is when she developed the symptoms and as the result of the numbness and tingling, the patient fell on the way to the bathroom. Since then, the patient has not been able to ambulate independently. The patient did not have any symptoms at 10:00 p.m. when she went to bed. The patient was brought to the emergency room for further evaluation. When the patient arrived at the emergency room, code stroke was called. CT scan of the head was obtained and this was negative for acute pathology. CTA of the head and neck was also obtained. 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 This was discussed with the neurointerventional surgeon, who did not advise any intervention. The patient was also seen in the emergency room by neurologist through the Telene[facility]rology [facility] at the request of the emergency room physician. It was stated that the patient is clearly outside the t-PA window but the teleneurologist advised aspirin therapy and admission for full stroke workup. Interval history / Subjective:       No acute complaint today.   Still with weakness in the left leg and numbness in the right leg     Assessment & Plan:     Leg weakness  -Patient with left leg weakness with some sensory disturbance in the right leg  -MRI of the brain shows no CVA, MRI of the thoracic and lumbar spine without explanatory finding for her leg weakness  -Negative for DVT  -Neurology following, working up for metabolic and autoimmune causes  -LP 5/15 to evaluate for AIDP  -MRI of the cervical, thoracic and lumbar spine showing no cord compression  -Physical therapy also following    Urinary retention  -Camacho placed today    Hyponatremia  -History of SIADH   -Sodium level stable    Hypertension  -Blood pressure stable  -Continue amlodipine and metoprolol    Tobacco use  -On nicotine replacement    Code status: FULL  DVT prophylaxis: Heparin    Care Plan discussed with: Patient/Family  Anticipated Disposition: SNF/LTC  Anticipated Discharge: 24 hours to 48 hours     Hospital Problems  Date Reviewed: 5/13/2020          Codes Class Noted POA    * (Principal) Lower extremity weakness ICD-10-CM: R29.898  ICD-9-CM: 729.89  5/13/2020 Yes                Review of Systems:   A comprehensive review of systems was negative except for that written in the HPI. Vital Signs:    Last 24hrs VS reviewed since prior progress note. Most recent are:  Visit Vitals  /71 (BP 1 Location: Right arm, BP Patient Position: At rest)   Pulse 77   Temp 98.7 °F (37.1 °C)   Resp 13   Ht 5' 1\" (1.549 m)   Wt 54.7 kg (120 lb 9.5 oz)   SpO2 100%   BMI 22.79 kg/m²         Intake/Output Summary (Last 24 hours) at 5/17/2020 1430  Last data filed at 5/17/2020 1055  Gross per 24 hour   Intake    Output 2725 ml   Net -2725 ml        Physical Examination:             Constitutional:  No acute distress, cooperative, pleasant    ENT:  Oral mucosa moist, oropharynx benign. Resp:  CTA bilaterally. No wheezing/rhonchi/rales. No accessory muscle use   CV:  Regular rhythm, normal rate, no murmurs, gallops, rubs    GI:  Soft, non distended, non tender.  normoactive bowel sounds, no hepatosplenomegaly     Musculoskeletal:  No edema, warm, 2+ pulses throughout    Neurologic:  Moves all extremities. AAOx3, CN II-XII reviewed     Skin:  Good turgor, no rashes or ulcers       Data Review:    Review and/or order of clinical lab test      Labs:     No results for input(s): WBC, HGB, HCT, PLT, HGBEXT, HCTEXT, PLTEXT, HGBEXT, HCTEXT, PLTEXT in the last 72 hours. Recent Labs     05/15/20  0136   *   K 3.7   CL 99   CO2 29   BUN 8   CREA 0.70   GLU 99   CA 9.0     No results for input(s): SGOT, GPT, ALT, AP, TBIL, TBILI, TP, ALB, GLOB, GGT, AML, LPSE in the last 72 hours. No lab exists for component: AMYP, HLPSE  No results for input(s): INR, PTP, APTT, INREXT, INREXT in the last 72 hours. No results for input(s): FE, TIBC, PSAT, FERR in the last 72 hours. Lab Results   Component Value Date/Time    Folate 33.3 (H) 05/13/2020 10:27 AM      No results for input(s): PH, PCO2, PO2 in the last 72 hours. No results for input(s): CPK, CKNDX, TROIQ in the last 72 hours.     No lab exists for component: CPKMB  Lab Results   Component Value Date/Time    Cholesterol, total 171 05/13/2020 10:27 AM    HDL Cholesterol 54 05/13/2020 10:27 AM    LDL, calculated 86.6 05/13/2020 10:27 AM    Triglyceride 152 (H) 05/13/2020 10:27 AM    CHOL/HDL Ratio 3.2 05/13/2020 10:27 AM     Lab Results   Component Value Date/Time    Glucose (POC) 82 05/13/2020 04:13 AM     Lab Results   Component Value Date/Time    Color YELLOW/STRAW 05/13/2020 08:10 AM    Appearance CLEAR 05/13/2020 08:10 AM    Specific gravity >1.030 (H) 05/13/2020 08:10 AM    pH (UA) 6.0 05/13/2020 08:10 AM    Protein Negative 05/13/2020 08:10 AM    Glucose Negative 05/13/2020 08:10 AM    Ketone Negative 05/13/2020 08:10 AM    Bilirubin Negative 05/13/2020 08:10 AM    Urobilinogen 0.2 05/13/2020 08:10 AM    Nitrites Negative 05/13/2020 08:10 AM    Leukocyte Esterase MODERATE (A) 05/13/2020 08:10 AM    Epithelial cells FEW 05/13/2020 08:10 AM    Bacteria Negative 05/13/2020 08:10 AM    WBC 5-10 05/13/2020 08:10 AM    RBC 0-5 05/13/2020 08:10 AM Medications Reviewed:     Current Facility-Administered Medications   Medication Dose Route Frequency    albuterol-ipratropium (DUO-NEB) 2.5 MG-0.5 MG/3 ML  3 mL Nebulization Q4H PRN    baclofen (LIORESAL) tablet 5 mg  5 mg Oral TID PRN    amLODIPine (NORVASC) tablet 10 mg  10 mg Oral DAILY    metoprolol tartrate (LOPRESSOR) tablet 50 mg  50 mg Oral BID    ondansetron (ZOFRAN) injection 4 mg  4 mg IntraVENous Q6H PRN    bisacodyL (DULCOLAX) tablet 5 mg  5 mg Oral DAILY PRN    acetaminophen (TYLENOL) tablet 650 mg  650 mg Oral Q4H PRN    heparin (porcine) injection 5,000 Units  5,000 Units SubCUTAneous Q8H    sodium chloride tablet 3 g  3 g Oral BID WITH MEALS    potassium chloride SR (KLOR-CON 10) tablet 20 mEq  20 mEq Oral BID    nicotine (NICODERM CQ) 14 mg/24 hr patch 1 Patch  1 Patch TransDERmal Q24H     ______________________________________________________________________  EXPECTED LENGTH OF STAY: 3d 21h  ACTUAL LENGTH OF STAY:          4                 Del Calderon MD

## 2020-05-18 LAB
ACE SERPL-CCNC: 40 U/L (ref 14–82)
ALBUMIN SERPL ELPH-MCNC: 3.7 G/DL (ref 2.9–4.4)
ALBUMIN/GLOB SERPL: 1.1 {RATIO} (ref 0.7–1.7)
ALPHA1 GLOB SERPL ELPH-MCNC: 0.3 G/DL (ref 0–0.4)
ALPHA2 GLOB SERPL ELPH-MCNC: 1.2 G/DL (ref 0.4–1)
B-GLOBULIN SERPL ELPH-MCNC: 0.9 G/DL (ref 0.7–1.3)
GAMMA GLOB SERPL ELPH-MCNC: 1.2 G/DL (ref 0.4–1.8)
GLOBULIN SER-MCNC: 3.5 G/DL (ref 2.2–3.9)
IGA SERPL-MCNC: 297 MG/DL (ref 64–422)
IGG SERPL-MCNC: 1111 MG/DL (ref 586–1602)
IGM SERPL-MCNC: 162 MG/DL (ref 26–217)
INTERPRETATION SERPL IEP-IMP: ABNORMAL
KAPPA LC FREE SER-MCNC: 25 MG/L (ref 3.3–19.4)
KAPPA LC FREE/LAMBDA FREE SER: 1.43 {RATIO} (ref 0.26–1.65)
LAMBDA LC FREE SERPL-MCNC: 17.5 MG/L (ref 5.7–26.3)
M PROTEIN SERPL ELPH-MCNC: ABNORMAL G/DL
PROT SERPL-MCNC: 7.2 G/DL (ref 6–8.5)

## 2020-05-18 PROCEDURE — 97112 NEUROMUSCULAR REEDUCATION: CPT

## 2020-05-18 PROCEDURE — 74011250637 HC RX REV CODE- 250/637: Performed by: PSYCHIATRY & NEUROLOGY

## 2020-05-18 PROCEDURE — 94760 N-INVAS EAR/PLS OXIMETRY 1: CPT

## 2020-05-18 PROCEDURE — 74011250637 HC RX REV CODE- 250/637: Performed by: INTERNAL MEDICINE

## 2020-05-18 PROCEDURE — 65660000000 HC RM CCU STEPDOWN

## 2020-05-18 PROCEDURE — 97535 SELF CARE MNGMENT TRAINING: CPT

## 2020-05-18 PROCEDURE — 77010033678 HC OXYGEN DAILY

## 2020-05-18 PROCEDURE — 97110 THERAPEUTIC EXERCISES: CPT

## 2020-05-18 PROCEDURE — 74011250636 HC RX REV CODE- 250/636: Performed by: INTERNAL MEDICINE

## 2020-05-18 PROCEDURE — 97116 GAIT TRAINING THERAPY: CPT

## 2020-05-18 RX ADMIN — Medication 3 G: at 17:16

## 2020-05-18 RX ADMIN — AMLODIPINE BESYLATE 10 MG: 5 TABLET ORAL at 08:50

## 2020-05-18 RX ADMIN — METOPROLOL TARTRATE 50 MG: 50 TABLET, FILM COATED ORAL at 08:50

## 2020-05-18 RX ADMIN — HEPARIN SODIUM 5000 UNITS: 5000 INJECTION INTRAVENOUS; SUBCUTANEOUS at 17:17

## 2020-05-18 RX ADMIN — BACLOFEN 5 MG: 10 TABLET ORAL at 07:25

## 2020-05-18 RX ADMIN — HEPARIN SODIUM 5000 UNITS: 5000 INJECTION INTRAVENOUS; SUBCUTANEOUS at 08:51

## 2020-05-18 RX ADMIN — METOPROLOL TARTRATE 50 MG: 50 TABLET, FILM COATED ORAL at 17:16

## 2020-05-18 RX ADMIN — POTASSIUM CHLORIDE 20 MEQ: 750 TABLET, FILM COATED, EXTENDED RELEASE ORAL at 17:16

## 2020-05-18 RX ADMIN — POTASSIUM CHLORIDE 20 MEQ: 750 TABLET, FILM COATED, EXTENDED RELEASE ORAL at 08:50

## 2020-05-18 RX ADMIN — BACLOFEN 5 MG: 10 TABLET ORAL at 21:54

## 2020-05-18 RX ADMIN — Medication 3 G: at 08:50

## 2020-05-18 RX ADMIN — HEPARIN SODIUM 5000 UNITS: 5000 INJECTION INTRAVENOUS; SUBCUTANEOUS at 01:43

## 2020-05-18 NOTE — PROGRESS NOTES
Update 15:50P- Call placed to [facility] [phone] to check referral status. Referral still pending for [facility]. CM to follow. LEONIE Wu,JL     ALIDA:    Possible discharge today pending medical stability. Pending referral to [facility]. Call placed to [facility] [place] [facility] [phone] to check referral status. Awaiting call back. CM to follow.  LEONIE Wu,JL

## 2020-05-18 NOTE — PROGRESS NOTES
Bedside and Verbal shift change report given to EVONNE Chavarria (oncoming nurse) by Leila Reynolds RN (offgoing nurse). Report included the following information SBAR, Kardex, MAR, Cardiac Rhythm NSR and Dual Neuro Assessment.

## 2020-05-18 NOTE — PROGRESS NOTES
Problem: Patient Education: Go to Patient Education Activity  Goal: Patient/Family Education  Outcome: Progressing Towards Goal     Problem: Pain  Goal: *Control of Pain  Outcome: Progressing Towards Goal     Problem: Pressure Injury - Risk of  Goal: *Prevention of pressure injury  Description: Document Sameer Scale and appropriate interventions in the flowsheet. Outcome: Progressing Towards Goal  Note: Pressure Injury Interventions:  Sensory Interventions: Discuss PT/OT consult with provider, Keep linens dry and wrinkle-free, Maintain/enhance activity level, Minimize linen layers    Moisture Interventions: Internal/External urinary devices, Limit adult briefs, Maintain skin hydration (lotion/cream), Check for incontinence Q2 hours and as needed    Activity Interventions: Pressure redistribution bed/mattress(bed type), PT/OT evaluation    Mobility Interventions: PT/OT evaluation, Pressure redistribution bed/mattress (bed type)    Nutrition Interventions: Document food/fluid/supplement intake    Friction and Shear Interventions: Lift sheet, Lift team/patient mobility team, Minimize layers                Problem: Falls - Risk of  Goal: *Absence of Falls  Description: Document Katey Fall Risk and appropriate interventions in the flowsheet.   Outcome: Progressing Towards Goal  Note: Fall Risk Interventions:  Mobility Interventions: Bed/chair exit alarm, Communicate number of staff needed for ambulation/transfer, Patient to call before getting OOB, PT Consult for mobility concerns, PT Consult for assist device competence    Mentation Interventions: Bed/chair exit alarm, Adequate sleep, hydration, pain control, Eyeglasses and hearing aids, More frequent rounding, Self-releasing belt    Medication Interventions: Bed/chair exit alarm, Evaluate medications/consider consulting pharmacy, Patient to call before getting OOB    Elimination Interventions: Call light in reach, Stay With Me (per policy), Patient to call for help with toileting needs, Toilet paper/wipes in reach              Problem: TIA/CVA Stroke: Day 2 Until Discharge  Goal: Off Pathway (Use only if patient is Off Pathway)  Outcome: Progressing Towards Goal  Goal: Activity/Safety  Outcome: Progressing Towards Goal  Goal: Diagnostic Test/Procedures  Outcome: Progressing Towards Goal  Goal: Nutrition/Diet  Outcome: Progressing Towards Goal  Goal: Discharge Planning  Outcome: Progressing Towards Goal  Goal: Medications  Outcome: Progressing Towards Goal

## 2020-05-18 NOTE — PROGRESS NOTES
Problem: Self Care Deficits Care Plan (Adult)  Goal: *Acute Goals and Plan of Care (Insert Text)  Description:   FUNCTIONAL STATUS PRIOR TO ADMISSION: Patient was independent and active without use of DME. Patient was independent for basic and instrumental ADLs, famiyl assists with driving but she is typically active, walking multiple times/day. HOME SUPPORT: The patient lived alone with granddaughter & daughter to provide assistance for driving. Occupational Therapy Goals  Initiated 5/13/2020  1. Patient will perform standing grooming tasks x5 minutes with minimal assistance/contact guard assist within 7 day(s). 2.  Patient will perform lower body dressing with minimal assistance/contact guard assist within 7 day(s). 3.  Patient will perform simple home management with minimal assistance/contact guard assist within 7 day(s). 4.  Patient will perform toilet transfers with minimal assistance/contact guard assist within 7 day(s). 5.  Patient will perform all aspects of toileting with minimal assistance/contact guard assist within 7 day(s). 6.  Patient will participate in upper extremity therapeutic exercise/activities with supervision/set-up for 5 minutes within 7 day(s). 7.  Patient will utilize energy conservation techniques during functional activities with verbal and visual cues within 7 day(s). Outcome: Progressing Towards Goal    OCCUPATIONAL THERAPY TREATMENT  Patient: Ronald Pisano (69 y.o. female)  Date: 5/18/2020  Diagnosis: Acute CVA (cerebrovascular accident) [facility]) [I63.9]   Lower extremity weakness       Precautions:    Chart, occupational therapy assessment, plan of care, and goals were reviewed. ASSESSMENT  Patient continues with skilled OT services and is progressing towards goals. Patient received sitting in chair and agreeable to treatment.  Patient reporting being too fatigued to attempt [facility] transfer but was open and enthusiastic about BUE strengthening exercises with theraband. Patient also educated on energy conservation in order to facilitate maximizing rehab efforts and to decrease risk of falls during transfers. Patient left sitting up in chair with call bell in reach, RN aware and all needs met. Will continue to follow, continue to recommend IPR as patient functioning well below baseline. Current Level of Function Impacting Discharge (ADLs): up to total A for ADLs    Other factors to consider for discharge: was IND with ADLs and mobility         PLAN :  Patient continues to benefit from skilled intervention to address the above impairments. Continue treatment per established plan of care. to address goals. Recommend with staff: Recommend with nursing patient to complete as able in order to maintain strength, endurance and independence: ADLs with supervision/setup and once Egress Test completed OOB to chair 3x/day and mobilizing to the [facility] for toileting with 2 assist. Thank you for your assistance. Recommend next OT session: [facility] transfers for toileting    Recommendation for discharge: (in order for the patient to meet his/her long term goals)  Therapy 3 hours per day 5-7 days per week    This discharge recommendation:  Has been made in collaboration with the attending provider and/or case management    IF patient discharges home will need the following DME: bedside commode, hospital bed, transfer bench, and walker: rolling       SUBJECTIVE:   Patient stated I sat up in the chair for 10 hours yesterday.     OBJECTIVE DATA SUMMARY:   Cognitive/Behavioral Status:  Neurologic State: Alert  Orientation Level: Oriented X4  Cognition: Appropriate for age attention/concentration; Follows commands  Perception: Appears intact  Perseveration: No perseveration noted  Safety/Judgement: Awareness of environment; Fall prevention    Functional Mobility and Transfers for ADLs:  Bed Mobility:   NT this session    Transfers:  NT this session    Balance:  Sitting: Impaired  Sitting - Static: Good (unsupported)  Sitting - Dynamic: Fair (occasional)    ADL Intervention:  Patient educated on energy conservation throughout day in order to decrease fatigue toward end of day (reported being in chair for 10 hours yesterday and being exhausted). Patient verbalized understanding of concepts. Cognitive Retraining  Safety/Judgement: Awareness of environment; Fall prevention    Therapeutic Exercises:   Patient issued red theraband and demonstrated 1x10 of the following exercises: shoulder horizontal add/abd, elbow flexion/extension, triceps extensions, internal/external rotation with good technique. Pain:  None reported    Activity Tolerance:   Fair and requires rest breaks  Please refer to the flowsheet for vital signs taken during this treatment. After treatment patient left in no apparent distress:   Sitting in chair, Call bell within reach, Bed / chair alarm activated, and RN aware     COMMUNICATION/COLLABORATION:   The patients plan of care was discussed with: Physical therapist and Registered nurse.      Dusty Rodriguez OT  Time Calculation: 25 mins

## 2020-05-18 NOTE — PROGRESS NOTES
Problem: Mobility Impaired (Adult and Pediatric)  Goal: *Acute Goals and Plan of Care (Insert Text)  Description:   FUNCTIONAL STATUS PRIOR TO ADMISSION: Patient was independent and active without use of DME.    HOME SUPPORT PRIOR TO ADMISSION: The patient lived alone with daughter and neighbors to provide assistance with groceries. Physical Therapy Goals  Initiated 5/13/2020  1. Patient will move from supine to sit and sit to supine , scoot up and down and roll side to side in bed with modified independence within 7 day(s). 2.  Patient will transfer from bed to chair and chair to bed with modified independence using the least restrictive device within 7 day(s). 3.  Patient will perform sit to stand with modified independence within 7 day(s). 4.  Patient will ambulate with supervision/set-up for 150 feet with the least restrictive device within 7 day(s). 5.  Patient will improve Meyer Balance score by 7 points within 7 days. Outcome: Progressing Towards Goal   PHYSICAL THERAPY TREATMENT  Patient: Gerrie Eisenmenger (69 y.o. female)  Date: 5/18/2020  Diagnosis: Acute CVA (cerebrovascular accident) [facility]) [I63.9]   Precautions: Fall  Chart, physical therapy assessment, plan of care and goals were reviewed. ASSESSMENT  Patient continues with skilled PT services and is progressing towards goals. Patient continues to be highly motivated and receptive to therapy. She was received in chair and attempted static standing but limited immediately by L LE cramps, hypertonicity and inability to bear weight on L foot (or event straighten LE to ground). Using her R LE and B HHA she was able to stand pivot to R back to bed. Anticipate she could utilize a RW for transfers if she remains unable to bear weight and straighten LE to ground.  Interestingly enough, in supine, her tendency is for extensor tone, but she is able to actively DF and perform D1 PNF pattern for LE with active assist. While she still has neutral DF ROM, this is very tight due to her extensor tone and a PRAFO boot was provided to patient. She was educated on the wear schedule and encouraged to assist nursing to adhere to 2 hr on/2 hr off schedule to preserve ROM. Today, she reported hypersensitivity to touch on L LE but increased numbness on R LE. Her weakness is greater L>R. Current Level of Function Impacting Discharge (mobility/balance): mod Ax2 for stand pivot transfers, unable to weightbear L     Other factors to consider for discharge: previously independent, diagnosis? PLAN :  Patient continues to benefit from skilled intervention to address the above impairments. Continue treatment per established plan of care. to address goals. Recommendation for discharge: (in order for the patient to meet his/her long term goals)  Therapy 3 hours per day 5-7 days per week    This discharge recommendation:  Has been made in collaboration with the attending provider and/or case management    IF patient discharges home will need the following DME: to be determined (TBD)       SUBJECTIVE:   Patient stated This one just does whatever it wants.  RE: L LE, poor proprioception    OBJECTIVE DATA SUMMARY:   Critical Behavior:  Neurologic State: Alert  Orientation Level: Oriented X4  Cognition: Appropriate for age attention/concentration, Follows commands  Safety/Judgement: Awareness of environment, Fall prevention  Functional Mobility Training:  Bed Mobility:  Sit to Supine: Moderate assistance;Assist x1(LEs assist)  Scooting: Contact guard assistance  Transfers:  Sit to Stand: Minimum assistance;Assist x2  Stand to Sit: Minimum assistance; Moderate assistance;Assist x2  Bed to Chair: Moderate assistance;Assist x2  Balance:  Sitting: Intact  Sitting - Static: Good (unsupported)  Sitting - Dynamic: Fair (occasional)  Standing: Impaired; With support  Standing - Static: Fair;Poor  Standing - Dynamic : Fair;Poor    Therapeutic Exercises:   D1 LE PNF pattern x10 L, x5 R (much better proprioception and control R>L)  Pain Rating:  Spasm 3/10 with seated resistive exercise    Activity Tolerance:   Good and SpO2 stable on RA (RN aware patient sats 96% on room air- PT removed O2 during session)  Please refer to the flowsheet for vital signs taken during this treatment. After treatment patient left in no apparent distress:   Supine in bed, Heels elevated for pressure relief, Call bell within reach, and PRAFO on L LE at 3PM     COMMUNICATION/COLLABORATION:   The patients plan of care was discussed with: Occupational therapist and Registered nurse.      Danish Lang, PT, DPT   Time Calculation: 34 mins

## 2020-05-18 NOTE — PROGRESS NOTES
[facility]                                                                                          Hospitalist Progress Note  Juanita Angelo MD  Answering service: [phone] or [phone] from in house phone        Date of Service:  2020  NAME:  Antonina Walsh  :  1945  MRN:  [id number]      Admission Summary:     Patient was in her usual state of health until the day of her presentation at the emergency room when the patient developed bilateral lower extremity numbness and tingling. The numbness and tingling is worse in the left lower extremity than the right lower extremity. The patient woke up at about 02:30 a.m. to use the bathroom, that is when she developed the symptoms and as the result of the numbness and tingling, the patient fell on the way to the bathroom. Since then, the patient has not been able to ambulate independently. The patient did not have any symptoms at 10:00 p.m. when she went to bed. The patient was brought to the emergency room for further evaluation. When the patient arrived at the emergency room, code stroke was called. CT scan of the head was obtained and this was negative for acute pathology. CTA of the head and neck was also obtained. 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 This was discussed with the neurointerventional surgeon, who did not advise any intervention. The patient was also seen in the emergency room by neurologist through the [facility] at the request of the emergency room physician. It was stated that the patient is clearly outside the t-PA window but the teleneurologist advised aspirin therapy and admission for full stroke workup. Interval history / Subjective:       No acute complaint today.   Still with weakness in the left leg and numbness in the right leg     Assessment & Plan:     Leg weakness  -Patient with left leg weakness with some sensory disturbance in the right leg  -MRI of the brain shows no CVA, MRI of the thoracic and lumbar spine without explanatory finding for her leg weakness  -Negative for DVT  -Neurology following, working up for metabolic and autoimmune causes  -LP 5/15 to evaluate for AIDP  -MRI of the cervical, thoracic and lumbar spine showing no cord compression  -Physical therapy also following  -Neurology to further evaluate with EMG as outpatient    Urinary retention  -Camacho placed today    Hyponatremia  -History of SIADH   -Sodium level stable    Hypertension  -Blood pressure stable  -Continue amlodipine and metoprolol    Tobacco use  -On nicotine replacement    Code status: FULL  DVT prophylaxis: Heparin    Care Plan discussed with: Patient/Family  Anticipated Disposition: SNF/LTC, paperwork pending at this time  Anticipated Discharge: 24 hours to 48 hours     Hospital Problems  Date Reviewed: 5/13/2020          Codes Class Noted POA    * (Principal) Lower extremity weakness ICD-10-CM: R29.898  ICD-9-CM: 729.89  5/13/2020 Yes                Review of Systems:   A comprehensive review of systems was negative except for that written in the HPI. Vital Signs:    Last 24hrs VS reviewed since prior progress note. Most recent are:  Visit Vitals  /60 (BP 1 Location: Left arm)   Pulse 73   Temp 97.9 °F (36.6 °C)   Resp 18   Ht 5' 1\" (1.549 m)   Wt 52.8 kg (116 lb 6.5 oz)   SpO2 100%   BMI 21.99 kg/m²         Intake/Output Summary (Last 24 hours) at 5/18/2020 1614  Last data filed at 5/18/2020 0393  Gross per 24 hour   Intake    Output 2050 ml   Net -2050 ml        Physical Examination:             Constitutional:  No acute distress, cooperative, pleasant    ENT:  Oral mucosa moist, oropharynx benign. Resp:  CTA bilaterally. No wheezing/rhonchi/rales. No accessory muscle use   CV:  Regular rhythm, normal rate, no murmurs, gallops, rubs    GI:  Soft, non distended, non tender.  normoactive bowel sounds, no hepatosplenomegaly     Musculoskeletal:  No edema, warm, 2+ pulses throughout    Neurologic:  Moves all extremities. AAOx3, CN II-XII reviewed     Skin:  Good turgor, no rashes or ulcers       Data Review:    Review and/or order of clinical lab test      Labs:     No results for input(s): WBC, HGB, HCT, PLT, HGBEXT, HCTEXT, PLTEXT, HGBEXT, HCTEXT, PLTEXT in the last 72 hours. No results for input(s): NA, K, CL, CO2, BUN, CREA, GLU, CA, MG, PHOS, URICA in the last 72 hours. No results for input(s): SGOT, GPT, ALT, AP, TBIL, TBILI, TP, ALB, GLOB, GGT, AML, LPSE in the last 72 hours. No lab exists for component: AMYP, HLPSE  No results for input(s): INR, PTP, APTT, INREXT, INREXT in the last 72 hours. No results for input(s): FE, TIBC, PSAT, FERR in the last 72 hours. Lab Results   Component Value Date/Time    Folate 33.3 (H) 05/13/2020 10:27 AM      No results for input(s): PH, PCO2, PO2 in the last 72 hours. No results for input(s): CPK, CKNDX, TROIQ in the last 72 hours.     No lab exists for component: CPKMB  Lab Results   Component Value Date/Time    Cholesterol, total 171 05/13/2020 10:27 AM    HDL Cholesterol 54 05/13/2020 10:27 AM    LDL, calculated 86.6 05/13/2020 10:27 AM    Triglyceride 152 (H) 05/13/2020 10:27 AM    CHOL/HDL Ratio 3.2 05/13/2020 10:27 AM     Lab Results   Component Value Date/Time    Glucose (POC) 82 05/13/2020 04:13 AM     Lab Results   Component Value Date/Time    Color YELLOW/STRAW 05/13/2020 08:10 AM    Appearance CLEAR 05/13/2020 08:10 AM    Specific gravity >1.030 (H) 05/13/2020 08:10 AM    pH (UA) 6.0 05/13/2020 08:10 AM    Protein Negative 05/13/2020 08:10 AM    Glucose Negative 05/13/2020 08:10 AM    Ketone Negative 05/13/2020 08:10 AM    Bilirubin Negative 05/13/2020 08:10 AM    Urobilinogen 0.2 05/13/2020 08:10 AM    Nitrites Negative 05/13/2020 08:10 AM    Leukocyte Esterase MODERATE (A) 05/13/2020 08:10 AM    Epithelial cells FEW 05/13/2020 08:10 AM    Bacteria Negative 05/13/2020 08:10 AM    WBC 5-10 05/13/2020 08:10 AM    RBC 0-5 05/13/2020 08:10 AM         Medications Reviewed:     Current Facility-Administered Medications   Medication Dose Route Frequency    albuterol-ipratropium (DUO-NEB) 2.5 MG-0.5 MG/3 ML  3 mL Nebulization Q4H PRN    baclofen (LIORESAL) tablet 5 mg  5 mg Oral TID PRN    amLODIPine (NORVASC) tablet 10 mg  10 mg Oral DAILY    metoprolol tartrate (LOPRESSOR) tablet 50 mg  50 mg Oral BID    ondansetron (ZOFRAN) injection 4 mg  4 mg IntraVENous Q6H PRN    bisacodyL (DULCOLAX) tablet 5 mg  5 mg Oral DAILY PRN    acetaminophen (TYLENOL) tablet 650 mg  650 mg Oral Q4H PRN    heparin (porcine) injection 5,000 Units  5,000 Units SubCUTAneous Q8H    sodium chloride tablet 3 g  3 g Oral BID WITH MEALS    potassium chloride SR (KLOR-CON 10) tablet 20 mEq  20 mEq Oral BID    nicotine (NICODERM CQ) 14 mg/24 hr patch 1 Patch  1 Patch TransDERmal Q24H     ______________________________________________________________________  EXPECTED LENGTH OF STAY: 2d 21h  ACTUAL LENGTH OF STAY:          5                 Albino Goldmann, MD

## 2020-05-18 NOTE — PROGRESS NOTES
Neurology Progress Note    Patient ID:  Mayra Blas  [id number]  69 y.o.  1945    Chief Complaint:LLE weakness and numbness     HPI:   69 y.o. right hand dominant female with history significant for multiple medical problems as noted below who presented to [facility] ER with complaints of predominantly left lower extremity weakness and numbness. Copper 120, normal. MRI L-spine and T-spine did not reveal any cord pathology/lesion or ischemia. Today, she feels like her LE are weaker and more numb compared to yesterday. Knee reflexes present. No other concerns. Objective:    All records in [facility] reviewed and noted    ROS:  Per HPI  All other 12 pt ROS negative    Meds:  Current Facility-Administered Medications   Medication Dose Route Frequency    albuterol-ipratropium (DUO-NEB) 2.5 MG-0.5 MG/3 ML  3 mL Nebulization Q4H PRN    baclofen (LIORESAL) tablet 5 mg  5 mg Oral TID PRN    amLODIPine (NORVASC) tablet 10 mg  10 mg Oral DAILY    metoprolol tartrate (LOPRESSOR) tablet 50 mg  50 mg Oral BID    ondansetron (ZOFRAN) injection 4 mg  4 mg IntraVENous Q6H PRN    bisacodyL (DULCOLAX) tablet 5 mg  5 mg Oral DAILY PRN    acetaminophen (TYLENOL) tablet 650 mg  650 mg Oral Q4H PRN    heparin (porcine) injection 5,000 Units  5,000 Units SubCUTAneous Q8H    sodium chloride tablet 3 g  3 g Oral BID WITH MEALS    potassium chloride SR (KLOR-CON 10) tablet 20 mEq  20 mEq Oral BID    nicotine (NICODERM CQ) 14 mg/24 hr patch 1 Patch  1 Patch TransDERmal Q24H       Imaging:  MRI Results (most recent):  Results from Hospital Encounter encounter on 05/13/20   MRI [facility] SPINE W CONT    Narrative INDICATION:   LE weakness, paresthesias, spasticity, urinary/bowel retention  eval for cord abnormality Please include DWI sequences    COMPARISON: May 13, 2020    TECHNIQUE: MRI of the thoracic and lumbar spine was performed with IV contrast,  as well as including sagittal diffusion-weighted imaging and viewed in  conjunction with recent noncontrast examinations. FINDINGS:    No abnormal intraspinal enhancement. Diffusion-weighted imaging is degraded by  artifact with no definite diffusion restriction involving the thoracic or lumbar  spinal cord. Impression IMPRESSION:    1. No abnormal intraspinal enhancement in the thoracic or lumbar spine. 2. No definite diffusion restriction involving the spinal cord to suggest cord  infarct. Lab Review   No results found for this or any previous visit (from the past 24 hour(s)). Exam:  Visit Vitals  /80   Pulse 76   Temp 99 °F (37.2 °C)   Resp 16   Ht 5' 1\" (1.549 m)   Wt 52.8 kg (116 lb 6.5 oz)   SpO2 98%   BMI 21.99 kg/m²     Gen: Well developed  CV: RRR  Neuro: A&O x 3, no dysarthria or aphasia  CN II-XII: , face symmetric, tongue/palate midline  Motor: LLE distal increased tone 4/5, prox 4-/5, RLE 4+/5, UE 5/5 b/l  DTR 1/4 and Symmetrical UE, 2+/4 bilateral lower extremities  (knee) ankles 0+   Sensory:  Decreased sensation RLE > LLE  Gait: deferred     Assessment:     Patient Active Problem List   Diagnosis Code    Essential hypertension I10    Menopause Z78.0    Glaucoma H40.9    Hyponatremia E87.1    SIADH (syndrome of inappropriate ADH production) ([facility]) E22.2    Alcohol abuse, in remission F10.11    Elevated blood pressure reading in office with diagnosis of hypertension I10    Lower extremity weakness R29.898       Plan:   1.) Lower extremity Weakness  ( neuropathy)    - Lower suspicion for AIDP given her presentation. She now has 2+ reflexes bilateral knees. This  seems to be a neuropathy.  She will need to get an EMG.   - EMG schedule for June 9TH at [facility] outpatient  neurology    -MRI C/T/L WWO, unrevealing    -CELESTINO,ACE,ceruloplasmin,zinc: pending    - Okay for discharge   Signed:  Army Solomon NP  5/18/2020  9:56 AM

## 2020-05-19 ENCOUNTER — HOSPITAL ENCOUNTER (OUTPATIENT)
Dept: REHABILITATION | Age: 75
End: 2020-06-06
Attending: FAMILY MEDICINE | Admitting: PHYSICAL MEDICINE & REHABILITATION

## 2020-05-19 VITALS
DIASTOLIC BLOOD PRESSURE: 65 MMHG | OXYGEN SATURATION: 96 % | BODY MASS INDEX: 22.19 KG/M2 | HEIGHT: 61 IN | WEIGHT: 117.5 LBS | TEMPERATURE: 97.5 F | SYSTOLIC BLOOD PRESSURE: 151 MMHG | RESPIRATION RATE: 17 BRPM | HEART RATE: 73 BPM

## 2020-05-19 DIAGNOSIS — G83.9 PARALYTIC SYNDROME, UNSPECIFIED (HCC): ICD-10-CM

## 2020-05-19 LAB
APPEARANCE UR: CLEAR
BACTERIA URNS QL MICRO: ABNORMAL /HPF
BILIRUB UR QL: NEGATIVE
CERULOPLASMIN SERPL-MCNC: 30.1 MG/DL (ref 19–39)
COLOR UR: ABNORMAL
EPITH CASTS URNS QL MICRO: ABNORMAL /LPF
GLUCOSE UR STRIP.AUTO-MCNC: NEGATIVE MG/DL
HGB UR QL STRIP: NEGATIVE
HYALINE CASTS URNS QL MICRO: ABNORMAL /LPF (ref 0–5)
KETONES UR QL STRIP.AUTO: NEGATIVE MG/DL
LEUKOCYTE ESTERASE UR QL STRIP.AUTO: ABNORMAL
NITRITE UR QL STRIP.AUTO: NEGATIVE
PH UR STRIP: 7.5 [PH] (ref 5–8)
PROT UR STRIP-MCNC: NEGATIVE MG/DL
RBC #/AREA URNS HPF: ABNORMAL /HPF (ref 0–5)
SP GR UR REFRACTOMETRY: 1.01 (ref 1–1.03)
UA: UC IF INDICATED,UAUC: ABNORMAL
UROBILINOGEN UR QL STRIP.AUTO: 0.2 EU/DL (ref 0.2–1)
WBC URNS QL MICRO: ABNORMAL /HPF (ref 0–4)
ZINC SERPL-MCNC: 71 UG/DL (ref 56–134)

## 2020-05-19 PROCEDURE — 74011250637 HC RX REV CODE- 250/637: Performed by: INTERNAL MEDICINE

## 2020-05-19 PROCEDURE — 74011250637 HC RX REV CODE- 250/637: Performed by: PSYCHIATRY & NEUROLOGY

## 2020-05-19 PROCEDURE — 81001 URINALYSIS AUTO W/SCOPE: CPT

## 2020-05-19 PROCEDURE — 74011250636 HC RX REV CODE- 250/636: Performed by: INTERNAL MEDICINE

## 2020-05-19 RX ORDER — IBUPROFEN 200 MG
1 TABLET ORAL EVERY 24 HOURS
Qty: 30 PATCH | Refills: 0 | Status: SHIPPED | OUTPATIENT
Start: 2020-05-19 | End: 2020-06-18

## 2020-05-19 RX ORDER — SODIUM CHLORIDE TAB 1 GM 1 G
3 TAB MISCELLANEOUS 2 TIMES DAILY WITH MEALS
Qty: 270 TAB | Refills: 0 | Status: SHIPPED | OUTPATIENT
Start: 2020-05-19 | End: 2020-09-10

## 2020-05-19 RX ADMIN — BACLOFEN 5 MG: 10 TABLET ORAL at 08:46

## 2020-05-19 RX ADMIN — POTASSIUM CHLORIDE 20 MEQ: 750 TABLET, FILM COATED, EXTENDED RELEASE ORAL at 08:40

## 2020-05-19 RX ADMIN — HEPARIN SODIUM 5000 UNITS: 5000 INJECTION INTRAVENOUS; SUBCUTANEOUS at 08:40

## 2020-05-19 RX ADMIN — HEPARIN SODIUM 5000 UNITS: 5000 INJECTION INTRAVENOUS; SUBCUTANEOUS at 01:22

## 2020-05-19 RX ADMIN — Medication 3 G: at 08:40

## 2020-05-19 RX ADMIN — AMLODIPINE BESYLATE 10 MG: 5 TABLET ORAL at 08:40

## 2020-05-19 RX ADMIN — METOPROLOL TARTRATE 50 MG: 50 TABLET, FILM COATED ORAL at 08:40

## 2020-05-19 NOTE — PROGRESS NOTES
Problem: Pain  Goal: *Control of Pain  Outcome: Progressing Towards Goal     Problem: Pressure Injury - Risk of  Goal: *Prevention of pressure injury  Description: Document Sameer Scale and appropriate interventions in the flowsheet. Outcome: Progressing Towards Goal  Note: Pressure Injury Interventions:  Sensory Interventions: Assess changes in LOC, Assess need for specialty bed, Avoid rigorous massage over bony prominences    Moisture Interventions: Absorbent underpads, Apply protective barrier, creams and emollients, Assess need for specialty bed    Activity Interventions: Pressure redistribution bed/mattress(bed type)    Mobility Interventions: PT/OT evaluation    Nutrition Interventions: Document food/fluid/supplement intake    Friction and Shear Interventions: Apply protective barrier, creams and emollients, Lift sheet                Problem: Falls - Risk of  Goal: *Absence of Falls  Description: Document Katey Fall Risk and appropriate interventions in the flowsheet.   Outcome: Progressing Towards Goal  Note: Fall Risk Interventions:  Mobility Interventions: Bed/chair exit alarm    Mentation Interventions: Adequate sleep, hydration, pain control, Bed/chair exit alarm    Medication Interventions: Bed/chair exit alarm, Assess postural VS orthostatic hypotension    Elimination Interventions: Bed/chair exit alarm              Problem: TIA/CVA Stroke: Day 2 Until Discharge  Goal: Activity/Safety  Outcome: Progressing Towards Goal  Goal: Diagnostic Test/Procedures  Outcome: Progressing Towards Goal  Goal: Nutrition/Diet  Outcome: Progressing Towards Goal  Goal: Discharge Planning  Outcome: Progressing Towards Goal     Problem: Discharge Planning  Goal: *Discharge to safe environment  Outcome: Progressing Towards Goal  Goal: *Knowledge of medication management  Outcome: Progressing Towards Goal

## 2020-05-19 NOTE — PROGRESS NOTES
Problem: Patient Education: Go to Patient Education Activity  Goal: Patient/Family Education  Outcome: Progressing Towards Goal     Problem: Pain  Goal: *Control of Pain  Outcome: Progressing Towards Goal  Goal: *PALLIATIVE CARE:  Alleviation of Pain  Outcome: Progressing Towards Goal     Problem: Patient Education: Go to Patient Education Activity  Goal: Patient/Family Education  Outcome: Progressing Towards Goal     Problem: Pressure Injury - Risk of  Goal: *Prevention of pressure injury  Description: Document Sameer Scale and appropriate interventions in the flowsheet. Outcome: Progressing Towards Goal  Note: Pressure Injury Interventions:  Sensory Interventions: Assess changes in LOC, Assess need for specialty bed, Avoid rigorous massage over bony prominences    Moisture Interventions: Absorbent underpads, Apply protective barrier, creams and emollients, Assess need for specialty bed    Activity Interventions: Pressure redistribution bed/mattress(bed type)    Mobility Interventions: PT/OT evaluation    Nutrition Interventions: Document food/fluid/supplement intake    Friction and Shear Interventions: Apply protective barrier, creams and emollients, Lift sheet                Problem: Patient Education: Go to Patient Education Activity  Goal: Patient/Family Education  Outcome: Progressing Towards Goal     Problem: Falls - Risk of  Goal: *Absence of Falls  Description: Document Katey Fall Risk and appropriate interventions in the flowsheet.   Outcome: Progressing Towards Goal  Note: Fall Risk Interventions:  Mobility Interventions: Bed/chair exit alarm    Mentation Interventions: Adequate sleep, hydration, pain control, Bed/chair exit alarm    Medication Interventions: Bed/chair exit alarm, Assess postural VS orthostatic hypotension    Elimination Interventions: Bed/chair exit alarm              Problem: Patient Education: Go to Patient Education Activity  Goal: Patient/Family Education  Outcome: Progressing Towards Goal     Problem: Patient Education: Go to Patient Education Activity  Goal: Patient/Family Education  Outcome: Progressing Towards Goal     Problem: Patient Education: Go to Patient Education Activity  Goal: Patient/Family Education  Outcome: Progressing Towards Goal     Problem: TIA/CVA Stroke: 0-24 hours  Goal: Off Pathway (Use only if patient is Off Pathway)  Outcome: Progressing Towards Goal  Goal: Activity/Safety  Outcome: Progressing Towards Goal  Goal: Consults, if ordered  Outcome: Progressing Towards Goal  Goal: Diagnostic Test/Procedures  Outcome: Progressing Towards Goal  Goal: Nutrition/Diet  Outcome: Progressing Towards Goal  Goal: Discharge Planning  Outcome: Progressing Towards Goal  Goal: Medications  Outcome: Progressing Towards Goal  Goal: Respiratory  Outcome: Progressing Towards Goal  Goal: Treatments/Interventions/Procedures  Outcome: Progressing Towards Goal  Goal: Minimize risk of bleeding post-thrombolytic infusion  Outcome: Progressing Towards Goal  Goal: Monitor for complications post-thrombolytic infusion  Outcome: Progressing Towards Goal  Goal: Psychosocial  Outcome: Progressing Towards Goal  Goal: *Hemodynamically stable  Outcome: Progressing Towards Goal  Goal: *Neurologically stable  Description: Absence of additional neurological deficits    Outcome: Progressing Towards Goal  Goal: *Verbalizes anxiety and depression are reduced or absent  Outcome: Progressing Towards Goal  Goal: *Absence of Signs of Aspiration on Current Diet  Outcome: Progressing Towards Goal  Goal: *Absence of deep venous thrombosis signs and symptoms(Stroke Metric)  Outcome: Progressing Towards Goal  Goal: *Ability to perform ADLs and demonstrates progressive mobility and function  Outcome: Progressing Towards Goal  Goal: *Stroke education started(Stroke Metric)  Outcome: Progressing Towards Goal  Goal: *Dysphagia screen performed(Stroke Metric)  Outcome: Progressing Towards Goal  Goal: *Rehab consulted(Stroke Metric)  Outcome: Progressing Towards Goal     Problem: TIA/CVA Stroke: Day 2 Until Discharge  Goal: Off Pathway (Use only if patient is Off Pathway)  Outcome: Progressing Towards Goal  Goal: Activity/Safety  Outcome: Progressing Towards Goal  Goal: Diagnostic Test/Procedures  Outcome: Progressing Towards Goal  Goal: Nutrition/Diet  Outcome: Progressing Towards Goal  Goal: Discharge Planning  Outcome: Progressing Towards Goal  Goal: Medications  Outcome: Progressing Towards Goal  Goal: Respiratory  Outcome: Progressing Towards Goal  Goal: Treatments/Interventions/Procedures  Outcome: Progressing Towards Goal  Goal: Psychosocial  Outcome: Progressing Towards Goal  Goal: *Verbalizes anxiety and depression are reduced or absent  Outcome: Progressing Towards Goal  Goal: *Absence of aspiration  Outcome: Progressing Towards Goal  Goal: *Absence of deep venous thrombosis signs and symptoms(Stroke Metric)  Outcome: Progressing Towards Goal  Goal: *Optimal pain control at patient's stated goal  Outcome: Progressing Towards Goal  Goal: *Tolerating diet  Outcome: Progressing Towards Goal  Goal: *Ability to perform ADLs and demonstrates progressive mobility and function  Outcome: Progressing Towards Goal  Goal: *Stroke education continued(Stroke Metric)  Outcome: Progressing Towards Goal     Problem: Ischemic Stroke: Discharge Outcomes  Goal: *Verbalizes anxiety and depression are reduced or absent  Outcome: Progressing Towards Goal  Goal: *Verbalize understanding of risk factor modification(Stroke Metric)  Outcome: Progressing Towards Goal  Goal: *Hemodynamically stable  Outcome: Progressing Towards Goal  Goal: *Absence of aspiration pneumonia  Outcome: Progressing Towards Goal  Goal: *Aware of needed dietary changes  Outcome: Progressing Towards Goal  Goal: *Verbalize understanding of prescribed medications including anti-coagulants, anti-lipid, and/or anti-platelets(Stroke Metric)  Outcome: Progressing Towards Goal  Goal: *Tolerating diet  Outcome: Progressing Towards Goal  Goal: *Aware of follow-up diagnostics related to anticoagulants  Outcome: Progressing Towards Goal  Goal: *Ability to perform ADLs and demonstrates progressive mobility and function  Outcome: Progressing Towards Goal  Goal: *Absence of DVT(Stroke Metric)  Outcome: Progressing Towards Goal  Goal: *Absence of aspiration  Outcome: Progressing Towards Goal  Goal: *Optimal pain control at patient's stated goal  Outcome: Progressing Towards Goal  Goal: *Home safety concerns addressed  Outcome: Progressing Towards Goal  Goal: *Describes available resources and support systems  Outcome: Progressing Towards Goal  Goal: *Verbalizes understanding of activation of EMS(911) for stroke symptoms(Stroke Metric)  Outcome: Progressing Towards Goal  Goal: *Understands and describes signs and symptoms to report to providers(Stroke Metric)  Outcome: Progressing Towards Goal  Goal: *Neurolgocially stable (absence of additional neurological deficits)  Outcome: Progressing Towards Goal  Goal: *Verbalizes importance of follow-up with primary care physician(Stroke Metric)  Outcome: Progressing Towards Goal  Goal: *Smoking cessation discussed,if applicable(Stroke Metric)  Outcome: Progressing Towards Goal  Goal: *Depression screening completed(Stroke Metric)  Outcome: Progressing Towards Goal     Problem: Patient Education: Go to Patient Education Activity  Goal: Patient/Family Education  Outcome: Progressing Towards Goal

## 2020-05-19 NOTE — PROGRESS NOTES
Bedside RN performed patient education and medication education. Discharge concerns initiated and discussed with patient, including clarification on \"who\" assists the patient at their home and instructions for when the home going patient should call their provider after discharge. Opportunity for questions and clarification was provided. Patient receptive to education: YES  Patient stated: \"I understand. \"  Barriers to Education: None  Diagnosis Education given:  YES    Length of stay: 6  Expected Day of Discharge: 5/19/20  Ask if they have \"Help at Home\" & add to white board? YES    Education Day #: 6    Medication Education Given:  YES  M in the box Medication name: Baclofen    Pt aware of HCAHPS survey: YES    I have reviewed discharge instructions with the patient. The patient verbalized understanding. Patient discharged [facility]. Report called to Emeli.

## 2020-05-19 NOTE — PROGRESS NOTES
Bedside shift change report given to EVONNE Aiken (oncoming nurse) by Mohit Cuevas RN (offgoing nurse). Report included the following information SBAR, Kardex, Intake/Output, MAR, Med Rec Status, Cardiac Rhythm NSR, Quality Measures and Dual Neuro Assessment.

## 2020-05-19 NOTE — DISCHARGE SUMMARY
Discharge Summary       PATIENT ID: Dao Yuen  MRN: [id number]   YOB: 1945    DATE OF ADMISSION: 5/13/2020  3:30 AM    DATE OF DISCHARGE: 5/19/2020   PRIMARY CARE PROVIDER: Teddy Langford MD     ATTENDING PHYSICIAN: Jess Chatman  DISCHARGING PROVIDER: Rashmi Ireland MD    To contact this individual call [phone] and ask the  to page. If unavailable ask to be transferred the Adult Hospitalist Department. CONSULTATIONS: IP CONSULT TO NEUROLOGY    PROCEDURES/SURGERIES: * No surgery found *    ADMITTING DIAGNOSES & HOSPITAL COURSE:     HPI  Patient was in her usual state of health until the day of her presentation at the emergency room when the patient developed bilateral lower extremity numbness and tingling.  The numbness and tingling is worse in the left lower extremity than the right lower extremity.  The patient woke up at about 02:30 a.m. to use the bathroom, that is when she developed the symptoms and as the result of the numbness and tingling, the patient fell on the way to the bathroom.  Since then, the patient has not been able to ambulate independently.  The patient did not have any symptoms at 10:00 p.m. when she went to bed.  The patient was brought to the emergency room for further evaluation.  When the patient arrived at the emergency room, code stroke was called.  CT scan of the head was obtained and this was negative for acute pathology.  CTA of the head and neck was also obtained.  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  This was discussed with the neurointerventional surgeon, who did not advise any intervention.  The patient was also seen in the emergency room by neurologist through the Tel[facility] at the request of the emergency room physician. [place] was stated that the patient is clearly outside the t-PA window but the teleneurologist advised aspirin therapy and admission for full stroke workup.      Hospital course  Patient presents with paresthesia and weakness of the left leg. Neuro evaluated the patient patient had multiple work-ups including MRI of the brain which shows no CVA. MRI of the cervical, thoracic and lumbar spine did not show any acute spinal cord compression. DVT study of the left lower extremity shows negative result. Patient was further work-up by neurology for ruling out metabolic and autoimmune causes PE, urine study for heavy metals, serum ceruloplasmin, zinc level as well as copper levels. Also SPEP was done. All the results failed to explain patient's symptoms. Neurology impression was finding not consistent with CIDP. 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 Patient was scheduled for outpatient EMG studies 6/9/2020 at 10 am.  In the interim patient to be discharged to rehab for physical therapy. DISCHARGE DIAGNOSES / PLAN:      1. Paresthesia and weakness of the left leg  2. Hyponatremia  3. Hypertension  4. Tobacco use     ADDITIONAL CARE RECOMMENDATIONS:     None     PENDING TEST RESULTS:   At the time of discharge the following test results are still pending: None    FOLLOW UP APPOINTMENTS:    Follow-up Information     Follow up With Specialties Details Why [address] [place], [place] [address]Giulia 7045, DO Neurology Go on 6/9/2020  10am appt  for EMG [facility] [address] [place] [address]      Jacky Roth MD Family Practice   [address] [place] [address]  [phone]               DIET: Cardiac Diet  Oral Nutritional Supplements: No Oral Supplement prescribed    ACTIVITY: PT/OT Eval and Treat    WOUND CARE: None    EQUIPMENT needed: None      DISCHARGE MEDICATIONS:  Current Discharge Medication List      START taking these medications    Details   nicotine (NICODERM CQ) 14 mg/24 hr patch 1 Patch by TransDERmal route every twenty-four (24) hours for 30 days.   Qty: 30 Patch, Refills: 0         CONTINUE these medications which have CHANGED    Details   sodium chloride 1 gram tablet Take 3 Tabs by mouth two (2) times daily (with meals). Qty: 270 Tab, Refills: 0         CONTINUE these medications which have NOT CHANGED    Details   potassium chloride SR (KLOR-CON 10) 10 mEq tablet TAKE 2 TABLETS BY MOUTH TWICE DAILY  Qty: 360 Tab, Refills: 0    Comments: **Patient requests 90 days supply**  Associated Diagnoses: Hyponatremia      amLODIPine (NORVASC) 10 mg tablet TAKE 1 TABLET BY MOUTH EVERY DAY  Qty: 90 Tab, Refills: 0    Associated Diagnoses: Essential hypertension      metoprolol tartrate (LOPRESSOR) 50 mg tablet TAKE 1 TABLET BY MOUTH TWICE DAILY  Qty: 180 Tab, Refills: 0    Associated Diagnoses: Essential hypertension      aspirin delayed-release 81 mg tablet Take 81 mg by mouth daily. calcium carbonate/vitamin D3 (CALCIUM 500 + D PO) Take 1 Tab by mouth two (2) times daily (with meals). multivitamin (ONE A DAY) tablet Take 1 Tab by mouth daily. NOTIFY YOUR PHYSICIAN FOR ANY OF THE FOLLOWING:   Fever over 101 degrees for 24 hours. Chest pain, shortness of breath, fever, chills, nausea, vomiting, diarrhea, change in mentation, falling, weakness, bleeding. Severe pain or pain not relieved by medications. Or, any other signs or symptoms that you may have questions about. DISPOSITION:    Home With:   OT  PT  HH  RN       Long term SNF/Inpatient Rehab    Independent/assisted living    Hospice    Other:       PATIENT CONDITION AT DISCHARGE:     Functional status    Poor     Deconditioned     Independent      Cognition     Lucid     Forgetful     Dementia      Catheters/lines (plus indication)    Cam[place]     PICC     PEG     None      Code status     Full code     DNR      PHYSICAL EXAMINATION AT DISCHARGE:  General:          Alert, cooperative, no distress, appears stated age.      HEENT:           Atraumatic, anicteric sclerae, pink conjunctivae                          No oral ulcers, mucosa moist, throat clear, dentition fair  Neck:               Supple, symmetrical  Lungs:             Clear to auscultation bilaterally. No Wheezing or Rhonchi. No rales. Chest wall:      No tenderness  No Accessory muscle use. Heart:              Regular  rhythm,  No  murmur   No edema  Abdomen:        Soft, non-tender. Not distended. Bowel sounds normal  Extremities:     No cyanosis. No clubbing,                            Skin turgor normal, Capillary refill normal  Skin:                Not pale. Not Jaundiced  No rashes   Psych:             Not anxious or agitated. Neurologic:      Alert, moves all extremities, answers questions appropriately and responds to commands       [address]:  Problem List as of 5/19/2020 Date Reviewed: 5/13/2020          Codes Class Noted - Resolved    * (Principal) Lower extremity weakness ICD-10-CM: R29.898  ICD-9-CM: 729.89  5/13/2020 - Present        Elevated blood pressure reading in office with diagnosis of hypertension ICD-10-CM: I10  ICD-9-CM: 401.9  4/9/2019 - Present        Alcohol abuse, in remission ICD-10-CM: F10.11  ICD-9-CM: 305.03  1/5/2015 - Present        SIADH (syndrome of inappropriate ADH production) ([facility] ICD-10-CM: E22.2  ICD-9-CM: 253.6  11/4/2014 - Present        Hyponatremia ICD-10-CM: E87.1  ICD-9-CM: 276.1  8/28/2014 - Present        Glaucoma ICD-10-CM: H40.9  ICD-9-CM: 365.9  7/31/2013 - Present    Overview Signed 7/31/2013 11:51 AM by Marisol Vang     In right eye only.              Essential hypertension ICD-10-CM: I10  ICD-9-CM: 401.9  4/9/2013 - Present        Menopause ICD-10-CM: Z78.0  ICD-9-CM: 627.2  Unknown - Present    Overview Signed 4/9/2013  9:36 AM by Randi Smith RN     at age 37             RESOLVED: Midline low back pain without sciatica ICD-10-CM: M54.5  ICD-9-CM: 724.2  4/16/2015 - 12/4/2018        RESOLVED: GERD (gastroesophageal reflux disease) ICD-10-CM: K21.9  ICD-9-CM: 530.81  11/3/2014 - 12/4/2018        RESOLVED: Syncope and collapse ICD-10-CM: R55  ICD-9-CM: 780.2  8/28/2014 - 9/3/2014        RESOLVED: Alcoholism ([address] ICD-10-CM: F10.20  ICD-9-CM: 303.90  5/7/2013 - 5/7/2013        RESOLVED: Alcohol abuse, continuous ICD-10-CM: F10.10  ICD-9-CM: 305.01  5/7/2013 - 1/5/2015        RESOLVED: Smoking addiction ICD-10-CM: O80.254  ICD-9-CM: 305.1  5/7/2013 - 5/28/2015              Greater than 60 minutes were spent with the patient on counseling and coordination of care    Signed:   Irene Leonard MD  5/19/2020  12:13 PM

## 2020-05-19 NOTE — PROGRESS NOTES
Inpatient Rehab/ Hospital to Hospital Transition of Care Note /Discharge Note       EMTALA filed and ready for MD to sign at bedside chart. Accepting Physician: Dr. Lamine Mayberry MD     Accepting Representative: Linda Arnold; RN      Accepting Facility: [facility]    RN call to report: [phone]    Transport: [facility] phone [phone] or Family      time: 3P    Ambulance packet at bedside chart. Family notified: CM met at bedside and she is in agreement with the discharge plan. A & OX4. Patient will confirm discharge plans with family via phone. The attending physician and the primary nurse were notified of the plan. CM available in case needs arise. LEONIE Vargas,CRM     ALIDA; RUR 11%    -Referral pending to [facility].    -Call placed to Pati Ratliff [phone] to check referral status. Referral still under clinical review. CM to follow.  LEONIE Vargas,CRM

## 2020-05-20 LAB
25(OH)D3 SERPL-MCNC: 37.1 NG/ML (ref 30–100)
ALBUMIN SERPL-MCNC: 3.6 G/DL (ref 3.5–5)
ALBUMIN/GLOB SERPL: 0.8 {RATIO} (ref 1.1–2.2)
ALP SERPL-CCNC: 139 U/L (ref 45–117)
ALT SERPL-CCNC: 79 U/L (ref 12–78)
ANION GAP SERPL CALC-SCNC: 3 MMOL/L (ref 5–15)
ARSENIC 24H UR-MCNC: 710 UG/L (ref 0–50)
AST SERPL-CCNC: 81 U/L (ref 15–37)
BILIRUB SERPL-MCNC: 0.9 MG/DL (ref 0.2–1)
BUN SERPL-MCNC: 15 MG/DL (ref 6–20)
BUN/CREAT SERPL: 16 (ref 12–20)
CALCIUM SERPL-MCNC: 10.2 MG/DL (ref 8.5–10.1)
CHLORIDE SERPL-SCNC: 100 MMOL/L (ref 97–108)
CO2 SERPL-SCNC: 30 MMOL/L (ref 21–32)
COLLECT DURATION TIME UR: ABNORMAL HR
CREAT SERPL-MCNC: 0.92 MG/DL (ref 0.55–1.02)
CREAT UR-MCNC: 0.42 G/L (ref 0.3–3)
GLOBULIN SER CALC-MCNC: 4.3 G/DL (ref 2–4)
GLUCOSE SERPL-MCNC: 98 MG/DL (ref 65–100)
INORG ARSENIC UR-MCNC: ABNORMAL UG/L (ref 0–19)
LEAD 24H UR-MCNC: ABNORMAL UG/L (ref 0–49)
MAGNESIUM SERPL-MCNC: 2.3 MG/DL (ref 1.6–2.4)
MERCURY 24H UR-MCNC: ABNORMAL UG/L (ref 0–19)
POTASSIUM SERPL-SCNC: 4.5 MMOL/L (ref 3.5–5.1)
PROT SERPL-MCNC: 7.9 G/DL (ref 6.4–8.2)
SODIUM SERPL-SCNC: 133 MMOL/L (ref 136–145)
SPECIMEN VOL ?TM UR: ABNORMAL ML

## 2020-05-20 PROCEDURE — 82306 VITAMIN D 25 HYDROXY: CPT

## 2020-05-20 PROCEDURE — 80053 COMPREHEN METABOLIC PANEL: CPT

## 2020-05-20 PROCEDURE — 83735 ASSAY OF MAGNESIUM: CPT

## 2020-05-20 PROCEDURE — 36415 COLL VENOUS BLD VENIPUNCTURE: CPT

## 2020-05-22 LAB
BACTERIA SPEC CULT: NORMAL
BACTERIA SPEC CULT: NORMAL
GRAM STN SPEC: NORMAL
GRAM STN SPEC: NORMAL
SERVICE CMNT-IMP: NORMAL

## 2020-05-23 LAB
APPEARANCE UR: ABNORMAL
BACTERIA URNS QL MICRO: ABNORMAL /HPF
BILIRUB UR QL: NEGATIVE
COLOR UR: ABNORMAL
EPITH CASTS URNS QL MICRO: ABNORMAL /LPF
GLUCOSE UR STRIP.AUTO-MCNC: NEGATIVE MG/DL
HGB UR QL STRIP: NEGATIVE
HYALINE CASTS URNS QL MICRO: ABNORMAL /LPF (ref 0–5)
KETONES UR QL STRIP.AUTO: NEGATIVE MG/DL
LEUKOCYTE ESTERASE UR QL STRIP.AUTO: ABNORMAL
NITRITE UR QL STRIP.AUTO: NEGATIVE
PH UR STRIP: 6.5 [PH] (ref 5–8)
PROT UR STRIP-MCNC: NEGATIVE MG/DL
RBC #/AREA URNS HPF: ABNORMAL /HPF (ref 0–5)
SP GR UR REFRACTOMETRY: 1.01 (ref 1–1.03)
UA: UC IF INDICATED,UAUC: ABNORMAL
UROBILINOGEN UR QL STRIP.AUTO: 1 EU/DL (ref 0.2–1)
WBC URNS QL MICRO: ABNORMAL /HPF (ref 0–4)

## 2020-05-23 PROCEDURE — 81001 URINALYSIS AUTO W/SCOPE: CPT

## 2020-05-23 PROCEDURE — 87186 SC STD MICRODIL/AGAR DIL: CPT

## 2020-05-23 PROCEDURE — 87077 CULTURE AEROBIC IDENTIFY: CPT

## 2020-05-23 PROCEDURE — 87086 URINE CULTURE/COLONY COUNT: CPT

## 2020-05-25 LAB
BACTERIA SPEC CULT: ABNORMAL
CC UR VC: ABNORMAL
SERVICE CMNT-IMP: ABNORMAL

## 2020-05-28 LAB
ANION GAP SERPL CALC-SCNC: 4 MMOL/L (ref 5–15)
BUN SERPL-MCNC: 12 MG/DL (ref 6–20)
BUN/CREAT SERPL: 16 (ref 12–20)
CALCIUM SERPL-MCNC: 9.5 MG/DL (ref 8.5–10.1)
CHLORIDE SERPL-SCNC: 102 MMOL/L (ref 97–108)
CO2 SERPL-SCNC: 29 MMOL/L (ref 21–32)
CREAT SERPL-MCNC: 0.75 MG/DL (ref 0.55–1.02)
ERYTHROCYTE [DISTWIDTH] IN BLOOD BY AUTOMATED COUNT: 12.3 % (ref 11.5–14.5)
GLUCOSE SERPL-MCNC: 84 MG/DL (ref 65–100)
HCT VFR BLD AUTO: 41.2 % (ref 35–47)
HGB BLD-MCNC: 14.2 G/DL (ref 11.5–16)
MCH RBC QN AUTO: 33.8 PG (ref 26–34)
MCHC RBC AUTO-ENTMCNC: 34.5 G/DL (ref 30–36.5)
MCV RBC AUTO: 98.1 FL (ref 80–99)
NRBC # BLD: 0 K/UL (ref 0–0.01)
NRBC BLD-RTO: 0 PER 100 WBC
PLATELET # BLD AUTO: 349 K/UL (ref 150–400)
PMV BLD AUTO: 9.3 FL (ref 8.9–12.9)
POTASSIUM SERPL-SCNC: 3.4 MMOL/L (ref 3.5–5.1)
RBC # BLD AUTO: 4.2 M/UL (ref 3.8–5.2)
SODIUM SERPL-SCNC: 135 MMOL/L (ref 136–145)
WBC # BLD AUTO: 9.4 K/UL (ref 3.6–11)

## 2020-05-28 PROCEDURE — 36415 COLL VENOUS BLD VENIPUNCTURE: CPT

## 2020-05-28 PROCEDURE — 85027 COMPLETE CBC AUTOMATED: CPT

## 2020-05-28 PROCEDURE — 80048 BASIC METABOLIC PNL TOTAL CA: CPT

## 2020-06-03 LAB
ANION GAP SERPL CALC-SCNC: 5 MMOL/L (ref 5–15)
BUN SERPL-MCNC: 12 MG/DL (ref 6–20)
BUN/CREAT SERPL: 13 (ref 12–20)
CALCIUM SERPL-MCNC: 9 MG/DL (ref 8.5–10.1)
CHLORIDE SERPL-SCNC: 103 MMOL/L (ref 97–108)
CO2 SERPL-SCNC: 28 MMOL/L (ref 21–32)
CREAT SERPL-MCNC: 0.93 MG/DL (ref 0.55–1.02)
GLUCOSE SERPL-MCNC: 99 MG/DL (ref 65–100)
POTASSIUM SERPL-SCNC: 3.8 MMOL/L (ref 3.5–5.1)
SODIUM SERPL-SCNC: 136 MMOL/L (ref 136–145)

## 2020-06-03 PROCEDURE — 36415 COLL VENOUS BLD VENIPUNCTURE: CPT

## 2020-06-03 PROCEDURE — 80048 BASIC METABOLIC PNL TOTAL CA: CPT

## 2020-06-04 ENCOUNTER — HOME HEALTH ADMISSION (OUTPATIENT)
Dept: HOME HEALTH SERVICES | Facility: HOME HEALTH | Age: 75
End: 2020-06-04
Payer: MEDICARE

## 2020-06-07 ENCOUNTER — HOME CARE VISIT (OUTPATIENT)
Dept: HOME HEALTH SERVICES | Facility: HOME HEALTH | Age: 75
End: 2020-06-07

## 2020-06-08 ENCOUNTER — HOME CARE VISIT (OUTPATIENT)
Dept: SCHEDULING | Facility: HOME HEALTH | Age: 75
End: 2020-06-08
Payer: MEDICARE

## 2020-06-08 VITALS
TEMPERATURE: 98.1 F | BODY MASS INDEX: 21.9 KG/M2 | HEIGHT: 62 IN | HEART RATE: 67 BPM | SYSTOLIC BLOOD PRESSURE: 116 MMHG | RESPIRATION RATE: 18 BRPM | OXYGEN SATURATION: 98 % | WEIGHT: 119 LBS | DIASTOLIC BLOOD PRESSURE: 56 MMHG

## 2020-06-08 PROCEDURE — 400013 HH SOC

## 2020-06-08 PROCEDURE — 3331090001 HH PPS REVENUE CREDIT

## 2020-06-08 PROCEDURE — G0299 HHS/HOSPICE OF RN EA 15 MIN: HCPCS

## 2020-06-08 PROCEDURE — 3331090002 HH PPS REVENUE DEBIT

## 2020-06-09 ENCOUNTER — OFFICE VISIT (OUTPATIENT)
Dept: NEUROLOGY | Age: 75
End: 2020-06-09

## 2020-06-09 ENCOUNTER — TELEPHONE (OUTPATIENT)
Dept: FAMILY MEDICINE CLINIC | Age: 75
End: 2020-06-09

## 2020-06-09 VITALS
BODY MASS INDEX: 22.08 KG/M2 | RESPIRATION RATE: 16 BRPM | OXYGEN SATURATION: 98 % | WEIGHT: 120 LBS | HEART RATE: 74 BPM | SYSTOLIC BLOOD PRESSURE: 110 MMHG | HEIGHT: 62 IN | DIASTOLIC BLOOD PRESSURE: 60 MMHG

## 2020-06-09 DIAGNOSIS — N31.9 NEUROGENIC BLADDER: Primary | ICD-10-CM

## 2020-06-09 DIAGNOSIS — R29.898 WEAKNESS OF BOTH LOWER EXTREMITIES: Primary | ICD-10-CM

## 2020-06-09 PROCEDURE — 3331090001 HH PPS REVENUE CREDIT

## 2020-06-09 PROCEDURE — 3331090002 HH PPS REVENUE DEBIT

## 2020-06-09 NOTE — PROGRESS NOTES
[address] [facility] Neurology [facility]  [address] [facility] [place], [address]  Phone [phone] Fax [phone]  Test Date:  2020    Patient: Alejandro Lacy : 1945 Physician: Jeannine Adorno MD   Sex: Female Height: 5' 2\" Ref Phys: Fredi Larson, NP   ID#: [id number] Weight: 120 lbs. Technician: Carolina Zhang     Patient Complaints:    Bilateral lower extremity sensorimotor deficits with cramps and bladder dysfunction, left > right    Patient History / Exam:    Ms. Jeevan Blackwell is a 69year old RH dominant female with history of recent admission to [facility] for evaluation of progressive lower extremity sensorimotor deficits, of unclear etiology despite extensive evaluation who presents for NCS/EMG. Examination demonstrates ongoing length-dependent sensory changes with 4+/5 strength diffusely in right leg with 4-/5 strength noted diffusely in left lower extremity with exception of left dorsiflexion which is 3/5 at best. Referred to further evaluate suspected acquired neuropathy. NCV & EMG Findings:  Sensory nerve conduction studies (NCSs) of the bilateral radial nerves demonstrated normal peak latencies and amplitudes. Sensory NCSs of the bilateral superficial peroneal nerves as well as left sural nerve demonstrated absent responses. Motor NCSs of the bilateral peroneal nerves demonstrated absent responses. Motor NCSs of the bilateral tibial nerves demonstrated normal onset latencies, amplitudes and conduction velocities. F-wave latencies at the bilateral tibial nerves were unremarkable. Electromyography (EMG) of the left  Tibialis anterior and vastus lateralis demonstrated chronic reinnervation changes as manifest by large amplitude, prolonged duration motor units with reduced recruitment.  EMG of the left gastronemius revealed acute denervation changes as manifest by positive sharp waves as well as chronic reinnervation changes as demonstrated by large-amplitude motor units prolonged in duration with reduced recruitment. EMG of the gluteus medius was unremarkable. 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 Recommendations: Will refer to urology for further assessment of urologic dysfunction, both as it pertains to symptom management as well as providing further insight/assessment regarding underlying symptom etiology. Will also send antiamphysin antibody, paraneoplastic antigens regarding paraneoplastic neuropathy. Encouraged patient to continue physical therapy, will follow-up with formal clinic visit in three to four weeks.      ___________________________  Xiao Merritt MD        Nerve Conduction Studies  Anti Sensory Summary Table     Stim Site NR Peak (ms) Norm Peak (ms) P-T Amp (µV) Norm P-T Amp Onset (ms) Site1 Site2 Delta-P (ms) Dist (cm) Chandana (m/s) Norm Chandana (m/s)   Left Radial Anti Sensory (Base 1st Digit)  30°C   Wrist    2.2 <3.1 31.1  1.6 Wrist Base 1st Digit 2.2 0.0     Right Radial Anti Sensory (Base 1st Digit)  30°C   Wrist    2.3 <3.1 38.4  1.7 Wrist Base 1st Digit 2.3 0.0     Left Sup Peroneal Anti Sensory (Ant Lat Mall)  30°C   14 cm NR  <4.4  >5.0  14 cm Ant Lat Mall  14.0  >32   Right Sup Peroneal Anti Sensory (Ant Lat Mall)  30°C   14 cm NR  <4.4  >5.0  14 cm Ant Lat Mall  14.0  >32   Left Sural Anti Sensory (Lat Mall)  30°C   Calf NR  <4.0  >5.0  Calf Lat Mall  14.0  >35     Motor Summary Table     Stim Site NR Onset (ms) Norm Onset (ms) O-P Amp (mV) Norm O-P Amp Site1 Site2 Delta-0 (ms) Dist (cm) Chandana (m/s) Norm Chandana (m/s)   Left Peroneal Motor (Ext Dig Brev)  30°C   Ankle NR  <6.1  >2.5 B Fib Ankle  0.0  >38   B Fib NR     Poplt B Fib  0.0  >40   Poplt NR             Right Peroneal Motor (Ext Dig Brev)  30°C   Ankle NR  <6.1  >2.5 B Fib Ankle  0.0  >38   B Fib NR     Poplt B Fib  0.0  >40   Poplt NR Left Tibial Motor (Abd Wilkerson Brev)  30°C   Ankle    4.2 <6.1 10.3 >3.0 Knee Ankle 6.6 37.0 56 >35   Knee    10.8  8.9          Right Tibial Motor (Abd Wilkerson Brev)  30°C   Ankle    3.5 <6.1 8.4 >3.0 Knee Ankle 6.3 36.0 57 >35   Knee    9.8  7.1            F Wave Studies     NR F-Lat (ms) Lat Norm (ms) L-R F-Lat (ms) L-R Lat Norm   Left Tibial (Mrkrs) (Abd Hallucis)  30°C      40.00 <61 0.00 <5.7   Right Tibial (Mrkrs) (Abd Hallucis)  30°C      40.00 <61 0.00 <5.7     EMG     Side Muscle Nerve Root Ins Act Fibs Psw Amp Dur Poly Recrt   Left AntTibialis Dp Br Peronel L4-5 Incr Nml Nml Incr >12ms 0 Reduced   Left Gastroc Tibial S1-2 Incr Nml 3+ Incr >12ms 0 Reduced   Left VastusLat Femoral L2-4 Nml Nml Nml Incr > 12ms 0 Reduced   Left Gluteus medius Sup Gluteal n.  L5-S1 Nml Nml Nml Nml Nml 0 Nml         Waveforms:

## 2020-06-10 ENCOUNTER — HOME CARE VISIT (OUTPATIENT)
Dept: SCHEDULING | Facility: HOME HEALTH | Age: 75
End: 2020-06-10
Payer: MEDICARE

## 2020-06-10 VITALS — OXYGEN SATURATION: 97 % | HEART RATE: 70 BPM | TEMPERATURE: 98.1 F | RESPIRATION RATE: 18 BRPM

## 2020-06-10 PROCEDURE — 3331090002 HH PPS REVENUE DEBIT

## 2020-06-10 PROCEDURE — 3331090001 HH PPS REVENUE CREDIT

## 2020-06-10 PROCEDURE — G0151 HHCP-SERV OF PT,EA 15 MIN: HCPCS

## 2020-06-11 ENCOUNTER — HOME CARE VISIT (OUTPATIENT)
Dept: SCHEDULING | Facility: HOME HEALTH | Age: 75
End: 2020-06-11
Payer: MEDICARE

## 2020-06-11 ENCOUNTER — HOME CARE VISIT (OUTPATIENT)
Dept: HOME HEALTH SERVICES | Facility: HOME HEALTH | Age: 75
End: 2020-06-11
Payer: MEDICARE

## 2020-06-11 VITALS
OXYGEN SATURATION: 95 % | DIASTOLIC BLOOD PRESSURE: 55 MMHG | HEART RATE: 63 BPM | TEMPERATURE: 99 F | RESPIRATION RATE: 16 BRPM | SYSTOLIC BLOOD PRESSURE: 110 MMHG

## 2020-06-11 PROCEDURE — 3331090002 HH PPS REVENUE DEBIT

## 2020-06-11 PROCEDURE — 3331090001 HH PPS REVENUE CREDIT

## 2020-06-11 PROCEDURE — G0152 HHCP-SERV OF OT,EA 15 MIN: HCPCS

## 2020-06-12 ENCOUNTER — HOME CARE VISIT (OUTPATIENT)
Dept: SCHEDULING | Facility: HOME HEALTH | Age: 75
End: 2020-06-12
Payer: MEDICARE

## 2020-06-12 VITALS
TEMPERATURE: 98.6 F | OXYGEN SATURATION: 96 % | SYSTOLIC BLOOD PRESSURE: 142 MMHG | HEART RATE: 84 BPM | DIASTOLIC BLOOD PRESSURE: 60 MMHG

## 2020-06-12 PROCEDURE — 3331090001 HH PPS REVENUE CREDIT

## 2020-06-12 PROCEDURE — G0299 HHS/HOSPICE OF RN EA 15 MIN: HCPCS

## 2020-06-12 PROCEDURE — 3331090002 HH PPS REVENUE DEBIT

## 2020-06-13 PROCEDURE — 3331090002 HH PPS REVENUE DEBIT

## 2020-06-13 PROCEDURE — 3331090001 HH PPS REVENUE CREDIT

## 2020-06-14 PROCEDURE — 3331090002 HH PPS REVENUE DEBIT

## 2020-06-14 PROCEDURE — 3331090001 HH PPS REVENUE CREDIT

## 2020-06-15 ENCOUNTER — HOME CARE VISIT (OUTPATIENT)
Dept: SCHEDULING | Facility: HOME HEALTH | Age: 75
End: 2020-06-15
Payer: MEDICARE

## 2020-06-15 VITALS
OXYGEN SATURATION: 85 % | TEMPERATURE: 98.4 F | RESPIRATION RATE: 16 BRPM | HEART RATE: 68 BPM | DIASTOLIC BLOOD PRESSURE: 64 MMHG | SYSTOLIC BLOOD PRESSURE: 122 MMHG

## 2020-06-15 PROCEDURE — 3331090001 HH PPS REVENUE CREDIT

## 2020-06-15 PROCEDURE — 3331090002 HH PPS REVENUE DEBIT

## 2020-06-15 PROCEDURE — G0299 HHS/HOSPICE OF RN EA 15 MIN: HCPCS

## 2020-06-16 ENCOUNTER — HOME CARE VISIT (OUTPATIENT)
Dept: HOME HEALTH SERVICES | Facility: HOME HEALTH | Age: 75
End: 2020-06-16
Payer: MEDICARE

## 2020-06-16 ENCOUNTER — HOME CARE VISIT (OUTPATIENT)
Dept: SCHEDULING | Facility: HOME HEALTH | Age: 75
End: 2020-06-16
Payer: MEDICARE

## 2020-06-16 PROCEDURE — 3331090001 HH PPS REVENUE CREDIT

## 2020-06-16 PROCEDURE — G0152 HHCP-SERV OF OT,EA 15 MIN: HCPCS

## 2020-06-16 PROCEDURE — 3331090002 HH PPS REVENUE DEBIT

## 2020-06-16 PROCEDURE — G0157 HHC PT ASSISTANT EA 15: HCPCS

## 2020-06-17 ENCOUNTER — HOME CARE VISIT (OUTPATIENT)
Dept: SCHEDULING | Facility: HOME HEALTH | Age: 75
End: 2020-06-17
Payer: MEDICARE

## 2020-06-17 VITALS
DIASTOLIC BLOOD PRESSURE: 70 MMHG | HEART RATE: 69 BPM | OXYGEN SATURATION: 98 % | RESPIRATION RATE: 18 BRPM | TEMPERATURE: 98 F | SYSTOLIC BLOOD PRESSURE: 125 MMHG

## 2020-06-17 VITALS
DIASTOLIC BLOOD PRESSURE: 70 MMHG | OXYGEN SATURATION: 98 % | SYSTOLIC BLOOD PRESSURE: 114 MMHG | HEART RATE: 76 BPM | RESPIRATION RATE: 16 BRPM | TEMPERATURE: 97.2 F

## 2020-06-17 VITALS
OXYGEN SATURATION: 98 % | HEART RATE: 69 BPM | RESPIRATION RATE: 18 BRPM | SYSTOLIC BLOOD PRESSURE: 125 MMHG | DIASTOLIC BLOOD PRESSURE: 70 MMHG | TEMPERATURE: 98 F

## 2020-06-17 PROCEDURE — G0299 HHS/HOSPICE OF RN EA 15 MIN: HCPCS

## 2020-06-17 PROCEDURE — 3331090002 HH PPS REVENUE DEBIT

## 2020-06-17 PROCEDURE — 3331090001 HH PPS REVENUE CREDIT

## 2020-06-18 ENCOUNTER — HOME CARE VISIT (OUTPATIENT)
Dept: SCHEDULING | Facility: HOME HEALTH | Age: 75
End: 2020-06-18
Payer: MEDICARE

## 2020-06-18 PROCEDURE — 3331090001 HH PPS REVENUE CREDIT

## 2020-06-18 PROCEDURE — 3331090002 HH PPS REVENUE DEBIT

## 2020-06-18 PROCEDURE — G0157 HHC PT ASSISTANT EA 15: HCPCS

## 2020-06-19 ENCOUNTER — HOME CARE VISIT (OUTPATIENT)
Dept: SCHEDULING | Facility: HOME HEALTH | Age: 75
End: 2020-06-19
Payer: MEDICARE

## 2020-06-19 ENCOUNTER — HOME CARE VISIT (OUTPATIENT)
Dept: HOME HEALTH SERVICES | Facility: HOME HEALTH | Age: 75
End: 2020-06-19
Payer: MEDICARE

## 2020-06-19 VITALS
TEMPERATURE: 98 F | HEART RATE: 73 BPM | RESPIRATION RATE: 18 BRPM | SYSTOLIC BLOOD PRESSURE: 120 MMHG | DIASTOLIC BLOOD PRESSURE: 72 MMHG | OXYGEN SATURATION: 95 %

## 2020-06-19 PROCEDURE — 3331090001 HH PPS REVENUE CREDIT

## 2020-06-19 PROCEDURE — 3331090002 HH PPS REVENUE DEBIT

## 2020-06-19 PROCEDURE — G0152 HHCP-SERV OF OT,EA 15 MIN: HCPCS

## 2020-06-20 PROCEDURE — 3331090002 HH PPS REVENUE DEBIT

## 2020-06-20 PROCEDURE — 3331090001 HH PPS REVENUE CREDIT

## 2020-06-21 PROCEDURE — 3331090002 HH PPS REVENUE DEBIT

## 2020-06-21 PROCEDURE — 3331090001 HH PPS REVENUE CREDIT

## 2020-06-22 ENCOUNTER — HOME CARE VISIT (OUTPATIENT)
Dept: SCHEDULING | Facility: HOME HEALTH | Age: 75
End: 2020-06-22
Payer: MEDICARE

## 2020-06-22 ENCOUNTER — HOME CARE VISIT (OUTPATIENT)
Dept: HOME HEALTH SERVICES | Facility: HOME HEALTH | Age: 75
End: 2020-06-22
Payer: MEDICARE

## 2020-06-22 VITALS
HEART RATE: 82 BPM | DIASTOLIC BLOOD PRESSURE: 68 MMHG | RESPIRATION RATE: 18 BRPM | TEMPERATURE: 97.6 F | SYSTOLIC BLOOD PRESSURE: 124 MMHG | OXYGEN SATURATION: 96 %

## 2020-06-22 VITALS
HEART RATE: 68 BPM | SYSTOLIC BLOOD PRESSURE: 118 MMHG | OXYGEN SATURATION: 94 % | DIASTOLIC BLOOD PRESSURE: 66 MMHG | TEMPERATURE: 98.5 F

## 2020-06-22 PROCEDURE — G0152 HHCP-SERV OF OT,EA 15 MIN: HCPCS

## 2020-06-22 PROCEDURE — 3331090001 HH PPS REVENUE CREDIT

## 2020-06-22 PROCEDURE — 3331090002 HH PPS REVENUE DEBIT

## 2020-06-22 PROCEDURE — G0299 HHS/HOSPICE OF RN EA 15 MIN: HCPCS

## 2020-06-23 ENCOUNTER — HOME CARE VISIT (OUTPATIENT)
Dept: SCHEDULING | Facility: HOME HEALTH | Age: 75
End: 2020-06-23
Payer: MEDICARE

## 2020-06-23 VITALS
DIASTOLIC BLOOD PRESSURE: 62 MMHG | SYSTOLIC BLOOD PRESSURE: 120 MMHG | RESPIRATION RATE: 18 BRPM | HEART RATE: 64 BPM | OXYGEN SATURATION: 98 % | TEMPERATURE: 96.8 F

## 2020-06-23 VITALS
TEMPERATURE: 98.4 F | RESPIRATION RATE: 16 BRPM | DIASTOLIC BLOOD PRESSURE: 60 MMHG | OXYGEN SATURATION: 95 % | SYSTOLIC BLOOD PRESSURE: 138 MMHG | HEART RATE: 69 BPM

## 2020-06-23 PROCEDURE — G0299 HHS/HOSPICE OF RN EA 15 MIN: HCPCS

## 2020-06-23 PROCEDURE — 3331090001 HH PPS REVENUE CREDIT

## 2020-06-23 PROCEDURE — 3331090002 HH PPS REVENUE DEBIT

## 2020-06-23 PROCEDURE — G0157 HHC PT ASSISTANT EA 15: HCPCS

## 2020-06-24 ENCOUNTER — HOME CARE VISIT (OUTPATIENT)
Dept: HOME HEALTH SERVICES | Facility: HOME HEALTH | Age: 75
End: 2020-06-24
Payer: MEDICARE

## 2020-06-24 ENCOUNTER — HOME CARE VISIT (OUTPATIENT)
Dept: SCHEDULING | Facility: HOME HEALTH | Age: 75
End: 2020-06-24
Payer: MEDICARE

## 2020-06-24 VITALS
OXYGEN SATURATION: 98 % | DIASTOLIC BLOOD PRESSURE: 68 MMHG | TEMPERATURE: 98.5 F | HEART RATE: 68 BPM | RESPIRATION RATE: 18 BRPM | SYSTOLIC BLOOD PRESSURE: 138 MMHG

## 2020-06-24 PROCEDURE — 3331090002 HH PPS REVENUE DEBIT

## 2020-06-24 PROCEDURE — G0152 HHCP-SERV OF OT,EA 15 MIN: HCPCS

## 2020-06-24 PROCEDURE — 3331090001 HH PPS REVENUE CREDIT

## 2020-06-25 ENCOUNTER — HOME CARE VISIT (OUTPATIENT)
Dept: SCHEDULING | Facility: HOME HEALTH | Age: 75
End: 2020-06-25
Payer: MEDICARE

## 2020-06-25 VITALS
TEMPERATURE: 97.4 F | OXYGEN SATURATION: 94 % | DIASTOLIC BLOOD PRESSURE: 76 MMHG | HEART RATE: 74 BPM | SYSTOLIC BLOOD PRESSURE: 122 MMHG | RESPIRATION RATE: 18 BRPM

## 2020-06-25 PROCEDURE — G0157 HHC PT ASSISTANT EA 15: HCPCS

## 2020-06-25 PROCEDURE — 3331090002 HH PPS REVENUE DEBIT

## 2020-06-25 PROCEDURE — 3331090001 HH PPS REVENUE CREDIT

## 2020-06-26 VITALS — HEART RATE: 68 BPM | TEMPERATURE: 98.6 F | RESPIRATION RATE: 18 BRPM | OXYGEN SATURATION: 95 %

## 2020-06-26 PROCEDURE — 3331090001 HH PPS REVENUE CREDIT

## 2020-06-26 PROCEDURE — 3331090002 HH PPS REVENUE DEBIT

## 2020-06-27 PROCEDURE — 3331090001 HH PPS REVENUE CREDIT

## 2020-06-27 PROCEDURE — 3331090002 HH PPS REVENUE DEBIT

## 2020-06-28 PROCEDURE — 3331090002 HH PPS REVENUE DEBIT

## 2020-06-28 PROCEDURE — 3331090001 HH PPS REVENUE CREDIT

## 2020-06-29 ENCOUNTER — HOME CARE VISIT (OUTPATIENT)
Dept: SCHEDULING | Facility: HOME HEALTH | Age: 75
End: 2020-06-29
Payer: MEDICARE

## 2020-06-29 ENCOUNTER — HOME CARE VISIT (OUTPATIENT)
Dept: HOME HEALTH SERVICES | Facility: HOME HEALTH | Age: 75
End: 2020-06-29
Payer: MEDICARE

## 2020-06-29 PROCEDURE — 3331090001 HH PPS REVENUE CREDIT

## 2020-06-29 PROCEDURE — 3331090002 HH PPS REVENUE DEBIT

## 2020-06-29 PROCEDURE — G0152 HHCP-SERV OF OT,EA 15 MIN: HCPCS

## 2020-06-30 ENCOUNTER — HOME CARE VISIT (OUTPATIENT)
Dept: HOME HEALTH SERVICES | Facility: HOME HEALTH | Age: 75
End: 2020-06-30
Payer: MEDICARE

## 2020-06-30 ENCOUNTER — HOME CARE VISIT (OUTPATIENT)
Dept: SCHEDULING | Facility: HOME HEALTH | Age: 75
End: 2020-06-30
Payer: MEDICARE

## 2020-06-30 VITALS
OXYGEN SATURATION: 96 % | SYSTOLIC BLOOD PRESSURE: 140 MMHG | TEMPERATURE: 98 F | HEART RATE: 70 BPM | DIASTOLIC BLOOD PRESSURE: 70 MMHG | RESPIRATION RATE: 16 BRPM

## 2020-06-30 PROCEDURE — 3331090002 HH PPS REVENUE DEBIT

## 2020-06-30 PROCEDURE — 3331090001 HH PPS REVENUE CREDIT

## 2020-06-30 PROCEDURE — G0157 HHC PT ASSISTANT EA 15: HCPCS

## 2020-07-01 VITALS
HEART RATE: 81 BPM | TEMPERATURE: 98.2 F | SYSTOLIC BLOOD PRESSURE: 140 MMHG | RESPIRATION RATE: 18 BRPM | DIASTOLIC BLOOD PRESSURE: 80 MMHG | OXYGEN SATURATION: 95 %

## 2020-07-01 PROCEDURE — 3331090001 HH PPS REVENUE CREDIT

## 2020-07-01 PROCEDURE — 3331090002 HH PPS REVENUE DEBIT

## 2020-07-02 ENCOUNTER — HOME CARE VISIT (OUTPATIENT)
Dept: SCHEDULING | Facility: HOME HEALTH | Age: 75
End: 2020-07-02
Payer: MEDICARE

## 2020-07-02 ENCOUNTER — HOME CARE VISIT (OUTPATIENT)
Dept: HOME HEALTH SERVICES | Facility: HOME HEALTH | Age: 75
End: 2020-07-02
Payer: MEDICARE

## 2020-07-02 VITALS
SYSTOLIC BLOOD PRESSURE: 125 MMHG | HEART RATE: 78 BPM | OXYGEN SATURATION: 98 % | RESPIRATION RATE: 18 BRPM | DIASTOLIC BLOOD PRESSURE: 72 MMHG | TEMPERATURE: 98.2 F

## 2020-07-02 PROCEDURE — 3331090002 HH PPS REVENUE DEBIT

## 2020-07-02 PROCEDURE — G0152 HHCP-SERV OF OT,EA 15 MIN: HCPCS

## 2020-07-02 PROCEDURE — 3331090001 HH PPS REVENUE CREDIT

## 2020-07-02 PROCEDURE — G0157 HHC PT ASSISTANT EA 15: HCPCS

## 2020-07-03 VITALS
HEART RATE: 78 BPM | SYSTOLIC BLOOD PRESSURE: 126 MMHG | DIASTOLIC BLOOD PRESSURE: 72 MMHG | RESPIRATION RATE: 18 BRPM | TEMPERATURE: 98.2 F | OXYGEN SATURATION: 98 %

## 2020-07-03 PROCEDURE — 3331090001 HH PPS REVENUE CREDIT

## 2020-07-03 PROCEDURE — 3331090002 HH PPS REVENUE DEBIT

## 2020-07-04 PROCEDURE — 3331090001 HH PPS REVENUE CREDIT

## 2020-07-04 PROCEDURE — 3331090002 HH PPS REVENUE DEBIT

## 2020-07-05 PROCEDURE — 3331090002 HH PPS REVENUE DEBIT

## 2020-07-05 PROCEDURE — 3331090001 HH PPS REVENUE CREDIT

## 2020-07-06 ENCOUNTER — HOME CARE VISIT (OUTPATIENT)
Dept: SCHEDULING | Facility: HOME HEALTH | Age: 75
End: 2020-07-06
Payer: MEDICARE

## 2020-07-06 ENCOUNTER — HOME CARE VISIT (OUTPATIENT)
Dept: HOME HEALTH SERVICES | Facility: HOME HEALTH | Age: 75
End: 2020-07-06
Payer: MEDICARE

## 2020-07-06 VITALS
RESPIRATION RATE: 18 BRPM | SYSTOLIC BLOOD PRESSURE: 132 MMHG | TEMPERATURE: 98.3 F | OXYGEN SATURATION: 94 % | HEART RATE: 77 BPM | DIASTOLIC BLOOD PRESSURE: 77 MMHG

## 2020-07-06 PROCEDURE — G0152 HHCP-SERV OF OT,EA 15 MIN: HCPCS

## 2020-07-06 PROCEDURE — 3331090001 HH PPS REVENUE CREDIT

## 2020-07-06 PROCEDURE — 3331090002 HH PPS REVENUE DEBIT

## 2020-07-07 ENCOUNTER — HOME CARE VISIT (OUTPATIENT)
Dept: SCHEDULING | Facility: HOME HEALTH | Age: 75
End: 2020-07-07
Payer: MEDICARE

## 2020-07-07 ENCOUNTER — HOME CARE VISIT (OUTPATIENT)
Dept: HOME HEALTH SERVICES | Facility: HOME HEALTH | Age: 75
End: 2020-07-07
Payer: MEDICARE

## 2020-07-07 PROCEDURE — 3331090001 HH PPS REVENUE CREDIT

## 2020-07-07 PROCEDURE — G0151 HHCP-SERV OF PT,EA 15 MIN: HCPCS

## 2020-07-07 PROCEDURE — 3331090002 HH PPS REVENUE DEBIT

## 2020-07-08 ENCOUNTER — HOME CARE VISIT (OUTPATIENT)
Dept: SCHEDULING | Facility: HOME HEALTH | Age: 75
End: 2020-07-08
Payer: MEDICARE

## 2020-07-08 VITALS
HEART RATE: 69 BPM | TEMPERATURE: 98.4 F | DIASTOLIC BLOOD PRESSURE: 70 MMHG | RESPIRATION RATE: 18 BRPM | OXYGEN SATURATION: 93 % | SYSTOLIC BLOOD PRESSURE: 130 MMHG

## 2020-07-08 VITALS
HEART RATE: 74 BPM | RESPIRATION RATE: 16 BRPM | DIASTOLIC BLOOD PRESSURE: 60 MMHG | OXYGEN SATURATION: 94 % | TEMPERATURE: 98 F | SYSTOLIC BLOOD PRESSURE: 122 MMHG

## 2020-07-08 PROCEDURE — 400013 HH SOC

## 2020-07-08 PROCEDURE — 3331090001 HH PPS REVENUE CREDIT

## 2020-07-08 PROCEDURE — 3331090002 HH PPS REVENUE DEBIT

## 2020-07-08 PROCEDURE — G0157 HHC PT ASSISTANT EA 15: HCPCS

## 2020-07-09 ENCOUNTER — HOME CARE VISIT (OUTPATIENT)
Dept: SCHEDULING | Facility: HOME HEALTH | Age: 75
End: 2020-07-09
Payer: MEDICARE

## 2020-07-09 PROCEDURE — G0152 HHCP-SERV OF OT,EA 15 MIN: HCPCS

## 2020-07-09 PROCEDURE — 3331090002 HH PPS REVENUE DEBIT

## 2020-07-09 PROCEDURE — 3331090001 HH PPS REVENUE CREDIT

## 2020-07-10 PROCEDURE — 3331090002 HH PPS REVENUE DEBIT

## 2020-07-10 PROCEDURE — 3331090001 HH PPS REVENUE CREDIT

## 2020-07-11 PROCEDURE — 3331090002 HH PPS REVENUE DEBIT

## 2020-07-11 PROCEDURE — 3331090001 HH PPS REVENUE CREDIT

## 2020-07-12 VITALS
OXYGEN SATURATION: 94 % | RESPIRATION RATE: 18 BRPM | DIASTOLIC BLOOD PRESSURE: 62 MMHG | TEMPERATURE: 98.6 F | SYSTOLIC BLOOD PRESSURE: 118 MMHG | HEART RATE: 77 BPM

## 2020-07-12 PROCEDURE — 3331090002 HH PPS REVENUE DEBIT

## 2020-07-12 PROCEDURE — 3331090001 HH PPS REVENUE CREDIT

## 2020-07-13 ENCOUNTER — OFFICE VISIT (OUTPATIENT)
Dept: NEUROLOGY | Age: 75
End: 2020-07-13

## 2020-07-13 VITALS
HEIGHT: 62 IN | SYSTOLIC BLOOD PRESSURE: 144 MMHG | WEIGHT: 120 LBS | DIASTOLIC BLOOD PRESSURE: 84 MMHG | OXYGEN SATURATION: 96 % | TEMPERATURE: 98.1 F | HEART RATE: 84 BPM | BODY MASS INDEX: 22.08 KG/M2

## 2020-07-13 DIAGNOSIS — G61.0 AIDP (ACUTE INFLAMMATORY DEMYELINATING POLYNEUROPATHY) (HCC): Primary | ICD-10-CM

## 2020-07-13 PROCEDURE — 3331090001 HH PPS REVENUE CREDIT

## 2020-07-13 PROCEDURE — 3331090002 HH PPS REVENUE DEBIT

## 2020-07-13 NOTE — PROGRESS NOTES
Chief Complaint   Patient presents with    Follow-up     neuropathy, \"doing a little better, her after having my EMG done. \"     Visit Vitals  /84 (BP 1 Location: Left arm, BP Patient Position: Sitting)   Pulse 84   Temp 98.1 °F (36.7 °C) (Oral)   Ht 5' 2\" (1.575 m)   Wt 54.4 kg (120 lb)   SpO2 96%   BMI 21.95 kg/m²

## 2020-07-13 NOTE — PROGRESS NOTES
Neurology Clinic Follow up Note    Patient ID:  Bull Shelley  [id number]  56 y.o.  1945      Ms. Leal is here for follow up today of  Chief Complaint   Patient presents with    Follow-up     neuropathy, \"doing a little better, her after having my EMG done. \"          Last Appointment With Me:  Visit date not found       Interval History: In the interval since follow-up during EMG, Ms. Vanna Zapien notes significant symptom improvement. Notes that she has been working with home health PT and notes dramatic improvement in her motor strength as well as autonomic dysfunction. Is interested in continuing with her therapy for several more weeks if possible. Denies any new symptoms or worsening of existing ones. PMHx/ PSHx/ FHx/ SHx:  Reviewed and unchanged previous visit. ROS:  Comprehensive review of systems negative except for as noted above. Objective:       Meds:  Current Outpatient Medications   Medication Sig Dispense Refill    baclofen (LIORESAL) 10 mg tablet Take 5 mg by mouth three (3) times daily.  polyvinyl alcohol/povidone (ARTIFICIAL TEARS OP) 2 Drops by IntraOCUlar route every two (2) hours as needed for Other (dry eyes).  ipratropium (ATROVENT HFA) 17 mcg/actuation inhaler Take 2 Puffs by inhalation every four (4) hours as needed for Cough, Respiratory Distress or Shortness of Breath.  sodium chloride 1 gram tablet Take 3 Tabs by mouth two (2) times daily (with meals). 270 Tab 0    potassium chloride SR (KLOR-CON 10) 10 mEq tablet TAKE 2 TABLETS BY MOUTH TWICE DAILY (Patient taking differently: TAKE 2 TABLETS BY MOUTH ONCE  DAILY) 360 Tab 0    amLODIPine (NORVASC) 10 mg tablet TAKE 1 TABLET BY MOUTH EVERY DAY 90 Tab 0    metoprolol tartrate (LOPRESSOR) 50 mg tablet TAKE 1 TABLET BY MOUTH TWICE DAILY 180 Tab 0    aspirin delayed-release 81 mg tablet Take 81 mg by mouth daily.       calcium carbonate/vitamin D3 (CALCIUM 500 + D PO) Take 1 Tab by mouth two (2) times daily (with meals).  multivitamin (ONE A DAY) tablet Take 1 Tab by mouth daily. Exam:  Visit Vitals  /84 (BP 1 Location: Left arm, BP Patient Position: Sitting)   Pulse 84   Temp 98.1 °F (36.7 °C) (Oral)   Ht 5' 2\" (1.575 m)   Wt 54.4 kg (120 lb)   SpO2 96%   BMI 21.95 kg/m²     Physical examination:  Pleasant female in no apparent distress. HEENT is unremarkable. Neck is supple. Cardiovascular, pulmonary and abdominal examinations are deferred. Extremities are warm/dry, no peripheral edema. Neurologically, appears alert and oriented, attention intact. Speech is clear, language is fluent. Cranial nerves II - XII are grossly intact. Motor strength testing is intact in upper extremities. In lower extremities, right iliopsoas is 5-/5, knee extension is 5/5, flexion is 5-/5, dorsiflexion is 5-/5 and plantar flexion is 5/5. In left lower extremity, iliopsoas is 4/5, knee extension is 5/5, knee flexion is 4+/5, dorsiflexion is 3/5. Remainder of examination is deferred. Lab data was reviewed. Radiology images were independently viewed and available reports were reviewed.       LABS  Results for orders placed or performed during the hospital encounter of 05/19/20   CULTURE, URINE    Specimen: Urine   Result Value Ref Range    Special Requests: NO SPECIAL REQUESTS  Reflexed from [id number]        [place] Count >100,000  COLONIES/mL        Culture result: ENTEROCOCCUS FAECALIS (A)         Susceptibility    Enterococcus faecalis - CM     Ampicillin ($) <=2 Susceptible ug/mL     Ciprofloxacin ($) 1 Susceptible ug/mL     Nitrofurantoin <=16 Susceptible ug/mL     Tetracycline >=16 Resistant ug/mL     Vancomycin ($) 1 Susceptible ug/mL     Levofloxacin ($) 2 Susceptible ug/mL     Linezolid ($$$$$) 2 Susceptible ug/mL     Daptomycin ($$$$$) 1 Susceptible ug/mL   URINALYSIS W/ REFLEX CULTURE    Specimen: Urine   Result Value Ref Range    Color YELLOW/STRAW      Appearance CLEAR CLEAR      Specific gravity 1.011 1.003 - 1.030      pH (UA) 7.5 5.0 - 8.0      Protein Negative NEG mg/dL    Glucose Negative NEG mg/dL    Ketone Negative NEG mg/dL    Bilirubin Negative NEG      Blood Negative NEG      Urobilinogen 0.2 0.2 - 1.0 EU/dL    Nitrites Negative NEG      Leukocyte Esterase TRACE (A) NEG      WBC 5-10 0 - 4 /hpf    RBC 0-5 0 - 5 /hpf    Epithelial cells FEW FEW /lpf    Bacteria 2+ (A) NEG /hpf    UA:UC IF INDICATED CULTURE NOT INDICATED BY UA RESULT CNI      Hyaline cast 0-2 0 - 5 /lpf   METABOLIC PANEL, COMPREHENSIVE   Result Value Ref Range    Sodium 133 (L) 136 - 145 mmol/L    Potassium 4.5 3.5 - 5.1 mmol/L    Chloride 100 97 - 108 mmol/L    CO2 30 21 - 32 mmol/L    Anion gap 3 (L) 5 - 15 mmol/L    Glucose 98 65 - 100 mg/dL    BUN 15 6 - 20 MG/DL    Creatinine 0.92 0.55 - 1.02 MG/DL    BUN/Creatinine ratio 16 12 - 20      GFR est AA >60 >60 ml/min/1.73m2    GFR est non-AA 60 (L) >60 ml/min/1.73m2    Calcium 10.2 (H) 8.5 - 10.1 MG/DL    Bilirubin, total 0.9 0.2 - 1.0 MG/DL    ALT (SGPT) 79 (H) 12 - 78 U/L    AST (SGOT) 81 (H) 15 - 37 U/L    Alk.  phosphatase 139 (H) 45 - 117 U/L    Protein, total 7.9 6.4 - 8.2 g/dL    Albumin 3.6 3.5 - 5.0 g/dL    Globulin 4.3 (H) 2.0 - 4.0 g/dL    A-G Ratio 0.8 (L) 1.1 - 2.2     MAGNESIUM   Result Value Ref Range    Magnesium 2.3 1.6 - 2.4 mg/dL   VITAMIN D, 25 HYDROXY   Result Value Ref Range    Vitamin D 25-Hydroxy 37.1 30 - 100 ng/mL   URINALYSIS W/ REFLEX CULTURE    Specimen: Urine   Result Value Ref Range    Color YELLOW/STRAW      Appearance CLOUDY (A) CLEAR      Specific gravity 1.013 1.003 - 1.030      pH (UA) 6.5 5.0 - 8.0      Protein Negative NEG mg/dL    Glucose Negative NEG mg/dL    Ketone Negative NEG mg/dL    Bilirubin Negative NEG      Blood Negative NEG      Urobilinogen 1.0 0.2 - 1.0 EU/dL    Nitrites Negative NEG      Leukocyte Esterase MODERATE (A) NEG      WBC 10-20 0 - 4 /hpf    RBC 0-5 0 - 5 /hpf    Epithelial cells FEW FEW /lpf    Bacteria 4+ (A) NEG /hpf UA: UC IF INDICATED URINE CULTURE ORDERED (A) CNI      Hyaline cast 0-2 0 - 5 /lpf   CBC W/O DIFF   Result Value Ref Range    WBC 9.4 3.6 - 11.0 K/uL    RBC 4.20 3.80 - 5.20 M/uL    HGB 14.2 11.5 - 16.0 g/dL    HCT 41.2 35.0 - 47.0 %    MCV 98.1 80.0 - 99.0 FL    MCH 33.8 26.0 - 34.0 PG    MCHC 34.5 30.0 - 36.5 g/dL    RDW 12.3 11.5 - 14.5 %    PLATELET 314 648 - 657 K/uL    MPV 9.3 8.9 - 12.9 FL    NRBC 0.0 0  WBC    ABSOLUTE NRBC 0.00 0.00 - 0.05 K/uL   METABOLIC PANEL, BASIC   Result Value Ref Range    Sodium 135 (L) 136 - 145 mmol/L    Potassium 3.4 (L) 3.5 - 5.1 mmol/L    Chloride 102 97 - 108 mmol/L    CO2 29 21 - 32 mmol/L    Anion gap 4 (L) 5 - 15 mmol/L    Glucose 84 65 - 100 mg/dL    BUN 12 6 - 20 MG/DL    Creatinine 0.75 0.55 - 1.02 MG/DL    BUN/Creatinine ratio 16 12 - 20      GFR est AA >60 >60 ml/min/1.73m2    GFR est non-AA >60 >60 ml/min/1.73m2    Calcium 9.5 8.5 - 75.2 MG/DL   METABOLIC PANEL, BASIC   Result Value Ref Range    Sodium 136 136 - 145 mmol/L    Potassium 3.8 3.5 - 5.1 mmol/L    Chloride 103 97 - 108 mmol/L    CO2 28 21 - 32 mmol/L    Anion gap 5 5 - 15 mmol/L    Glucose 99 65 - 100 mg/dL    BUN 12 6 - 20 MG/DL    Creatinine 0.93 0.55 - 1.02 MG/DL    BUN/Creatinine ratio 13 12 - 20      GFR est AA >60 >60 ml/min/1.73m2    GFR est non-AA 59 (L) >60 ml/min/1.73m2    Calcium 9.0 8.5 - 10.1 MG/DL       IMAGING:  MRI Results (most recent):  Results from Hospital Encounter encounter on 05/13/20   MRI [facility] SPINE W CONT    Narrative INDICATION:   LE weakness, paresthesias, spasticity, urinary/bowel retention  eval for cord abnormality Please include DWI sequences    COMPARISON: May 13, 2020    TECHNIQUE: MRI of the thoracic and lumbar spine was performed with IV contrast,  as well as including sagittal diffusion-weighted imaging and viewed in  conjunction with recent noncontrast examinations. FINDINGS:    No abnormal intraspinal enhancement.  Diffusion-weighted imaging is degraded by  artifact with no definite diffusion restriction involving the thoracic or lumbar  spinal cord. Impression IMPRESSION:    1. No abnormal intraspinal enhancement in the thoracic or lumbar spine. 2. No definite diffusion restriction involving the spinal cord to suggest cord  infarct. 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      Signed:  Nguyễn Moore MD  7/13/2020  10:53 AM

## 2020-07-14 ENCOUNTER — HOME CARE VISIT (OUTPATIENT)
Dept: SCHEDULING | Facility: HOME HEALTH | Age: 75
End: 2020-07-14
Payer: MEDICARE

## 2020-07-14 VITALS
TEMPERATURE: 97.5 F | HEART RATE: 65 BPM | RESPIRATION RATE: 18 BRPM | SYSTOLIC BLOOD PRESSURE: 150 MMHG | DIASTOLIC BLOOD PRESSURE: 70 MMHG | OXYGEN SATURATION: 95 %

## 2020-07-14 PROCEDURE — G0152 HHCP-SERV OF OT,EA 15 MIN: HCPCS

## 2020-07-14 PROCEDURE — G0157 HHC PT ASSISTANT EA 15: HCPCS

## 2020-07-14 PROCEDURE — 3331090001 HH PPS REVENUE CREDIT

## 2020-07-14 PROCEDURE — 3331090002 HH PPS REVENUE DEBIT

## 2020-07-15 VITALS
HEART RATE: 68 BPM | RESPIRATION RATE: 18 BRPM | OXYGEN SATURATION: 95 % | SYSTOLIC BLOOD PRESSURE: 118 MMHG | TEMPERATURE: 97.5 F | DIASTOLIC BLOOD PRESSURE: 62 MMHG

## 2020-07-15 PROCEDURE — 3331090001 HH PPS REVENUE CREDIT

## 2020-07-15 PROCEDURE — 3331090002 HH PPS REVENUE DEBIT

## 2020-07-16 ENCOUNTER — HOME CARE VISIT (OUTPATIENT)
Dept: HOME HEALTH SERVICES | Facility: HOME HEALTH | Age: 75
End: 2020-07-16
Payer: MEDICARE

## 2020-07-16 ENCOUNTER — HOME CARE VISIT (OUTPATIENT)
Dept: SCHEDULING | Facility: HOME HEALTH | Age: 75
End: 2020-07-16
Payer: MEDICARE

## 2020-07-16 VITALS
TEMPERATURE: 99 F | DIASTOLIC BLOOD PRESSURE: 55 MMHG | RESPIRATION RATE: 16 BRPM | SYSTOLIC BLOOD PRESSURE: 110 MMHG | HEART RATE: 76 BPM | OXYGEN SATURATION: 93 %

## 2020-07-16 VITALS — RESPIRATION RATE: 18 BRPM | TEMPERATURE: 98.6 F | HEART RATE: 76 BPM | OXYGEN SATURATION: 93 %

## 2020-07-16 PROCEDURE — G0152 HHCP-SERV OF OT,EA 15 MIN: HCPCS

## 2020-07-16 PROCEDURE — 3331090001 HH PPS REVENUE CREDIT

## 2020-07-16 PROCEDURE — G0157 HHC PT ASSISTANT EA 15: HCPCS

## 2020-07-16 PROCEDURE — 3331090002 HH PPS REVENUE DEBIT

## 2020-07-17 PROCEDURE — 3331090002 HH PPS REVENUE DEBIT

## 2020-07-17 PROCEDURE — 3331090001 HH PPS REVENUE CREDIT

## 2020-07-18 PROCEDURE — 3331090002 HH PPS REVENUE DEBIT

## 2020-07-18 PROCEDURE — 3331090001 HH PPS REVENUE CREDIT

## 2020-07-19 PROCEDURE — 3331090002 HH PPS REVENUE DEBIT

## 2020-07-19 PROCEDURE — 3331090001 HH PPS REVENUE CREDIT

## 2020-07-20 PROCEDURE — 3331090001 HH PPS REVENUE CREDIT

## 2020-07-20 PROCEDURE — 3331090002 HH PPS REVENUE DEBIT

## 2020-07-21 ENCOUNTER — HOME CARE VISIT (OUTPATIENT)
Dept: SCHEDULING | Facility: HOME HEALTH | Age: 75
End: 2020-07-21
Payer: MEDICARE

## 2020-07-21 PROCEDURE — 3331090002 HH PPS REVENUE DEBIT

## 2020-07-21 PROCEDURE — 3331090001 HH PPS REVENUE CREDIT

## 2020-07-21 PROCEDURE — G0157 HHC PT ASSISTANT EA 15: HCPCS

## 2020-07-22 VITALS
OXYGEN SATURATION: 92 % | DIASTOLIC BLOOD PRESSURE: 78 MMHG | TEMPERATURE: 98.3 F | HEART RATE: 74 BPM | RESPIRATION RATE: 18 BRPM | SYSTOLIC BLOOD PRESSURE: 130 MMHG

## 2020-07-22 PROCEDURE — 3331090002 HH PPS REVENUE DEBIT

## 2020-07-22 PROCEDURE — 3331090001 HH PPS REVENUE CREDIT

## 2020-07-23 ENCOUNTER — HOME CARE VISIT (OUTPATIENT)
Dept: HOME HEALTH SERVICES | Facility: HOME HEALTH | Age: 75
End: 2020-07-23
Payer: MEDICARE

## 2020-07-23 ENCOUNTER — HOME CARE VISIT (OUTPATIENT)
Dept: SCHEDULING | Facility: HOME HEALTH | Age: 75
End: 2020-07-23
Payer: MEDICARE

## 2020-07-23 PROCEDURE — 3331090001 HH PPS REVENUE CREDIT

## 2020-07-23 PROCEDURE — 3331090002 HH PPS REVENUE DEBIT

## 2020-07-23 PROCEDURE — G0151 HHCP-SERV OF PT,EA 15 MIN: HCPCS

## 2020-07-24 VITALS
SYSTOLIC BLOOD PRESSURE: 138 MMHG | TEMPERATURE: 98 F | RESPIRATION RATE: 16 BRPM | HEART RATE: 74 BPM | DIASTOLIC BLOOD PRESSURE: 60 MMHG | OXYGEN SATURATION: 96 %

## 2020-07-24 PROCEDURE — 3331090001 HH PPS REVENUE CREDIT

## 2020-07-24 PROCEDURE — 3331090002 HH PPS REVENUE DEBIT

## 2020-07-25 PROCEDURE — 3331090001 HH PPS REVENUE CREDIT

## 2020-07-25 PROCEDURE — 3331090002 HH PPS REVENUE DEBIT

## 2020-07-26 PROCEDURE — 3331090001 HH PPS REVENUE CREDIT

## 2020-07-26 PROCEDURE — 3331090002 HH PPS REVENUE DEBIT

## 2020-07-29 ENCOUNTER — DOCUMENTATION ONLY (OUTPATIENT)
Dept: NEUROLOGY | Age: 75
End: 2020-07-29

## 2020-07-29 NOTE — PROGRESS NOTES
Faxed referral to [facility]. Received confirmation.

## 2020-12-09 DIAGNOSIS — E87.1 HYPONATREMIA: ICD-10-CM

## 2020-12-09 RX ORDER — POTASSIUM CHLORIDE 750 MG/1
TABLET, FILM COATED, EXTENDED RELEASE ORAL
Qty: 360 TAB | Refills: 0 | Status: SHIPPED | OUTPATIENT
Start: 2020-12-09 | End: 2021-12-22 | Stop reason: SDUPTHER

## 2020-12-09 NOTE — TELEPHONE ENCOUNTER
PCP: Ike Lee MD    Last appt: Visit date not found  Future Appointments   Date Time Provider Sabine Quinteros   1/11/2021 11:40 AM Chase Rodriguez MD NEU BS AMB       Requested Prescriptions     Pending Prescriptions Disp Refills    potassium chloride SR (KLOR-CON 10) 10 mEq tablet 360 Tab 0       Pharmacy [facility]    Patient has 0 days' supply of medication available.     Prior labs and Blood pressures:  BP Readings from Last 3 Encounters:   07/23/20 138/60   07/21/20 130/78   07/16/20 110/55     Lab Results   Component Value Date/Time    Sodium 136 06/03/2020 04:40 AM    Potassium 3.8 06/03/2020 04:40 AM    Chloride 103 06/03/2020 04:40 AM    CO2 28 06/03/2020 04:40 AM    Anion gap 5 06/03/2020 04:40 AM    Glucose 99 06/03/2020 04:40 AM    BUN 12 06/03/2020 04:40 AM    Creatinine 0.93 06/03/2020 04:40 AM    BUN/Creatinine ratio 13 06/03/2020 04:40 AM    GFR est AA >60 06/03/2020 04:40 AM    GFR est non-AA 59 (L) 06/03/2020 04:40 AM    Calcium 9.0 06/03/2020 04:40 AM     Lab Results   Component Value Date/Time    Hemoglobin A1c 5.0 05/13/2020 10:27 AM     Lab Results   Component Value Date/Time    Cholesterol, total 171 05/13/2020 10:27 AM    HDL Cholesterol 54 05/13/2020 10:27 AM    LDL, calculated 86.6 05/13/2020 10:27 AM    VLDL, calculated 30.4 05/13/2020 10:27 AM    Triglyceride 152 (H) 05/13/2020 10:27 AM    CHOL/HDL Ratio 3.2 05/13/2020 10:27 AM     Lab Results   Component Value Date/Time    Vitamin D 25-Hydroxy 37.1 05/20/2020 04:30 AM       Lab Results   Component Value Date/Time    TSH 1.13 05/13/2020 10:27 AM

## 2021-01-11 ENCOUNTER — OFFICE VISIT (OUTPATIENT)
Dept: NEUROLOGY | Age: 76
End: 2021-01-11
Payer: MEDICARE

## 2021-01-11 VITALS
HEART RATE: 86 BPM | RESPIRATION RATE: 16 BRPM | DIASTOLIC BLOOD PRESSURE: 60 MMHG | HEIGHT: 62 IN | WEIGHT: 120 LBS | BODY MASS INDEX: 22.08 KG/M2 | OXYGEN SATURATION: 94 % | SYSTOLIC BLOOD PRESSURE: 102 MMHG

## 2021-01-11 DIAGNOSIS — G62.9 NEUROPATHY: Primary | ICD-10-CM

## 2021-01-11 PROCEDURE — 99213 OFFICE O/P EST LOW 20 MIN: CPT | Performed by: PSYCHIATRY & NEUROLOGY

## 2021-01-11 PROCEDURE — 1100F PTFALLS ASSESS-DOCD GE2>/YR: CPT | Performed by: PSYCHIATRY & NEUROLOGY

## 2021-01-11 PROCEDURE — G8420 CALC BMI NORM PARAMETERS: HCPCS | Performed by: PSYCHIATRY & NEUROLOGY

## 2021-01-11 PROCEDURE — G8427 DOCREV CUR MEDS BY ELIG CLIN: HCPCS | Performed by: PSYCHIATRY & NEUROLOGY

## 2021-01-11 PROCEDURE — G8399 PT W/DXA RESULTS DOCUMENT: HCPCS | Performed by: PSYCHIATRY & NEUROLOGY

## 2021-01-11 PROCEDURE — G8510 SCR DEP NEG, NO PLAN REQD: HCPCS | Performed by: PSYCHIATRY & NEUROLOGY

## 2021-01-11 PROCEDURE — 3017F COLORECTAL CA SCREEN DOC REV: CPT | Performed by: PSYCHIATRY & NEUROLOGY

## 2021-01-11 PROCEDURE — 1090F PRES/ABSN URINE INCON ASSESS: CPT | Performed by: PSYCHIATRY & NEUROLOGY

## 2021-01-11 PROCEDURE — G8754 DIAS BP LESS 90: HCPCS | Performed by: PSYCHIATRY & NEUROLOGY

## 2021-01-11 PROCEDURE — G8536 NO DOC ELDER MAL SCRN: HCPCS | Performed by: PSYCHIATRY & NEUROLOGY

## 2021-01-11 PROCEDURE — 3288F FALL RISK ASSESSMENT DOCD: CPT | Performed by: PSYCHIATRY & NEUROLOGY

## 2021-01-11 PROCEDURE — G8752 SYS BP LESS 140: HCPCS | Performed by: PSYCHIATRY & NEUROLOGY

## 2021-01-11 NOTE — PATIENT INSTRUCTIONS
PRESCRIPTION REFILL POLICY [facility] Statement to Patients April 1, 2014 In an effort to ensure the large volume of patient prescription refills is processed in the most efficient and expeditious manner, we are asking our patients to assist us by calling your Pharmacy for all prescription refills, this will include also your  Mail Order Pharmacy. The pharmacy will contact our office electronically to continue the refill process. Please do not wait until the last minute to call your pharmacy. We need at least 48 hours (2days) to fill prescriptions. We also encourage you to call your pharmacy before going to  your prescription to make sure it is ready. With regard to controlled substance prescription refill requests (narcotic refills) that need to be picked up at our office, we ask your cooperation by providing us with at least 72 hours (3days) notice that you will need a refill. We will not refill narcotic prescription refill requests after 4:00pm on any weekday, Monday through Thursday, or after 2:00pm on Fridays, or on the weekends. We encourage everyone to explore another way of getting your prescription refill request processed using [employer], our patient web portal through our electronic medical record system. [employer] is an efficient and effective way to communicate your medication request directly to the office and  downloadable as an asad on your smart phone . [employer] also features a review functionality that allows you to view your medication list as well as leave messages for your physician. Are you ready to get connected? If so please review the attatched instructions or speak to any of our staff to get you set up right away! Thank you so much for your cooperation. Should you have any questions please contact our Practice Administrator. The Physicians and Staff,  [facility]

## 2021-01-11 NOTE — PROGRESS NOTES
Neurology Clinic Follow up Note    Patient ID:  Babita Bishop  [id number]  4 y.o.  1945      Ms. Leal is here for follow up today of  No chief complaint on file. Last Appointment With Me:  Visit date not found       Interval History: In interval from prior visit, Ms. Janice Jimenez says she is noticed increased stiffness in her left lower extremity. Denies any weakness just has not progressed to the point where she is free of a walker which is frustrating her. No history of falls noted and no worsening symptoms. Urinary symptoms remain stable. PMHx/ PSHx/ FHx/ SHx:  Reviewed and unchanged previous visit. ROS:  Comprehensive review of systems negative except for as noted above. Objective:       Meds:  Current Outpatient Medications   Medication Sig Dispense Refill    potassium chloride SR (KLOR-CON 10) 10 mEq tablet TAKE 2 TABLETS BY MOUTH TWICE DAILY 360 Tab 0    metoprolol tartrate (LOPRESSOR) 50 mg tablet TAKE 1 TABLET BY MOUTH TWICE DAILY 180 Tab 1    amLODIPine (NORVASC) 10 mg tablet TAKE 1 TABLET BY MOUTH EVERY DAY 90 Tab 1    sodium chloride 1 gram tablet TAKE 3 TABLETS BY MOUTH TWICE DAILY WITH FOOD 540 Tab 1    baclofen (LIORESAL) 10 mg tablet Take 5 mg by mouth three (3) times daily.  polyvinyl alcohol/povidone (ARTIFICIAL TEARS OP) 2 Drops by IntraOCUlar route every two (2) hours as needed for Other (dry eyes).  ipratropium (ATROVENT HFA) 17 mcg/actuation inhaler Take 2 Puffs by inhalation every four (4) hours as needed for Cough, Respiratory Distress or Shortness of Breath.  aspirin delayed-release 81 mg tablet Take 81 mg by mouth daily.  calcium carbonate/vitamin D3 (CALCIUM 500 + D PO) Take 1 Tab by mouth two (2) times daily (with meals).  multivitamin (ONE A DAY) tablet Take 1 Tab by mouth daily.          Exam:  Visit Vitals  /60   Pulse 86   Resp 16   Ht 5' 2\" (1.575 m)   Wt 120 lb (54.4 kg)   SpO2 94%   BMI 21.95 kg/m²     Physical examination:  Pleasant female resting comfortably in chair no distress. HEENT appears unremarkable. Neck appears supple. Cardiovascular, pulmonary abdominal exams are deferred. Extremities are warm/dry. Neurologically patient appears alert and oriented attention intact. Speech clear, language fluent. Cranials 2 through 12 are unremarkable. Motorically patient has 5 out of 5 strength in upper extremities, right leg is approximately 5 out of 5. Left leg is 4-5 at iliopsoas 4-5 knee flexion 5 out of 5 knee extension, 3 out of 5 left dorsiflexion 5 out of 5 plantarflexion. Hamstrings are tight. Sensation is grossly intact. Coordination is intact. On gait testing with a walker patient has decreased leg swing and knee movement on the left.     LABS  Results for orders placed or performed during the hospital encounter of 05/19/20   CULTURE, URINE    Specimen: Urine   Result Value Ref Range    Special Requests: NO SPECIAL REQUESTS  Reflexed from [id number]        [place] Count >100,000  COLONIES/mL        Culture result: ENTEROCOCCUS FAECALIS (A)         Susceptibility    Enterococcus faecalis - CM     Ampicillin ($) <=2 Susceptible ug/mL     Ciprofloxacin ($) 1 Susceptible ug/mL     Nitrofurantoin <=16 Susceptible ug/mL     Tetracycline >=16 Resistant ug/mL     Vancomycin ($) 1 Susceptible ug/mL     Levofloxacin ($) 2 Susceptible ug/mL     Linezolid ($$$$$) 2 Susceptible ug/mL     Daptomycin ($$$$$) 1 Susceptible ug/mL   URINALYSIS W/ REFLEX CULTURE    Specimen: Urine   Result Value Ref Range    Color YELLOW/STRAW      Appearance CLEAR CLEAR      Specific gravity 1.011 1.003 - 1.030      pH (UA) 7.5 5.0 - 8.0      Protein Negative NEG mg/dL    Glucose Negative NEG mg/dL    Ketone Negative NEG mg/dL    Bilirubin Negative NEG      Blood Negative NEG      Urobilinogen 0.2 0.2 - 1.0 EU/dL    Nitrites Negative NEG      Leukocyte Esterase TRACE (A) NEG      WBC 5-10 0 - 4 /hpf    RBC 0-5 0 - 5 /hpf    Epithelial cells FEW FEW /lpf    Bacteria 2+ (A) NEG /hpf    UA:UC IF INDICATED CULTURE NOT INDICATED BY UA RESULT CNI      Hyaline cast 0-2 0 - 5 /lpf   METABOLIC PANEL, COMPREHENSIVE   Result Value Ref Range    Sodium 133 (L) 136 - 145 mmol/L    Potassium 4.5 3.5 - 5.1 mmol/L    Chloride 100 97 - 108 mmol/L    CO2 30 21 - 32 mmol/L    Anion gap 3 (L) 5 - 15 mmol/L    Glucose 98 65 - 100 mg/dL    BUN 15 6 - 20 MG/DL    Creatinine 0.92 0.55 - 1.02 MG/DL    BUN/Creatinine ratio 16 12 - 20      GFR est AA >60 >60 ml/min/1.73m2    GFR est non-AA 60 (L) >60 ml/min/1.73m2    Calcium 10.2 (H) 8.5 - 10.1 MG/DL    Bilirubin, total 0.9 0.2 - 1.0 MG/DL    ALT (SGPT) 79 (H) 12 - 78 U/L    AST (SGOT) 81 (H) 15 - 37 U/L    Alk.  phosphatase 139 (H) 45 - 117 U/L    Protein, total 7.9 6.4 - 8.2 g/dL    Albumin 3.6 3.5 - 5.0 g/dL    Globulin 4.3 (H) 2.0 - 4.0 g/dL    A-G Ratio 0.8 (L) 1.1 - 2.2     MAGNESIUM   Result Value Ref Range    Magnesium 2.3 1.6 - 2.4 mg/dL   VITAMIN D, 25 HYDROXY   Result Value Ref Range    Vitamin D 25-Hydroxy 37.1 30 - 100 ng/mL   URINALYSIS W/ REFLEX CULTURE    Specimen: Urine   Result Value Ref Range    Color YELLOW/STRAW      Appearance CLOUDY (A) CLEAR      Specific gravity 1.013 1.003 - 1.030      pH (UA) 6.5 5.0 - 8.0      Protein Negative NEG mg/dL    Glucose Negative NEG mg/dL    Ketone Negative NEG mg/dL    Bilirubin Negative NEG      Blood Negative NEG      Urobilinogen 1.0 0.2 - 1.0 EU/dL    Nitrites Negative NEG      Leukocyte Esterase MODERATE (A) NEG      WBC 10-20 0 - 4 /hpf    RBC 0-5 0 - 5 /hpf    Epithelial cells FEW FEW /lpf    Bacteria 4+ (A) NEG /hpf    UA:UC IF INDICATED URINE CULTURE ORDERED (A) CNI      Hyaline cast 0-2 0 - 5 /lpf   CBC W/O DIFF   Result Value Ref Range    WBC 9.4 3.6 - 11.0 K/uL    RBC 4.20 3.80 - 5.20 M/uL    HGB 14.2 11.5 - 16.0 g/dL    HCT 41.2 35.0 - 47.0 %    MCV 98.1 80.0 - 99.0 FL    MCH 33.8 26.0 - 34.0 PG    MCHC 34.5 30.0 - 36.5 g/dL    RDW 12.3 11.5 - 14.5 %    PLATELET 349 150 - 400 K/uL    MPV 9.3 8.9 - 12.9 FL    NRBC 0.0 0  WBC    ABSOLUTE NRBC 0.00 0.00 - 0.11 K/uL   METABOLIC PANEL, BASIC   Result Value Ref Range    Sodium 135 (L) 136 - 145 mmol/L    Potassium 3.4 (L) 3.5 - 5.1 mmol/L    Chloride 102 97 - 108 mmol/L    CO2 29 21 - 32 mmol/L    Anion gap 4 (L) 5 - 15 mmol/L    Glucose 84 65 - 100 mg/dL    BUN 12 6 - 20 MG/DL    Creatinine 0.75 0.55 - 1.02 MG/DL    BUN/Creatinine ratio 16 12 - 20      GFR est AA >60 >60 ml/min/1.73m2    GFR est non-AA >60 >60 ml/min/1.73m2    Calcium 9.5 8.5 - 13.1 MG/DL   METABOLIC PANEL, BASIC   Result Value Ref Range    Sodium 136 136 - 145 mmol/L    Potassium 3.8 3.5 - 5.1 mmol/L    Chloride 103 97 - 108 mmol/L    CO2 28 21 - 32 mmol/L    Anion gap 5 5 - 15 mmol/L    Glucose 99 65 - 100 mg/dL    BUN 12 6 - 20 MG/DL    Creatinine 0.93 0.55 - 1.02 MG/DL    BUN/Creatinine ratio 13 12 - 20      GFR est AA >60 >60 ml/min/1.73m2    GFR est non-AA 59 (L) >60 ml/min/1.73m2    Calcium 9.0 8.5 - 10.1 MG/DL       IMAGING:  MRI Results (most recent):  Results from Hospital Encounter encounter on 05/13/20   MRI [facility] SPINE W CONT    Narrative INDICATION:   LE weakness, paresthesias, spasticity, urinary/bowel retention  eval for cord abnormality Please include DWI sequences    COMPARISON: May 13, 2020    TECHNIQUE: MRI of the thoracic and lumbar spine was performed with IV contrast,  as well as including sagittal diffusion-weighted imaging and viewed in  conjunction with recent noncontrast examinations. FINDINGS:    No abnormal intraspinal enhancement. Diffusion-weighted imaging is degraded by  artifact with no definite diffusion restriction involving the thoracic or lumbar  spinal cord. Impression IMPRESSION:    1. No abnormal intraspinal enhancement in the thoracic or lumbar spine. 2. No definite diffusion restriction involving the spinal cord to suggest cord  infarct.              Assessment:     Sven Sever is a 77year old RH dominant female who presents to [facility] clinic for evaluation of presumably prior episode of AIDP     Plan:   Acquired peripheral neuropathy:  Symptoms are overall stable without further progression, however patient does endorse significant stiffness in the left lower extremity.   This was a prior complaint, testing for antigad antibodies were unremarkable, anti-AMPA not tested as far as I am aware  Strength is otherwise largely unchanged, encourage patient to engage in ongoing home therapy as well as stretching and we will plan to reevaluate in 4 to 6 weeks time at which point further testing can be considered  No further recommendations at this time    Return to clinic in 4 to 6 weeks    Signed:  Peggy Benson MD  1/11/2021  12:11 PM

## 2021-01-11 NOTE — PROGRESS NOTES
Ms. Mearl Riedel presents today to follow up neuropathy of LLE. She reported her symptoms have worsened. Depression screening done on this patient.

## 2021-03-01 ENCOUNTER — VIRTUAL VISIT (OUTPATIENT)
Dept: NEUROLOGY | Age: 76
End: 2021-03-01
Payer: MEDICARE

## 2021-03-01 DIAGNOSIS — G61.0 AIDP (ACUTE INFLAMMATORY DEMYELINATING POLYNEUROPATHY) (HCC): Primary | ICD-10-CM

## 2021-03-01 PROCEDURE — 99441 PR PHYS/QHP TELEPHONE EVALUATION 5-10 MIN: CPT | Performed by: PSYCHIATRY & NEUROLOGY

## 2021-03-01 RX ORDER — BACLOFEN 10 MG/1
5 TABLET ORAL
Qty: 63 TAB | Refills: 0 | Status: SHIPPED | OUTPATIENT
Start: 2021-03-01 | End: 2021-03-22

## 2021-03-01 NOTE — PROGRESS NOTES
This is a telemedicine visit that was performed with in the originating site at the patient's home and the distance site at [facility] clinicVerbal consent to participate in the video visit was obtained. This visit occurred during the corona (COVID -19) public health emergency. I discussed with the patient the nature of our telemedicine visit is that:   - I would evaluate the patient and recommend diagnostic and treatment based on my assessment   - Our sessions are not being recorded and that personal health information is protected   - Our team will provide follow-up care in person if when the patient needs it. This visit was performed via telehealth modality to facilitate ongoing care patient's complications from prior acquired neuropathy during the COVID-19 pandemic    Neurology Clinic Follow up Note    Patient ID:  Lalo Augustin  [id number]  77 y.o.  1945      Ms. Leal is here for follow up today of  Chief Complaint   Patient presents with    Follow-up    Leg Problem     Left leg still stiff          Last Appointment With Me:  1/11/2021       Interval History:     Interval from prior visit, no significant changes have been noted. Denies any worsening symptoms with improvement in bladder function noted. She does continue to suffer stiffness and pain in her left knee and calf for which conservative therapies have not been efficacious. Denies any new numbness or weakness. PMHx/ PSHx/ FHx/ SHx:  Reviewed and unchanged previous visit. ROS:  Comprehensive review of systems negative except for as noted above. Objective:       Meds:  Current Outpatient Medications   Medication Sig Dispense Refill    baclofen (LIORESAL) 10 mg tablet Take 0.5 Tabs by mouth three (3) times daily as needed for Muscle Spasm(s) for up to 21 days.  63 Tab 0    potassium chloride SR (KLOR-CON 10) 10 mEq tablet TAKE 2 TABLETS BY MOUTH TWICE DAILY 360 Tab 0    metoprolol tartrate (LOPRESSOR) 50 mg tablet TAKE 1 TABLET BY MOUTH TWICE DAILY 180 Tab 1    amLODIPine (NORVASC) 10 mg tablet TAKE 1 TABLET BY MOUTH EVERY DAY 90 Tab 1    sodium chloride 1 gram tablet TAKE 3 TABLETS BY MOUTH TWICE DAILY WITH FOOD 540 Tab 1    aspirin delayed-release 81 mg tablet Take 81 mg by mouth daily.  multivitamin (ONE A DAY) tablet Take 1 Tab by mouth daily.  polyvinyl alcohol/povidone (ARTIFICIAL TEARS OP) 2 Drops by IntraOCUlar route every two (2) hours as needed for Other (dry eyes).  ipratropium (ATROVENT HFA) 17 mcg/actuation inhaler Take 2 Puffs by inhalation every four (4) hours as needed for Cough, Respiratory Distress or Shortness of Breath.  calcium carbonate/vitamin D3 (CALCIUM 500 + D PO) Take 1 Tab by mouth two (2) times daily (with meals). Physical exam:  Pleasant female briefly visualized on video otherwise spoken to over the phone. Examination is largely deferred due to telephone nature of the visit. She is speaking in full sentences without dysarthria or evidence of aphasia. No evidence of cardiopulmonary distress treatability to converse freely throughout the duration of the conversation.   lucia Carole is unable to be performed    LABS  Results for orders placed or performed during the hospital encounter of 05/19/20   CULTURE, URINE    Specimen: Urine   Result Value Ref Range    Special Requests: NO SPECIAL REQUESTS  Reflexed from [id number]        [place] Count >100,000  COLONIES/mL        Culture result: ENTEROCOCCUS FAECALIS (A)         Susceptibility    Enterococcus faecalis - CM     Ampicillin ($) <=2 Susceptible ug/mL     Ciprofloxacin ($) 1 Susceptible ug/mL     Nitrofurantoin <=16 Susceptible ug/mL     Tetracycline >=16 Resistant ug/mL     Vancomycin ($) 1 Susceptible ug/mL     Levofloxacin ($) 2 Susceptible ug/mL     Linezolid ($$$$$) 2 Susceptible ug/mL     Daptomycin ($$$$$) 1 Susceptible ug/mL   URINALYSIS W/ REFLEX CULTURE    Specimen: Urine   Result Value Ref Range    Color YELLOW/STRAW      Appearance CLEAR CLEAR      Specific gravity 1.011 1.003 - 1.030      pH (UA) 7.5 5.0 - 8.0      Protein Negative NEG mg/dL    Glucose Negative NEG mg/dL    Ketone Negative NEG mg/dL    Bilirubin Negative NEG      Blood Negative NEG      Urobilinogen 0.2 0.2 - 1.0 EU/dL    Nitrites Negative NEG      Leukocyte Esterase TRACE (A) NEG      WBC 5-10 0 - 4 /hpf    RBC 0-5 0 - 5 /hpf    Epithelial cells FEW FEW /lpf    Bacteria 2+ (A) NEG /hpf    UA:UC IF INDICATED CULTURE NOT INDICATED BY UA RESULT CNI      Hyaline cast 0-2 0 - 5 /lpf   METABOLIC PANEL, COMPREHENSIVE   Result Value Ref Range    Sodium 133 (L) 136 - 145 mmol/L    Potassium 4.5 3.5 - 5.1 mmol/L    Chloride 100 97 - 108 mmol/L    CO2 30 21 - 32 mmol/L    Anion gap 3 (L) 5 - 15 mmol/L    Glucose 98 65 - 100 mg/dL    BUN 15 6 - 20 MG/DL    Creatinine 0.92 0.55 - 1.02 MG/DL    BUN/Creatinine ratio 16 12 - 20      GFR est AA >60 >60 ml/min/1.73m2    GFR est non-AA 60 (L) >60 ml/min/1.73m2    Calcium 10.2 (H) 8.5 - 10.1 MG/DL    Bilirubin, total 0.9 0.2 - 1.0 MG/DL    ALT (SGPT) 79 (H) 12 - 78 U/L    AST (SGOT) 81 (H) 15 - 37 U/L    Alk.  phosphatase 139 (H) 45 - 117 U/L    Protein, total 7.9 6.4 - 8.2 g/dL    Albumin 3.6 3.5 - 5.0 g/dL    Globulin 4.3 (H) 2.0 - 4.0 g/dL    A-G Ratio 0.8 (L) 1.1 - 2.2     MAGNESIUM   Result Value Ref Range    Magnesium 2.3 1.6 - 2.4 mg/dL   VITAMIN D, 25 HYDROXY   Result Value Ref Range    Vitamin D 25-Hydroxy 37.1 30 - 100 ng/mL   URINALYSIS W/ REFLEX CULTURE    Specimen: Urine   Result Value Ref Range    Color YELLOW/STRAW      Appearance CLOUDY (A) CLEAR      Specific gravity 1.013 1.003 - 1.030      pH (UA) 6.5 5.0 - 8.0      Protein Negative NEG mg/dL    Glucose Negative NEG mg/dL    Ketone Negative NEG mg/dL    Bilirubin Negative NEG      Blood Negative NEG      Urobilinogen 1.0 0.2 - 1.0 EU/dL    Nitrites Negative NEG      Leukocyte Esterase MODERATE (A) NEG WBC 10-20 0 - 4 /hpf    RBC 0-5 0 - 5 /hpf    Epithelial cells FEW FEW /lpf    Bacteria 4+ (A) NEG /hpf    UA:UC IF INDICATED URINE CULTURE ORDERED (A) CNI      Hyaline cast 0-2 0 - 5 /lpf   CBC W/O DIFF   Result Value Ref Range    WBC 9.4 3.6 - 11.0 K/uL    RBC 4.20 3.80 - 5.20 M/uL    HGB 14.2 11.5 - 16.0 g/dL    HCT 41.2 35.0 - 47.0 %    MCV 98.1 80.0 - 99.0 FL    MCH 33.8 26.0 - 34.0 PG    MCHC 34.5 30.0 - 36.5 g/dL    RDW 12.3 11.5 - 14.5 %    PLATELET 256 231 - 066 K/uL    MPV 9.3 8.9 - 12.9 FL    NRBC 0.0 0  WBC    ABSOLUTE NRBC 0.00 0.00 - 3.14 K/uL   METABOLIC PANEL, BASIC   Result Value Ref Range    Sodium 135 (L) 136 - 145 mmol/L    Potassium 3.4 (L) 3.5 - 5.1 mmol/L    Chloride 102 97 - 108 mmol/L    CO2 29 21 - 32 mmol/L    Anion gap 4 (L) 5 - 15 mmol/L    Glucose 84 65 - 100 mg/dL    BUN 12 6 - 20 MG/DL    Creatinine 0.75 0.55 - 1.02 MG/DL    BUN/Creatinine ratio 16 12 - 20      GFR est AA >60 >60 ml/min/1.73m2    GFR est non-AA >60 >60 ml/min/1.73m2    Calcium 9.5 8.5 - 37.0 MG/DL   METABOLIC PANEL, BASIC   Result Value Ref Range    Sodium 136 136 - 145 mmol/L    Potassium 3.8 3.5 - 5.1 mmol/L    Chloride 103 97 - 108 mmol/L    CO2 28 21 - 32 mmol/L    Anion gap 5 5 - 15 mmol/L    Glucose 99 65 - 100 mg/dL    BUN 12 6 - 20 MG/DL    Creatinine 0.93 0.55 - 1.02 MG/DL    BUN/Creatinine ratio 13 12 - 20      GFR est AA >60 >60 ml/min/1.73m2    GFR est non-AA 59 (L) >60 ml/min/1.73m2    Calcium 9.0 8.5 - 10.1 MG/DL       IMAGING:  MRI Results (most recent):  Results from Hospital Encounter encounter on 05/13/20   MRI [facility] SPINE W CONT    Narrative INDICATION:   LE weakness, paresthesias, spasticity, urinary/bowel retention  eval for cord abnormality Please include DWI sequences    COMPARISON: May 13, 2020    TECHNIQUE: MRI of the thoracic and lumbar spine was performed with IV contrast,  as well as including sagittal diffusion-weighted imaging and viewed in  conjunction with recent noncontrast examinations. FINDINGS:    No abnormal intraspinal enhancement. Diffusion-weighted imaging is degraded by  artifact with no definite diffusion restriction involving the thoracic or lumbar  spinal cord. Impression IMPRESSION:    1. No abnormal intraspinal enhancement in the thoracic or lumbar spine. 2. No definite diffusion restriction involving the spinal cord to suggest cord  infarct. Assessment:   No diagnosis found. Plan:   History of acquired neuropathy:  Evaluation is quite limited given telephone nature of today's visit due to AV difficulties. However patient denies any progressive symptoms really just endorses stiffness in her left calf knee as her ongoing one  We will attempt a trial of baclofen 5 mg 3 times daily as needed and plan to follow-up in person in 1 month    5-10 minutes were spent present by me during this visit and document review, brief interview and discussion with patient and documentation.     Signed:  Chucky Negro MD  3/1/2021  12:02 PM

## 2021-03-04 ENCOUNTER — TELEPHONE (OUTPATIENT)
Dept: NEUROLOGY | Age: 76
End: 2021-03-04

## 2021-03-04 NOTE — TELEPHONE ENCOUNTER
----- Message from Lakhwinder Montalvo Page sent at 3/4/2021  9:07 AM EST -----  Regarding: Dr. Kishore Villalpando Telephone  General Message/Vendor Calls    Caller's first and last name: Patient       Reason for call: Covid vaccine clearance      Callback required yes/no and why: Yes. To advise      Best contact number(s): [phone]      Details to clarify the request: Patient forgot to mention she is scheduled for the first dose of the Covid Vaccine on 3/8/21.  Please contact to advise if it's ok for her to receive vaccine      Geovanna Miramontes

## 2021-03-04 NOTE — TELEPHONE ENCOUNTER
Edmond yIers     No issue that I am aware of from a Neurology standpoint in undergoing vaccination.      Bassem Lee

## 2021-03-09 DIAGNOSIS — I10 ESSENTIAL HYPERTENSION: ICD-10-CM

## 2021-03-09 NOTE — TELEPHONE ENCOUNTER
Patient has an appointment with DOMENIC Kaiser on 04/13/202. Can patient have a refill until can be seen by new provider? Last visit 04/13/2020 Virtual visit MD Read   Next appointment 04/13/2021 DOMENIC Kaiser   Previous refill encounter(s)   09/10/2020:   - Sodium chloride #540 with 1 refill,   - Norvasc #90 with 1 refill,  - Lopressor #180 with 1 refill     Requested Prescriptions     Pending Prescriptions Disp Refills    sodium chloride 1 gram tablet 540 Tab 0     Sig: Take 3 Tabs by mouth two (2) times daily (with meals).  metoprolol tartrate (LOPRESSOR) 50 mg tablet 180 Tab 0     Sig: Take 1 Tab by mouth two (2) times a day.  amLODIPine (NORVASC) 10 mg tablet 90 Tab 0     Sig: Take 1 Tab by mouth daily.

## 2021-03-12 NOTE — TELEPHONE ENCOUNTER
Pt left a voice mail following up on the refills that was requested. Please review and prescribe if appropriate. Thank you.

## 2021-03-15 RX ORDER — SODIUM CHLORIDE TAB 1 GM 1 G
3 TAB MISCELLANEOUS 2 TIMES DAILY WITH MEALS
Qty: 540 TAB | Refills: 0 | Status: SHIPPED | OUTPATIENT
Start: 2021-03-15 | End: 2021-06-21

## 2021-03-15 RX ORDER — AMLODIPINE BESYLATE 10 MG/1
10 TABLET ORAL DAILY
Qty: 90 TAB | Refills: 0 | Status: SHIPPED | OUTPATIENT
Start: 2021-03-15 | End: 2021-06-21

## 2021-03-15 RX ORDER — METOPROLOL TARTRATE 50 MG/1
50 TABLET ORAL 2 TIMES DAILY
Qty: 180 TAB | Refills: 0 | Status: SHIPPED | OUTPATIENT
Start: 2021-03-15 | End: 2021-06-21

## 2021-03-15 NOTE — TELEPHONE ENCOUNTER
Pt left voice mail requesting to speak to the  regarding the refill request. Pt states has called multiple times with no response to request.     Please contact pt. BCN: [phone]     Requested Prescriptions     Pending Prescriptions Disp Refills    sodium chloride 1 gram tablet 540 Tab 0     Sig: Take 3 Tabs by mouth two (2) times daily (with meals).  metoprolol tartrate (LOPRESSOR) 50 mg tablet 180 Tab 0     Sig: Take 1 Tab by mouth two (2) times a day.  amLODIPine (NORVASC) 10 mg tablet 90 Tab 0     Sig: Take 1 Tab by mouth daily.

## 2021-04-13 ENCOUNTER — OFFICE VISIT (OUTPATIENT)
Dept: FAMILY MEDICINE CLINIC | Age: 76
End: 2021-04-13
Payer: MEDICARE

## 2021-04-13 VITALS
TEMPERATURE: 96.9 F | WEIGHT: 104.4 LBS | HEIGHT: 62 IN | RESPIRATION RATE: 14 BRPM | BODY MASS INDEX: 19.21 KG/M2 | SYSTOLIC BLOOD PRESSURE: 148 MMHG | DIASTOLIC BLOOD PRESSURE: 60 MMHG | OXYGEN SATURATION: 90 % | HEART RATE: 78 BPM

## 2021-04-13 DIAGNOSIS — Z83.3 FAMILY HISTORY OF DIABETES MELLITUS (DM): ICD-10-CM

## 2021-04-13 DIAGNOSIS — R63.4 UNEXPLAINED WEIGHT LOSS: ICD-10-CM

## 2021-04-13 DIAGNOSIS — Z72.0 TOBACCO ABUSE: ICD-10-CM

## 2021-04-13 DIAGNOSIS — R42 DIZZINESS: ICD-10-CM

## 2021-04-13 DIAGNOSIS — E22.2 SIADH (SYNDROME OF INAPPROPRIATE ADH PRODUCTION) (HCC): ICD-10-CM

## 2021-04-13 DIAGNOSIS — I10 ESSENTIAL HYPERTENSION: ICD-10-CM

## 2021-04-13 DIAGNOSIS — Z76.89 ENCOUNTER TO ESTABLISH CARE: Primary | ICD-10-CM

## 2021-04-13 LAB
ALBUMIN SERPL-MCNC: 3.8 G/DL (ref 3.5–5)
ALBUMIN/GLOB SERPL: 0.9 {RATIO} (ref 1.1–2.2)
ALP SERPL-CCNC: 180 U/L (ref 45–117)
ALT SERPL-CCNC: 17 U/L (ref 12–78)
ANION GAP SERPL CALC-SCNC: 6 MMOL/L (ref 5–15)
AST SERPL-CCNC: 17 U/L (ref 15–37)
BASOPHILS # BLD: 0.1 K/UL (ref 0–0.1)
BASOPHILS NFR BLD: 1 % (ref 0–1)
BILIRUB SERPL-MCNC: 0.5 MG/DL (ref 0.2–1)
BILIRUB UR QL STRIP: NEGATIVE
BUN SERPL-MCNC: 9 MG/DL (ref 6–20)
BUN/CREAT SERPL: 12 (ref 12–20)
CALCIUM SERPL-MCNC: 9.5 MG/DL (ref 8.5–10.1)
CHLORIDE SERPL-SCNC: 103 MMOL/L (ref 97–108)
CHOLEST SERPL-MCNC: 189 MG/DL
CO2 SERPL-SCNC: 30 MMOL/L (ref 21–32)
COMMENT, HOLDF: NORMAL
CREAT SERPL-MCNC: 0.75 MG/DL (ref 0.55–1.02)
DIFFERENTIAL METHOD BLD: ABNORMAL
EOSINOPHIL # BLD: 0 K/UL (ref 0–0.4)
EOSINOPHIL NFR BLD: 0 % (ref 0–7)
ERYTHROCYTE [DISTWIDTH] IN BLOOD BY AUTOMATED COUNT: 13.4 % (ref 11.5–14.5)
GLOBULIN SER CALC-MCNC: 4.2 G/DL (ref 2–4)
GLUCOSE SERPL-MCNC: 95 MG/DL (ref 65–100)
GLUCOSE UR-MCNC: NEGATIVE MG/DL
HBA1C MFR BLD HPLC: 4.8 %
HCT VFR BLD AUTO: 50.3 % (ref 35–47)
HDLC SERPL-MCNC: 71 MG/DL
HDLC SERPL: 2.7 {RATIO} (ref 0–5)
HGB BLD-MCNC: 16.6 G/DL (ref 11.5–16)
IMM GRANULOCYTES # BLD AUTO: 0 K/UL (ref 0–0.04)
IMM GRANULOCYTES NFR BLD AUTO: 0 % (ref 0–0.5)
KETONES P FAST UR STRIP-MCNC: NEGATIVE MG/DL
LDLC SERPL CALC-MCNC: 83.8 MG/DL (ref 0–100)
LIPID PROFILE,FLP: ABNORMAL
LYMPHOCYTES # BLD: 4 K/UL (ref 0.8–3.5)
LYMPHOCYTES NFR BLD: 32 % (ref 12–49)
MCH RBC QN AUTO: 34.2 PG (ref 26–34)
MCHC RBC AUTO-ENTMCNC: 33 G/DL (ref 30–36.5)
MCV RBC AUTO: 103.5 FL (ref 80–99)
MONOCYTES # BLD: 1 K/UL (ref 0–1)
MONOCYTES NFR BLD: 8 % (ref 5–13)
NEUTS SEG # BLD: 7.6 K/UL (ref 1.8–8)
NEUTS SEG NFR BLD: 59 % (ref 32–75)
NRBC # BLD: 0 K/UL (ref 0–0.01)
NRBC BLD-RTO: 0 PER 100 WBC
PH UR STRIP: 7 [PH] (ref 4.6–8)
PLATELET # BLD AUTO: 290 K/UL (ref 150–400)
PMV BLD AUTO: 9.1 FL (ref 8.9–12.9)
POTASSIUM SERPL-SCNC: 3.7 MMOL/L (ref 3.5–5.1)
PROT SERPL-MCNC: 8 G/DL (ref 6.4–8.2)
PROT UR QL STRIP: NEGATIVE
RBC # BLD AUTO: 4.86 M/UL (ref 3.8–5.2)
SAMPLES BEING HELD,HOLD: NORMAL
SODIUM SERPL-SCNC: 139 MMOL/L (ref 136–145)
SP GR UR STRIP: 1.02 (ref 1–1.03)
TRIGL SERPL-MCNC: 171 MG/DL (ref ?–150)
TSH SERPL DL<=0.05 MIU/L-ACNC: 1.04 UIU/ML (ref 0.36–3.74)
UA UROBILINOGEN AMB POC: NORMAL (ref 0.2–1)
URINALYSIS CLARITY POC: CLEAR
URINALYSIS COLOR POC: YELLOW
URINE BLOOD POC: NORMAL
URINE LEUKOCYTES POC: NORMAL
URINE NITRITES POC: POSITIVE
VLDLC SERPL CALC-MCNC: 34.2 MG/DL
WBC # BLD AUTO: 12.7 K/UL (ref 3.6–11)

## 2021-04-13 PROCEDURE — G8427 DOCREV CUR MEDS BY ELIG CLIN: HCPCS | Performed by: NURSE PRACTITIONER

## 2021-04-13 PROCEDURE — G8420 CALC BMI NORM PARAMETERS: HCPCS | Performed by: NURSE PRACTITIONER

## 2021-04-13 PROCEDURE — G8536 NO DOC ELDER MAL SCRN: HCPCS | Performed by: NURSE PRACTITIONER

## 2021-04-13 PROCEDURE — 3288F FALL RISK ASSESSMENT DOCD: CPT | Performed by: NURSE PRACTITIONER

## 2021-04-13 PROCEDURE — 81003 URINALYSIS AUTO W/O SCOPE: CPT | Performed by: NURSE PRACTITIONER

## 2021-04-13 PROCEDURE — G8754 DIAS BP LESS 90: HCPCS | Performed by: NURSE PRACTITIONER

## 2021-04-13 PROCEDURE — G8399 PT W/DXA RESULTS DOCUMENT: HCPCS | Performed by: NURSE PRACTITIONER

## 2021-04-13 PROCEDURE — 83036 HEMOGLOBIN GLYCOSYLATED A1C: CPT | Performed by: NURSE PRACTITIONER

## 2021-04-13 PROCEDURE — 99203 OFFICE O/P NEW LOW 30 MIN: CPT | Performed by: NURSE PRACTITIONER

## 2021-04-13 PROCEDURE — G8753 SYS BP > OR = 140: HCPCS | Performed by: NURSE PRACTITIONER

## 2021-04-13 PROCEDURE — 3017F COLORECTAL CA SCREEN DOC REV: CPT | Performed by: NURSE PRACTITIONER

## 2021-04-13 PROCEDURE — 1100F PTFALLS ASSESS-DOCD GE2>/YR: CPT | Performed by: NURSE PRACTITIONER

## 2021-04-13 PROCEDURE — 1090F PRES/ABSN URINE INCON ASSESS: CPT | Performed by: NURSE PRACTITIONER

## 2021-04-13 PROCEDURE — G0463 HOSPITAL OUTPT CLINIC VISIT: HCPCS | Performed by: NURSE PRACTITIONER

## 2021-04-13 PROCEDURE — G8432 DEP SCR NOT DOC, RNG: HCPCS | Performed by: NURSE PRACTITIONER

## 2021-04-13 RX ORDER — BACLOFEN 10 MG/1
5 TABLET ORAL
COMMUNITY
Start: 2020-08-19 | End: 2022-01-10 | Stop reason: ALTCHOICE

## 2021-04-13 NOTE — PROGRESS NOTES
Jonathon Law (: 1945) is a 77 y.o. female, new patient, here for evaluation of the following chief complaint(s):  Establish Care       ASSESSMENT/PLAN:  1. Encounter to establish care  2. Essential hypertension  -     CBC WITH AUTOMATED DIFF; Future  -     METABOLIC PANEL, COMPREHENSIVE; Future  -     LIPID PANEL; Future  -     AMB POC URINALYSIS DIP STICK AUTO W/O MICRO  -     AMB POC HEMOGLOBIN A1C  3. SIADH (syndrome of inappropriate ADH production) (HCC)  -     METABOLIC PANEL, COMPREHENSIVE; Future  -     AMB POC HEMOGLOBIN A1C  4. Dizziness  -     CBC WITH AUTOMATED DIFF; Future  -     METABOLIC PANEL, COMPREHENSIVE; Future  -     TSH 3RD GENERATION; Future  -     AMB POC HEMOGLOBIN A1C  -     XR CHEST PA LAT; Future  5. Unexplained weight loss  -     CBC WITH AUTOMATED DIFF; Future  -     METABOLIC PANEL, COMPREHENSIVE; Future  -     TSH 3RD GENERATION; Future  -     AMB POC HEMOGLOBIN A1C  -     XR CHEST PA LAT; Future  6. Family history of diabetes mellitus (DM)  -     AMB POC HEMOGLOBIN A1C  7. Tobacco abuse  -     XR CHEST PA LAT; Future    Check labs, may need referral to GI  Patient at this time refuses mammogram and colonoscopy    Return in about 3 months (around 2021), or if symptoms worsen or fail to improve. SUBJECTIVE/OBJECTIVE:  HPI   Patient comes in today to establish care and weight loss  Was in [facility] [place] for 6 days in May 2020 and 20 days in [address] [place] [address]. Did home health for 8 weeks, and 12 weeks outpt PT  Left knee muscle stiffness. 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 She has not appetite. Does eat junk foods, likes sweets - Eating ice cream, cookies.     Smoker 1ppd for 54 years. Had 4 years where she did not smoke but vaped. Thinks relates to some depression. Had some anxiety in her 20-30s, was treated for few years. Takes Calms Forte - helps her sleep, will use if she feels like she is jumping out of her skin  Had some dizziness for several days. Drinking fluids, staying hydrated. Denies changes in vision, chest pains, dyspnea, edema. Had cataract removed left eye in Dec 2020 and has right eye scheduled in May 2021. Hx SIADH - Takes sodium tabs daily. HTN-stable. taking amlodipine and metoprolol. Compliant w/ meds, low salt diet. No swelling, headache or dizziness. No chest pain, SOB, palpitations. Allergies   Allergen Reactions    Levaquin [Levofloxacin] Anaphylaxis     Throat and face became swollen.  Ace Inhibitors Vertigo    Hydrochlorothiazide Other (comments)     Hypotension and severe hyponatremia.     Penicillins Hives     While pregnant       Past Medical History:   Diagnosis Date    History of mammogram 10 years ago    Hypertension     Hyponatremia 09/11/2014    hospitalized for severe hyponatremia due to SIADH    Menopause     at age 37    Pap smear for cervical cancer screening 15 years ago    SIADH (syndrome of inappropriate ADH production) ([facility] [address]        Past Surgical History:   Procedure Laterality Date    HX CATARACT REMOVAL Right 1/14/2014    HX CHOLECYSTECTOMY      HX THROMBECTOMY Right     leg    HX TUBAL LIGATION         Social History     Socioeconomic History    Marital status:      Spouse name: Not on file    Number of children: Not on file    Years of education: Not on file    Highest education level: Not on file   Occupational History    Not on file   Social Needs    Financial resource strain: Not on file    Food insecurity     Worry: Not on file     Inability: Not on file    Transportation needs     Medical: Not on file     Non-medical: Not on file   Tobacco Use    Smoking status: Current Every Day Smoker     Packs/day: 1.00     Years: 40.00     Pack years: 40.00     Types: Cigarettes    Smokeless tobacco: Former User     Quit date: 9/11/2014   Substance and Sexual Activity    Alcohol use: Yes     Alcohol/week: 3.0 standard drinks     Types: 3 Standard drinks or equivalent per week     Frequency: 2-3 times a week     Drinks per session: 1 or 2     Binge frequency: Never    Drug use: No    Sexual activity: Not Currently   Lifestyle    Physical activity     Days per week: Not on file     Minutes per session: Not on file    Stress: Not on file   Relationships    Social connections     Talks on phone: Not on file     Gets together: Not on file     Attends Jehovah's witness service: Not on file     Active member of club or organization: Not on file     Attends meetings of clubs or organizations: Not on file     Relationship status: Not on file    Intimate partner violence     Fear of current or ex partner: Not on file     Emotionally abused: Not on file     Physically abused: Not on file     Forced sexual activity: Not on file   Other Topics Concern    Not on file   Social History Narrative    Not on file       Family History   Problem Relation Age of Onset    Kidney Disease Mother         uncertain       Current Outpatient Medications   Medication Sig    baclofen (LIORESAL) 10 mg tablet Take 5 mg by mouth two (2) times daily as needed for Pain.  sodium chloride 1 gram tablet Take 3 Tabs by mouth two (2) times daily (with meals).  metoprolol tartrate (LOPRESSOR) 50 mg tablet Take 1 Tab by mouth two (2) times a day.  amLODIPine (NORVASC) 10 mg tablet Take 1 Tab by mouth daily.  potassium chloride SR (KLOR-CON 10) 10 mEq tablet TAKE 2 TABLETS BY MOUTH TWICE DAILY (Patient taking differently: 10 mEq. TAKE 2 TABLETS BY MOUTH TWICE DAILY)    polyvinyl alcohol/povidone (ARTIFICIAL TEARS OP) 2 Drops by IntraOCUlar route every two (2) hours as needed for Other (dry eyes).     ipratropium (ATROVENT HFA) 17 mcg/actuation inhaler Take 2 Puffs by inhalation every four (4) hours as needed for Cough, Respiratory Distress or Shortness of Breath.  aspirin delayed-release 81 mg tablet Take 81 mg by mouth daily.  multivitamin (ONE A DAY) tablet Take 1 Tab by mouth daily. No current facility-administered medications for this visit. Review of Systems   Constitutional: Positive for appetite change and unexpected weight change. Negative for chills, diaphoresis, fatigue and fever. Respiratory: Negative for cough and shortness of breath. Cardiovascular: Negative for chest pain, palpitations and leg swelling. Gastrointestinal: Negative for abdominal pain, blood in stool, constipation, diarrhea, nausea and vomiting. Genitourinary: Negative for dysuria, flank pain, frequency, hematuria and urgency. Musculoskeletal: Negative for back pain and myalgias. Skin: Negative. Neurological: Positive for weakness and numbness. Negative for dizziness, speech difficulty, light-headedness and headaches. Psychiatric/Behavioral: Positive for dysphoric mood (depression about health and COVID). Negative for sleep disturbance. The patient is not nervous/anxious. Vitals:    04/13/21 1445 04/13/21 1510   BP: (!) 161/74 (!) 148/60   Pulse: 78    Resp: 14    Temp: 96.9 °F (36.1 °C)    TempSrc: Skin    SpO2: 90%    Weight: 104 lb 6.4 oz (47.4 kg)    Height: 5' 2\" (1.575 m)      Physical Exam  Vitals signs reviewed. Constitutional:       Appearance: Normal appearance. She is well-developed and well-groomed. She is cachectic. HENT:      Right Ear: Hearing normal.      Left Ear: Hearing normal.   Neck:      Thyroid: No thyromegaly. Cardiovascular:      Rate and Rhythm: Normal rate and regular rhythm. Pulses:           Dorsalis pedis pulses are 2+ on the right side and 2+ on the left side.       Heart sounds: Normal heart sounds, S1 normal and S2 normal.   Pulmonary:      Effort: Pulmonary effort is normal.      Breath sounds: Normal breath sounds. Abdominal:      General: Bowel sounds are normal.      Palpations: Abdomen is soft. Tenderness: There is no abdominal tenderness. Musculoskeletal:      Right lower leg: No edema. Left lower leg: No edema. Lymphadenopathy:      Cervical: No cervical adenopathy. Skin:     General: Skin is warm and dry. Neurological:      Mental Status: She is alert and oriented to person, place, and time. Psychiatric:         Attention and Perception: Attention normal.         Mood and Affect: Mood and affect normal.         Speech: Speech normal.         Behavior: Behavior normal. Behavior is cooperative. Thought Content: Thought content normal.         Cognition and Memory: Cognition and memory normal.       On this date 04/13/2021 I have spent 35 minutes reviewing previous notes, test results and face to face with the patient discussing the diagnosis and importance of compliance with the treatment plan as well as documenting on the day of the visit. An electronic signature was used to authenticate this note.   -- Valerie Plummer NP

## 2021-04-13 NOTE — PROGRESS NOTES
Chief Complaint   Patient presents with   Bhaskar      PCP - Dr. Bogdan Amador concerned with feeling lightheaded and dizzy. 1. Have you been to the ER, urgent care clinic since your last visit? Hospitalized since your last visit? No    2. Have you seen or consulted any other health care providers outside of the [address] [place] [address] since your last visit? Include any pap smears or colon screening.  No     Colonoscopy - Pt states had one in her 45s  Mammo - Pt aware overdue    Health Maintenance Due   Topic Date Due    Shingrix Vaccine Age 49> (1 of 2) Never done    Colorectal Cancer Screening Combo  09/02/2015    Breast Cancer Screen Mammogram  04/27/2018    Medicare Yearly Exam  04/14/2021

## 2021-04-13 NOTE — PATIENT INSTRUCTIONS
Dizziness: Care Instructions Your Care Instructions Dizziness is the feeling of unsteadiness or fuzziness in your head. It is different than having vertigo, which is a feeling that the room is spinning or that you are moving or falling. It is also different from lightheadedness, which is the feeling that you are about to faint. It can be hard to know what causes dizziness. Some people feel dizzy when they have migraine headaches. Sometimes bouts of flu can make you feel dizzy. Some medical conditions, such as heart problems or high blood pressure, can make you feel dizzy. Many medicines can cause dizziness, including medicines for high blood pressure, pain, or anxiety. If a medicine causes your symptoms, your doctor may recommend that you stop or change the medicine. If it is a problem with your heart, you may need medicine to help your heart work better. If there is no clear reason for your symptoms, your doctor may suggest watching and waiting for a while to see if the dizziness goes away on its own. Follow-up care is a key part of your treatment and safety. Be sure to make and go to all appointments, and call your doctor if you are having problems. It's also a good idea to know your test results and keep a list of the medicines you take. How can you care for yourself at home? · If your doctor recommends or prescribes medicine, take it exactly as directed. Call your doctor if you think you are having a problem with your medicine. · Do not drive while you feel dizzy. · Try to prevent falls. Steps you can take include: ? Using nonskid mats, adding grab bars near the tub, and using night-lights. ? Clearing your home so that walkways are free of anything you might trip on. 
? Letting family and friends know that you have been feeling dizzy. This will help them know how to help you. When should you call for help? Call 911 anytime you think you may need emergency care.  For example, call if: 
  · You passed out (lost consciousness).  
  · You have dizziness along with symptoms of a heart attack. These may include: 
? Chest pain or pressure, or a strange feeling in the chest. 
? Sweating. ? Shortness of breath. ? Nausea or vomiting. ? Pain, pressure, or a strange feeling in the back, neck, jaw, or upper belly or in one or both shoulders or arms. ? Lightheadedness or sudden weakness. ? A fast or irregular heartbeat.  
  · You have symptoms of a stroke. These may include: 
? Sudden numbness, tingling, weakness, or loss of movement in your face, arm, or leg, especially on only one side of your body. ? Sudden vision changes. ? Sudden trouble speaking. ? Sudden confusion or trouble understanding simple statements. ? Sudden problems with walking or balance. ? A sudden, severe headache that is different from past headaches. Call your doctor now or seek immediate medical care if: 
  · You feel dizzy and have a fever, headache, or ringing in your ears.  
  · You have new or increased nausea and vomiting.  
  · Your dizziness does not go away or comes back. Watch closely for changes in your health, and be sure to contact your doctor if: 
  · You do not get better as expected. Where can you learn more? Go to http://www.gray.com/ Enter N011 in the search box to learn more about \"Dizziness: Care Instructions. \" Current as of: February 26, 2020               Content Version: 12.8 © 3994-0203 [employer]. Care instructions adapted under license by [employer] (which disclaims liability or warranty for this information). If you have questions about a medical condition or this instruction, always ask your healthcare professional. [place] [address] any warranty or liability for your use of this information.

## 2021-04-18 NOTE — PROGRESS NOTES
RECOMMENDATIONS:  Urinalysis is positive for urinary tract infection. I have sent a prescription for Macrobid, a urinary antibiotic, to your pharmacy. Diabetes and thyroid screen negative. Liver and kidney function normal.  Cholesterol numbers look great! WBC (white blood cell) slightly elevated. This could be from UTI. Hemoglobin and hematocrit are elevated. This could be from smoking.   Would like to repeat and check couple other labs. (peripheral smear, serum EPO, Ethan-2)

## 2021-04-19 ENCOUNTER — TELEPHONE (OUTPATIENT)
Dept: FAMILY MEDICINE CLINIC | Age: 76
End: 2021-04-19

## 2021-04-19 RX ORDER — NITROFURANTOIN 25; 75 MG/1; MG/1
100 CAPSULE ORAL 2 TIMES DAILY
Qty: 14 CAP | Refills: 0 | Status: SHIPPED | OUTPATIENT
Start: 2021-04-19 | End: 2021-04-26

## 2021-04-19 NOTE — TELEPHONE ENCOUNTER
----- Message from James Myrick NP sent at 4/18/2021  6:40 PM EDT -----  RECOMMENDATIONS:  Urinalysis is positive for urinary tract infection. I have sent a prescription for Macrobid, a urinary antibiotic, to your pharmacy. Diabetes and thyroid screen negative. Liver and kidney function normal.  Cholesterol numbers look great! WBC (white blood cell) slightly elevated. This could be from UTI. Hemoglobin and hematocrit are elevated. This could be from smoking.   Would like to repeat and check couple other labs. (peripheral smear, serum EPO, Ethan-2)

## 2021-04-19 NOTE — TELEPHONE ENCOUNTER
Verified patient with two type of identifiers. Informed pt of lab results and/or prescription(s). Pt verbalized understanding. Pt going to call back to schedule lab only.

## 2021-04-19 NOTE — TELEPHONE ENCOUNTER
----- Message from Kei Bethea sent at 4/19/2021  1:58 PM EDT -----  Regarding: NP Awilda/Telephone  Patient return call    Caller's first and last name and relationship (if not the patient): pt      Best contact number(s): [phone]      Whose call is being returned: Eda      Details to clarify the request:  Pt returning missed call      Kei eBthea

## 2021-04-20 NOTE — TELEPHONE ENCOUNTER
Orders Placed This Encounter    nitrofurantoin, macrocrystal-monohydrate, (MACROBID) 100 mg capsule     Sig: Take 1 Cap by mouth two (2) times a day for 7 days.      Dispense:  14 Cap     Refill:  0

## 2021-04-21 ENCOUNTER — APPOINTMENT (OUTPATIENT)
Dept: GENERAL RADIOLOGY | Age: 76
End: 2021-04-21
Attending: PHYSICIAN ASSISTANT
Payer: MEDICARE

## 2021-04-21 ENCOUNTER — HOSPITAL ENCOUNTER (EMERGENCY)
Age: 76
Discharge: HOME | End: 2021-04-21
Attending: STUDENT IN AN ORGANIZED HEALTH CARE EDUCATION/TRAINING PROGRAM
Payer: MEDICARE

## 2021-04-21 VITALS
HEART RATE: 92 BPM | RESPIRATION RATE: 16 BRPM | SYSTOLIC BLOOD PRESSURE: 128 MMHG | OXYGEN SATURATION: 94 % | TEMPERATURE: 97.7 F | DIASTOLIC BLOOD PRESSURE: 67 MMHG

## 2021-04-21 DIAGNOSIS — M54.50 RIGHT LUMBAR PAIN: Primary | ICD-10-CM
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
## 2021-04-21 PROCEDURE — 74011000250 HC RX REV CODE- 250: Performed by: PHYSICIAN ASSISTANT

## 2021-04-21 PROCEDURE — 99283 EMERGENCY DEPT VISIT LOW MDM: CPT

## 2021-04-21 PROCEDURE — 73502 X-RAY EXAM HIP UNI 2-3 VIEWS: CPT

## 2021-04-21 PROCEDURE — 72100 X-RAY EXAM L-S SPINE 2/3 VWS: CPT

## 2021-04-21 PROCEDURE — 71046 X-RAY EXAM CHEST 2 VIEWS: CPT

## 2021-04-21 RX ORDER — LIDOCAINE 4 G/100G
1 PATCH TOPICAL
Status: DISCONTINUED | OUTPATIENT
Start: 2021-04-21 | End: 2021-04-21 | Stop reason: HOSPADM

## 2021-04-21 NOTE — ED PROVIDER NOTES
Darek Mckeon is a 77 y.o. female with PMhx of HTN, UTIs who presents from home to ED with cc of fall around 3:00 AM. States her L foot \"gave out on me\" and notes this happens frequently, has been evaluated for this in the past. Denies symptoms causing her fall. Denies head injury, LOC or neck pain. Endorses some R sided lower back pain. Denies saddle paresthesias, urinary or fecal incontinence or retention. States she was able to get up by herself after the fall. States she is ambulatory with a walker. Took 2 extra strength tylenol this morning and muscle relaxer. Notes recent dx of UTI for which she was rx'd abx. States she just started this yesterday. Pt denies additional symptoms of F/C, cough, congestion, rhinorrhea, CP, SOB, abdominal pain, N/V/D, constipation, HA, lightheadedness, dizziness, visual changes, neck pain/stiffness, rash, dysuria, urinary frequency    PMHx:menopause, HTN, SIADH, UTI  PSHx: cholecystectomy, tubal ligation, thrombectomy, cataract removal    PCP: Liana Gil, NP    There are no other complaints verbalized at this time.              Past Medical History:   Diagnosis Date    History of mammogram 10 years ago    Hypertension     Hyponatremia 09/11/2014    hospitalized for severe hyponatremia due to SIADH    Menopause     at age 37    Pap smear for cervical cancer screening 15 years ago    SIADH (syndrome of inappropriate ADH production) ([facility] [address]     UTI (urinary tract infection)        Past Surgical History:   Procedure Laterality Date    HX CATARACT REMOVAL Right 1/14/2014    HX CHOLECYSTECTOMY      HX THROMBECTOMY Right     leg    HX TUBAL LIGATION           Family History:   Problem Relation Age of Onset    Kidney Disease Mother         uncertain       Social History     Socioeconomic History    Marital status:      Spouse name: Not on file    Number of children: Not on file    Years of education: Not on file    Highest education level: Not on file   Occupational History    Not on file   Social Needs    Financial resource strain: Not on file    Food insecurity     Worry: Not on file     Inability: Not on file    Transportation needs     Medical: Not on file     Non-medical: Not on file   Tobacco Use    Smoking status: Current Every Day Smoker     Packs/day: 1.00     Years: 40.00     Pack years: 40.00     Types: Cigarettes    Smokeless tobacco: Former User     Quit date: 9/11/2014   Substance and Sexual Activity    Alcohol use: Yes     Alcohol/week: 3.0 standard drinks     Types: 3 Standard drinks or equivalent per week     Frequency: 2-3 times a week     Drinks per session: 1 or 2     Binge frequency: Never    Drug use: No    Sexual activity: Not Currently   Lifestyle    Physical activity     Days per week: Not on file     Minutes per session: Not on file    Stress: Not on file   Relationships    Social connections     Talks on phone: Not on file     Gets together: Not on file     Attends Taoist service: Not on file     Active member of club or organization: Not on file     Attends meetings of clubs or organizations: Not on file     Relationship status: Not on file    Intimate partner violence     Fear of current or ex partner: Not on file     Emotionally abused: Not on file     Physically abused: Not on file     Forced sexual activity: Not on file   Other Topics Concern    Not on file   Social History Narrative    Not on file         ALLERGIES: Levaquin [levofloxacin], Ace inhibitors, Hydrochlorothiazide, and Penicillins    Review of Systems   Constitutional: Negative for chills and fever. HENT: Negative for congestion and rhinorrhea. Eyes: Negative for visual disturbance. Respiratory: Negative for cough and shortness of breath. Cardiovascular: Negative for chest pain. Gastrointestinal: Negative for abdominal pain, constipation, diarrhea, nausea and vomiting. Genitourinary: Negative for dysuria and frequency. Musculoskeletal: Positive for back pain. Negative for neck pain and neck stiffness. Skin: Negative for rash. Neurological: Negative for dizziness, weakness, light-headedness and headaches. All other systems reviewed and are negative. Vitals:    04/21/21 0917 04/21/21 1101   BP: 131/66 128/67   Pulse: 100 92   Resp: 16 16   Temp: 97 °F (36.1 °C) 97.7 °F (36.5 °C)   SpO2: 93% 94%            Physical Exam  Vitals signs and nursing note reviewed. Constitutional:       General: She is not in acute distress. Appearance: Normal appearance. She is not ill-appearing, toxic-appearing or diaphoretic. HENT:      Head: Atraumatic. No raccoon eyes or Deleon's sign. Jaw: There is normal jaw occlusion. Right Ear: Tympanic membrane, ear canal and external ear normal. No drainage. No hemotympanum. Left Ear: Tympanic membrane, ear canal and external ear normal. No drainage. No hemotympanum. Nose: Nose normal.      Right Nostril: No epistaxis or septal hematoma. Left Nostril: No epistaxis or septal hematoma. Mouth/Throat:      Mouth: Mucous membranes are moist.      Pharynx: Oropharynx is clear. Uvula midline. No oropharyngeal exudate or posterior oropharyngeal erythema. Eyes:      Extraocular Movements: Extraocular movements intact. Conjunctiva/sclera: Conjunctivae normal.      Pupils: Pupils are equal, round, and reactive to light. Neck:      Musculoskeletal: Normal range of motion and neck supple. No neck rigidity, spinous process tenderness or muscular tenderness. Cardiovascular:      Rate and Rhythm: Normal rate and regular rhythm. Heart sounds: Normal heart sounds. No murmur. No friction rub. No gallop. Pulmonary:      Effort: Pulmonary effort is normal. No respiratory distress. Breath sounds: Normal breath sounds. No stridor. No wheezing, rhonchi or rales. Abdominal:      General: Bowel sounds are normal. There is no distension.       Palpations: Abdomen is soft. Tenderness: There is no abdominal tenderness. There is no guarding or rebound. Musculoskeletal:         General: No swelling or deformity. Thoracic back: She exhibits no tenderness and no bony tenderness. Lumbar back: She exhibits tenderness (R sided). She exhibits no bony tenderness. Right lower leg: No edema. Left lower leg: No edema. Comments: Some tenderness noted to R lumbar spine. No ttp to R hip. Some ttp to R posterior lower ribs. Skin:     General: Skin is warm and dry. Capillary Refill: Capillary refill takes less than 2 seconds. Neurological:      Mental Status: She is alert and oriented to person, place, and time. Mental status is at baseline. GCS: GCS eye subscore is 4. GCS verbal subscore is 5. GCS motor subscore is 6. Cranial Nerves: Cranial nerves are intact. No cranial nerve deficit (2-12), dysarthria or facial asymmetry. Sensory: Sensation is intact. No sensory deficit (grossly to BLUE and BLLE). Motor: Motor function is intact. No weakness (equal to biceps, triceps,  strength, flexion/extension of hip, knee or ankles ), tremor, abnormal muscle tone or seizure activity. Coordination: Finger-Nose-Finger Test and Heel to Shin Test normal.          MDM  Number of Diagnoses or Management Options     Amount and/or Complexity of Data Reviewed  Tests in the radiology section of CPT®: ordered and reviewed  Discuss the patient with other providers: yes (Dr Brooklyn Cam, ED attending)           Procedures               VITAL SIGNS:  Vitals:    04/21/21 0917 04/21/21 1101   BP: 131/66 128/67   Pulse: 100 92   Resp: 16 16   Temp: 97 °F (36.1 °C) 97.7 °F (36.5 °C)   SpO2: 93% 94%         LABS:  No results found for this or any previous visit (from the past 24 hour(s)). IMAGING:  XR SPINE LUMB 2 OR 3 V   Final Result   Chronic, stable, L1 compression fracture. Osteoporosis. XR CHEST PA LAT   Final Result   1.  Left, ninth, lateral rib fracture is acute, but present since at least   4/13/2021.   2. Lateral, right, inferior rib fractures are older and show healing. XR HIP RT W OR WO PELV 2-3 VWS   Final Result   No acute abnormality. Medications During Visit:  Medications - No data to display      DECISION MAKING:    Kasey Angelucci is a 77 y.o. female who comes in as above. She presents afebrile and hemodynamically stable. Reports mechanical fall earlier this morning secondary to mechanical difficulty with her left foot, and notes history of chronic difficulty with that foot. Denies symptoms causing a fall. Denies head injury, LOC or neck pain and on neuro and head exam, no acute findings noted. Report she is currently just starting treatment for UTI for which is followed by her PCP. Report some right sided lower back pain. On physical exam, she has no spinal tenderness to palpation but has right sided lumbar discomfort and some right posterior lower rib tenderness to palpation. X-ray obtained to lumbar spine, right hip and chest x-ray. Right hip without acute abnormality, lumbar spine demonstrating chronic, stable L1 compression fracture and osteoporosis. Patient reports she is aware of this and region is nontender to palpation. CXR demonstrating left, ninth, lateral rib fracture which is acute, but present since chest x-ray on 4/13/21. Patient reports this was obtained at routine visit with her PCP secondary to history of smoking. She reports no pain and has no tenderness to this region. It also demonstrates right lateral inferior rib fractures which are older than left side, and demonstrate healing. No evidence of pneumothorax and patient is able to take deep inspirations without difficulty and has no SOB. Patient and I have discussed supportive treatments as well as close follow up with her PCP. Patient reports she already has an appointment scheduled for follow-up of her previous appointment in approx. a week.  We have discussed close return precautions as well as follow up recommendations. Opportunity for questions presented. Pt verbalizes their understanding and agreement with care plan. IMPRESSION:  1. Right lumbar pain    2. Fall, initial encounter        DISPOSITION:  Discharged      Discharge Medication List as of 4/21/2021 10:49 AM           Follow-up Information     Follow up With Specialties Details Why Contact Info    Paulo Vega NP Nurse Practitioner Schedule an appointment as soon as possible for a visit  Please follow up with your PCP regarding your falls and your pain from today [address] [facility] [address]  [phone]      [address] [facility] [address] [facility] Emergency Medicine Go to  As needed, if onset of shortness of breath, chest pain, lightheadedness, dizziness, difficulty speaking, new or other concerning symptoms [address] [place] [address]  [phone]            The patient is asked to follow-up with their primary care provider and any other physicians as above in the next several days. They are to call tomorrow for an appointment. We have discussed strict return precautions and the patient is asked to return promptly for any increased concerns or worsening of symptoms and for return precautions regarding their symptoms today. They can return to this emergency department or any other emergency department. I have discussed with them results as above and presented opportunity for questions. They verbalize their understanding of the aboveand agreement with care plan. Please note that this dictation was completed with [employer], the computer voice recognition software. Quite often unanticipated grammatical, syntax, homophones, and other interpretive errors are inadvertently transcribed by the computer software. Please disregard these errors. Please excuse any errors that have escaped final proofreading.

## 2021-04-21 NOTE — ED TRIAGE NOTES
Pt arrives via squad after a slip and fall in bathroom, pt hit her lower back, denies radiating pain, denies any new numbness or tingling , denies hitting head, no loc

## 2021-04-22 ENCOUNTER — TELEPHONE (OUTPATIENT)
Dept: FAMILY MEDICINE CLINIC | Age: 76
End: 2021-04-22

## 2021-04-22 NOTE — TELEPHONE ENCOUNTER
----- Message from [facility] sent at 4/22/2021  8:59 AM EDT -----  Regarding: NP Awilda/Telephone  Appointment not available    Caller's first and last name and relationship to patient (if not the patient):  N/A      Best contact number:  [phone]      Preferred date and time:  Monday morning at 11:00a.m. Scheduled appointment date and time:  N/A      Reason for appointment:  Labs      Details to clarify the request:  Patient is requesting a call back to see if she can come in Monday, 4/26/21, at 11:00a.m. to get her bloodwork done.       [facility]

## 2021-04-22 NOTE — TELEPHONE ENCOUNTER
Verified patient with two type of identifiers. Pt states won;t be able to make it that morning and will call back.

## 2021-04-23 ENCOUNTER — TELEPHONE (OUTPATIENT)
Dept: FAMILY MEDICINE CLINIC | Age: 76
End: 2021-04-23

## 2021-04-23 NOTE — TELEPHONE ENCOUNTER
Verified patient with two type of identifiers. Pt states will call back next week to schedule for the 1st week of may.

## 2021-04-23 NOTE — TELEPHONE ENCOUNTER
----- Message from Topher Trammell sent at 4/23/2021  9:47 AM EDT -----  Regarding: NP Awilda/telephone  General Message/Vendor Calls    Caller's first and last name: pt      Reason for call: update      Callback required yes/no and why: yes      Best contact number(s): [phone]    Details to clarify the request: pt cxl her blood work appointment at 11:00 and will call back next week to reschedule it.       Topher Trammell

## 2021-04-26 ENCOUNTER — TELEPHONE (OUTPATIENT)
Dept: FAMILY MEDICINE CLINIC | Age: 76
End: 2021-04-26

## 2021-04-26 NOTE — TELEPHONE ENCOUNTER
----- Message from April MELVINA Cruz sent at 4/26/2021 10:24 AM EDT -----  Regarding: Nikki Hayden NP/telephone  General Message/Vendor Calls    Caller's first and last name:      Reason for call: Requested a call back       Callback required yes/no and why: Yes       Best contact number(s): [phone]      Details to clarify the request: Patient stated she would like for Eda to know she can get a ride in to get her blood work done on Wednesday April 28 th she would like a call back to confirm that that's okay to come in.        April [address]

## 2021-04-27 ENCOUNTER — OFFICE VISIT (OUTPATIENT)
Dept: FAMILY MEDICINE CLINIC | Age: 76
End: 2021-04-27

## 2021-04-27 DIAGNOSIS — D72.829 LEUKOCYTOSIS, UNSPECIFIED TYPE: ICD-10-CM

## 2021-04-27 DIAGNOSIS — D58.2 ELEVATED HEMOGLOBIN (HCC): Primary | ICD-10-CM

## 2021-04-27 DIAGNOSIS — R71.8 ELEVATED HEMATOCRIT: ICD-10-CM

## 2021-04-27 NOTE — PROGRESS NOTES
Kitty Vela (: 1945) is a 77 y.o. female, established patient, here for evaluation of the following chief complaint(s):  LAB ONLY       ASSESSMENT/PLAN:  Below is the assessment and plan developed based on review of pertinent history, physical exam, labs, studies, and medications. 1. Elevated hemoglobin (HCC)  -     PERIPHERAL SMEAR; Future  -     ERYTHROPOIETIN; Future  -     JAK2 MUTATION ANALYSIS; Future  2. Elevated hematocrit  -     PERIPHERAL SMEAR; Future  -     ERYTHROPOIETIN; Future  -     JAK2 MUTATION ANALYSIS; Future  3. Leukocytosis, unspecified type   -     PERIPHERAL SMEAR; Future  -     ERYTHROPOIETIN; Future      An electronic signature was used to authenticate this note.   -- Marge Wright, DOMENIC

## 2021-04-28 LAB
EPO SERPL-ACNC: 10.4 MIU/ML (ref 2.6–18.5)
PERIPHERAL SMEAR,PSM: NORMAL

## 2021-04-30 LAB
BACKGROUND: 489207: NORMAL
DIRECTOR REVIEW: 489204: NORMAL
JAK2 P.V617F BLD/T QL: NORMAL

## 2021-05-03 NOTE — PROGRESS NOTES
Labs negative for polycythemia vera. No immature cells seen under microscope. Work on smoking cessation.

## 2021-06-21 DIAGNOSIS — I10 ESSENTIAL HYPERTENSION: ICD-10-CM

## 2021-06-21 RX ORDER — METOPROLOL TARTRATE 50 MG/1
TABLET ORAL
Qty: 180 TABLET | Refills: 3 | Status: SHIPPED | OUTPATIENT
Start: 2021-06-21 | End: 2022-06-29

## 2021-06-21 RX ORDER — SODIUM CHLORIDE TAB 1 GM 1 G
TAB MISCELLANEOUS
Qty: 540 TABLET | Refills: 3 | Status: SHIPPED | OUTPATIENT
Start: 2021-06-21 | End: 2022-06-29

## 2021-06-21 RX ORDER — AMLODIPINE BESYLATE 10 MG/1
TABLET ORAL
Qty: 90 TABLET | Refills: 3 | Status: SHIPPED | OUTPATIENT
Start: 2021-06-21 | End: 2022-08-12

## 2021-09-17 ENCOUNTER — HOSPITAL ENCOUNTER (EMERGENCY)
Age: 76
Discharge: HOME | End: 2021-09-17
Attending: EMERGENCY MEDICINE
Payer: MEDICARE

## 2021-09-17 VITALS
SYSTOLIC BLOOD PRESSURE: 177 MMHG | TEMPERATURE: 97.2 F | OXYGEN SATURATION: 94 % | RESPIRATION RATE: 20 BRPM | HEART RATE: 107 BPM | DIASTOLIC BLOOD PRESSURE: 79 MMHG

## 2021-09-17 DIAGNOSIS — S05.02XA ABRASION OF LEFT CORNEA, INITIAL ENCOUNTER: Primary | ICD-10-CM

## 2021-09-17 PROCEDURE — 99282 EMERGENCY DEPT VISIT SF MDM: CPT

## 2021-09-17 RX ORDER — TETRACAINE HYDROCHLORIDE 5 MG/ML
1 SOLUTION OPHTHALMIC
Status: DISCONTINUED | OUTPATIENT
Start: 2021-09-17 | End: 2021-09-17 | Stop reason: HOSPADM

## 2021-09-17 RX ORDER — ERYTHROMYCIN 5 MG/G
OINTMENT OPHTHALMIC
Qty: 3.5 G | Refills: 0 | Status: SHIPPED | OUTPATIENT
Start: 2021-09-17 | End: 2022-03-29 | Stop reason: ALTCHOICE

## 2021-09-17 NOTE — ED TRIAGE NOTES
Pt c/o left eye pain since yesterday, pt may have scratched it, +drainage, pt stated she is not sleeping from the pain

## 2021-09-17 NOTE — ED PROVIDER NOTES
Patient is a 77year old female who presents to ED c/o left eye pain and redness since yesterday. Patient reports she was itching her left eye yesterday when she scratched it with her nail. Reports eye pain, eye redness, and discharge. Denies fever, chills, pain with EOM, purulent discharge, visual changes, or periorbital swelling. States she is followed by [facility]. Reports she is anxious because she had to come here today so this may be why her HR is elevated. Denies chest pain, palpitations and states she otherwise feels well. Past Medical History:   Diagnosis Date    History of mammogram 10 years ago    Hypertension     Hyponatremia 09/11/2014    hospitalized for severe hyponatremia due to SIADH    Menopause     at age 37    Pap smear for cervical cancer screening 15 years ago    SIADH (syndrome of inappropriate ADH production) ([facility] [address]     UTI (urinary tract infection)        Past Surgical History:   Procedure Laterality Date    HX CATARACT REMOVAL Right 1/14/2014    HX CHOLECYSTECTOMY      HX THROMBECTOMY Right     leg    HX TUBAL LIGATION           Family History:   Problem Relation Age of Onset    Kidney Disease Mother         uncertain       Social History     Socioeconomic History    Marital status:      Spouse name: Not on file    Number of children: Not on file    Years of education: Not on file    Highest education level: Not on file   Occupational History    Not on file   Tobacco Use    Smoking status: Current Every Day Smoker     Packs/day: 1.00     Years: 40.00     Pack years: 40.00     Types: Cigarettes    Smokeless tobacco: Former User     Quit date: 9/11/2014   Vaping Use    Vaping Use: Never used   Substance and Sexual Activity    Alcohol use:  Yes     Alcohol/week: 3.0 standard drinks     Types: 3 Standard drinks or equivalent per week    Drug use: No    Sexual activity: Not Currently   Other Topics Concern    Not on file   Social History Narrative    Not on file     Social Determinants of Health     Financial Resource Strain:     Difficulty of Paying Living Expenses:    Food Insecurity:     Worried About Running Out of Food in the Last Year:     [address] in the Last Year:    Transportation Needs:     Lack of Transportation (Medical):  Lack of Transportation (Non-Medical):    Physical Activity:     Days of Exercise per Week:     Minutes of Exercise per Session:    Stress:     Feeling of Stress :    Social Connections:     Frequency of Communication with Friends and Family:     Frequency of Social Gatherings with Friends and Family:     Attends Mormonism Services:     Active Member of Clubs or Organizations:     Attends Club or Organization Meetings:     Marital Status:    Intimate Partner Violence:     Fear of Current or Ex-Partner:     Emotionally Abused:     Physically Abused:     Sexually Abused: ALLERGIES: Levaquin [levofloxacin], Ace inhibitors, Hydrochlorothiazide, and Penicillins    Review of Systems   Constitutional: Negative for activity change, appetite change, chills and fever. HENT: Negative for congestion and sore throat. Eyes: Positive for pain, discharge and redness. Negative for visual disturbance. Respiratory: Negative for cough and shortness of breath. Cardiovascular: Negative for chest pain, palpitations and leg swelling. Gastrointestinal: Negative for abdominal distention, abdominal pain, constipation, diarrhea, nausea and vomiting. Genitourinary: Negative for decreased urine volume, dysuria, flank pain, frequency and urgency. Musculoskeletal: Negative for back pain and neck pain. Skin: Negative for rash and wound. Allergic/Immunologic: Negative for immunocompromised state. Neurological: Negative for dizziness, syncope, weakness, light-headedness, numbness and headaches. Psychiatric/Behavioral: Negative for confusion.    All other systems reviewed and are negative. Vitals:    09/17/21 0913 09/17/21 0947   BP: (!) 177/79    Pulse: (!) 122 (!) 107   Resp: 20    Temp: 97.2 °F (36.2 °C)    SpO2: 94%             Physical Exam  Vitals and nursing note reviewed. Constitutional:       General: She is not in acute distress. Appearance: Normal appearance. She is well-developed. She is not toxic-appearing. HENT:      Head: Normocephalic. Nose: Nose normal.      Mouth/Throat:      Mouth: Mucous membranes are moist.   Eyes:      General: Lids are normal.      Extraocular Movements: Extraocular movements intact. Conjunctiva/sclera: Conjunctivae normal.      Pupils: Pupils are equal, round, and reactive to light. Slit lamp exam:     Left eye: No corneal ulcer. Comments: Vision grossly intact bilaterally. Left eye: erythematous and increased watery discharge. Cardiovascular:      Rate and Rhythm: Normal rate and regular rhythm. Pulses: Normal pulses. Heart sounds: Normal heart sounds, S1 normal and S2 normal.   Pulmonary:      Effort: Pulmonary effort is normal. No accessory muscle usage. Breath sounds: Normal breath sounds. Abdominal:      Palpations: Abdomen is soft. Musculoskeletal:         General: Normal range of motion. Cervical back: Normal range of motion and neck supple. Skin:     General: Skin is warm and dry. Capillary Refill: Capillary refill takes less than 2 seconds. Neurological:      General: No focal deficit present. Mental Status: She is alert and oriented to person, place, and time. Mental status is at baseline. Psychiatric:         Attention and Perception: Attention normal.         Mood and Affect: Mood and affect normal.         Speech: Speech normal.         Behavior: Behavior is cooperative. Thought Content:  Thought content normal.         Cognition and Memory: Cognition normal.         Judgment: Judgment normal.          MDM  Number of Diagnoses or Management Options  Abrasion of left cornea, initial encounter  Diagnosis management comments: Patient with corneal abrasion. Started on erythromycin ointment and advised close follow-up to [facility]. Return to ER warnings discussed in detail. Patient is otherwise tachycardic but states it's because she's anxious she had to be here and that she did not sleep well last night. Denies CP, palpitations, shortness of breath. Will discharge home. Amount and/or Complexity of Data Reviewed  Discuss the patient with other providers: yes (Dr. Toshia Morse, ED Attending )           Eye Stain      Date/Time: 9/17/2021 9:48 AM    Performed by: PA        Corneal abrasion was present on eyelid eversion. Left eye location: 3 o'clock    Cornea is clear. Anterior chamber is clear. Patient tolerance: patient tolerated the procedure well with no immediate complications  My total time at bedside, performing this procedure was 1-15 minutes.

## 2021-09-17 NOTE — DISCHARGE INSTRUCTIONS
Use erythromycin ointment as prescribed 4 times per day. Please call [facility] to schedule follow-up appointment. If you develop new or worsening symptoms please return to ER.

## 2021-09-17 NOTE — ED NOTES
Patient discharged home by [facility]. Prescriptions and discharge instructions. Patient verbalized understanding of discharge instructions.

## 2021-12-22 DIAGNOSIS — E87.1 HYPONATREMIA: ICD-10-CM

## 2021-12-22 NOTE — TELEPHONE ENCOUNTER
Last visit: 4/27/21  Next visit: not scheduled  Previous refill 12/09/20 (360+0R)    Requested Prescriptions     Pending Prescriptions Disp Refills    potassium chloride SR (KLOR-CON 10) 10 mEq tablet 360 Tablet 0     Sig: TAKE 2 TABLETS BY MOUTH TWICE DAILY

## 2021-12-23 RX ORDER — POTASSIUM CHLORIDE 750 MG/1
TABLET, FILM COATED, EXTENDED RELEASE ORAL
Qty: 360 TABLET | Refills: 2 | OUTPATIENT
Start: 2021-12-23 | End: 2022-01-06

## 2022-01-06 ENCOUNTER — APPOINTMENT (OUTPATIENT)
Dept: GENERAL RADIOLOGY | Age: 77
End: 2022-01-06
Attending: STUDENT IN AN ORGANIZED HEALTH CARE EDUCATION/TRAINING PROGRAM
Payer: MEDICARE

## 2022-01-06 ENCOUNTER — HOSPITAL ENCOUNTER (EMERGENCY)
Age: 77
Discharge: HOME | End: 2022-01-06
Attending: STUDENT IN AN ORGANIZED HEALTH CARE EDUCATION/TRAINING PROGRAM
Payer: MEDICARE

## 2022-01-06 VITALS
RESPIRATION RATE: 18 BRPM | HEART RATE: 104 BPM | OXYGEN SATURATION: 96 % | DIASTOLIC BLOOD PRESSURE: 73 MMHG | TEMPERATURE: 98.3 F | SYSTOLIC BLOOD PRESSURE: 150 MMHG

## 2022-01-06 DIAGNOSIS — M79.605 ACUTE LEG PAIN, LEFT: Primary | ICD-10-CM

## 2022-01-06 DIAGNOSIS — E87.6 HYPOKALEMIA: ICD-10-CM

## 2022-01-06 LAB
ANION GAP SERPL CALC-SCNC: 8 MMOL/L (ref 5–15)
BASOPHILS # BLD: 0.1 K/UL (ref 0–0.1)
BASOPHILS NFR BLD: 1 % (ref 0–1)
BUN SERPL-MCNC: 9 MG/DL (ref 6–20)
BUN/CREAT SERPL: 15 (ref 12–20)
CALCIUM SERPL-MCNC: 8.7 MG/DL (ref 8.5–10.1)
CHLORIDE SERPL-SCNC: 101 MMOL/L (ref 97–108)
CO2 SERPL-SCNC: 28 MMOL/L (ref 21–32)
CREAT SERPL-MCNC: 0.61 MG/DL (ref 0.55–1.02)
DIFFERENTIAL METHOD BLD: ABNORMAL
EOSINOPHIL # BLD: 0 K/UL (ref 0–0.4)
EOSINOPHIL NFR BLD: 0 % (ref 0–7)
ERYTHROCYTE [DISTWIDTH] IN BLOOD BY AUTOMATED COUNT: 14.5 % (ref 11.5–14.5)
GLUCOSE SERPL-MCNC: 95 MG/DL (ref 65–100)
HCT VFR BLD AUTO: 43 % (ref 35–47)
HGB BLD-MCNC: 15.1 G/DL (ref 11.5–16)
IMM GRANULOCYTES # BLD AUTO: 0.1 K/UL (ref 0–0.04)
IMM GRANULOCYTES NFR BLD AUTO: 0 % (ref 0–0.5)
LYMPHOCYTES # BLD: 1.1 K/UL (ref 0.8–3.5)
LYMPHOCYTES NFR BLD: 9 % (ref 12–49)
MCH RBC QN AUTO: 35.9 PG (ref 26–34)
MCHC RBC AUTO-ENTMCNC: 35.1 G/DL (ref 30–36.5)
MCV RBC AUTO: 102.1 FL (ref 80–99)
MONOCYTES # BLD: 0.8 K/UL (ref 0–1)
MONOCYTES NFR BLD: 6 % (ref 5–13)
NEUTS SEG # BLD: 10.7 K/UL (ref 1.8–8)
NEUTS SEG NFR BLD: 84 % (ref 32–75)
NRBC # BLD: 0 K/UL (ref 0–0.01)
NRBC BLD-RTO: 0 PER 100 WBC
PLATELET # BLD AUTO: 241 K/UL (ref 150–400)
PMV BLD AUTO: 9.6 FL (ref 8.9–12.9)
POTASSIUM SERPL-SCNC: 3.1 MMOL/L (ref 3.5–5.1)
RBC # BLD AUTO: 4.21 M/UL (ref 3.8–5.2)
SODIUM SERPL-SCNC: 137 MMOL/L (ref 136–145)
WBC # BLD AUTO: 12.8 K/UL (ref 3.6–11)

## 2022-01-06 PROCEDURE — 74011250637 HC RX REV CODE- 250/637: Performed by: STUDENT IN AN ORGANIZED HEALTH CARE EDUCATION/TRAINING PROGRAM

## 2022-01-06 PROCEDURE — 73552 X-RAY EXAM OF FEMUR 2/>: CPT

## 2022-01-06 PROCEDURE — 85025 COMPLETE CBC W/AUTO DIFF WBC: CPT

## 2022-01-06 PROCEDURE — 96374 THER/PROPH/DIAG INJ IV PUSH: CPT

## 2022-01-06 PROCEDURE — 99284 EMERGENCY DEPT VISIT MOD MDM: CPT

## 2022-01-06 PROCEDURE — 74011250636 HC RX REV CODE- 250/636: Performed by: STUDENT IN AN ORGANIZED HEALTH CARE EDUCATION/TRAINING PROGRAM

## 2022-01-06 PROCEDURE — 73502 X-RAY EXAM HIP UNI 2-3 VIEWS: CPT

## 2022-01-06 PROCEDURE — 80048 BASIC METABOLIC PNL TOTAL CA: CPT

## 2022-01-06 PROCEDURE — 96376 TX/PRO/DX INJ SAME DRUG ADON: CPT

## 2022-01-06 PROCEDURE — 36415 COLL VENOUS BLD VENIPUNCTURE: CPT

## 2022-01-06 PROCEDURE — 96375 TX/PRO/DX INJ NEW DRUG ADDON: CPT

## 2022-01-06 RX ORDER — DIAZEPAM 10 MG/2ML
1 INJECTION INTRAMUSCULAR
Status: COMPLETED | OUTPATIENT
Start: 2022-01-06 | End: 2022-01-06

## 2022-01-06 RX ORDER — POTASSIUM CHLORIDE 750 MG/1
40 TABLET, FILM COATED, EXTENDED RELEASE ORAL
Status: COMPLETED | OUTPATIENT
Start: 2022-01-06 | End: 2022-01-06

## 2022-01-06 RX ORDER — DIAZEPAM 10 MG/2ML
2 INJECTION INTRAMUSCULAR
Status: COMPLETED | OUTPATIENT
Start: 2022-01-06 | End: 2022-01-06

## 2022-01-06 RX ORDER — MORPHINE SULFATE 2 MG/ML
2 INJECTION, SOLUTION INTRAMUSCULAR; INTRAVENOUS ONCE
Status: COMPLETED | OUTPATIENT
Start: 2022-01-06 | End: 2022-01-06

## 2022-01-06 RX ORDER — DIAZEPAM 2 MG/1
2 TABLET ORAL
Qty: 12 TABLET | Refills: 0 | Status: SHIPPED | OUTPATIENT
Start: 2022-01-06 | End: 2022-03-29 | Stop reason: SDUPTHER

## 2022-01-06 RX ORDER — KETOROLAC TROMETHAMINE 30 MG/ML
15 INJECTION, SOLUTION INTRAMUSCULAR; INTRAVENOUS
Status: COMPLETED | OUTPATIENT
Start: 2022-01-06 | End: 2022-01-06

## 2022-01-06 RX ORDER — POTASSIUM CHLORIDE 750 MG/1
20 TABLET, FILM COATED, EXTENDED RELEASE ORAL DAILY
Qty: 10 TABLET | Refills: 0 | Status: SHIPPED | OUTPATIENT
Start: 2022-01-06 | End: 2022-01-11

## 2022-01-06 RX ADMIN — DIAZEPAM 1 MG: 5 INJECTION, SOLUTION INTRAMUSCULAR; INTRAVENOUS at 15:19

## 2022-01-06 RX ADMIN — MORPHINE SULFATE 2 MG: 2 INJECTION, SOLUTION INTRAMUSCULAR; INTRAVENOUS at 11:49

## 2022-01-06 RX ADMIN — POTASSIUM CHLORIDE 40 MEQ: 750 TABLET, FILM COATED, EXTENDED RELEASE ORAL at 15:19

## 2022-01-06 RX ADMIN — KETOROLAC TROMETHAMINE 15 MG: 30 INJECTION, SOLUTION INTRAMUSCULAR; INTRAVENOUS at 15:19

## 2022-01-06 RX ADMIN — DIAZEPAM 2 MG: 5 INJECTION, SOLUTION INTRAMUSCULAR; INTRAVENOUS at 14:03

## 2022-01-06 NOTE — ED PROVIDER NOTES
Patient is a 43-year-old female who was brought in by EMS due to left upper leg pain that began this morning approximately 9 hours ago while she got up to use the bathroom in the middle of the night. She states around 2:30 AM, she felt like she had a muscle cramp in her left leg, walked over to the bathroom and sat down sharply on the toilet, exacerbating her pain. Since then, she is not been able to ambulate secondary to the pain and limited range of motion. No previous injuries to the hip, per patient. She reports a previous history of possible neuropathy in that leg. Has not take anything for the pain. Currently rated 10 out of 10 in severity. Does not radiate. Movement makes it worse, holding still improves it. Denies any other injuries or complaints.            Past Medical History:   Diagnosis Date    History of mammogram 10 years ago    Hypertension     Hyponatremia 09/11/2014    hospitalized for severe hyponatremia due to SIADH    Menopause     at age 37    Pap smear for cervical cancer screening 15 years ago    [facility]     UTI (urinary tract infection)        Past Surgical History:   Procedure Laterality Date    HX CATARACT REMOVAL Right 1/14/2014    HX CHOLECYSTECTOMY      HX THROMBECTOMY Right     leg    HX TUBAL LIGATION           Family History:   Problem Relation Age of Onset    Kidney Disease Mother         uncertain       Social History     Socioeconomic History    Marital status:      Spouse name: Not on file    Number of children: Not on file    Years of education: Not on file    Highest education level: Not on file   Occupational History    Not on file   Tobacco Use    Smoking status: Current Every Day Smoker     Packs/day: 1.00     Years: 40.00     Pack years: 40.00     Types: Cigarettes    Smokeless tobacco: Former User     Quit date: 9/11/2014   Vaping Use    Vaping Use: Never used   Substance and Sexual Activity  Alcohol use: Yes     Alcohol/week: 3.0 standard drinks     Types: 3 Standard drinks or equivalent per week    Drug use: No    Sexual activity: Not Currently   Other Topics Concern    Not on file   Social History Narrative    Not on file     Social Determinants of Health     Financial Resource Strain:     Difficulty of Paying Living Expenses: Not on file   Food Insecurity:     Worried About Running Out of Food in the Last Year: Not on file    Mark of Food in the Last Year: Not on file   Transportation Needs:     Lack of Transportation (Medical): Not on file    Lack of Transportation (Non-Medical): Not on file   Physical Activity:     Days of Exercise per Week: Not on file    Minutes of Exercise per Session: Not on file   Stress:     Feeling of Stress : Not on file   Social Connections:     Frequency of Communication with Friends and Family: Not on file    Frequency of Social Gatherings with Friends and Family: Not on file    Attends Mosque Services: Not on file    Active Member of [address] [place] [address] or Organizations: Not on file    Attends Club or Organization Meetings: Not on file    Marital Status: Not on file   Intimate Partner Violence:     Fear of Current or Ex-Partner: Not on file    Emotionally Abused: Not on file    Physically Abused: Not on file    Sexually Abused: Not on file   Housing Stability:     Unable to Pay for Housing in the Last Year: Not on file    Number of [place] in the Last Year: Not on file    Unstable Housing in the Last Year: Not on file         ALLERGIES: Levaquin [levofloxacin], Ace inhibitors, Hydrochlorothiazide, and Penicillins    Review of Systems   Musculoskeletal: Positive for arthralgias (L upper leg). Negative for back pain and neck pain. Neurological: Positive for weakness (L leg). Negative for syncope and headaches. All other systems reviewed and are negative.       Vitals:    01/06/22 1146 01/06/22 1147 01/06/22 1430 01/06/22 1434   BP:   (!) 150/73    Pulse:  (!) 104     Resp: 18      Temp:       SpO2:    96%            Physical Exam  Vitals and nursing note reviewed. Constitutional:       General: She is not in acute distress. Appearance: She is well-developed. HENT:      Head: Normocephalic and atraumatic. Eyes:      Conjunctiva/sclera: Conjunctivae normal.   Cardiovascular:      Rate and Rhythm: Normal rate and regular rhythm. Pulmonary:      Effort: Pulmonary effort is normal. No respiratory distress. Abdominal:      Palpations: Abdomen is soft. Tenderness: There is no abdominal tenderness. There is no guarding. Musculoskeletal:         General: No tenderness or signs of injury. Cervical back: Normal range of motion and neck supple. Left hip: Decreased range of motion. Left upper leg: No deformity or bony tenderness. Right lower leg: No edema. Left lower leg: No edema. Left foot: Normal pulse. Comments: 2+ DP pulse on left   Skin:     General: Skin is warm and dry. Neurological:      Mental Status: She is alert and oriented to person, place, and time. Motor: No abnormal muscle tone.           MDM       Procedures

## 2022-01-06 NOTE — ED TRIAGE NOTES
Pt to ED via EMS c/o L leg pain. Pt states that she went to the bathroom last night and \"sat down hard\" on the toilet. She was able to ambulate back to bed after that but was unable to bear weight on her L leg when she woke up this morning. Pt has L hip extended and L knee flexed; she states this is the position of comfort. No deformity or swelling noted, no pain with palpation. Limited ROM due to pain at hip joint. A&O x4, clear speech, follows commands, NAD.

## 2022-01-06 NOTE — ED NOTES
Reviewed discharge instructions with pt including new medications. Pt verbalized understanding. IV removed and all belongings at bedside. Pt escorted off unit via wheelchair and driven home by daughter.

## 2022-01-06 NOTE — PROGRESS NOTES
----- Message from Dex Hsu MD sent at 3/28/2018  1:47 PM CDT -----  Results reviewed     Date of previous inpatient admission/ ED visit? 9/17/21 ED visit    What brought the patient back to ED? Patient presents to the ED w/ left leg pain    Did patient decline recommended services during last admission/ ED visit (if yes, what)? No    Has patient seen a provider since their last inpatient admission/ED visit (if yes, when)? No     PCP: First and Last name: Cameron Cruz   Name of Practice:    Are you a current patient: Yes/No: Yes   Approximate date of last visit: 4/2021 Annual Medicare Visit    CM Interventions:  From previous inpatient admission/ED visit: Previous placements [facility] 2020 and [facility] 2014  From current inpatient admission/ED visit: Met w/patient and introduced to role of CM.  lives home alone in apartment (single story). Support received from daughter and granddaughter (groceries/errands/transportation). DME includes RW. Local pharmacy is [email or web address] ([address] [place] [address]).  endorses having a poor appetite family says \" I eat like a bird. \"  Typical day has coffee and couple of cigarettes . Patient supplements between meals (Boost and Premium Milk). Informed CM of balance issues and is receptive of HH to return if MD orders. No additional needs verbalized. CC referral sent to [facility]. Granddaughter Farhan Bustos to be contacted since she finishes work before daughter Doug Medina ([id number])    Care Management Interventions  PCP Verified by CM:  Yes  Last Visit to PCP: 04/27/21  Palliative Care Criteria Met (RRAT>21 & CHF Dx)?: No  Transition of Care Consult (CM Consult): Discharge [address] Jay: Yes  MyChart Signup: No  Discharge Durable Medical Equipment: No  Health Maintenance Reviewed: Yes  Physical Therapy Consult: No  Occupational Therapy Consult: No  Speech Therapy Consult: No  Support Systems: Child(jc),Other Family Member(s)  Confirm Follow Up Transport: Family  [employer] Information Provided?: No  Discharge Location  Discharge Placement: Home with home health

## 2022-01-06 NOTE — SENIOR SERVICES NOTE
Senior services consult received and appreciated. Chart Reviewed. Patient greeted in room. AO x4, I introduced myself and role as SSED NP. Current event discussed, patient remains to be having a muscle spasms in the left upper thigh. Plan is to ambulate patient with rolling walker, as this is what she uses mainly at home. Home:   Lives independently with support from her daughter and grand daughter. Has a rollator that she uses when she goes out but mainly ambulates with rolling walker. Slipper socks placed on patient, patient agreeable to attempt to ambulate however upon attempt patient had a spasm that was visible in the left mid thigh, I assisted patient back on the stretcher, repositioned. Maribel Flores RN notified, patient has medications ordered.   -Patient states that she will re-attempt to ambulate after being medicated. -When patient is discharged she states that she can call her daughter or grand daughter upon discharge. Roselia Nolan NP  SSED  3:23 PM    1153-  I returned to patients room after receiving medication. Minimal assist to sit on the side of the stretcher. Kalyn Moran in place, patient able to ambulate 6 feet to the door, turn and return to the chair at the bedside. Slow steady gait, patient c/o intermittent spasms but pain improved. States that her daughter and grand daughter will be able to support and stay with her this weekend.   -Patient receptive to [facility] will place order for PT/RN to ensure all needs are met at home.   -We discussed her potassium level being 3.1 today, she states that she takes 2 tabs daily and will increase her dietary intake of potassium.   -Snack provided and beverage, patient calling family for ride home. I have collaborated findings with Dr. Breezy Alonso.     Roselia Nolan NP  3:55 PM

## 2022-01-07 ENCOUNTER — DOCUMENTATION ONLY (OUTPATIENT)
Dept: CASE MANAGEMENT | Age: 77
End: 2022-01-07

## 2022-01-07 NOTE — PROGRESS NOTES
Patient accepted by [facility] agency will contact this weekend. Call placed to patient and provided updates. No additional needs verbalized.

## 2022-01-07 NOTE — PROGRESS NOTES
Updates received no availability w/ [address]. Call placed to patient -introduced to role of CM and HIPAA identifier verified. Patient states improvement from yesterday and daughter stayed last night. Informed CM can seek available [facility] agency. Patient in agreement with additional referrals being sent.

## 2022-01-10 DIAGNOSIS — M62.838 MUSCLE SPASM: Primary | ICD-10-CM

## 2022-01-10 RX ORDER — CYCLOBENZAPRINE HCL 10 MG
10 TABLET ORAL
Qty: 20 TABLET | Refills: 1 | Status: CANCELLED | OUTPATIENT
Start: 2022-01-10

## 2022-01-10 RX ORDER — CYCLOBENZAPRINE HCL 5 MG
5 TABLET ORAL
Qty: 30 TABLET | Refills: 1 | Status: SHIPPED | OUTPATIENT
Start: 2022-01-10

## 2022-01-10 NOTE — TELEPHONE ENCOUNTER
Verified patient with two type of identifiers. Pt scheduled and requesting refill for diazepam. Informed pt since controlled substance pt would need to see provider first and offered flexeril. Pt would like alternative muscle relaxer sent over.

## 2022-01-10 NOTE — TELEPHONE ENCOUNTER
Patient wants a return call regarding being in [facility], she has some questions.   Please give her a call @ [phone]

## 2022-01-10 NOTE — TELEPHONE ENCOUNTER
Orders Placed This Encounter    cyclobenzaprine (FLEXERIL) 5 mg tablet     Sig: Take 1 Tablet by mouth two (2) times daily as needed for Muscle Spasm(s).      Dispense:  30 Tablet     Refill:  1

## 2022-01-25 ENCOUNTER — TELEPHONE (OUTPATIENT)
Dept: FAMILY MEDICINE CLINIC | Age: 77
End: 2022-01-25

## 2022-01-25 NOTE — TELEPHONE ENCOUNTER
Mirella with [facility] would like for Wanda Rollins to know patient refused her nursing visit today and she is non compliant she can be reached @ [phone]

## 2022-01-26 ENCOUNTER — APPOINTMENT (OUTPATIENT)
Dept: VASCULAR SURGERY | Age: 77
DRG: 628 | End: 2022-01-26
Attending: STUDENT IN AN ORGANIZED HEALTH CARE EDUCATION/TRAINING PROGRAM
Payer: MEDICARE

## 2022-01-26 ENCOUNTER — APPOINTMENT (OUTPATIENT)
Dept: GENERAL RADIOLOGY | Age: 77
DRG: 628 | End: 2022-01-26
Attending: STUDENT IN AN ORGANIZED HEALTH CARE EDUCATION/TRAINING PROGRAM
Payer: MEDICARE

## 2022-01-26 ENCOUNTER — HOSPITAL ENCOUNTER (INPATIENT)
Age: 77
LOS: 7 days | Discharge: NURSING, REHAB OR LONG-TERM CARE | DRG: 628 | End: 2022-02-03
Attending: STUDENT IN AN ORGANIZED HEALTH CARE EDUCATION/TRAINING PROGRAM | Admitting: HOSPITALIST
Payer: MEDICARE

## 2022-01-26 DIAGNOSIS — S72.002K CLOSED FRACTURE OF NECK OF LEFT FEMUR WITH NONUNION: Chronic | ICD-10-CM

## 2022-01-26 DIAGNOSIS — E87.29 INCREASED ANION GAP METABOLIC ACIDOSIS: Primary | ICD-10-CM

## 2022-01-26 DIAGNOSIS — E87.6 HYPOKALEMIA: ICD-10-CM

## 2022-01-26 DIAGNOSIS — E87.1 HYPONATREMIA: ICD-10-CM

## 2022-01-26 LAB
ALBUMIN SERPL-MCNC: 3.1 G/DL (ref 3.5–5)
ALBUMIN/GLOB SERPL: 0.6 {RATIO} (ref 1.1–2.2)
ALP SERPL-CCNC: 201 U/L (ref 45–117)
ALT SERPL-CCNC: 40 U/L (ref 12–78)
ANION GAP SERPL CALC-SCNC: 16 MMOL/L (ref 5–15)
AST SERPL-CCNC: 46 U/L (ref 15–37)
BASOPHILS # BLD: 0 K/UL (ref 0–0.1)
BASOPHILS NFR BLD: 0 % (ref 0–1)
BILIRUB SERPL-MCNC: 1 MG/DL (ref 0.2–1)
BNP SERPL-MCNC: 1518 PG/ML
BUN SERPL-MCNC: 11 MG/DL (ref 6–20)
BUN/CREAT SERPL: 13 (ref 12–20)
CALCIUM SERPL-MCNC: 10.1 MG/DL (ref 8.5–10.1)
CHLORIDE SERPL-SCNC: 90 MMOL/L (ref 97–108)
CO2 SERPL-SCNC: 19 MMOL/L (ref 21–32)
COMMENT, HOLDF: NORMAL
COVID-19 RAPID TEST, COVR: NOT DETECTED
CREAT SERPL-MCNC: 0.85 MG/DL (ref 0.55–1.02)
DIFFERENTIAL METHOD BLD: ABNORMAL
EOSINOPHIL # BLD: 0 K/UL (ref 0–0.4)
EOSINOPHIL NFR BLD: 0 % (ref 0–7)
ERYTHROCYTE [DISTWIDTH] IN BLOOD BY AUTOMATED COUNT: 13.5 % (ref 11.5–14.5)
ETHANOL SERPL-MCNC: <10 MG/DL
GLOBULIN SER CALC-MCNC: 5.2 G/DL (ref 2–4)
GLUCOSE SERPL-MCNC: 83 MG/DL (ref 65–100)
HCT VFR BLD AUTO: 43.9 % (ref 35–47)
HGB BLD-MCNC: 15.3 G/DL (ref 11.5–16)
IMM GRANULOCYTES # BLD AUTO: 0.1 K/UL (ref 0–0.04)
IMM GRANULOCYTES NFR BLD AUTO: 0 % (ref 0–0.5)
LYMPHOCYTES # BLD: 1.2 K/UL (ref 0.8–3.5)
LYMPHOCYTES NFR BLD: 8 % (ref 12–49)
MAGNESIUM SERPL-MCNC: 2 MG/DL (ref 1.6–2.4)
MCH RBC QN AUTO: 36.1 PG (ref 26–34)
MCHC RBC AUTO-ENTMCNC: 34.9 G/DL (ref 30–36.5)
MCV RBC AUTO: 103.5 FL (ref 80–99)
MONOCYTES # BLD: 1 K/UL (ref 0–1)
MONOCYTES NFR BLD: 7 % (ref 5–13)
NEUTS SEG # BLD: 12 K/UL (ref 1.8–8)
NEUTS SEG NFR BLD: 85 % (ref 32–75)
NRBC # BLD: 0 K/UL (ref 0–0.01)
NRBC BLD-RTO: 0 PER 100 WBC
PLATELET # BLD AUTO: 510 K/UL (ref 150–400)
PMV BLD AUTO: 8.5 FL (ref 8.9–12.9)
POTASSIUM SERPL-SCNC: 2.9 MMOL/L (ref 3.5–5.1)
PROT SERPL-MCNC: 8.3 G/DL (ref 6.4–8.2)
RBC # BLD AUTO: 4.24 M/UL (ref 3.8–5.2)
SAMPLES BEING HELD,HOLD: NORMAL
SODIUM SERPL-SCNC: 125 MMOL/L (ref 136–145)
SOURCE, COVRS: NORMAL
WBC # BLD AUTO: 14.2 K/UL (ref 3.6–11)

## 2022-01-26 PROCEDURE — 83735 ASSAY OF MAGNESIUM: CPT

## 2022-01-26 PROCEDURE — 82077 ASSAY SPEC XCP UR&BREATH IA: CPT

## 2022-01-26 PROCEDURE — 93005 ELECTROCARDIOGRAM TRACING: CPT

## 2022-01-26 PROCEDURE — 83880 ASSAY OF NATRIURETIC PEPTIDE: CPT

## 2022-01-26 PROCEDURE — 93970 EXTREMITY STUDY: CPT

## 2022-01-26 PROCEDURE — 85025 COMPLETE CBC W/AUTO DIFF WBC: CPT

## 2022-01-26 PROCEDURE — 99285 EMERGENCY DEPT VISIT HI MDM: CPT

## 2022-01-26 PROCEDURE — 94761 N-INVAS EAR/PLS OXIMETRY MLT: CPT

## 2022-01-26 PROCEDURE — 87635 SARS-COV-2 COVID-19 AMP PRB: CPT

## 2022-01-26 PROCEDURE — 80053 COMPREHEN METABOLIC PANEL: CPT

## 2022-01-26 PROCEDURE — 71046 X-RAY EXAM CHEST 2 VIEWS: CPT

## 2022-01-26 PROCEDURE — 96366 THER/PROPH/DIAG IV INF ADDON: CPT

## 2022-01-26 PROCEDURE — 96368 THER/DIAG CONCURRENT INF: CPT

## 2022-01-26 PROCEDURE — 36415 COLL VENOUS BLD VENIPUNCTURE: CPT

## 2022-01-26 PROCEDURE — 74011250636 HC RX REV CODE- 250/636: Performed by: STUDENT IN AN ORGANIZED HEALTH CARE EDUCATION/TRAINING PROGRAM

## 2022-01-26 PROCEDURE — 74011000258 HC RX REV CODE- 258: Performed by: STUDENT IN AN ORGANIZED HEALTH CARE EDUCATION/TRAINING PROGRAM

## 2022-01-26 PROCEDURE — 96365 THER/PROPH/DIAG IV INF INIT: CPT

## 2022-01-26 PROCEDURE — 74011000250 HC RX REV CODE- 250: Performed by: STUDENT IN AN ORGANIZED HEALTH CARE EDUCATION/TRAINING PROGRAM

## 2022-01-26 RX ORDER — POTASSIUM CHLORIDE AND SODIUM CHLORIDE 900; 300 MG/100ML; MG/100ML
INJECTION, SOLUTION INTRAVENOUS CONTINUOUS
Status: DISCONTINUED | OUTPATIENT
Start: 2022-01-26 | End: 2022-01-27

## 2022-01-26 RX ORDER — FOLIC ACID 5 MG/ML
1 INJECTION, SOLUTION INTRAMUSCULAR; INTRAVENOUS; SUBCUTANEOUS DAILY
Status: DISCONTINUED | OUTPATIENT
Start: 2022-01-26 | End: 2022-01-26 | Stop reason: SDUPTHER

## 2022-01-26 RX ADMIN — FOLIC ACID: 5 INJECTION, SOLUTION INTRAMUSCULAR; INTRAVENOUS; SUBCUTANEOUS at 22:05

## 2022-01-26 RX ADMIN — POTASSIUM CHLORIDE AND SODIUM CHLORIDE: 900; 300 INJECTION, SOLUTION INTRAVENOUS at 19:45

## 2022-01-26 RX ADMIN — THIAMINE HYDROCHLORIDE 100 MG: 100 INJECTION, SOLUTION INTRAMUSCULAR; INTRAVENOUS at 22:05

## 2022-01-26 NOTE — ED TRIAGE NOTES
Reports L leg swelling x2 weeks, R leg swelling x2 days. Reports decreased appetite and generalized weakness. Denies chest pain. Has chronic SOB r/t smoking per patient- feels like breathing is at her baseline.

## 2022-01-26 NOTE — Clinical Note
Status[de-identified] INPATIENT [101]   Type of Bed: Remote Telemetry [29]   Cardiac Monitoring Required?: No   Inpatient Hospitalization Certified Necessary for the Following Reasons: 3.  Patient receiving treatment that can only be provided in an inpatient setting (further clarification in H&P documentation)   Admitting Diagnosis: High anion gap metabolic acidosis [[id number]]   Admitting Physician: Holly Godinez [[id number]]   Attending Physician: Holly Godinez [[id number]]   Estimated Length of Stay: 2 Midnights   Discharge Plan[de-identified] Home with Office Follow-up

## 2022-01-27 PROBLEM — E87.20 METABOLIC ACIDOSIS: Status: ACTIVE | Noted: 2022-01-27

## 2022-01-27 PROBLEM — E87.6 HYPOKALEMIA: Status: ACTIVE | Noted: 2022-01-27

## 2022-01-27 PROBLEM — E87.29 HIGH ANION GAP METABOLIC ACIDOSIS: Status: ACTIVE | Noted: 2022-01-27

## 2022-01-27 LAB
ALBUMIN SERPL-MCNC: 2.7 G/DL (ref 3.5–5)
ALBUMIN/GLOB SERPL: 0.6 {RATIO} (ref 1.1–2.2)
ALP SERPL-CCNC: 179 U/L (ref 45–117)
ALT SERPL-CCNC: 34 U/L (ref 12–78)
ANION GAP SERPL CALC-SCNC: 12 MMOL/L (ref 5–15)
APPEARANCE UR: CLEAR
AST SERPL-CCNC: 35 U/L (ref 15–37)
ATRIAL RATE: 111 BPM
BACTERIA URNS QL MICRO: ABNORMAL /HPF
BILIRUB SERPL-MCNC: 0.8 MG/DL (ref 0.2–1)
BILIRUB UR QL: NEGATIVE
BUN SERPL-MCNC: 11 MG/DL (ref 6–20)
BUN/CREAT SERPL: 16 (ref 12–20)
CALCIUM SERPL-MCNC: 9.3 MG/DL (ref 8.5–10.1)
CALCULATED P AXIS, ECG09: 74 DEGREES
CALCULATED R AXIS, ECG10: -71 DEGREES
CALCULATED T AXIS, ECG11: 83 DEGREES
CHLORIDE SERPL-SCNC: 97 MMOL/L (ref 97–108)
CO2 SERPL-SCNC: 21 MMOL/L (ref 21–32)
COLOR UR: ABNORMAL
COMMENT, HOLDF: NORMAL
COMMENT, HOLDF: NORMAL
CREAT SERPL-MCNC: 0.67 MG/DL (ref 0.55–1.02)
DIAGNOSIS, 93000: NORMAL
EPITH CASTS URNS QL MICRO: ABNORMAL /LPF
GLOBULIN SER CALC-MCNC: 4.6 G/DL (ref 2–4)
GLUCOSE SERPL-MCNC: 72 MG/DL (ref 65–100)
GLUCOSE UR STRIP.AUTO-MCNC: NEGATIVE MG/DL
HGB UR QL STRIP: ABNORMAL
HYALINE CASTS URNS QL MICRO: ABNORMAL /LPF (ref 0–5)
KETONES UR QL STRIP.AUTO: 80 MG/DL
LEUKOCYTE ESTERASE UR QL STRIP.AUTO: ABNORMAL
NITRITE UR QL STRIP.AUTO: POSITIVE
P-R INTERVAL, ECG05: 124 MS
PH UR STRIP: 5.5 [PH] (ref 5–8)
PHOSPHATE SERPL-MCNC: 2.2 MG/DL (ref 2.6–4.7)
POTASSIUM SERPL-SCNC: 3.2 MMOL/L (ref 3.5–5.1)
PROT SERPL-MCNC: 7.3 G/DL (ref 6.4–8.2)
PROT UR STRIP-MCNC: 30 MG/DL
Q-T INTERVAL, ECG07: 350 MS
QRS DURATION, ECG06: 74 MS
QTC CALCULATION (BEZET), ECG08: 476 MS
RBC #/AREA URNS HPF: ABNORMAL /HPF (ref 0–5)
SAMPLES BEING HELD,HOLD: NORMAL
SAMPLES BEING HELD,HOLD: NORMAL
SODIUM SERPL-SCNC: 130 MMOL/L (ref 136–145)
SP GR UR REFRACTOMETRY: 1.01 (ref 1–1.03)
UROBILINOGEN UR QL STRIP.AUTO: 0.2 EU/DL (ref 0.2–1)
VENTRICULAR RATE, ECG03: 111 BPM
WBC URNS QL MICRO: ABNORMAL /HPF (ref 0–4)

## 2022-01-27 PROCEDURE — 97162 PT EVAL MOD COMPLEX 30 MIN: CPT

## 2022-01-27 PROCEDURE — 74011250636 HC RX REV CODE- 250/636: Performed by: STUDENT IN AN ORGANIZED HEALTH CARE EDUCATION/TRAINING PROGRAM

## 2022-01-27 PROCEDURE — 87077 CULTURE AEROBIC IDENTIFY: CPT

## 2022-01-27 PROCEDURE — 74011000250 HC RX REV CODE- 250: Performed by: HOSPITALIST

## 2022-01-27 PROCEDURE — 80053 COMPREHEN METABOLIC PANEL: CPT

## 2022-01-27 PROCEDURE — 87186 SC STD MICRODIL/AGAR DIL: CPT

## 2022-01-27 PROCEDURE — 65660000000 HC RM CCU STEPDOWN

## 2022-01-27 PROCEDURE — 81001 URINALYSIS AUTO W/SCOPE: CPT

## 2022-01-27 PROCEDURE — 74011250637 HC RX REV CODE- 250/637: Performed by: HOSPITALIST

## 2022-01-27 PROCEDURE — 87086 URINE CULTURE/COLONY COUNT: CPT

## 2022-01-27 PROCEDURE — 74011250636 HC RX REV CODE- 250/636: Performed by: HOSPITALIST

## 2022-01-27 PROCEDURE — 84100 ASSAY OF PHOSPHORUS: CPT

## 2022-01-27 PROCEDURE — 96366 THER/PROPH/DIAG IV INF ADDON: CPT

## 2022-01-27 PROCEDURE — 74011000258 HC RX REV CODE- 258: Performed by: STUDENT IN AN ORGANIZED HEALTH CARE EDUCATION/TRAINING PROGRAM

## 2022-01-27 PROCEDURE — 97530 THERAPEUTIC ACTIVITIES: CPT

## 2022-01-27 RX ORDER — ONDANSETRON 4 MG/1
4 TABLET, ORALLY DISINTEGRATING ORAL
Status: DISCONTINUED | OUTPATIENT
Start: 2022-01-27 | End: 2022-01-27 | Stop reason: SDUPTHER

## 2022-01-27 RX ORDER — ACETAMINOPHEN 325 MG/1
650 TABLET ORAL
Status: DISCONTINUED | OUTPATIENT
Start: 2022-01-27 | End: 2022-02-03 | Stop reason: HOSPADM

## 2022-01-27 RX ORDER — POLYETHYLENE GLYCOL 3350 17 G/17G
17 POWDER, FOR SOLUTION ORAL DAILY PRN
Status: DISCONTINUED | OUTPATIENT
Start: 2022-01-27 | End: 2022-01-27 | Stop reason: SDUPTHER

## 2022-01-27 RX ORDER — SODIUM CHLORIDE 0.9 % (FLUSH) 0.9 %
5-40 SYRINGE (ML) INJECTION AS NEEDED
Status: DISCONTINUED | OUTPATIENT
Start: 2022-01-27 | End: 2022-02-03 | Stop reason: HOSPADM

## 2022-01-27 RX ORDER — POTASSIUM CHLORIDE 750 MG/1
40 TABLET, FILM COATED, EXTENDED RELEASE ORAL 2 TIMES DAILY
Status: DISPENSED | OUTPATIENT
Start: 2022-01-27 | End: 2022-01-29

## 2022-01-27 RX ORDER — SODIUM CHLORIDE TAB 1 GM 1 G
3 TAB MISCELLANEOUS 2 TIMES DAILY WITH MEALS
Status: DISCONTINUED | OUTPATIENT
Start: 2022-01-27 | End: 2022-02-03 | Stop reason: HOSPADM

## 2022-01-27 RX ORDER — METOPROLOL TARTRATE 50 MG/1
50 TABLET ORAL 2 TIMES DAILY
Status: DISCONTINUED | OUTPATIENT
Start: 2022-01-28 | End: 2022-02-03 | Stop reason: HOSPADM

## 2022-01-27 RX ORDER — ENOXAPARIN SODIUM 100 MG/ML
40 INJECTION SUBCUTANEOUS DAILY
Status: DISCONTINUED | OUTPATIENT
Start: 2022-01-27 | End: 2022-01-31

## 2022-01-27 RX ORDER — LORAZEPAM 1 MG/1
2 TABLET ORAL
Status: DISCONTINUED | OUTPATIENT
Start: 2022-01-27 | End: 2022-02-03 | Stop reason: HOSPADM

## 2022-01-27 RX ORDER — POLYETHYLENE GLYCOL 3350 17 G/17G
17 POWDER, FOR SOLUTION ORAL DAILY PRN
Status: DISCONTINUED | OUTPATIENT
Start: 2022-01-27 | End: 2022-02-03 | Stop reason: HOSPADM

## 2022-01-27 RX ORDER — ONDANSETRON 4 MG/1
4 TABLET, ORALLY DISINTEGRATING ORAL
Status: DISCONTINUED | OUTPATIENT
Start: 2022-01-27 | End: 2022-02-03 | Stop reason: HOSPADM

## 2022-01-27 RX ORDER — LORAZEPAM 1 MG/1
4 TABLET ORAL
Status: DISCONTINUED | OUTPATIENT
Start: 2022-01-27 | End: 2022-02-03 | Stop reason: HOSPADM

## 2022-01-27 RX ORDER — ACETAMINOPHEN 650 MG/1
650 SUPPOSITORY RECTAL
Status: DISCONTINUED | OUTPATIENT
Start: 2022-01-27 | End: 2022-01-27 | Stop reason: SDUPTHER

## 2022-01-27 RX ORDER — SODIUM CHLORIDE 0.9 % (FLUSH) 0.9 %
5-40 SYRINGE (ML) INJECTION AS NEEDED
Status: DISCONTINUED | OUTPATIENT
Start: 2022-01-27 | End: 2022-01-27 | Stop reason: SDUPTHER

## 2022-01-27 RX ORDER — FOLIC ACID 1 MG/1
1 TABLET ORAL DAILY
Status: DISCONTINUED | OUTPATIENT
Start: 2022-01-27 | End: 2022-02-03 | Stop reason: HOSPADM

## 2022-01-27 RX ORDER — ENOXAPARIN SODIUM 100 MG/ML
40 INJECTION SUBCUTANEOUS DAILY
Status: DISCONTINUED | OUTPATIENT
Start: 2022-01-27 | End: 2022-01-27 | Stop reason: SDUPTHER

## 2022-01-27 RX ORDER — ASPIRIN 81 MG/1
81 TABLET ORAL DAILY
Status: DISCONTINUED | OUTPATIENT
Start: 2022-01-27 | End: 2022-01-31

## 2022-01-27 RX ORDER — SODIUM CHLORIDE 0.9 % (FLUSH) 0.9 %
5-40 SYRINGE (ML) INJECTION EVERY 8 HOURS
Status: DISCONTINUED | OUTPATIENT
Start: 2022-01-27 | End: 2022-02-03 | Stop reason: HOSPADM

## 2022-01-27 RX ORDER — ONDANSETRON 2 MG/ML
4 INJECTION INTRAMUSCULAR; INTRAVENOUS
Status: DISCONTINUED | OUTPATIENT
Start: 2022-01-27 | End: 2022-01-27 | Stop reason: SDUPTHER

## 2022-01-27 RX ORDER — ONDANSETRON 2 MG/ML
4 INJECTION INTRAMUSCULAR; INTRAVENOUS
Status: DISCONTINUED | OUTPATIENT
Start: 2022-01-27 | End: 2022-02-03 | Stop reason: HOSPADM

## 2022-01-27 RX ORDER — IPRATROPIUM BROMIDE AND ALBUTEROL SULFATE 2.5; .5 MG/3ML; MG/3ML
3 SOLUTION RESPIRATORY (INHALATION)
Status: DISCONTINUED | OUTPATIENT
Start: 2022-01-27 | End: 2022-02-03 | Stop reason: HOSPADM

## 2022-01-27 RX ORDER — SODIUM CHLORIDE 0.9 % (FLUSH) 0.9 %
5-40 SYRINGE (ML) INJECTION EVERY 8 HOURS
Status: DISCONTINUED | OUTPATIENT
Start: 2022-01-27 | End: 2022-01-27 | Stop reason: SDUPTHER

## 2022-01-27 RX ORDER — ACETAMINOPHEN 650 MG/1
650 SUPPOSITORY RECTAL
Status: DISCONTINUED | OUTPATIENT
Start: 2022-01-27 | End: 2022-02-03 | Stop reason: HOSPADM

## 2022-01-27 RX ORDER — ACETAMINOPHEN 325 MG/1
650 TABLET ORAL
Status: DISCONTINUED | OUTPATIENT
Start: 2022-01-27 | End: 2022-01-27 | Stop reason: SDUPTHER

## 2022-01-27 RX ORDER — BALSAM PERU/CASTOR OIL
OINTMENT (GRAM) TOPICAL 2 TIMES DAILY
Status: DISCONTINUED | OUTPATIENT
Start: 2022-01-27 | End: 2022-02-03 | Stop reason: HOSPADM

## 2022-01-27 RX ADMIN — FOLIC ACID 1 MG: 1 TABLET ORAL at 18:03

## 2022-01-27 RX ADMIN — ASPIRIN 81 MG: 81 TABLET, COATED ORAL at 18:03

## 2022-01-27 RX ADMIN — WATER 1 G: 1 INJECTION INTRAMUSCULAR; INTRAVENOUS; SUBCUTANEOUS at 07:44

## 2022-01-27 RX ADMIN — ENOXAPARIN SODIUM 40 MG: 100 INJECTION SUBCUTANEOUS at 18:03

## 2022-01-27 RX ADMIN — SODIUM CHLORIDE, PRESERVATIVE FREE 10 ML: 5 INJECTION INTRAVENOUS at 18:04

## 2022-01-27 RX ADMIN — THIAMINE HYDROCHLORIDE 100 MG: 100 INJECTION, SOLUTION INTRAMUSCULAR; INTRAVENOUS at 19:38

## 2022-01-27 RX ADMIN — Medication: at 19:36

## 2022-01-27 RX ADMIN — Medication 3 G: at 19:36

## 2022-01-27 RX ADMIN — POTASSIUM CHLORIDE 40 MEQ: 750 TABLET, FILM COATED, EXTENDED RELEASE ORAL at 18:03

## 2022-01-27 RX ADMIN — SODIUM CHLORIDE, PRESERVATIVE FREE 10 ML: 5 INJECTION INTRAVENOUS at 22:00

## 2022-01-27 NOTE — ED PROVIDER NOTES
77 y.o. female prior hx of HTN, SIADH on sodium tablets, alcohol use, presents today secondary to fatigue and leg swelling. Reports 2 weeks ago she started with L leg swelling which has since progressed to R leg swelling. She has chronic unchanged pain in the legs. She reports no appetite and barely able to eat or drink anything for past few days. Has had sob and a cough but tells me this is chronic. She denies any pain other than in her legs. No chest pain or abd pain. No vomiting or diarrhea. She tells me her last alcoholic drink was 5 days ago. She drinks a glass of scotch every day or other day. Past Medical History:   Diagnosis Date    History of mammogram 10 years ago    Hypertension     Hyponatremia 09/11/2014    hospitalized for severe hyponatremia due to SIADH    Menopause     at age 37    Pap smear for cervical cancer screening 15 years ago    SIADH (syndrome of inappropriate ADH production) ([facility] [address]     UTI (urinary tract infection)        Past Surgical History:   Procedure Laterality Date    HX CATARACT REMOVAL Right 1/14/2014    HX CHOLECYSTECTOMY      HX THROMBECTOMY Right     leg    HX TUBAL LIGATION           Family History:   Problem Relation Age of Onset    Kidney Disease Mother         uncertain       Social History     Socioeconomic History    Marital status:      Spouse name: Not on file    Number of children: Not on file    Years of education: Not on file    Highest education level: Not on file   Occupational History    Not on file   Tobacco Use    Smoking status: Current Every Day Smoker     Packs/day: 1.00     Years: 40.00     Pack years: 40.00     Types: Cigarettes    Smokeless tobacco: Former User     Quit date: 9/11/2014   Vaping Use    Vaping Use: Never used   Substance and Sexual Activity    Alcohol use:  Yes     Alcohol/week: 3.0 standard drinks     Types: 3 Standard drinks or equivalent per week    Drug use: No    Sexual activity: Not Currently Other Topics Concern    Not on file   Social History Narrative    Not on file     Social Determinants of Health     Financial Resource Strain:     Difficulty of Paying Living Expenses: Not on file   Food Insecurity:     Worried About Running Out of Food in the Last Year: Not on file    Mark of Food in the Last Year: Not on file   Transportation Needs:     Lack of Transportation (Medical): Not on file    Lack of Transportation (Non-Medical): Not on file   Physical Activity:     Days of Exercise per Week: Not on file    Minutes of Exercise per Session: Not on file   Stress:     Feeling of Stress : Not on file   Social Connections:     Frequency of Communication with Friends and Family: Not on file    Frequency of Social Gatherings with Friends and Family: Not on file    Attends Restoration Services: Not on file    Active Member of [address] [place] [id number] or Organizations: Not on file    Attends Club or Organization Meetings: Not on file    Marital Status: Not on file   Intimate Partner Violence:     Fear of Current or Ex-Partner: Not on file    Emotionally Abused: Not on file    Physically Abused: Not on file    Sexually Abused: Not on file   Housing Stability:     Unable to Pay for Housing in the Last Year: Not on file    Number of [place] in the Last Year: Not on file    Unstable Housing in the Last Year: Not on file         ALLERGIES: Levaquin [levofloxacin], Ace inhibitors, Hydrochlorothiazide, and Penicillins    Review of Systems   Constitutional: Positive for activity change, appetite change and fatigue. Negative for chills and fever. HENT: Negative for congestion and rhinorrhea. Eyes: Negative for redness and visual disturbance. Respiratory: Positive for cough and shortness of breath. Cardiovascular: Positive for leg swelling. Negative for chest pain. Gastrointestinal: Negative for abdominal pain, diarrhea, nausea and vomiting.    Genitourinary: Negative for dysuria, flank pain, frequency, hematuria and urgency. Musculoskeletal: Negative for arthralgias, back pain, myalgias and neck pain. Skin: Negative for rash and wound. Allergic/Immunologic: Negative for immunocompromised state. Neurological: Negative for dizziness and headaches. Vitals:    01/26/22 1706   BP: 133/67   Pulse: (!) 107   Resp: 22   Temp: 96.8 °F (36 °C)   SpO2: 97%   Weight: 47.6 kg (105 lb)   Height: 5' 1\" (1.549 m)            Physical Exam  Vitals and nursing note reviewed. Constitutional:       General: She is not in acute distress. Appearance: She is well-developed. She is ill-appearing. She is not diaphoretic. HENT:      Head: Normocephalic. Mouth/Throat:      Mouth: Mucous membranes are dry. Pharynx: No oropharyngeal exudate. Eyes:      General:         Right eye: No discharge. Left eye: No discharge. Pupils: Pupils are equal, round, and reactive to light. Cardiovascular:      Rate and Rhythm: Normal rate and regular rhythm. Heart sounds: Normal heart sounds. No murmur heard. No friction rub. No gallop. Pulmonary:      Effort: Pulmonary effort is normal. No respiratory distress. Breath sounds: Normal breath sounds. No stridor. No wheezing or rales. Abdominal:      General: Bowel sounds are normal. There is no distension. Palpations: Abdomen is soft. Tenderness: There is no abdominal tenderness. There is no guarding or rebound. Musculoskeletal:         General: No deformity. Normal range of motion. Cervical back: Normal range of motion and neck supple. Right lower leg: Edema present. Left lower leg: Edema present. Skin:     General: Skin is warm and dry. Capillary Refill: Capillary refill takes less than 2 seconds. Findings: No rash. Neurological:      Mental Status: She is alert and oriented to person, place, and time.    Psychiatric:         Behavior: Behavior normal.          MDM       Procedures        Perfect Serve Consult for Admission  9:04 PM    ED Room Number: ER15/15  Patient Name and age: Kyle Leal 77 y.o.  female  Working Diagnosis:   1. Increased anion gap metabolic acidosis    2. Hyponatremia    3. Hypokalemia        COVID-19 Suspicion:  Other test pending  Sepsis present:  no  Reassessment needed: no  Code Status:  Full Code  Readmission: no  Isolation Requirements:  no  Recommended Level of Care:  telemetry  Department:[facility] Adult ED - 21   Other:  77 y.o. female presenting today with several days of not eating/drinking well, does have hx of etoh use and SIADH (on sodium pills). General fatigue, edema. Hyponatremic, hypoK, mild AG and metabolic acidosis. The patient is a 78 yo female here with poor appetite/not eating x several days as well as progressive b/l leg swelling in addition to a chronic cough and sob. Today found to have hyponatremia, hypokalemia, hypochloremia, bicarb of 19 and AG of 16. Also noted to have elevated BNP. Renal function ok. B/l dvt study negative. EKG showing sinus tachycardia without ST elevation or depression. I suspect her electrolytes are combination of poor appetite, alcohol use, and SIADH. Patient getting NSS + KCl in the ED gently to avoid rapid increase in sodium. She is not altered and does not need hypertonics. Will also give thiamine/folate given alcohol use.     Ruslan Escudero, DO

## 2022-01-27 NOTE — PROGRESS NOTES
Problem: Mobility Impaired (Adult and Pediatric)  Goal: *Acute Goals and Plan of Care (Insert Text)  Description: FUNCTIONAL STATUS PRIOR TO ADMISSION: Patient is a limited historian but reports she was modified independent using a rollator for functional mobility most recently. At \"baseline\" she is independent. HOME SUPPORT PRIOR TO ADMISSION: The patient lived alone with support from her daughter for transportation and groceries. Physical Therapy Goals  Initiated 1/27/2022  1. Patient will move from supine to sit and sit to supine  in bed with minimal assistance/contact guard assist within 7 day(s). 2.  Patient will transfer from bed to chair and chair to bed with minimal assistance/contact guard assist using the least restrictive device within 7 day(s). 3.  Patient will perform sit to stand with minimal assistance/contact guard assist within 7 day(s). 4.  Patient will ambulate with moderate assistance  for 50 feet with the least restrictive device within 7 day(s). Outcome: Progressing Towards Goal   PHYSICAL THERAPY EVALUATION  Patient: Ying Mazariegos (77 y.o. female)  Date: 1/27/2022  Primary Diagnosis: High anion gap metabolic acidosis [S71.7]  Hypokalemia [E87.6]  Hyponatremia [N00.2]  Metabolic acidosis [T89.1]        Precautions:        ASSESSMENT  Based on the objective data described below, the patient presents with impaired ROM, guarded mobility d/t left LE pain, poor balance, and tachycardia following admission for metabolic acidosis. Received on the stretcher with a resting -120 BPM. She remained reluctant to mobilize d/t reported left LE \"spasm\" and pain but agreed if she was allowed to manage this limb. Transferred to EOB requiring moderate assist and greatly increased time. Attempted to stand from EOB and patient cleared her buttocks with moderate assist but was unable to WB on the left LE d/t pain limiting her ability to side step to [facility].  Standing balance was also limited d/t a posteriorly shifted COG and patient unable to correct to stand fully erect. Returned to supine in NAD. Patient below her reported baseline but suspect her mobility has been on the decline based on her sacral wound. Recommend discharge to SNF level rehab when medically stable. Current Level of Function Impacting Discharge (mobility/balance): moderate assist for bed mobility. Unable to maintain stance or WB on left LE. Other factors to consider for discharge: lives alone, ETOH     Patient will benefit from skilled therapy intervention to address the above noted impairments. PLAN :  Recommendations and Planned Interventions: bed mobility training, transfer training, gait training, therapeutic exercises, and therapeutic activities      Frequency/Duration: Patient will be followed by physical therapy:  5 times a week to address goals. Recommendation for discharge: (in order for the patient to meet his/her long term goals)  Therapy up to 5 days/week in SNF setting    This discharge recommendation:  Has not yet been discussed the attending provider and/or case management    IF patient discharges home will need the following DME: to be determined (TBD)         SUBJECTIVE:   Patient stated I don't know how much walking I'm going to be able to d do today.     OBJECTIVE DATA SUMMARY:   HISTORY:    Past Medical History:   Diagnosis Date    History of mammogram 10 years ago    Hypertension     Hyponatremia 09/11/2014    hospitalized for severe hyponatremia due to SIADH    Menopause     at age 37    Pap smear for cervical cancer screening 15 years ago    SIADH (syndrome of inappropriate ADH production) ([facility] [address]     UTI (urinary tract infection)      Past Surgical History:   Procedure Laterality Date    HX CATARACT REMOVAL Right 1/14/2014    HX CHOLECYSTECTOMY      HX THROMBECTOMY Right     leg    HX TUBAL LIGATION         Personal factors and/or comorbidities impacting plan of care:     [address] [place] [address] Environment: Apartment  # Steps to Enter: 0  One/Two Story Residence: One story  Living Alone: Yes  Current DME Used/Available at Home: Grab bars,Cane, straight,Shower chair,Walker, rollator,Walker, rolling    EXAMINATION/PRESENTATION/DECISION MAKING:   Critical Behavior:              Hearing:     Skin:    Edema:   Range Of Motion:  AROM: Generally decreased, functional (left hip/knee limited by pain)                       Strength:    Strength: Generally decreased, functional (left hip/knee )                    Tone & Sensation:   Tone: Normal              Sensation: Intact (hypersensitive left LE)               Coordination:     Vision:      Functional Mobility:  Bed Mobility:     Supine to Sit: Moderate assistance; Additional time  Sit to Supine: Moderate assistance; Additional time  Scooting: Maximum assistance  Transfers:  Sit to Stand: Moderate assistance  Stand to Sit: Moderate assistance                       Balance:   Sitting: Impaired  Sitting - Static: Good (unsupported)  Sitting - Dynamic: Fair (occasional)  Standing: Impaired  Standing - Static: Poor  Standing - Dynamic : Poor       Physical Therapy Evaluation Charge Determination   History Examination Presentation Decision-Making   MEDIUM  Complexity : 1-2 comorbidities / personal factors will impact the outcome/ POC  MEDIUM Complexity : 3 Standardized tests and measures addressing body structure, function, activity limitation and / or participation in recreation  MEDIUM Complexity : Evolving with changing characteristics  MEDIUM Complexity : FOTO score of 26-74      Based on the above components, the patient evaluation is determined to be of the following complexity level: MEDIUM    Pain Ratin/10 left thigh    Activity Tolerance:   Poor    After treatment patient left in no apparent distress:   Supine in bed and Call bell within reach    COMMUNICATION/EDUCATION:   The patients plan of care was discussed with: Registered nurse.      Fall prevention education was provided and the patient/caregiver indicated understanding., Patient/family have participated as able in goal setting and plan of care. , and Patient/family agree to work toward stated goals and plan of care.     Thank you for this referral.  Erin Acevedo, PT, DPT   Time Calculation: 30 mins

## 2022-01-27 NOTE — ED NOTES
Verbal shift change report given to Juancarlos RN and Veronika RN (oncoming nurse) by [facility] (offgoing nurse). Report included the following information SBAR, ED Summary, MAR and Recent Results.

## 2022-01-27 NOTE — WOUND CARE
WOCN Note:     New consult for incontinence related skin damage. Chart shows:  Admitted for hypokalemia and UTI  History of SIADH    Assessment:   Appropriately conversational reports no pain. Able to turn independently onto left side. Wearing briefs. Surface: stretcher - seen in ER    Bilateral heels intact and without erythema. Heels offloaded with pillows. 1. POA full thickness tissue loss to sacrum  3 x 4 x 0.2 cm  100% yellow with irregular margins in a slow-to-alma red field  no exudate  Tx: no foam dressing available - applied cream until venelex up from pharmacy  This skin damage could be attributed to incontinence (she reports diarrhea) along with pressure and friction. Wound Recommendations:    Sacrum: venelex twice daily and covered with foam - change foam as needed    PI Prevention:  Turn/reposition approximately every 2 hours  Offload heels with heels hanging off end of pillow at all times while in bed. Sacral Foam dressing: lift to assess regularly; change as needed. Discontinue if incontinence is frequently soiling dressing. Dr. Dewayne Nunes aware of sacral injury.     Transition of Care: Plan to follow weekly and as needed while admitted to hospital.     BREA LuoN, RN, [facility]  Certified Wound, Ostomy, Continence Nurse  office [phone]  Available via [facility]

## 2022-01-27 NOTE — CONSULTS
Seen and examined  Thanks for the consult  A/P:  Chronic hyponatremia ,better,SIADH   High AG met acidosis,better,ketozcidosis(starvation,etoh. Algis Broaden )  hypokalemia  Edema  HTN    Check Phos  On   salt tab  Monitor edema  Check Phos  Will follow

## 2022-01-27 NOTE — PROGRESS NOTES
Occupational Therapy: defer    Chart reviewed, attempted OT evaluation in ED. Patient received shortly after PT evaluation and declined additional activity at this time. Patient just received lunch and reported she should also wait until her LLE spasm stops. Will defer at this time and will f/u as able and appropriate.     Ayaka Michael, OTR/L

## 2022-01-27 NOTE — CONSULTS
[address]    Name:  Alondra Reaves  MR#:  [id number]  :  1945  ACCOUNT #:  [de-identified]  DATE OF SERVICE:  2022    REASON FOR CONSULTATION:  Seen for hyponatremia. Thanks Dr. Yazan Moore for the consult. HISTORY OF PRESENT ILLNESS:  Most of the history was taken from the chart. She is a little bit poor historian, and she is known to us from previous admission. Coming back for the diagnosis of hyponatremia, this has been going on at least since . Sodium level beginning of the month was good at 137. It was on and off lower, the lowest we saw before; it was 119. At this time, she came with a sodium of 125, improved to 130. She has SIADH, on salt tablets. She came with leg swelling, fatigue. On admission, not in renal failure. Potassium was quite low, calcium on the high side, and the numbers improved. COVID rapid test was supposedly not detected, and we were called to help out with her sodium. She is on room air, not in distress but she drank a couple of shots of whiskey on admission. MEDICATIONS AS INPATIENT:  As follows. 1.  Sodium chloride 3 g twice a day. 2.  Aspirin. 3.  Rocephin. 4.  Lovenox. 5.  KCl 40 mEq twice a day for four doses. MEDICATIONS AS OUTPATIENT:  Also reviewed. REVIEW OF SYSTEMS:  Look at the HPI, rest of it is negative. ALLERGIES:  TO LEVAQUIN, ACE, HCTZ, AND PENICILLIN. PHYSICAL EXAMINATION:  GENERAL:  She was little bit sleepy, lethargic. VITAL SIGNS:  /67, saturating 97%. EXTREMITIES:  Some lower extremity edema, not in distress. LABORATORY AND DIAGNOSTIC DATA:  As follows:  WBC 14.2, hemoglobin 15.3, platelets 918, and that was from yesterday. UA:  1.011 specific gravity, 5 to 10 rbc's, ketones are positive. Sodium 130, potassium 3.2. Admission anion gap is 16 with an albumin level of 3.1, so corrected gap will be close to 18, now gap is 12.   Bicarb 21, sodium 130, potassium 3.2, calcium 9.3, albumin is 2.7, magnesium 2. I do not see any phosphorus check. Chest x-ray looks fairly clear. IMPRESSION:  1. Chronic hyponatremia related to syndrome of inappropriate antidiuretic hormone secretion. Sodium level is better on chronic salt tablets. 2.  High anion gap metabolic acidosis, likely starvation ketoacidosis, better. 3.  Hypokalemia. 4.  Poor nutrition. 5.  Ethyl alcohol abuse. 6.  Some edema. RECOMMENDATIONS:  As follows:  1. Sodium is better. 2.  If still issue with edema, we will decrease the salt tablets. 3.  On KCl replacement. 4.  Check phosphorus. 5.  We will follow. 6.  See Dr. Mike Choudhary.       Natividad MD RONNA Overton/[id number]  D:  01/27/2022 10:06  T:  01/27/2022 11:54  JOB #:  [id number]

## 2022-01-27 NOTE — ED NOTES
TRANSFER - OUT REPORT:    Verbal report given to [facility] on Mackenzie Melissa  being transferred to [facility] (unit) for routine progression of care       Report consisted of patients Situation, Background, Assessment and   Recommendations(SBAR). Information from the following report(s) SBAR, ED Summary, [facility] and Recent Results was reviewed with the receiving nurse. Lines:   Peripheral IV 01/26/22 Left Antecubital (Active)   Site Assessment Clean, dry, & intact 01/26/22 1725   Phlebitis Assessment 0 01/26/22 1725   Infiltration Assessment 0 01/26/22 1725   Dressing Status Clean, dry, & intact 01/26/22 1725   Dressing Type Transparent 01/26/22 1725   Hub Color/Line Status Pink 01/26/22 1725   Action Taken Blood drawn 01/26/22 1725   Alcohol Cap Used No 01/26/22 1725        Opportunity for questions and clarification was provided.       Patient transported with:   Purple [employer] Bo

## 2022-01-27 NOTE — H&P
HISTORY AND PHYSICAL  Marjorie Muir MD        PCP: Kalyani Alexandra NP    Please note that this dictation was completed with [employer], the computer voice recognition software. Quite often unanticipated grammatical, syntax, homophones, and other interpretive errors are inadvertently transcribed by the computer software. Please disregard these errors. Please excuse any errors that have escaped final proofreading. C/C: Worsening leg edema and fatigue. HPI  This is a 67-year-old female with a significant past medical history of chronic hyponatremia due to SIADH on salt tablets, alcohol abuse, hypertension presented to the ED for evaluation of progressive leg swelling, left more than right and fatigue. Upon evaluation in the ED, she was found to have sodium of 125, potassium 2.9, anion gap of 16 with CO2 of 19. I saw and examined patient in the ED room 15. Patient presently is alert oriented x3, on room air, not in distress and she was able to provide good history. She confirmed above history, admitted to having poor oral intake, getting progressively fatigued. She says she does not drink much however on further questioning she admitted drinking 1-2 shots of scotch about 4 days a week, she does not feel she would go through withdrawal.  She has chronic intermittent diarrhea, she is not sure if she had GI work-up. She denied cough, fever, shortness of breath, chest pain, palpitations. She was started on IV fluid with KCl in the ED and repeat BMP showed sodium has improved to 130 and potassium has improved to 3.2 anion gap almost normalized.     PMH/PSH:  Past Medical History:   Diagnosis Date    History of mammogram 10 years ago    Hypertension     Hyponatremia 09/11/2014    hospitalized for severe hyponatremia due to SIADH    Menopause     at age 37    Pap smear for cervical cancer screening 15 years ago    SIADH (syndrome of inappropriate ADH production) ([facility] [address]     UTI (urinary tract infection)      Past Surgical History:   Procedure Laterality Date    HX CATARACT REMOVAL Right 1/14/2014    HX CHOLECYSTECTOMY      HX THROMBECTOMY Right     leg    HX TUBAL LIGATION         Home meds:   Prior to Admission medications    Medication Sig Start Date End Date Taking? Authorizing Provider   cyclobenzaprine (FLEXERIL) 5 mg tablet Take 1 Tablet by mouth two (2) times daily as needed for Muscle Spasm(s). 1/10/22   Mychal Kaiser NP   diazePAM (Valium) 2 mg tablet Take 1 Tablet by mouth every eight (8) hours as needed for Muscle Spasm(s). Max Daily Amount: 6 mg. 1/6/22   Tana Dickens MD   erythromycin (ILOTYCIN) ophthalmic ointment Apply 0.5 inch to affected eye 4 times per day. Indications: an infection on the surface of the eye 9/17/21   ROXANA Iglesias   amLODIPine (NORVASC) 10 mg tablet TAKE 1 TABLET BY MOUTH DAILY 6/21/21   Mychal Kaiser NP   metoprolol tartrate (LOPRESSOR) 50 mg tablet TAKE 1 TABLET BY MOUTH TWICE DAILY 6/21/21   Mychal Kaiser NP   sodium chloride 1 gram tablet TAKE 3 TABLETS BY MOUTH TWICE DAILY WITH MEALS 6/21/21   Mychal Kaiser NP   polyvinyl alcohol/povidone (ARTIFICIAL TEARS OP) 2 Drops by IntraOCUlar route every two (2) hours as needed for Other (dry eyes). 6/8/20   Provider, Historical   ipratropium (ATROVENT HFA) 17 mcg/actuation inhaler Take 2 Puffs by inhalation every four (4) hours as needed for Cough, Respiratory Distress or Shortness of Breath. 6/8/20   Provider, Historical   aspirin delayed-release 81 mg tablet Take 81 mg by mouth daily. Provider, Historical   multivitamin (ONE A DAY) tablet Take 1 Tab by mouth daily. Provider, Historical       Allergies: Allergies   Allergen Reactions    Levaquin [Levofloxacin] Anaphylaxis     Throat and face became swollen.  Ace Inhibitors Vertigo    Hydrochlorothiazide Other (comments)     Hypotension and severe hyponatremia.     Penicillins Hives     While pregnant       FH:  Family History   Problem Relation Age of Onset    Kidney Disease Mother         uncertain       SH:  Social History     Tobacco Use    Smoking status: Current Every Day Smoker     Packs/day: 1.00     Years: 40.00     Pack years: 40.00     Types: Cigarettes    Smokeless tobacco: Former User     Quit date: 9/11/2014   Substance Use Topics    Alcohol use: Yes     Alcohol/week: 3.0 standard drinks     Types: 3 Standard drinks or equivalent per week       ROS: A comprehensive review of systems was negative except for that written in the HPI. PHYSICAL EXAM:  Generally: Patient is alert and oriented x3. She is not in any distress. HEENT: No pallor or jaundice. Buccal mucosa is mildly dry. Lungs: On room air. Not in distress. Coarse breath sounds otherwise symmetrical.  Cardiovascular: S1-S2 well heard, no gallop or rub. Abdomen: Flat, normoactive, soft and nontender. ANALIA: No CVA or suprapubic tenderness. Extremities: Pitting pedal and leg edema, without warmth or tenderness  CNS: Alert oriented x3. Cranial nerves are intact. Motor exam symmetrical.  No asterixis. Psych: Calm and cooperative. Labs/Imaging:  Available labs and imaging studies reviewed. Assessment & Plan: This is a 52-year-old female with PMH of hyponatremia, SIADH, chronic alcohol abuse, hypertension, peripheral neuropathy presented with generalized weakness increasing leg edema. Acute on chronic hyponatremia, component of prerenal on top of chronic SIADH  --Sodium improved from 125-130 this morning after being on IV fluid overnight. I have discontinued IV fluids to avoid rapid correction of sodium. Check urine lites. Encourage oral intake. Consulted nephrology, discussed. Anion gap metabolic acidosis, dehydration due to poor oral intake  --Improved with IV fluid. Discontinue IV fluids to avoid rapid correction of sodium. Encourage oral intake.     Moderate hypokalemia due to poor oral intake: Improving, continue to replete and monitor    Peripheral edema due to poor nutrition, hypoalbuminemia. There is evidence of CHF. Bilateral venous Dopplers negative for DVT. Chronic hypertension. BP is soft. Patient with features of dehydration. Hold antihypertensives for now    Alcohol abuse. She admits to drinking 1-2 scotch 4 days a week. No hallucination, delusions, asterixis or any other signs of withdrawal.  --Vitamin supplements with folic acid and thiamine ordered. --Initiate symptom triggered CIWA protocol    UA consistent with UTI. Urine culture requested. Start ceftriaxone    Leukocytosis, thrombocytosis likely reflective of hemoconcentration due to dehydration than sepsis. Repeat labs after hydration. Chronic intermittent diarrhea: No active issues at present. Patient will need outpatient GI eval    Chronic peripheral neuropathy. Rapid Covid test negative. No respiratory symptoms. Patient's Baseline: Uses cane/rolling walker  DVT ppx: Lovenox  Code status:DNR. When asked about her wish regarding resuscitation in the event of cardiac or respiratory arrest, she promptly said \"no I have DNR.\"    I think she has a DNR paper on file. Disposition: She lives alone. She usually uses cane but most recently using Rollator. She has been progressively getting weak and may need rehab on discharge. PT and OT eval requested.                 Signed By: Андрей Valentin MD     January 27, 2022

## 2022-01-28 LAB
ALBUMIN SERPL-MCNC: 2.5 G/DL (ref 3.5–5)
ALBUMIN/GLOB SERPL: 0.7 {RATIO} (ref 1.1–2.2)
ALP SERPL-CCNC: 171 U/L (ref 45–117)
ALT SERPL-CCNC: 37 U/L (ref 12–78)
ANION GAP SERPL CALC-SCNC: 12 MMOL/L (ref 5–15)
AST SERPL-CCNC: 66 U/L (ref 15–37)
BILIRUB SERPL-MCNC: 0.8 MG/DL (ref 0.2–1)
BUN SERPL-MCNC: 6 MG/DL (ref 6–20)
BUN/CREAT SERPL: 13 (ref 12–20)
CALCIUM SERPL-MCNC: 8.8 MG/DL (ref 8.5–10.1)
CHLORIDE SERPL-SCNC: 98 MMOL/L (ref 97–108)
CO2 SERPL-SCNC: 23 MMOL/L (ref 21–32)
CREAT SERPL-MCNC: 0.48 MG/DL (ref 0.55–1.02)
ERYTHROCYTE [DISTWIDTH] IN BLOOD BY AUTOMATED COUNT: 13.4 % (ref 11.5–14.5)
GLOBULIN SER CALC-MCNC: 3.6 G/DL (ref 2–4)
GLUCOSE BLD STRIP.AUTO-MCNC: 73 MG/DL (ref 65–117)
GLUCOSE SERPL-MCNC: 75 MG/DL (ref 65–100)
HCT VFR BLD AUTO: 37.3 % (ref 35–47)
HGB BLD-MCNC: 12.9 G/DL (ref 11.5–16)
MCH RBC QN AUTO: 35.4 PG (ref 26–34)
MCHC RBC AUTO-ENTMCNC: 34.6 G/DL (ref 30–36.5)
MCV RBC AUTO: 102.5 FL (ref 80–99)
NRBC # BLD: 0 K/UL (ref 0–0.01)
NRBC BLD-RTO: 0 PER 100 WBC
PLATELET # BLD AUTO: 366 K/UL (ref 150–400)
PMV BLD AUTO: 8.9 FL (ref 8.9–12.9)
POTASSIUM SERPL-SCNC: 2.8 MMOL/L (ref 3.5–5.1)
PROT SERPL-MCNC: 6.1 G/DL (ref 6.4–8.2)
RBC # BLD AUTO: 3.64 M/UL (ref 3.8–5.2)
SERVICE CMNT-IMP: NORMAL
SODIUM SERPL-SCNC: 133 MMOL/L (ref 136–145)
WBC # BLD AUTO: 8.7 K/UL (ref 3.6–11)

## 2022-01-28 PROCEDURE — 36415 COLL VENOUS BLD VENIPUNCTURE: CPT

## 2022-01-28 PROCEDURE — 74011250637 HC RX REV CODE- 250/637: Performed by: INTERNAL MEDICINE

## 2022-01-28 PROCEDURE — 85027 COMPLETE CBC AUTOMATED: CPT

## 2022-01-28 PROCEDURE — 80053 COMPREHEN METABOLIC PANEL: CPT

## 2022-01-28 PROCEDURE — 65660000000 HC RM CCU STEPDOWN

## 2022-01-28 PROCEDURE — 97530 THERAPEUTIC ACTIVITIES: CPT

## 2022-01-28 PROCEDURE — 82962 GLUCOSE BLOOD TEST: CPT

## 2022-01-28 PROCEDURE — 97535 SELF CARE MNGMENT TRAINING: CPT

## 2022-01-28 PROCEDURE — 97165 OT EVAL LOW COMPLEX 30 MIN: CPT

## 2022-01-28 PROCEDURE — 74011250637 HC RX REV CODE- 250/637: Performed by: HOSPITALIST

## 2022-01-28 PROCEDURE — 74011250636 HC RX REV CODE- 250/636: Performed by: HOSPITALIST

## 2022-01-28 PROCEDURE — 74011250636 HC RX REV CODE- 250/636: Performed by: STUDENT IN AN ORGANIZED HEALTH CARE EDUCATION/TRAINING PROGRAM

## 2022-01-28 PROCEDURE — 74011000258 HC RX REV CODE- 258: Performed by: STUDENT IN AN ORGANIZED HEALTH CARE EDUCATION/TRAINING PROGRAM

## 2022-01-28 PROCEDURE — 74011000250 HC RX REV CODE- 250: Performed by: HOSPITALIST

## 2022-01-28 PROCEDURE — 77030038269 HC DRN EXT URIN PURWCK BARD -A

## 2022-01-28 RX ORDER — SODIUM,POTASSIUM PHOSPHATES 280-250MG
1 POWDER IN PACKET (EA) ORAL 2 TIMES DAILY
Status: COMPLETED | OUTPATIENT
Start: 2022-01-28 | End: 2022-01-29

## 2022-01-28 RX ORDER — POTASSIUM CHLORIDE 7.45 MG/ML
10 INJECTION INTRAVENOUS
Status: COMPLETED | OUTPATIENT
Start: 2022-01-28 | End: 2022-01-28

## 2022-01-28 RX ADMIN — ENOXAPARIN SODIUM 40 MG: 100 INJECTION SUBCUTANEOUS at 18:00

## 2022-01-28 RX ADMIN — SODIUM CHLORIDE, PRESERVATIVE FREE 10 ML: 5 INJECTION INTRAVENOUS at 07:23

## 2022-01-28 RX ADMIN — POTASSIUM CHLORIDE 40 MEQ: 750 TABLET, FILM COATED, EXTENDED RELEASE ORAL at 08:37

## 2022-01-28 RX ADMIN — POTASSIUM CHLORIDE 10 MEQ: 7.46 INJECTION, SOLUTION INTRAVENOUS at 10:51

## 2022-01-28 RX ADMIN — Medication 3 G: at 08:37

## 2022-01-28 RX ADMIN — FOLIC ACID 1 MG: 1 TABLET ORAL at 08:37

## 2022-01-28 RX ADMIN — Medication: at 18:01

## 2022-01-28 RX ADMIN — METOPROLOL TARTRATE 50 MG: 50 TABLET, FILM COATED ORAL at 08:37

## 2022-01-28 RX ADMIN — WATER 1 G: 1 INJECTION INTRAMUSCULAR; INTRAVENOUS; SUBCUTANEOUS at 07:23

## 2022-01-28 RX ADMIN — POTASSIUM & SODIUM PHOSPHATES POWDER PACK 280-160-250 MG 1 PACKET: 280-160-250 PACK at 13:45

## 2022-01-28 RX ADMIN — ACETAMINOPHEN 650 MG: 325 TABLET ORAL at 18:28

## 2022-01-28 RX ADMIN — POTASSIUM CHLORIDE 40 MEQ: 750 TABLET, FILM COATED, EXTENDED RELEASE ORAL at 18:00

## 2022-01-28 RX ADMIN — ASPIRIN 81 MG: 81 TABLET, COATED ORAL at 08:37

## 2022-01-28 RX ADMIN — POTASSIUM & SODIUM PHOSPHATES POWDER PACK 280-160-250 MG 1 PACKET: 280-160-250 PACK at 18:00

## 2022-01-28 RX ADMIN — SODIUM CHLORIDE, PRESERVATIVE FREE 10 ML: 5 INJECTION INTRAVENOUS at 21:41

## 2022-01-28 RX ADMIN — POTASSIUM CHLORIDE 10 MEQ: 7.46 INJECTION, SOLUTION INTRAVENOUS at 08:37

## 2022-01-28 RX ADMIN — Medication: at 08:38

## 2022-01-28 RX ADMIN — SODIUM CHLORIDE, PRESERVATIVE FREE 10 ML: 5 INJECTION INTRAVENOUS at 13:46

## 2022-01-28 RX ADMIN — Medication 3 G: at 18:00

## 2022-01-28 RX ADMIN — THIAMINE HYDROCHLORIDE 100 MG: 100 INJECTION, SOLUTION INTRAMUSCULAR; INTRAVENOUS at 18:00

## 2022-01-28 RX ADMIN — METOPROLOL TARTRATE 50 MG: 50 TABLET, FILM COATED ORAL at 18:00

## 2022-01-28 NOTE — PROGRESS NOTES
CM attempted to meet with patient for intiial assessment and to discuss option of SNF rehab Attending in agreement with SNF plan. Nursing staff with patient at this time. Other CM staff will follow-up as able.  LEONIE Adorno

## 2022-01-28 NOTE — PROGRESS NOTES
Problem: Mobility Impaired (Adult and Pediatric)  Goal: *Acute Goals and Plan of Care (Insert Text)  Description: FUNCTIONAL STATUS PRIOR TO ADMISSION: Patient is a limited historian but reports she was modified independent using a rollator for functional mobility most recently. At \"baseline\" she is independent. HOME SUPPORT PRIOR TO ADMISSION: The patient lived alone with support from her daughter for transportation and groceries. Physical Therapy Goals  Initiated 1/27/2022  1. Patient will move from supine to sit and sit to supine  in bed with minimal assistance/contact guard assist within 7 day(s). 2.  Patient will transfer from bed to chair and chair to bed with minimal assistance/contact guard assist using the least restrictive device within 7 day(s). 3.  Patient will perform sit to stand with minimal assistance/contact guard assist within 7 day(s). 4.  Patient will ambulate with moderate assistance  for 50 feet with the least restrictive device within 7 day(s). Outcome: Progressing Towards Goal    PHYSICAL THERAPY TREATMENT  Patient: Ryann Meade (77 y.o. female)  Date: 1/28/2022  Diagnosis: High anion gap metabolic acidosis [Z12.2]  Hypokalemia [E87.6]  Hyponatremia [S46.3]  Metabolic acidosis [K63.0] <principal problem not specified>       Precautions: Fall  Chart, physical therapy assessment, plan of care and goals were reviewed. ASSESSMENT  Patient continues with skilled PT services and is progressing towards goals. Pt agreeable to therapy. Pt repots buttock pain and attempting to roll to her side. Pt also reports left LE spasm with movement. Pt utilizing UE to move left LE. Pt required assistance to achieve EOB. Pt was able to transfer to chair but poor WB through left LE due to \"spasms. \"  pt unable to tolerate gait at this time. Assisted pt back to bed. Pt continued to require max A for task with increase left LE pain. Pt was able to be positioned for comfort in supine.  Pt is below baseline with poor tolerance with standing     Current Level of Function Impacting Discharge (mobility/balance):max A     Other factors to consider for discharge:left  LE spasms. Decrease mobility and independence          PLAN :  Patient continues to benefit from skilled intervention to address the above impairments. Continue treatment per established plan of care. to address goals. Recommendation for discharge: (in order for the patient to meet his/her long term goals)  Therapy up to 5 days/week in SNF setting    This discharge recommendation:  Has not yet been discussed the attending provider and/or case management    IF patient discharges home will need the following DME: to be determined (TBD)       SUBJECTIVE:   Patient stated I am falling.     OBJECTIVE DATA SUMMARY:   Critical Behavior:  Neurologic State: Alert  Orientation Level: Oriented X4  Cognition: Appropriate for age attention/concentration  Safety/Judgement: Insight into deficits  Functional Mobility Training:  Bed Mobility:     Supine to Sit: Moderate assistance      sit to supine mod A         Transfers:  Sit to Stand: Maximum assistance  Stand to Sit: Maximum assistance                             Balance:  Sitting: Impaired  Sitting - Static: Good (unsupported)  Sitting - Dynamic: Fair (occasional)  Standing: Impaired  Standing - Static: Poor  Standing - Dynamic : Poor  Ambulation/Gait Training:       Stairs: Therapeutic Exercises:     Pain Rating:  Left LE spasm    Activity Tolerance:   Poor    After treatment patient left in no apparent distress:   Sitting in chair, Call bell within reach, and Bed / chair alarm activated    COMMUNICATION/COLLABORATION:   The patients plan of care was discussed with: Registered nurse.      Jojo Tran PTA   Time Calculation: 26 mins

## 2022-01-28 NOTE — PROGRESS NOTES
RENAL  PROGRESS NOTE        Subjective:    seen and examined  Feels better            Objective:   VITALS SIGNS:    Visit Vitals  BP (!) 141/65 (BP 1 Location: Right upper arm, BP Patient Position: Sitting)   Pulse 91   Temp 97.5 °F (36.4 °C)   Resp 18   Ht 5' 1\" (1.549 m)   Wt 45.9 kg (101 lb 1.6 oz)   SpO2 93%   BMI 19.10 kg/m²       O2 Device: None (Room air)   O2 Flow Rate (L/min): 2 l/min   Temp (24hrs), Av °F (36.7 °C), Min:97.4 °F (36.3 °C), Max:98.7 °F (37.1 °C)         PHYSICAL EXAM:  NAD  No edema    DATA REVIEW:     INTAKE / OUTPUT:   Last shift:      701 - 1900  In: 200 [P.O.:200]  Out: 0   Last 3 shifts: 1901 -  07  In: 520 [P.O.:520]  Out: 0     Intake/Output Summary (Last 24 hours) at 2022 1118  Last data filed at 2022 1810  Gross per 24 hour   Intake 720 ml   Output 0 ml   Net 720 ml         LABS:   Recent Labs     22  1720   WBC 14.2*   HGB 15.3   HCT 43.9   *     Recent Labs     22  0449 22  0314 22  1945 22  1720   * 130*  --  125*   K 2.8* 3.2*  --  2.9*   CL 98 97  --  90*   CO2 23 21  --  19*   GLU 75 72  --  83   BUN 6 11  --  11   CREA 0.48* 0.67  --  0.85   CA 8.8 9.3  --  10.1   MG  --   --  2.0  --    PHOS  --  2.2*  --   --    ALB 2.5* 2.7*  --  3.1*   TBILI 0.8 0.8  --  1.0   ALT 37 34  --  40           Assessment:   Chronic hyponatremia ,better,SIADH   High AG met acidosis,better,ketozcidosis(starvation,etoh. Chavo Power ) better  hypokalemia  Edema  HTN     Po phos  On   salt tab  KCL   sodium better  Will follow back on Monday if still here   Grayson Mckeon MD

## 2022-01-28 NOTE — PROGRESS NOTES
1734: TRANSFER - IN REPORT:    Verbal report received from \[facility]\"" (name) on Lucio Estrella  being received from ED (unit) for routine progression of care      Report consisted of patients Situation, Background, Assessment and   Recommendations(SBAR). Information from the following report(s) SBAR, Kardex, Intake/Output, MAR, Recent Results and Cardiac Rhythm Sinus Tach was reviewed with the receiving nurse. Opportunity for questions and clarification was provided. Assessment completed upon patients arrival to unit and care assumed. 1800: MD Lina updated about HR being consistently in the 120s and BP being 151/72. MD Lina states he will place orders if needed. 1930: Bedside and Verbal shift change report given to Jean Marie Pitts RN (oncoming nurse) by John Bloom RN (offgoing nurse). Report included the following information SBAR, Kardex, Intake/Output, MAR, Recent Results and Cardiac Rhythm Sinus Tach. Care Plans:   Problem: Patient Education: Go to Patient Education Activity  Goal: Patient/Family Education  Outcome: Progressing Towards Goal     Problem: Pressure Injury - Risk of  Goal: *Prevention of pressure injury  Description: Document Sameer Scale and appropriate interventions in the flowsheet.   Outcome: Progressing Towards Goal  Note: Pressure Injury Interventions:                                            Problem: Patient Education: Go to Patient Education Activity  Goal: Patient/Family Education  Outcome: Progressing Towards Goal

## 2022-01-28 NOTE — PROGRESS NOTES
0730: Bedside and Verbal shift change report given to Stephanie Estrada RN (oncoming nurse) by Dalia Delvalle RN (offgoing nurse). Report included the following information SBAR, Kardex, Intake/Output, MAR, Recent Results and Cardiac Rhythm Sinus Tach. 1930: Bedside and Verbal shift change report given to EVONNE Daley (oncoming nurse) by Stephanie Estrada RN (offgoing nurse). Report included the following information SBAR, Kardex, Intake/Output, MAR, Recent Results and Cardiac Rhythm Sinus Rhythm. Care Plans:   Problem: Patient Education: Go to Patient Education Activity  Goal: Patient/Family Education  1/28/2022 0731 by Laura Bernal RN  Outcome: Progressing Towards Goal  1/27/2022 1955 by Laura Bernal RN  Outcome: Progressing Towards Goal     Problem: Pressure Injury - Risk of  Goal: *Prevention of pressure injury  Description: Document Sameer Scale and appropriate interventions in the flowsheet. 1/28/2022 0731 by Laura Bernal RN  Outcome: Progressing Towards Goal  Note: Pressure Injury Interventions:  Sensory Interventions: Assess changes in LOC,Assess need for specialty bed,Avoid rigorous massage over bony prominences,Check visual cues for pain,Keep linens dry and wrinkle-free,Minimize linen layers,Monitor skin under medical devices,Maintain/enhance activity level,Pad between skin to skin    Moisture Interventions: Absorbent underpads,Apply protective barrier, creams and emollients,Internal/External urinary devices,Limit adult briefs,Maintain skin hydration (lotion/cream),Minimize layers,Moisture barrier,Check for incontinence Q2 hours and as needed,Assess need for specialty bed    Activity Interventions: Assess need for specialty bed,Increase time out of bed,Pressure redistribution bed/mattress(bed type)    Mobility Interventions: Assess need for specialty bed,Pressure redistribution bed/mattress (bed type),Turn and reposition approx.  every two hours(pillow and wedges)    Nutrition Interventions: Document food/fluid/supplement intake    Friction and Shear Interventions: Lift sheet,Minimize layers             1/27/2022 1955 by Lashae Lou RN  Outcome: Progressing Towards Goal  Note: Pressure Injury Interventions:                                            Problem: Patient Education: Go to Patient Education Activity  Goal: Patient/Family Education  1/28/2022 0731 by Lashae Lou RN  Outcome: Progressing Towards Goal  1/27/2022 1955 by Lashae Lou RN  Outcome: Progressing Towards Goal     Problem: Falls - Risk of  Goal: *Absence of Falls  Description: Document Jamil Vincent Fall Risk and appropriate interventions in the flowsheet.   Outcome: Progressing Towards Goal  Note: Fall Risk Interventions:  Mobility Interventions: Communicate number of staff needed for ambulation/transfer              Elimination Interventions: Call light in reach    History of Falls Interventions: Bed/chair exit alarm         Problem: Patient Education: Go to Patient Education Activity  Goal: Patient/Family Education  Outcome: Progressing Towards Goal

## 2022-01-28 NOTE — PROGRESS NOTES
Problem: Self Care Deficits Care Plan (Adult)  Goal: *Acute Goals and Plan of Care (Insert Text)  Description: FUNCTIONAL STATUS PRIOR TO ADMISSION: The patient lived alone and primarily used a rollator for functional mobility (stated she used to use a walker however moving LLE has been more difficult lately). HOME SUPPORT: The patient lived alone with daughter who lives nearby to provide assistance. Occupational Therapy Goals  Initiated 1/28/2022  1. Patient will perform bathing with minimal assistance/contact guard assist within 7 day(s). 2.  Patient will perform lower body dressing with minimal assistance/contact guard assist within 7 day(s). 3.  Patient will perform upper body dressing with minimal assistance/contact guard assist for item retrieval within 7 day(s). 4.  Patient will perform toilet transfers with supervision/set-up within 7 day(s). 5.  Patient will perform all aspects of toileting with minimal assistance/contact guard assist within 7 day(s). 6.  Patient will participate in upper extremity therapeutic exercise/activities with supervision/set-up for 5 minutes within 7 day(s). 7.  Patient will utilize energy conservation techniques during functional activities with verbal cues within 7 day(s). Outcome: Progressing Towards Goal   OCCUPATIONAL THERAPY EVALUATION  Patient: Kaykay Stroud (77 y.o. female)  Date: 1/28/2022  Primary Diagnosis: High anion gap metabolic acidosis [Z90.4]  Hypokalemia [E87.6]  Hyponatremia [B17.0]  Metabolic acidosis [D71.7]        Precautions:  Fall    ASSESSMENT  Based on the objective data described below, the patient presents with generalized weakness, decreased endurance, decreased activity tolerance, and spasms in LLE limiting patient desire to perform sit <> stand transfers at this time. PTA, patient lived alone and was largely MOD I for functional mobility using a rollator.  Patient far below functional baseline at this time and would likely benefit from SNF rehab post discharge. Current Level of Function Impacting Discharge (ADLs/self-care): up to MOD A    Functional Outcome Measure: The patient scored 25/100 on the Barthel Index outcome measure which is indicative of patient being very dependent with ADL tasks. Other factors to consider for discharge: ETOH     Patient will benefit from skilled therapy intervention to address the above noted impairments. PLAN :  Recommendations and Planned Interventions: self care training, functional mobility training, therapeutic exercise, balance training, therapeutic activities, endurance activities, patient education, home safety training, and family training/education    Frequency/Duration: Patient will be followed by occupational therapy 4 times a week to address goals. Recommendation for discharge: (in order for the patient to meet his/her long term goals)  Therapy up to 5 days/week in SNF setting    This discharge recommendation:  Has not yet been discussed the attending provider and/or case management    IF patient discharges home will need the following DME: TBD       SUBJECTIVE:   Patient stated I cannot stand again.     OBJECTIVE DATA SUMMARY:   HISTORY:   Past Medical History:   Diagnosis Date    History of mammogram 10 years ago    Hypertension     Hyponatremia 09/11/2014    hospitalized for severe hyponatremia due to SIADH    Menopause     at age 37    Pap smear for cervical cancer screening 15 years ago    SIADH (syndrome of inappropriate ADH production) ([facility] [address]     UTI (urinary tract infection)      Past Surgical History:   Procedure Laterality Date    HX CATARACT REMOVAL Right 1/14/2014    HX CHOLECYSTECTOMY      HX THROMBECTOMY Right     leg    HX TUBAL LIGATION         Expanded or extensive additional review of patient history:     Home Situation  Home Environment: Apartment  # Steps to Enter: 0  One/Two Story Residence: One story  Living Alone: Yes  Support Systems: Child(jc)  Patient Expects to be Discharged to[de-identified] Rehab hospital/unit acute  Current DME Used/Available at Home: Grab bars,Cane, straight,Cane, quad,Shower chair,Walker, rolling,Walker, rollator  Tub or Shower Type: Tub/Shower combination    Hand dominance: Right    EXAMINATION OF PERFORMANCE DEFICITS:  Cognitive/Behavioral Status:  Neurologic State: Alert  Orientation Level: Oriented X4  Cognition: Appropriate for age attention/concentration  Perception: Appears intact  Perseveration: No perseveration noted  Safety/Judgement: Insight into deficits    Skin: exposed areas grossly intact; peripheral IV in LUE    Edema: none noted    Hearing: Auditory  Auditory Impairment: None    Vision/Perceptual:                                Corrective Lenses: Reading glasses    Range of Motion:  BUE  AROM: Generally decreased, functional                         Strength:  BUE  Strength: Generally decreased, functional                Coordination:     Fine Motor Skills-Upper: Left Intact; Right Intact    Gross Motor Skills-Upper: Left Intact; Right Intact    Tone & Sensation:  BUE  Tone: Normal  Sensation: Intact                      Balance:  Sitting: Intact; Without support  Standing:  (unable to assess)      ADL Assessment:  Feeding: Setup;Supervision    Oral Facial Hygiene/Grooming: Setup;Supervision    Bathing: Moderate assistance (difficulty bathing LLE)    Upper Body Dressing: Setup;Supervision    Lower Body Dressing: Moderate assistance (difficulty threading LLE into sock and A for standing)    Toileting: Moderate assistance (A for standing and clothing management)                ADL Intervention and task modifications:    Lower Body Dressing Assistance  Socks:  Moderate assistance  Leg Crossed Method Used: Yes  Position Performed: Seated in chair    Cognitive Retraining  Safety/Judgement: Insight into deficits    Functional Measure:    Barthel Index:  Bathin  Bladder: 5  Bowels: 5  Groomin  Dressin  Feedin  Mobility: 0  Stairs: 0  Toilet Use: 0  Transfer (Bed to Chair and Back): 0  Total: 25/100      The Barthel ADL Index: Guidelines  1. The index should be used as a record of what a patient does, not as a record of what a patient could do. 2. The main aim is to establish degree of independence from any help, physical or verbal, however minor and for whatever reason. 3. The need for supervision renders the patient not independent. 4. A patient's performance should be established using the best available evidence. Asking the patient, friends/relatives and nurses are the usual sources, but direct observation and common sense are also important. However direct testing is not needed. 5. Usually the patient's performance over the preceding 24-48 hours is important, but occasionally longer periods will be relevant. 6. Middle categories imply that the patient supplies over 50 per cent of the effort. 7. Use of aids to be independent is allowed. Score Interpretation (from [address] [address])    Independent   60-79 Minimally independent   40-59 Partially dependent   20-39 Very dependent   <20 Totally dependent     -Albert Purcell., Barthel, D.W. (1965). Functional evaluation: the Barthel Index. [facility] ([address] (1997). The Barthel activities of daily living index: self-reporting versus actual performance in the old (> or = 75 years). Journal of [address] [place] [address] 45(7), [address] [facility], [facility], Leland Cowden., [facility]. (1999). Measuring the change in disability after inpatient rehabilitation; comparison of the responsiveness of the Barthel Index and Functional [place] Measure. Journal of Neurology, Neurosurgery, and Psychiatry, 66(4), 111-348. Mikayla Olmedo, N.J.A, SERGIO BrewerJ.MELVINA, & Demetria Davis, MMuniraA. (2004) Assessment of post-stroke quality of life in cost-effectiveness studies: The usefulness of the Barthel Index and the EuroQoL-5D.  Quality of Life Research, 13, 718-80        Occupational Therapy Evaluation Charge Determination   History Examination Decision-Making   LOW Complexity : Brief history review  MEDIUM Complexity : 3-5 performance deficits relating to physical, cognitive , or psychosocial skils that result in activity limitations and / or participation restrictions MEDIUM Complexity : Patient may present with comorbidities that affect occupational performnce. Miniml to moderate modification of tasks or assistance (eg, physical or verbal ) with assesment(s) is necessary to enable patient to complete evaluation       Based on the above components, the patient evaluation is determined to be of the following complexity level: LOW   Pain Rating:  C/o pain in LLE w/ spasms    Activity Tolerance:   Fair and requires rest breaks    After treatment patient left in no apparent distress:    Sitting in chair and Call bell within reach    COMMUNICATION/EDUCATION:   The patients plan of care was discussed with: Physical therapist and Registered nurse. Patient/family agree to work toward stated goals and plan of care. This patients plan of care is appropriate for delegation to [facility].     Thank you for this referral.  Allison Gardner  Time Calculation: 14 mins

## 2022-01-28 NOTE — PROGRESS NOTES
1930  Bedside shift change report given to Aimee Doherty (oncoming nurse) by Charity Lau (offgoing nurse). Report included the following information SBAR, Kardex, Intake/Output, MAR, Accordion, Recent Results and Cardiac Rhythm ST.     0730  Bedside shift change report given to Macrina (oncoming nurse) by Aimee Doherty (offgoing nurse).  Report included the following information SBAR, Kardex, Intake/Output, MAR, Accordion, Recent Results and Cardiac Rhythm ST.

## 2022-01-28 NOTE — PROGRESS NOTES
HISTORY AND PHYSICAL  Hunter Molina MD        PCP: Stacey Lao NP    Please note that this dictation was completed with [employer], the computer voice recognition software. Quite often unanticipated grammatical, syntax, homophones, and other interpretive errors are inadvertently transcribed by the computer software. Please disregard these errors. Please excuse any errors that have escaped final proofreading. Admission history: This is a 18-year-old female with a significant past medical history of chronic hyponatremia due to SIADH on salt tablets, alcohol abuse, hypertension presented to the ED for evaluation of progressive leg swelling, left more than right and fatigue. Upon evaluation in the ED, she was found to have sodium of 125, potassium 2.9, anion gap of 16 with CO2 of 19. I saw and examined patient in the ED room 15. Patient presently is alert oriented x3, on room air, not in distress and she was able to provide good history. She confirmed above history, admitted to having poor oral intake, getting progressively fatigued. She says she does not drink much however on further questioning she admitted drinking 1-2 shots of scotch about 4 days a week, she does not feel she would go through withdrawal.  She has chronic intermittent diarrhea, she is not sure if she had GI work-up. She denied cough, fever, shortness of breath, chest pain, palpitations. She was started on IV fluid with KCl in the ED and repeat BMP showed sodium has improved to 130 and potassium has improved to 3.2 anion gap almost normalized. Assessment & Plan: This is a 18-year-old female with PMH of hyponatremia, SIADH, chronic alcohol abuse, hypertension, peripheral neuropathy presented with generalized weakness increasing leg edema. Acute on chronic hyponatremia, component of prerenal on top of chronic SIADH and alcohol-related. --She initially was on IV fluid which I discontinued.  Sodium improved to the low 130s, remained stable. --Nephrology consulted, recommendations appreciated. Anion gap metabolic acidosis, dehydration due to poor oral intake, starvation etc.  --Improved/resolved with IV fluid     Moderate hypokalemia due to poor oral intake:  --Replete and monitor. Peripheral edema due to poor nutrition, hypoalbuminemia. There is no evidence of CHF. Bilateral venous Dopplers negative for DVT. Chronic hypertension. BP improved. Resume Lopressor  Tachycardia improved with resumption of Lopressor    Alcohol abuse. She admits to drinking 1-2 scotch 4 days a week. No hallucination, delusions, asterixis or any other signs of withdrawal.  --Vitamin supplements with folic acid and thiamine ordered. --Initiate symptom triggered CIWA protocol  --No evidence of withdrawal this for    GNR UTI : Continue ceftriaxone. Leukocytosis, thrombocytosis likely reflective of hemoconcentration due to dehydration than sepsis. Resolved. .    Chronic intermittent diarrhea: No active issues at present. Patient will need outpatient GI eval    Chronic peripheral neuropathy. Rapid Covid test negative. No respiratory symptoms. Patient's Baseline: Uses cane/rolling walker  DVT ppx: Lovenox  Code status:DNR. When asked about her wish regarding resuscitation in the event of cardiac or respiratory arrest, she promptly said \"no I have DNR.\"    I think she has a DNR paper on file. Disposition: She lives alone. She usually uses cane but most recently using Rollator. She has been progressively getting weak and may need rehab on discharge.  Needs to SNF.          PMH/PSH:  Past Medical History:   Diagnosis Date    History of mammogram 10 years ago    Hypertension     Hyponatremia 09/11/2014    hospitalized for severe hyponatremia due to SIADH    Menopause     at age 37    Pap smear for cervical cancer screening 15 years ago    SIADH [facility]syndrome of inappropriate ADH production) ([facility] [address]     UTI (urinary tract infection)      Past Surgical History:   Procedure Laterality Date    HX CATARACT REMOVAL Right 1/14/2014    HX CHOLECYSTECTOMY      HX THROMBECTOMY Right     leg    HX TUBAL LIGATION         Home meds:   Prior to Admission medications    Medication Sig Start Date End Date Taking? Authorizing Provider   cyclobenzaprine (FLEXERIL) 5 mg tablet Take 1 Tablet by mouth two (2) times daily as needed for Muscle Spasm(s). 1/10/22   Neto Kaiser NP   diazePAM (Valium) 2 mg tablet Take 1 Tablet by mouth every eight (8) hours as needed for Muscle Spasm(s). Max Daily Amount: 6 mg. 1/6/22   Lorne Levine MD   erythromycin (ILOTYCIN) ophthalmic ointment Apply 0.5 inch to affected eye 4 times per day. Indications: an infection on the surface of the eye 9/17/21   ROXANA Eller   amLODIPine (NORVASC) 10 mg tablet TAKE 1 TABLET BY MOUTH DAILY 6/21/21   Neto Kaiser NP   metoprolol tartrate (LOPRESSOR) 50 mg tablet TAKE 1 TABLET BY MOUTH TWICE DAILY 6/21/21   Neto Kaiser NP   sodium chloride 1 gram tablet TAKE 3 TABLETS BY MOUTH TWICE DAILY WITH MEALS 6/21/21   Neto Kaiser NP   polyvinyl alcohol/povidone (ARTIFICIAL TEARS OP) 2 Drops by IntraOCUlar route every two (2) hours as needed for Other (dry eyes). 6/8/20   Provider, Historical   ipratropium (ATROVENT HFA) 17 mcg/actuation inhaler Take 2 Puffs by inhalation every four (4) hours as needed for Cough, Respiratory Distress or Shortness of Breath. 6/8/20   Provider, Historical   aspirin delayed-release 81 mg tablet Take 81 mg by mouth daily. Provider, Historical   multivitamin (ONE A DAY) tablet Take 1 Tab by mouth daily. Provider, Historical       Allergies: Allergies   Allergen Reactions    Levaquin [Levofloxacin] Anaphylaxis     Throat and face became swollen.  Ace Inhibitors Vertigo    Hydrochlorothiazide Other (comments)     Hypotension and severe hyponatremia.     Penicillins Hives     While pregnant       FH:  Family History   Problem Relation Age of Onset    Kidney Disease Mother         uncertain       SH:  Social History     Tobacco Use    Smoking status: Current Every Day Smoker     Packs/day: 1.00     Years: 40.00     Pack years: 40.00     Types: Cigarettes    Smokeless tobacco: Former User     Quit date: 9/11/2014   Substance Use Topics    Alcohol use: Yes     Alcohol/week: 3.0 standard drinks     Types: 3 Standard drinks or equivalent per week       ROS: A comprehensive review of systems was negative except for that written in the HPI. PHYSICAL EXAM:  Generally: Patient is alert and oriented x3. She is not in any distress. HEENT: No pallor or jaundice. Buccal mucosa is mildly dry. Lungs: On room air. Not in distress. Coarse breath sounds otherwise symmetrical.  Cardiovascular: S1-S2 well heard, no gallop or rub. Abdomen: Flat, normoactive, soft and nontender. ANALIA: No CVA or suprapubic tenderness. Extremities: Pitting pedal and leg edema, without warmth or tenderness  CNS: Alert oriented x3. Cranial nerves are intact. Motor exam symmetrical.  No asterixis. Psych: Calm and cooperative. Labs/Imaging:  Available labs and imaging studies reviewed.                       Signed By: Rashid Fountain MD     January 28, 2022

## 2022-01-29 LAB
BACTERIA SPEC CULT: ABNORMAL
CC UR VC: ABNORMAL
SERVICE CMNT-IMP: ABNORMAL

## 2022-01-29 PROCEDURE — 74011250637 HC RX REV CODE- 250/637: Performed by: INTERNAL MEDICINE

## 2022-01-29 PROCEDURE — 74011250637 HC RX REV CODE- 250/637: Performed by: HOSPITALIST

## 2022-01-29 PROCEDURE — 65660000000 HC RM CCU STEPDOWN

## 2022-01-29 PROCEDURE — 74011000258 HC RX REV CODE- 258: Performed by: STUDENT IN AN ORGANIZED HEALTH CARE EDUCATION/TRAINING PROGRAM

## 2022-01-29 PROCEDURE — 74011250636 HC RX REV CODE- 250/636: Performed by: HOSPITALIST

## 2022-01-29 PROCEDURE — 74011000250 HC RX REV CODE- 250: Performed by: HOSPITALIST

## 2022-01-29 PROCEDURE — 74011250636 HC RX REV CODE- 250/636: Performed by: STUDENT IN AN ORGANIZED HEALTH CARE EDUCATION/TRAINING PROGRAM

## 2022-01-29 RX ORDER — CYCLOBENZAPRINE HCL 10 MG
5 TABLET ORAL
Status: DISCONTINUED | OUTPATIENT
Start: 2022-01-29 | End: 2022-02-03 | Stop reason: HOSPADM

## 2022-01-29 RX ORDER — POTASSIUM CHLORIDE 750 MG/1
40 TABLET, FILM COATED, EXTENDED RELEASE ORAL
Status: COMPLETED | OUTPATIENT
Start: 2022-01-29 | End: 2022-01-29

## 2022-01-29 RX ADMIN — Medication: at 08:51

## 2022-01-29 RX ADMIN — THIAMINE HYDROCHLORIDE 100 MG: 100 INJECTION, SOLUTION INTRAMUSCULAR; INTRAVENOUS at 20:35

## 2022-01-29 RX ADMIN — CYCLOBENZAPRINE 5 MG: 10 TABLET, FILM COATED ORAL at 22:23

## 2022-01-29 RX ADMIN — SODIUM CHLORIDE, PRESERVATIVE FREE 10 ML: 5 INJECTION INTRAVENOUS at 22:24

## 2022-01-29 RX ADMIN — POTASSIUM CHLORIDE 40 MEQ: 750 TABLET, FILM COATED, EXTENDED RELEASE ORAL at 15:51

## 2022-01-29 RX ADMIN — POTASSIUM & SODIUM PHOSPHATES POWDER PACK 280-160-250 MG 1 PACKET: 280-160-250 PACK at 18:18

## 2022-01-29 RX ADMIN — ASPIRIN 81 MG: 81 TABLET, COATED ORAL at 08:45

## 2022-01-29 RX ADMIN — Medication 3 G: at 18:18

## 2022-01-29 RX ADMIN — ENOXAPARIN SODIUM 40 MG: 100 INJECTION SUBCUTANEOUS at 18:18

## 2022-01-29 RX ADMIN — METOPROLOL TARTRATE 50 MG: 50 TABLET, FILM COATED ORAL at 18:18

## 2022-01-29 RX ADMIN — METOPROLOL TARTRATE 50 MG: 50 TABLET, FILM COATED ORAL at 08:45

## 2022-01-29 RX ADMIN — WATER 1 G: 1 INJECTION INTRAMUSCULAR; INTRAVENOUS; SUBCUTANEOUS at 07:05

## 2022-01-29 RX ADMIN — CYCLOBENZAPRINE 5 MG: 10 TABLET, FILM COATED ORAL at 15:51

## 2022-01-29 RX ADMIN — Medication 3 G: at 08:45

## 2022-01-29 RX ADMIN — POTASSIUM & SODIUM PHOSPHATES POWDER PACK 280-160-250 MG 1 PACKET: 280-160-250 PACK at 08:45

## 2022-01-29 RX ADMIN — SODIUM CHLORIDE, PRESERVATIVE FREE 10 ML: 5 INJECTION INTRAVENOUS at 15:46

## 2022-01-29 RX ADMIN — SODIUM CHLORIDE, PRESERVATIVE FREE 10 ML: 5 INJECTION INTRAVENOUS at 07:06

## 2022-01-29 RX ADMIN — FOLIC ACID 1 MG: 1 TABLET ORAL at 08:45

## 2022-01-29 RX ADMIN — Medication: at 18:20

## 2022-01-29 NOTE — PROGRESS NOTES
Denise Veloz MD        PCP: Josette Armendariz NP    Please note that this dictation was completed with [employer], the computer voice recognition software. Quite often unanticipated grammatical, syntax, homophones, and other interpretive errors are inadvertently transcribed by the computer software. Please disregard these errors. Please excuse any errors that have escaped final proofreading. Admission history: This is a 67-year-old female with a significant past medical history of chronic hyponatremia due to SIADH on salt tablets, alcohol abuse, hypertension presented to the ED for evaluation of progressive leg swelling, left more than right and fatigue. Upon evaluation in the ED, she was found to have sodium of 125, potassium 2.9, anion gap of 16 with CO2 of 19. I saw and examined patient in the ED room 15. Patient presently is alert oriented x3, on room air, not in distress and she was able to provide good history. She confirmed above history, admitted to having poor oral intake, getting progressively fatigued. She says she does not drink much however on further questioning she admitted drinking 1-2 shots of scotch about 4 days a week, she does not feel she would go through withdrawal.  She has chronic intermittent diarrhea, she is not sure if she had GI work-up. She denied cough, fever, shortness of breath, chest pain, palpitations. She was started on IV fluid with KCl in the ED and repeat BMP showed sodium has improved to 130 and potassium has improved to 3.2 anion gap almost normalized. Assessment & Plan: This is a 67-year-old female with PMH of hyponatremia, SIADH, chronic alcohol abuse, hypertension, peripheral neuropathy presented with generalized weakness increasing leg edema.     No chest pain/SOB, dizziness  Has some calf muscle spasms    Acute on chronic hyponatremia, component of prerenal on top of chronic SIADH and alcohol-related: now improved with holding fluids    AGMA, dehydration due to poor oral intake, starvation: resolved with fluids  Moderate hypokalemia due to poor oral intake: replace as needed  Peripheral edema due to poor nutrition, hypoalbuminemia:no evidence of CHF. Bilateral venous Dopplers negative for DVT. Chronic hypertension. BP improved. Resume Lopressor  Alcohol abuse. She admits to drinking 1-2 scotch 4 days a week. No hallucination, delusions, asterixis or any other signs of withdrawal.  --Vitamin supplements with folic acid and thiamine ordered. --Initiate symptom triggered CIWA protocol  --No evidence of withdrawal this for    Klebsiella UTI : Continue ceftriaxone (1/27--1/31)  Chronic intermittent diarrhea:outpatient GI eval  Chronic peripheral neuropathy. Regular diet    PT/OT SNF    Patient's Baseline: Uses cane/rolling walker  DVT ppx: Lovenox    Plan: Medically stable, awaiting SNF  Code status:DNR  Disposition: SNF          PMH/PSH:  Past Medical History:   Diagnosis Date    History of mammogram 10 years ago    Hypertension     Hyponatremia 09/11/2014    hospitalized for severe hyponatremia due to SIADH    Menopause     at age 37    Pap smear for cervical cancer screening 15 years ago    [facility]ADH [facility]syndrome of inappropriate ADH production) ([facility] [address]     UTI (urinary tract infection)      Past Surgical History:   Procedure Laterality Date    HX CATARACT REMOVAL Right 1/14/2014    HX CHOLECYSTECTOMY      HX THROMBECTOMY Right     leg    HX TUBAL LIGATION         Home meds:   Prior to Admission medications    Medication Sig Start Date End Date Taking? Authorizing Provider   cyclobenzaprine (FLEXERIL) 5 mg tablet Take 1 Tablet by mouth two (2) times daily as needed for Muscle Spasm(s). 1/10/22   Alexander Kaiser NP   diazePAM (Valium) 2 mg tablet Take 1 Tablet by mouth every eight (8) hours as needed for Muscle Spasm(s).  Max Daily Amount: 6 mg. 1/6/22   Onesimo Carrero MD   erythromycin (ILOTYCIN) ophthalmic ointment Apply 0.5 inch to affected eye 4 times per day. Indications: an infection on the surface of the eye 9/17/21   ROXANA Beltran   amLODIPine (NORVASC) 10 mg tablet TAKE 1 TABLET BY MOUTH DAILY 6/21/21   Katey Kaiser NP   metoprolol tartrate (LOPRESSOR) 50 mg tablet TAKE 1 TABLET BY MOUTH TWICE DAILY 6/21/21   Katey Kaiser NP   sodium chloride 1 gram tablet TAKE 3 TABLETS BY MOUTH TWICE DAILY WITH MEALS 6/21/21   Katey Kaiser NP   polyvinyl alcohol/povidone (ARTIFICIAL TEARS OP) 2 Drops by IntraOCUlar route every two (2) hours as needed for Other (dry eyes). 6/8/20   Provider, Historical   ipratropium (ATROVENT HFA) 17 mcg/actuation inhaler Take 2 Puffs by inhalation every four (4) hours as needed for Cough, Respiratory Distress or Shortness of Breath. 6/8/20   Provider, Historical   aspirin delayed-release 81 mg tablet Take 81 mg by mouth daily. Provider, Historical   multivitamin (ONE A DAY) tablet Take 1 Tab by mouth daily. Provider, Historical       Allergies: Allergies   Allergen Reactions    Levaquin [Levofloxacin] Anaphylaxis     Throat and face became swollen.  Ace Inhibitors Vertigo    Hydrochlorothiazide Other (comments)     Hypotension and severe hyponatremia.  Penicillins Hives     While pregnant       FH:  Family History   Problem Relation Age of Onset    Kidney Disease Mother         uncertain       SH:  Social History     Tobacco Use    Smoking status: Current Every Day Smoker     Packs/day: 1.00     Years: 40.00     Pack years: 40.00     Types: Cigarettes    Smokeless tobacco: Former User     Quit date: 9/11/2014   Substance Use Topics    Alcohol use: Yes     Alcohol/week: 3.0 standard drinks     Types: 3 Standard drinks or equivalent per week       ROS: A comprehensive review of systems was negative except for that written in the HPI. PHYSICAL EXAM:  Generally: Patient is alert and oriented x3. She is not in any distress. HEENT: No pallor or jaundice. Buccal mucosa is mildly dry. Lungs: On room air. Not in distress. Coarse breath sounds otherwise symmetrical.  Cardiovascular: S1-S2 well heard, no gallop or rub. Abdomen: Flat, normoactive, soft and nontender. ANALIA: No CVA or suprapubic tenderness. Extremities: Pitting pedal and leg edema, without warmth or tenderness  CNS: Alert oriented x3. Cranial nerves are intact. Motor exam symmetrical.  No asterixis. Psych: Calm and cooperative. Labs/Imaging:  Available labs and imaging studies reviewed.                       Signed By: Hue Gibbons MD     January 29, 2022

## 2022-01-29 NOTE — PROGRESS NOTES
Transition of Care-  Anticipate discharge to a skilled nursing facility - referrals sent via [address] [place] [address]   1st choice- [facility]  ([phone]) 2nd choice:  [facility] ([phone])  RUR 13%  Transportation at Discharge: Patients daughter Eneida Saldaña ([phone]) will be able to provide transportation to the nursing facility    *Patients daughter is requesting a [facility] for PACE program*    Cm met w/ patient, introduced self, explained role and offered support  Patient lives alone and was independent w/adls/iadls prior to admission. Patient wanted cm to contact her daughter for SNF choice. Cm contacted patients daughter Eneida Saldaña and she informed cm [facility] or [facility]- referrals sent via [facility]. Eneida Saldaña informed cm that she has been in touch with PACE and after she is discharged from the skilled nursing facility patient will be receiving PACE and will need a UAI. Reason for Admission:      Acute on chronic hyponatremia, component of prerenal on top of chronic SIADH  --Sodium improved from 125-130 this morning after being on IV fluid overnight. I have discontinued IV fluids to avoid rapid correction of sodium. RUR Score: 13%                    Plan for utilizing home health:  Not at this time        PCP: First and Last name:  Sarah Lebron NP   Are you a current patient: Yes/No:   Yes   Approximate date of last visit:  Few weeks ago   Can you participate in a virtual visit with your PCP:  Rather in person                    Current Advanced Directive/Advance Care Plan: DNR      Healthcare Decision Maker:   Click here to complete [address] including selection of the Healthcare Decision Maker Relationship (ie \"Primary\")             Primary Decision Maker: Lila Nazario sister - [phone]                                    Care Management Interventions  PCP Verified by CM:  Yes  Last Visit to PCP: 01/13/22  Mode of Transport at Discharge: Self  Transition of Care Consult (CM Consult): SNF  Partner SNF: Yes  Physical Therapy Consult: Yes  Occupational Therapy Consult: Yes  Support Systems: [place]  Confirm Follow Up Transport: Family  The Plan for Transition of Care is Related to the Following Treatment Goals : PT OT and a nursing facility for skilled care  The Patient and/or Patient Representative was Provided with a Choice of Provider and Agrees with the Discharge Plan?: Yes  Freedom of Choice List was Provided with Basic Dialogue that Supports the Patient's Individualized Plan of Care/Goals, Treatment Preferences and Shares the Quality Data Associated with the Providers?: Yes  Discharge Location  Patient Expects to be Discharged to[de-identified] Skilled nursing facility      The Plan for Transition of Care is related to the following treatment goals: PT/OT    The Patient and/or patient representative sister Adonay Lewis was provided with a choice of provider and agrees   with the discharge plan. [x] Yes [] No    Freedom of choice list was provided with basic dialogue that supports the patient's individualized plan of care/goals, treatment preferences and shares the quality data associated with the providers.  [x] Yes [] No

## 2022-01-29 NOTE — PROGRESS NOTES
2330: Bedside and Verbal shift change report given to Bianca Calle RN (oncoming nurse) by Jonelle Sen RN (offgoing nurse). Report included the following information SBAR, Kardex, Intake/Output, MAR, Recent Results, Cardiac Rhythm NSR and Alarm Parameters . 0100: Patient frequent desaturating into the upper 80's. Patient encouraged to cough and deep breathe, head of bed elevated, pulse ox sensor changed and patient put on 2L NC. Reassessed lung sounds, patient found to be coarse and rhonchi appreciated bilaterally. 0800: Bedside and Verbal shift change report given to Ayaz Ruggiero RN (oncoming nurse) by Bianca Calle RN (offgoing nurse). Report included the following information SBAR, Kardex, Intake/Output, MAR, Recent Results, Cardiac Rhythm NSR and Alarm Parameters .

## 2022-01-30 ENCOUNTER — APPOINTMENT (OUTPATIENT)
Dept: GENERAL RADIOLOGY | Age: 77
DRG: 628 | End: 2022-01-30
Attending: HOSPITALIST
Payer: MEDICARE

## 2022-01-30 LAB
ANION GAP SERPL CALC-SCNC: 6 MMOL/L (ref 5–15)
BASOPHILS # BLD: 0 K/UL (ref 0–0.1)
BASOPHILS NFR BLD: 0 % (ref 0–1)
BUN SERPL-MCNC: 6 MG/DL (ref 6–20)
BUN/CREAT SERPL: 19 (ref 12–20)
CALCIUM SERPL-MCNC: 8.7 MG/DL (ref 8.5–10.1)
CHLORIDE SERPL-SCNC: 95 MMOL/L (ref 97–108)
CO2 SERPL-SCNC: 31 MMOL/L (ref 21–32)
CREAT SERPL-MCNC: 0.32 MG/DL (ref 0.55–1.02)
DIFFERENTIAL METHOD BLD: ABNORMAL
EOSINOPHIL # BLD: 0 K/UL (ref 0–0.4)
EOSINOPHIL NFR BLD: 0 % (ref 0–7)
ERYTHROCYTE [DISTWIDTH] IN BLOOD BY AUTOMATED COUNT: 13.1 % (ref 11.5–14.5)
GLUCOSE SERPL-MCNC: 95 MG/DL (ref 65–100)
HCT VFR BLD AUTO: 33.6 % (ref 35–47)
HGB BLD-MCNC: 11.6 G/DL (ref 11.5–16)
IMM GRANULOCYTES # BLD AUTO: 0 K/UL (ref 0–0.04)
IMM GRANULOCYTES NFR BLD AUTO: 0 % (ref 0–0.5)
LYMPHOCYTES # BLD: 1.3 K/UL (ref 0.8–3.5)
LYMPHOCYTES NFR BLD: 18 % (ref 12–49)
MCH RBC QN AUTO: 35.2 PG (ref 26–34)
MCHC RBC AUTO-ENTMCNC: 34.5 G/DL (ref 30–36.5)
MCV RBC AUTO: 101.8 FL (ref 80–99)
MONOCYTES # BLD: 1 K/UL (ref 0–1)
MONOCYTES NFR BLD: 14 % (ref 5–13)
NEUTS SEG # BLD: 4.8 K/UL (ref 1.8–8)
NEUTS SEG NFR BLD: 68 % (ref 32–75)
NRBC # BLD: 0 K/UL (ref 0–0.01)
NRBC BLD-RTO: 0 PER 100 WBC
PLATELET # BLD AUTO: 278 K/UL (ref 150–400)
PMV BLD AUTO: 8.7 FL (ref 8.9–12.9)
POTASSIUM SERPL-SCNC: 3.2 MMOL/L (ref 3.5–5.1)
RBC # BLD AUTO: 3.3 M/UL (ref 3.8–5.2)
SODIUM SERPL-SCNC: 132 MMOL/L (ref 136–145)
WBC # BLD AUTO: 7.1 K/UL (ref 3.6–11)

## 2022-01-30 PROCEDURE — 73502 X-RAY EXAM HIP UNI 2-3 VIEWS: CPT

## 2022-01-30 PROCEDURE — 80048 BASIC METABOLIC PNL TOTAL CA: CPT

## 2022-01-30 PROCEDURE — 65660000000 HC RM CCU STEPDOWN

## 2022-01-30 PROCEDURE — 74011250636 HC RX REV CODE- 250/636: Performed by: STUDENT IN AN ORGANIZED HEALTH CARE EDUCATION/TRAINING PROGRAM

## 2022-01-30 PROCEDURE — 36415 COLL VENOUS BLD VENIPUNCTURE: CPT

## 2022-01-30 PROCEDURE — 85025 COMPLETE CBC W/AUTO DIFF WBC: CPT

## 2022-01-30 PROCEDURE — 74011000250 HC RX REV CODE- 250: Performed by: HOSPITALIST

## 2022-01-30 PROCEDURE — 74011250637 HC RX REV CODE- 250/637: Performed by: HOSPITALIST

## 2022-01-30 PROCEDURE — 74011250636 HC RX REV CODE- 250/636: Performed by: HOSPITALIST

## 2022-01-30 PROCEDURE — 74011000258 HC RX REV CODE- 258: Performed by: STUDENT IN AN ORGANIZED HEALTH CARE EDUCATION/TRAINING PROGRAM

## 2022-01-30 PROCEDURE — 74011000250 HC RX REV CODE- 250: Performed by: PHYSICIAN ASSISTANT

## 2022-01-30 RX ORDER — POTASSIUM CHLORIDE 750 MG/1
40 TABLET, FILM COATED, EXTENDED RELEASE ORAL 2 TIMES DAILY
Status: COMPLETED | OUTPATIENT
Start: 2022-01-30 | End: 2022-01-31

## 2022-01-30 RX ORDER — LIDOCAINE 4 G/100G
1 PATCH TOPICAL EVERY 24 HOURS
Status: DISCONTINUED | OUTPATIENT
Start: 2022-01-30 | End: 2022-02-03 | Stop reason: HOSPADM

## 2022-01-30 RX ADMIN — Medication: at 08:24

## 2022-01-30 RX ADMIN — ACETAMINOPHEN 650 MG: 325 TABLET ORAL at 08:15

## 2022-01-30 RX ADMIN — WATER 1 G: 1 INJECTION INTRAMUSCULAR; INTRAVENOUS; SUBCUTANEOUS at 06:53

## 2022-01-30 RX ADMIN — FOLIC ACID 1 MG: 1 TABLET ORAL at 08:15

## 2022-01-30 RX ADMIN — CYCLOBENZAPRINE 5 MG: 10 TABLET, FILM COATED ORAL at 08:15

## 2022-01-30 RX ADMIN — ENOXAPARIN SODIUM 40 MG: 100 INJECTION SUBCUTANEOUS at 17:03

## 2022-01-30 RX ADMIN — Medication 3 G: at 16:58

## 2022-01-30 RX ADMIN — SODIUM CHLORIDE, PRESERVATIVE FREE 10 ML: 5 INJECTION INTRAVENOUS at 22:54

## 2022-01-30 RX ADMIN — ASPIRIN 81 MG: 81 TABLET, COATED ORAL at 08:15

## 2022-01-30 RX ADMIN — METOPROLOL TARTRATE 50 MG: 50 TABLET, FILM COATED ORAL at 08:15

## 2022-01-30 RX ADMIN — POTASSIUM CHLORIDE 40 MEQ: 750 TABLET, FILM COATED, EXTENDED RELEASE ORAL at 08:15

## 2022-01-30 RX ADMIN — METOPROLOL TARTRATE 50 MG: 50 TABLET, FILM COATED ORAL at 17:03

## 2022-01-30 RX ADMIN — THIAMINE HYDROCHLORIDE 100 MG: 100 INJECTION, SOLUTION INTRAMUSCULAR; INTRAVENOUS at 23:39

## 2022-01-30 RX ADMIN — Medication: at 17:03

## 2022-01-30 RX ADMIN — CYCLOBENZAPRINE 5 MG: 10 TABLET, FILM COATED ORAL at 16:56

## 2022-01-30 RX ADMIN — ACETAMINOPHEN 650 MG: 325 TABLET ORAL at 16:56

## 2022-01-30 RX ADMIN — POTASSIUM CHLORIDE 40 MEQ: 750 TABLET, FILM COATED, EXTENDED RELEASE ORAL at 17:03

## 2022-01-30 RX ADMIN — SODIUM CHLORIDE, PRESERVATIVE FREE 10 ML: 5 INJECTION INTRAVENOUS at 16:58

## 2022-01-30 RX ADMIN — Medication 3 G: at 08:15

## 2022-01-30 NOTE — PROGRESS NOTES
HISTORY AND PHYSICAL  Justino Joyner MD        PCP: Mariella Gamble NP    Please note that this dictation was completed with [employer], the computer voice recognition software. Quite often unanticipated grammatical, syntax, homophones, and other interpretive errors are inadvertently transcribed by the computer software. Please disregard these errors. Please excuse any errors that have escaped final proofreading. Admission history: This is a 27-year-old female with a significant past medical history of chronic hyponatremia due to SIADH on salt tablets, alcohol abuse, hypertension presented to the ED for evaluation of progressive leg swelling, left more than right and fatigue. Upon evaluation in the ED, she was found to have sodium of 125, potassium 2.9, anion gap of 16 with CO2 of 19. I saw and examined patient in the ED room 15. Patient presently is alert oriented x3, on room air, not in distress and she was able to provide good history. She confirmed above history, admitted to having poor oral intake, getting progressively fatigued. She says she does not drink much however on further questioning she admitted drinking 1-2 shots of scotch about 4 days a week, she does not feel she would go through withdrawal.  She has chronic intermittent diarrhea, she is not sure if she had GI work-up. She denied cough, fever, shortness of breath, chest pain, palpitations. She was started on IV fluid with KCl in the ED and repeat BMP showed sodium has improved to 130 and potassium has improved to 3.2 anion gap almost normalized. Subjectively  --Patient complains of spasm on the left thigh down to the knee. On further questioning she told me she has pain, her leg is rotated. Daughter at the bedside who told me patient had a fall and she had been using a cane and dragging the left leg.   On exam left lower extremity is externally rotated and shortened, she most probably has femoral.  X-ray confirmed left femoral neck fracture. I made patient aware she will follow with surgery. Orthopedic consulted. Assessment & Plan: This is a 60-year-old female with PMH of hyponatremia, SIADH, chronic alcohol abuse, hypertension, peripheral neuropathy presented with generalized weakness increasing leg edema. Acute on chronic hyponatremia, component of prerenal on top of chronic SIADH and alcohol-related: now improved with holding fluids    AGMA, dehydration due to poor oral intake, starvation: resolved with fluids  Moderate hypokalemia due to poor oral intake: replace as needed  Peripheral edema due to poor nutrition, hypoalbuminemia:no evidence of CHF. Bilateral venous Dopplers negative for DVT. Chronic hypertension. BP improved. Resume Lopressor  Alcohol abuse. She admits to drinking 1-2 scotch 4 days a week. No hallucination, delusions, asterixis or any other signs of withdrawal.  --Vitamin supplements with folic acid and thiamine ordered. --Initiate symptom triggered CIWA protocol  --No evidence of withdrawal this for    Klebsiella UTI : Continue ceftriaxone (1/27--1/31)  Chronic intermittent diarrhea:outpatient GI eval  Chronic peripheral neuropathy. Left femoral neck fracture  --Ortho consulted, she will need surgery.   I will keep her n.p.o. in case Ortho take her to or tomorrow    Regular diet    PT/OT SNF    Patient's Baseline: Uses cane/rolling walker  DVT ppx: Lovenox    Plan: Medically stable, awaiting SNF  Code status:DNR  Disposition: SNF          PMH/PSH:  Past Medical History:   Diagnosis Date    History of mammogram 10 years ago    Hypertension     Hyponatremia 09/11/2014    hospitalized for severe hyponatremia due to SIADH    Menopause     at age 37    Pap smear for cervical cancer screening 15 years ago    SIADH (syndrome of inappropriate ADH production) [facility] [address]     UTI (urinary tract infection)      Past Surgical History:   Procedure Laterality Date    HX CATARACT REMOVAL Right 1/14/2014  HX CHOLECYSTECTOMY      HX THROMBECTOMY Right     leg    HX TUBAL LIGATION         Home meds:   Prior to Admission medications    Medication Sig Start Date End Date Taking? Authorizing Provider   cyclobenzaprine (FLEXERIL) 5 mg tablet Take 1 Tablet by mouth two (2) times daily as needed for Muscle Spasm(s). 1/10/22   Daisha Kaiser NP   diazePAM (Valium) 2 mg tablet Take 1 Tablet by mouth every eight (8) hours as needed for Muscle Spasm(s). Max Daily Amount: 6 mg. 1/6/22   Zenaida Moseley MD   erythromycin (ILOTYCIN) ophthalmic ointment Apply 0.5 inch to affected eye 4 times per day. Indications: an infection on the surface of the eye 9/17/21   Germania Caro, PA   amLODIPine (NORVASC) 10 mg tablet TAKE 1 TABLET BY MOUTH DAILY 6/21/21   Daisha Kaiser NP   metoprolol tartrate (LOPRESSOR) 50 mg tablet TAKE 1 TABLET BY MOUTH TWICE DAILY 6/21/21   Daisha Kaiser NP   sodium chloride 1 gram tablet TAKE 3 TABLETS BY MOUTH TWICE DAILY WITH MEALS 6/21/21   Daisha Kaiser NP   polyvinyl alcohol/povidone (ARTIFICIAL TEARS OP) 2 Drops by IntraOCUlar route every two (2) hours as needed for Other (dry eyes). 6/8/20   Provider, Historical   ipratropium (ATROVENT HFA) 17 mcg/actuation inhaler Take 2 Puffs by inhalation every four (4) hours as needed for Cough, Respiratory Distress or Shortness of Breath. 6/8/20   Provider, Historical   aspirin delayed-release 81 mg tablet Take 81 mg by mouth daily. Provider, Historical   multivitamin (ONE A DAY) tablet Take 1 Tab by mouth daily. Provider, Historical       Allergies: Allergies   Allergen Reactions    Levaquin [Levofloxacin] Anaphylaxis     Throat and face became swollen.  Ace Inhibitors Vertigo    Hydrochlorothiazide Other (comments)     Hypotension and severe hyponatremia.     Penicillins Hives     While pregnant       FH:  Family History   Problem Relation Age of Onset    Kidney Disease Mother         uncertain       SH:  Social History     Tobacco Use    Smoking status: Current Every Day Smoker     Packs/day: 1.00     Years: 40.00     Pack years: 40.00     Types: Cigarettes    Smokeless tobacco: Former User     Quit date: 9/11/2014   Substance Use Topics    Alcohol use: Yes     Alcohol/week: 3.0 standard drinks     Types: 3 Standard drinks or equivalent per week       ROS: A comprehensive review of systems was negative except for that written in the HPI. PHYSICAL EXAM:  Generally: Patient is alert and oriented x3. She is not in any distress. HEENT: No pallor or jaundice. Buccal mucosa is mildly dry. Lungs: On room air. Not in distress. Coarse breath sounds otherwise symmetrical.  Cardiovascular: S1-S2 well heard, no gallop or rub. Abdomen: Flat, normoactive, soft and nontender. ANALIA: No CVA or suprapubic tenderness. Extremities: Left lower extremity rotated and shortened. CNS: Alert oriented x3. Cranial nerves are intact. Motor exam symmetrical.  No asterixis. Psych: Calm and cooperative. Labs/Imaging:  Available labs and imaging studies reviewed.                       Signed By: Hunter Molina MD     January 30, 2022

## 2022-01-30 NOTE — CONSULTS
ORTHO CONSULT NOTE    Date of Consultation:  January 30, 2022  Referring Physician:  Sabina Cheema MD  CC: L hip pain    HPI:  Madhavi Scott is a 77 y.o. female who c/o Left hip pain for 3-4 weeks; has had many recent falls, alcohol related. Was seen for L hip pain on 1/6, had negative xrays, but has been using a walker since that time to ambulate. Pt states pain is 0/10 if resting, 7/10 with movement; assoc spasms; Denies numbness, tingling, focal weakness, cp, sob, fever, chills, dizziness. Current Anticoagulant Medications:  Lovenox . Past Medical History:   Diagnosis Date    History of mammogram 10 years ago    Hypertension     Hyponatremia 09/11/2014    hospitalized for severe hyponatremia due to SIADH    Menopause     at age 37    Pap smear for cervical cancer screening 15 years ago    SIADH (syndrome of inappropriate ADH production) ([facility] [address]     UTI (urinary tract infection)       Past Surgical History:   Procedure Laterality Date    HX CATARACT REMOVAL Right 1/14/2014    HX CHOLECYSTECTOMY      HX THROMBECTOMY Right     leg    HX TUBAL LIGATION        Family History   Problem Relation Age of Onset    Kidney Disease Mother         uncertain      Social History     Tobacco Use    Smoking status: Current Every Day Smoker     Packs/day: 1.00     Years: 40.00     Pack years: 40.00     Types: Cigarettes    Smokeless tobacco: Former User     Quit date: 9/11/2014   Substance Use Topics    Alcohol use: Yes     Alcohol/week: 3.0 standard drinks     Types: 3 Standard drinks or equivalent per week     Allergies   Allergen Reactions    Levaquin [Levofloxacin] Anaphylaxis     Throat and face became swollen. Ace Inhibitors Vertigo    Hydrochlorothiazide Other (comments)     Hypotension and severe hyponatremia. Penicillins Hives     While pregnant        Review of Systems:  Per HPI.     Objective:     Patient Vitals for the past 8 hrs:   BP Temp Pulse Resp SpO2   01/30/22 1800 -- -- 84 -- --   01/30/22 1659 (!) 160/66 97.8 °F (36.6 °C) 84 18 99 %   22 1437 -- -- 81 -- --   22 1208 -- -- 74 -- --     Temp (24hrs), Av.9 °F (36.6 °C), Min:97.7 °F (36.5 °C), Max:98.1 °F (36.7 °C)      EXAM:   NAD. Answers questions appropriately. Moves BUE spontaneously with NTTP long bones and joints. RLE no pain PROM, NTTP long bones and joints. Moves foot OK with SILT and CR toes < 2 secs. LLE shortened and externally rotated. Hip skin intact and soft compartments. Knee, ankle and foot NTTP. Moves foot OK with SILT and CR toes < 2 secs. Bilat calf soft and NTTP. Imaging Review:   Results from Hospital Encounter encounter on 22    XR HIP LT W OR WO PELV 2-3 VWS    Narrative  EXAM: XR HIP LT W OR WO PELV 2-3 VWS    INDICATION: left sided hip pain, LLE internally rotated. .    COMPARISON: None. FINDINGS: AP view of the pelvis and a frogleg lateral view of the left hip  demonstrate an acute left femoral neck fracture with superior lateral  displacement of the shaft relative to the head. .    Impression  Acute left femoral neck fracture    Results from Hospital Encounter encounter on 20    CT ABD PELV WO CONT    Narrative  EXAM: CT ABD PELV WO CONT    INDICATION: Mid abdominal pain after fall    COMPARISON: 2007 CT of the abdomen and  CT of the lumbar spine. CONTRAST:  None. TECHNIQUE:  Thin axial images were obtained through the abdomen and pelvis. Coronal and  sagittal reformats were generated. Oral contrast was not administered. CT dose  reduction was achieved through use of a standardized protocol tailored for this  examination and automatic exposure control for dose modulation. There is residual intravenous contrast material from a prior CTA. Sonal Peña FINDINGS:  LOWER THORAX: Patchy airspace disease in the right middle lobe and right lower  lobe. LIVER: No mass. BILIARY TREE: Gallbladder is surgically absent CBD is not dilated. SPLEEN: within normal limits.   PANCREAS: No focal abnormality. ADRENALS: Unremarkable. KIDNEYS/URETERS: No calculus or hydronephrosis. Left interpolar 5 mm  hypodensity. Mild left renal atrophy. STOMACH: Unremarkable. SMALL BOWEL: No dilatation or wall thickening. COLON: No dilatation or wall thickening. APPENDIX: Unremarkable. PERITONEUM: No ascites or pneumoperitoneum. RETROPERITONEUM: No lymphadenopathy or aortic aneurysm. Severe calcification and  ectasia of the aortic iliac system. Calcification of the celiac artery SMA. REPRODUCTIVE ORGANS: Small uterus  URINARY BLADDER: No mass or calculus. Bladder distention. BONES: Healing right lateral seventh, eighth, ninth rib fractures (series 3,  images 1, 7, 13). L1 fracture previously described. ABDOMINAL WALL: No mass or hernia. ADDITIONAL COMMENTS: Densely enhancing structure in the right inguinal region  that measures 1.9 x 1.1 cm, compatible with a calcified lymph node versus  pseudoaneurysm. (image 77). Old L1 burst fracture. Impression  IMPRESSION:  Marked bladder distention. Healing or healed right lateral rib fractures as described. Old L1 burst  fracture. Patchy airspace disease in the right middle lobe and right lower lobe. . This  could be secondary to bacterial or viral infectious disease, inflammatory  disease, or contusion. Clinical correlation needed. Possible right inguinal pseudoaneurysm  Diverticulosis without evidence for diverticulitis  Incidental left renal atrophy and atherosclerosis.       Labs:   Recent Results (from the past 24 hour(s))   CBC WITH AUTOMATED DIFF    Collection Time: 01/30/22  3:57 AM   Result Value Ref Range    WBC 7.1 3.6 - 11.0 K/uL    RBC 3.30 (L) 3.80 - 5.20 M/uL    HGB 11.6 11.5 - 16.0 g/dL    HCT 33.6 (L) 35.0 - 47.0 %    .8 (H) 80.0 - 99.0 FL    MCH 35.2 (H) 26.0 - 34.0 PG    MCHC 34.5 30.0 - 36.5 g/dL    RDW 13.1 11.5 - 14.5 %    PLATELET 496 964 - 099 K/uL    MPV 8.7 (L) 8.9 - 12.9 FL    NRBC 0.0 0  WBC    ABSOLUTE NRBC 0.00 0.00 - 0.01 K/uL    NEUTROPHILS 68 32 - 75 %    LYMPHOCYTES 18 12 - 49 %    MONOCYTES 14 (H) 5 - 13 %    EOSINOPHILS 0 0 - 7 %    BASOPHILS 0 0 - 1 %    IMMATURE GRANULOCYTES 0 0.0 - 0.5 %    ABS. NEUTROPHILS 4.8 1.8 - 8.0 K/UL    ABS. LYMPHOCYTES 1.3 0.8 - 3.5 K/UL    ABS. MONOCYTES 1.0 0.0 - 1.0 K/UL    ABS. EOSINOPHILS 0.0 0.0 - 0.4 K/UL    ABS. BASOPHILS 0.0 0.0 - 0.1 K/UL    ABS. IMM. GRANS. 0.0 0.00 - 0.04 K/UL    DF AUTOMATED     METABOLIC PANEL, BASIC    Collection Time: 01/30/22  3:57 AM   Result Value Ref Range    Sodium 132 (L) 136 - 145 mmol/L    Potassium 3.2 (L) 3.5 - 5.1 mmol/L    Chloride 95 (L) 97 - 108 mmol/L    CO2 31 21 - 32 mmol/L    Anion gap 6 5 - 15 mmol/L    Glucose 95 65 - 100 mg/dL    BUN 6 6 - 20 MG/DL    Creatinine 0.32 (L) 0.55 - 1.02 MG/DL    BUN/Creatinine ratio 19 12 - 20      GFR est AA >60 >60 ml/min/1.73m2    GFR est non-AA >60 >60 ml/min/1.73m2    Calcium 8.7 8.5 - 10.1 MG/DL       Impression:     Left Femoral Neck Fracture    Plan:   I explained the nature of the injury and discussed the recommended surgery. I discussed potential risks/benefits/alternatives of surgery as well as expected hospital course. Patient consents. Plan for Left Hip Hemiarthroplasty on 1/31. Medically cleared per medicine; Moderate Risk; RCRI - 0  Bedrest.   NPO p MN. Ice, Lido patch, Acetaminophen, Oxycodone. SCDs OK. Hold anticoagulants. Dr. Shefali Winston is aware and agrees with above plan.       ROXANA Alan  Orthopedic Trauma Service  [facility]

## 2022-01-31 ENCOUNTER — ANESTHESIA EVENT (OUTPATIENT)
Dept: SURGERY | Age: 77
DRG: 628 | End: 2022-01-31
Payer: MEDICARE

## 2022-01-31 ENCOUNTER — ANESTHESIA (OUTPATIENT)
Dept: SURGERY | Age: 77
DRG: 628 | End: 2022-01-31
Payer: MEDICARE

## 2022-01-31 ENCOUNTER — APPOINTMENT (OUTPATIENT)
Dept: GENERAL RADIOLOGY | Age: 77
DRG: 628 | End: 2022-01-31
Attending: HOSPITALIST
Payer: MEDICARE

## 2022-01-31 PROBLEM — S72.002K CLOSED FRACTURE OF NECK OF LEFT FEMUR WITH NONUNION: Chronic | Status: ACTIVE | Noted: 2022-01-31

## 2022-01-31 LAB
ABO + RH BLD: NORMAL
ANION GAP SERPL CALC-SCNC: 3 MMOL/L (ref 5–15)
APTT PPP: 31.9 SEC (ref 22.1–31)
BLOOD GROUP ANTIBODIES SERPL: NORMAL
BUN SERPL-MCNC: 9 MG/DL (ref 6–20)
BUN/CREAT SERPL: 22 (ref 12–20)
CALCIUM SERPL-MCNC: 9.3 MG/DL (ref 8.5–10.1)
CHLORIDE SERPL-SCNC: 99 MMOL/L (ref 97–108)
CO2 SERPL-SCNC: 32 MMOL/L (ref 21–32)
CREAT SERPL-MCNC: 0.41 MG/DL (ref 0.55–1.02)
ERYTHROCYTE [DISTWIDTH] IN BLOOD BY AUTOMATED COUNT: 13.6 % (ref 11.5–14.5)
GLUCOSE SERPL-MCNC: 117 MG/DL (ref 65–100)
HCT VFR BLD AUTO: 35.8 % (ref 35–47)
HGB BLD-MCNC: 12.3 G/DL (ref 11.5–16)
INR PPP: 1.1 (ref 0.9–1.1)
MCH RBC QN AUTO: 35.8 PG (ref 26–34)
MCHC RBC AUTO-ENTMCNC: 34.4 G/DL (ref 30–36.5)
MCV RBC AUTO: 104.1 FL (ref 80–99)
NRBC # BLD: 0 K/UL (ref 0–0.01)
NRBC BLD-RTO: 0 PER 100 WBC
PLATELET # BLD AUTO: 282 K/UL (ref 150–400)
PMV BLD AUTO: 8.5 FL (ref 8.9–12.9)
POTASSIUM SERPL-SCNC: 3.7 MMOL/L (ref 3.5–5.1)
PROTHROMBIN TIME: 11.3 SEC (ref 9–11.1)
RBC # BLD AUTO: 3.44 M/UL (ref 3.8–5.2)
SODIUM SERPL-SCNC: 134 MMOL/L (ref 136–145)
SPECIMEN EXP DATE BLD: NORMAL
THERAPEUTIC RANGE,PTTT: ABNORMAL SECS (ref 58–77)
WBC # BLD AUTO: 8 K/UL (ref 3.6–11)

## 2022-01-31 PROCEDURE — 0SRS0JZ REPLACEMENT OF LEFT HIP JOINT, FEMORAL SURFACE WITH SYNTHETIC SUBSTITUTE, OPEN APPROACH: ICD-10-PCS | Performed by: ORTHOPAEDIC SURGERY

## 2022-01-31 PROCEDURE — 74011250637 HC RX REV CODE- 250/637: Performed by: PHYSICIAN ASSISTANT

## 2022-01-31 PROCEDURE — 74011000258 HC RX REV CODE- 258: Performed by: NURSE ANESTHETIST, CERTIFIED REGISTERED

## 2022-01-31 PROCEDURE — 74011250636 HC RX REV CODE- 250/636: Performed by: HOSPITALIST

## 2022-01-31 PROCEDURE — 74011250636 HC RX REV CODE- 250/636: Performed by: NURSE ANESTHETIST, CERTIFIED REGISTERED

## 2022-01-31 PROCEDURE — 77030031139 HC SUT VCRL2 J&J -A: Performed by: ORTHOPAEDIC SURGERY

## 2022-01-31 PROCEDURE — 74011000250 HC RX REV CODE- 250: Performed by: PHYSICIAN ASSISTANT

## 2022-01-31 PROCEDURE — 85730 THROMBOPLASTIN TIME PARTIAL: CPT

## 2022-01-31 PROCEDURE — 74011000250 HC RX REV CODE- 250: Performed by: NURSE ANESTHETIST, CERTIFIED REGISTERED

## 2022-01-31 PROCEDURE — 77030005513 HC CATH URETH FOL11 MDII -B: Performed by: ORTHOPAEDIC SURGERY

## 2022-01-31 PROCEDURE — 77030002933 HC SUT MCRYL J&J -A: Performed by: ORTHOPAEDIC SURGERY

## 2022-01-31 PROCEDURE — 74011000250 HC RX REV CODE- 250: Performed by: ORTHOPAEDIC SURGERY

## 2022-01-31 PROCEDURE — 74011250637 HC RX REV CODE- 250/637: Performed by: HOSPITALIST

## 2022-01-31 PROCEDURE — 74011250636 HC RX REV CODE- 250/636: Performed by: ANESTHESIOLOGY

## 2022-01-31 PROCEDURE — 77030040361 HC SLV COMPR DVT MDII -B: Performed by: ORTHOPAEDIC SURGERY

## 2022-01-31 PROCEDURE — 77030020788: Performed by: ORTHOPAEDIC SURGERY

## 2022-01-31 PROCEDURE — 77030019908 HC STETH ESOPH SIMS -A: Performed by: ANESTHESIOLOGY

## 2022-01-31 PROCEDURE — 86900 BLOOD TYPING SEROLOGIC ABO: CPT

## 2022-01-31 PROCEDURE — 76210000017 HC OR PH I REC 1.5 TO 2 HR: Performed by: ORTHOPAEDIC SURGERY

## 2022-01-31 PROCEDURE — 77030040750 HC INSTR NAV SYS DISP ORLN -G: Performed by: ORTHOPAEDIC SURGERY

## 2022-01-31 PROCEDURE — 85610 PROTHROMBIN TIME: CPT

## 2022-01-31 PROCEDURE — 73501 X-RAY EXAM HIP UNI 1 VIEW: CPT

## 2022-01-31 PROCEDURE — 76060000036 HC ANESTHESIA 2.5 TO 3 HR: Performed by: ORTHOPAEDIC SURGERY

## 2022-01-31 PROCEDURE — 77030008684 HC TU ET CUF COVD -B: Performed by: ANESTHESIOLOGY

## 2022-01-31 PROCEDURE — 74011250636 HC RX REV CODE- 250/636: Performed by: PHYSICIAN ASSISTANT

## 2022-01-31 PROCEDURE — P9045 ALBUMIN (HUMAN), 5%, 250 ML: HCPCS | Performed by: NURSE ANESTHETIST, CERTIFIED REGISTERED

## 2022-01-31 PROCEDURE — 76010000131 HC OR TIME 2 TO 2.5 HR: Performed by: ORTHOPAEDIC SURGERY

## 2022-01-31 PROCEDURE — 74011000250 HC RX REV CODE- 250: Performed by: HOSPITALIST

## 2022-01-31 PROCEDURE — 65660000000 HC RM CCU STEPDOWN

## 2022-01-31 PROCEDURE — 77030026438 HC STYL ET INTUB CARD -A: Performed by: ANESTHESIOLOGY

## 2022-01-31 PROCEDURE — 74011000258 HC RX REV CODE- 258: Performed by: PHYSICIAN ASSISTANT

## 2022-01-31 PROCEDURE — 36415 COLL VENOUS BLD VENIPUNCTURE: CPT

## 2022-01-31 PROCEDURE — 77030010507 HC ADH SKN DERMBND J&J -B: Performed by: ORTHOPAEDIC SURGERY

## 2022-01-31 PROCEDURE — 85027 COMPLETE CBC AUTOMATED: CPT

## 2022-01-31 PROCEDURE — C1776 JOINT DEVICE (IMPLANTABLE): HCPCS | Performed by: ORTHOPAEDIC SURGERY

## 2022-01-31 PROCEDURE — 2709999900 HC NON-CHARGEABLE SUPPLY: Performed by: ORTHOPAEDIC SURGERY

## 2022-01-31 PROCEDURE — 80048 BASIC METABOLIC PNL TOTAL CA: CPT

## 2022-01-31 PROCEDURE — 77030006784 HC BLD SAW OSC MCRA -B: Performed by: ORTHOPAEDIC SURGERY

## 2022-01-31 PROCEDURE — 74011000250 HC RX REV CODE- 250: Performed by: ANESTHESIOLOGY

## 2022-01-31 DEVICE — HIP H4 HEMI UNI BIPLR IMPL CAPPED H4: Type: IMPLANTABLE DEVICE | Status: FUNCTIONAL

## 2022-01-31 DEVICE — ARTICUL/EZE FEMORAL HEAD DIAMETER 28MM +1.5 12/14 TAPER
Type: IMPLANTABLE DEVICE | Site: HIP | Status: FUNCTIONAL
Brand: ARTICUL/EZE

## 2022-01-31 DEVICE — SUMMIT FEMORAL STEM 12/14 TAPER TAPER ED W/POROCOAT SIZE 6 STD 150MM
Type: IMPLANTABLE DEVICE | Site: HIP | Status: FUNCTIONAL
Brand: SUMMIT POROCOAT

## 2022-01-31 DEVICE — SELF CENTERING BI-POLAR HEAD 28MM ID 44MM OD
Type: IMPLANTABLE DEVICE | Site: HIP | Status: FUNCTIONAL
Brand: SELF CENTERING

## 2022-01-31 RX ORDER — SODIUM CHLORIDE 9 MG/ML
25 INJECTION, SOLUTION INTRAVENOUS CONTINUOUS
Status: DISCONTINUED | OUTPATIENT
Start: 2022-01-31 | End: 2022-01-31 | Stop reason: HOSPADM

## 2022-01-31 RX ORDER — LIDOCAINE HYDROCHLORIDE 20 MG/ML
INJECTION, SOLUTION EPIDURAL; INFILTRATION; INTRACAUDAL; PERINEURAL AS NEEDED
Status: DISCONTINUED | OUTPATIENT
Start: 2022-01-31 | End: 2022-01-31 | Stop reason: HOSPADM

## 2022-01-31 RX ORDER — NEOSTIGMINE METHYLSULFATE 1 MG/ML
INJECTION, SOLUTION INTRAVENOUS AS NEEDED
Status: DISCONTINUED | OUTPATIENT
Start: 2022-01-31 | End: 2022-01-31 | Stop reason: HOSPADM

## 2022-01-31 RX ORDER — SODIUM CHLORIDE 9 MG/ML
100 INJECTION, SOLUTION INTRAVENOUS CONTINUOUS
Status: DISCONTINUED | OUTPATIENT
Start: 2022-01-31 | End: 2022-02-01

## 2022-01-31 RX ORDER — ALBUMIN HUMAN 50 G/1000ML
SOLUTION INTRAVENOUS AS NEEDED
Status: DISCONTINUED | OUTPATIENT
Start: 2022-01-31 | End: 2022-01-31 | Stop reason: HOSPADM

## 2022-01-31 RX ORDER — DEXAMETHASONE SODIUM PHOSPHATE 4 MG/ML
INJECTION, SOLUTION INTRA-ARTICULAR; INTRALESIONAL; INTRAMUSCULAR; INTRAVENOUS; SOFT TISSUE AS NEEDED
Status: DISCONTINUED | OUTPATIENT
Start: 2022-01-31 | End: 2022-01-31 | Stop reason: HOSPADM

## 2022-01-31 RX ORDER — SODIUM CHLORIDE, SODIUM LACTATE, POTASSIUM CHLORIDE, CALCIUM CHLORIDE 600; 310; 30; 20 MG/100ML; MG/100ML; MG/100ML; MG/100ML
1000 INJECTION, SOLUTION INTRAVENOUS CONTINUOUS
Status: DISCONTINUED | OUTPATIENT
Start: 2022-01-31 | End: 2022-01-31 | Stop reason: HOSPADM

## 2022-01-31 RX ORDER — SODIUM CHLORIDE 0.9 % (FLUSH) 0.9 %
5-40 SYRINGE (ML) INJECTION AS NEEDED
Status: DISCONTINUED | OUTPATIENT
Start: 2022-01-31 | End: 2022-02-03 | Stop reason: HOSPADM

## 2022-01-31 RX ORDER — PROPOFOL 10 MG/ML
INJECTION, EMULSION INTRAVENOUS AS NEEDED
Status: DISCONTINUED | OUTPATIENT
Start: 2022-01-31 | End: 2022-01-31 | Stop reason: HOSPADM

## 2022-01-31 RX ORDER — ALBUTEROL SULFATE 0.83 MG/ML
2.5 SOLUTION RESPIRATORY (INHALATION) AS NEEDED
Status: DISCONTINUED | OUTPATIENT
Start: 2022-01-31 | End: 2022-01-31 | Stop reason: HOSPADM

## 2022-01-31 RX ORDER — METOPROLOL TARTRATE 5 MG/5ML
INJECTION INTRAVENOUS
Status: DISPENSED
Start: 2022-01-31 | End: 2022-02-01

## 2022-01-31 RX ORDER — FENTANYL CITRATE 50 UG/ML
50 INJECTION, SOLUTION INTRAMUSCULAR; INTRAVENOUS AS NEEDED
Status: DISCONTINUED | OUTPATIENT
Start: 2022-01-31 | End: 2022-01-31 | Stop reason: HOSPADM

## 2022-01-31 RX ORDER — MIDAZOLAM HYDROCHLORIDE 1 MG/ML
0.5 INJECTION, SOLUTION INTRAMUSCULAR; INTRAVENOUS
Status: DISCONTINUED | OUTPATIENT
Start: 2022-01-31 | End: 2022-01-31 | Stop reason: HOSPADM

## 2022-01-31 RX ORDER — NALOXONE HYDROCHLORIDE 0.4 MG/ML
0.4 INJECTION, SOLUTION INTRAMUSCULAR; INTRAVENOUS; SUBCUTANEOUS AS NEEDED
Status: DISCONTINUED | OUTPATIENT
Start: 2022-01-31 | End: 2022-02-03 | Stop reason: HOSPADM

## 2022-01-31 RX ORDER — METOPROLOL TARTRATE 5 MG/5ML
5 INJECTION INTRAVENOUS
Status: DISCONTINUED | OUTPATIENT
Start: 2022-01-31 | End: 2022-01-31 | Stop reason: HOSPADM

## 2022-01-31 RX ORDER — LIDOCAINE HYDROCHLORIDE 10 MG/ML
0.1 INJECTION, SOLUTION EPIDURAL; INFILTRATION; INTRACAUDAL; PERINEURAL AS NEEDED
Status: DISCONTINUED | OUTPATIENT
Start: 2022-01-31 | End: 2022-01-31 | Stop reason: HOSPADM

## 2022-01-31 RX ORDER — OXYCODONE HYDROCHLORIDE 5 MG/1
2.5 TABLET ORAL
Status: DISCONTINUED | OUTPATIENT
Start: 2022-01-31 | End: 2022-02-03 | Stop reason: HOSPADM

## 2022-01-31 RX ORDER — ROCURONIUM BROMIDE 10 MG/ML
INJECTION, SOLUTION INTRAVENOUS AS NEEDED
Status: DISCONTINUED | OUTPATIENT
Start: 2022-01-31 | End: 2022-01-31 | Stop reason: HOSPADM

## 2022-01-31 RX ORDER — MORPHINE SULFATE 2 MG/ML
2 INJECTION, SOLUTION INTRAMUSCULAR; INTRAVENOUS
Status: DISCONTINUED | OUTPATIENT
Start: 2022-01-31 | End: 2022-01-31 | Stop reason: HOSPADM

## 2022-01-31 RX ORDER — ENOXAPARIN SODIUM 100 MG/ML
40 INJECTION SUBCUTANEOUS DAILY
Status: DISCONTINUED | OUTPATIENT
Start: 2022-02-01 | End: 2022-02-03 | Stop reason: HOSPADM

## 2022-01-31 RX ORDER — POLYETHYLENE GLYCOL 3350 17 G/17G
17 POWDER, FOR SOLUTION ORAL DAILY
Status: DISCONTINUED | OUTPATIENT
Start: 2022-02-01 | End: 2022-02-03 | Stop reason: HOSPADM

## 2022-01-31 RX ORDER — SUCCINYLCHOLINE CHLORIDE 20 MG/ML
INJECTION INTRAMUSCULAR; INTRAVENOUS AS NEEDED
Status: DISCONTINUED | OUTPATIENT
Start: 2022-01-31 | End: 2022-01-31 | Stop reason: HOSPADM

## 2022-01-31 RX ORDER — SODIUM CHLORIDE, SODIUM LACTATE, POTASSIUM CHLORIDE, CALCIUM CHLORIDE 600; 310; 30; 20 MG/100ML; MG/100ML; MG/100ML; MG/100ML
INJECTION, SOLUTION INTRAVENOUS
Status: DISCONTINUED | OUTPATIENT
Start: 2022-01-31 | End: 2022-01-31 | Stop reason: HOSPADM

## 2022-01-31 RX ORDER — SODIUM CHLORIDE 0.9 % (FLUSH) 0.9 %
5-40 SYRINGE (ML) INJECTION EVERY 8 HOURS
Status: DISCONTINUED | OUTPATIENT
Start: 2022-01-31 | End: 2022-02-03 | Stop reason: HOSPADM

## 2022-01-31 RX ORDER — FERROUS SULFATE, DRIED 160(50) MG
1 TABLET, EXTENDED RELEASE ORAL
Status: DISCONTINUED | OUTPATIENT
Start: 2022-02-01 | End: 2022-02-03 | Stop reason: HOSPADM

## 2022-01-31 RX ORDER — FENTANYL CITRATE 50 UG/ML
INJECTION, SOLUTION INTRAMUSCULAR; INTRAVENOUS AS NEEDED
Status: DISCONTINUED | OUTPATIENT
Start: 2022-01-31 | End: 2022-01-31 | Stop reason: HOSPADM

## 2022-01-31 RX ORDER — HYDROMORPHONE HYDROCHLORIDE 1 MG/ML
0.2 INJECTION, SOLUTION INTRAMUSCULAR; INTRAVENOUS; SUBCUTANEOUS
Status: DISCONTINUED | OUTPATIENT
Start: 2022-01-31 | End: 2022-01-31 | Stop reason: HOSPADM

## 2022-01-31 RX ORDER — MIDAZOLAM HYDROCHLORIDE 1 MG/ML
1 INJECTION, SOLUTION INTRAMUSCULAR; INTRAVENOUS AS NEEDED
Status: DISCONTINUED | OUTPATIENT
Start: 2022-01-31 | End: 2022-01-31 | Stop reason: HOSPADM

## 2022-01-31 RX ORDER — DIPHENHYDRAMINE HYDROCHLORIDE 50 MG/ML
12.5 INJECTION, SOLUTION INTRAMUSCULAR; INTRAVENOUS AS NEEDED
Status: DISCONTINUED | OUTPATIENT
Start: 2022-01-31 | End: 2022-01-31 | Stop reason: HOSPADM

## 2022-01-31 RX ORDER — BUPIVACAINE HYDROCHLORIDE 5 MG/ML
INJECTION, SOLUTION EPIDURAL; INTRACAUDAL AS NEEDED
Status: DISCONTINUED | OUTPATIENT
Start: 2022-01-31 | End: 2022-01-31 | Stop reason: HOSPADM

## 2022-01-31 RX ORDER — ROPIVACAINE HYDROCHLORIDE 5 MG/ML
30 INJECTION, SOLUTION EPIDURAL; INFILTRATION; PERINEURAL AS NEEDED
Status: DISCONTINUED | OUTPATIENT
Start: 2022-01-31 | End: 2022-01-31 | Stop reason: HOSPADM

## 2022-01-31 RX ORDER — FENTANYL CITRATE 50 UG/ML
25 INJECTION, SOLUTION INTRAMUSCULAR; INTRAVENOUS
Status: DISCONTINUED | OUTPATIENT
Start: 2022-01-31 | End: 2022-01-31 | Stop reason: HOSPADM

## 2022-01-31 RX ORDER — HYDROMORPHONE HYDROCHLORIDE 2 MG/ML
INJECTION, SOLUTION INTRAMUSCULAR; INTRAVENOUS; SUBCUTANEOUS AS NEEDED
Status: DISCONTINUED | OUTPATIENT
Start: 2022-01-31 | End: 2022-01-31 | Stop reason: HOSPADM

## 2022-01-31 RX ORDER — ONDANSETRON 2 MG/ML
4 INJECTION INTRAMUSCULAR; INTRAVENOUS AS NEEDED
Status: DISCONTINUED | OUTPATIENT
Start: 2022-01-31 | End: 2022-01-31 | Stop reason: HOSPADM

## 2022-01-31 RX ORDER — HYDROCODONE BITARTRATE AND ACETAMINOPHEN 5; 325 MG/1; MG/1
1 TABLET ORAL AS NEEDED
Status: DISCONTINUED | OUTPATIENT
Start: 2022-01-31 | End: 2022-01-31 | Stop reason: HOSPADM

## 2022-01-31 RX ORDER — ACETAMINOPHEN 325 MG/1
650 TABLET ORAL ONCE
Status: DISCONTINUED | OUTPATIENT
Start: 2022-01-31 | End: 2022-01-31 | Stop reason: HOSPADM

## 2022-01-31 RX ORDER — FACIAL-BODY WIPES
10 EACH TOPICAL DAILY PRN
Status: DISCONTINUED | OUTPATIENT
Start: 2022-02-02 | End: 2022-02-03 | Stop reason: HOSPADM

## 2022-01-31 RX ORDER — KETAMINE HCL IN 0.9 % NACL 50 MG/5 ML
SYRINGE (ML) INTRAVENOUS AS NEEDED
Status: DISCONTINUED | OUTPATIENT
Start: 2022-01-31 | End: 2022-01-31 | Stop reason: HOSPADM

## 2022-01-31 RX ORDER — ONDANSETRON 2 MG/ML
INJECTION INTRAMUSCULAR; INTRAVENOUS AS NEEDED
Status: DISCONTINUED | OUTPATIENT
Start: 2022-01-31 | End: 2022-01-31 | Stop reason: HOSPADM

## 2022-01-31 RX ORDER — ASPIRIN 81 MG/1
81 TABLET ORAL DAILY
Status: DISCONTINUED | OUTPATIENT
Start: 2022-02-01 | End: 2022-02-03 | Stop reason: HOSPADM

## 2022-01-31 RX ORDER — GLYCOPYRROLATE 0.2 MG/ML
INJECTION INTRAMUSCULAR; INTRAVENOUS AS NEEDED
Status: DISCONTINUED | OUTPATIENT
Start: 2022-01-31 | End: 2022-01-31 | Stop reason: HOSPADM

## 2022-01-31 RX ORDER — GLYCOPYRROLATE 0.2 MG/ML
0.2 INJECTION INTRAMUSCULAR; INTRAVENOUS
Status: DISCONTINUED | OUTPATIENT
Start: 2022-01-31 | End: 2022-01-31 | Stop reason: HOSPADM

## 2022-01-31 RX ORDER — AMOXICILLIN 250 MG
1 CAPSULE ORAL 2 TIMES DAILY
Status: DISCONTINUED | OUTPATIENT
Start: 2022-01-31 | End: 2022-02-03 | Stop reason: HOSPADM

## 2022-01-31 RX ORDER — OXYCODONE HYDROCHLORIDE 5 MG/1
5 TABLET ORAL
Status: DISCONTINUED | OUTPATIENT
Start: 2022-01-31 | End: 2022-02-03 | Stop reason: HOSPADM

## 2022-01-31 RX ADMIN — GLYCOPYRROLATE 0.4 MG: 0.2 INJECTION, SOLUTION INTRAMUSCULAR; INTRAVENOUS at 14:46

## 2022-01-31 RX ADMIN — ALBUMIN (HUMAN) 250 ML: 12.5 INJECTION, SOLUTION INTRAVENOUS at 13:43

## 2022-01-31 RX ADMIN — SODIUM CHLORIDE, PRESERVATIVE FREE 10 ML: 5 INJECTION INTRAVENOUS at 22:14

## 2022-01-31 RX ADMIN — SODIUM CHLORIDE, POTASSIUM CHLORIDE, SODIUM LACTATE AND CALCIUM CHLORIDE: 600; 310; 30; 20 INJECTION, SOLUTION INTRAVENOUS at 13:43

## 2022-01-31 RX ADMIN — TRANEXAMIC ACID 1 G: 100 INJECTION, SOLUTION INTRAVENOUS at 13:37

## 2022-01-31 RX ADMIN — PROPOFOL 125 MG: 10 INJECTION, EMULSION INTRAVENOUS at 13:09

## 2022-01-31 RX ADMIN — METOPROLOL TARTRATE 50 MG: 50 TABLET, FILM COATED ORAL at 17:43

## 2022-01-31 RX ADMIN — Medication 25 MG: at 13:45

## 2022-01-31 RX ADMIN — ONDANSETRON HYDROCHLORIDE 4 MG: 2 INJECTION, SOLUTION INTRAMUSCULAR; INTRAVENOUS at 14:16

## 2022-01-31 RX ADMIN — LIDOCAINE HYDROCHLORIDE 60 MG: 20 INJECTION, SOLUTION EPIDURAL; INFILTRATION; INTRACAUDAL; PERINEURAL at 13:09

## 2022-01-31 RX ADMIN — FENTANYL CITRATE 50 MCG: 50 INJECTION, SOLUTION INTRAMUSCULAR; INTRAVENOUS at 14:01

## 2022-01-31 RX ADMIN — ROCURONIUM BROMIDE 40 MG: 10 SOLUTION INTRAVENOUS at 13:13

## 2022-01-31 RX ADMIN — METOPROLOL TARTRATE 5 MG: 5 INJECTION INTRAVENOUS at 16:30

## 2022-01-31 RX ADMIN — NEOSTIGMINE METHYLSULFATE 3 MG: 1 INJECTION, SOLUTION INTRAVENOUS at 14:46

## 2022-01-31 RX ADMIN — FENTANYL CITRATE 50 MCG: 50 INJECTION, SOLUTION INTRAMUSCULAR; INTRAVENOUS at 13:09

## 2022-01-31 RX ADMIN — FOLIC ACID 1 MG: 1 TABLET ORAL at 09:05

## 2022-01-31 RX ADMIN — METOPROLOL TARTRATE 50 MG: 50 TABLET, FILM COATED ORAL at 09:04

## 2022-01-31 RX ADMIN — Medication: at 22:14

## 2022-01-31 RX ADMIN — SODIUM CHLORIDE 125 ML/HR: 9 INJECTION, SOLUTION INTRAVENOUS at 16:14

## 2022-01-31 RX ADMIN — SENNOSIDES AND DOCUSATE SODIUM 1 TABLET: 50; 8.6 TABLET ORAL at 20:52

## 2022-01-31 RX ADMIN — WATER 1 G: 1 INJECTION INTRAMUSCULAR; INTRAVENOUS; SUBCUTANEOUS at 09:03

## 2022-01-31 RX ADMIN — CYCLOBENZAPRINE 5 MG: 10 TABLET, FILM COATED ORAL at 22:12

## 2022-01-31 RX ADMIN — SUCCINYLCHOLINE CHLORIDE 140 MG: 20 INJECTION, SOLUTION INTRAMUSCULAR; INTRAVENOUS at 13:09

## 2022-01-31 RX ADMIN — CYCLOBENZAPRINE 5 MG: 10 TABLET, FILM COATED ORAL at 09:05

## 2022-01-31 RX ADMIN — HYDROMORPHONE HYDROCHLORIDE 0.25 MG: 2 INJECTION, SOLUTION INTRAMUSCULAR; INTRAVENOUS; SUBCUTANEOUS at 14:56

## 2022-01-31 RX ADMIN — PHENYLEPHRINE HYDROCHLORIDE 50 MCG/MIN: 10 INJECTION INTRAVENOUS at 13:05

## 2022-01-31 RX ADMIN — POTASSIUM CHLORIDE 40 MEQ: 750 TABLET, FILM COATED, EXTENDED RELEASE ORAL at 20:52

## 2022-01-31 RX ADMIN — FENTANYL CITRATE 25 MCG: 50 INJECTION, SOLUTION INTRAMUSCULAR; INTRAVENOUS at 11:25

## 2022-01-31 RX ADMIN — DEXAMETHASONE SODIUM PHOSPHATE 4 MG: 4 INJECTION, SOLUTION INTRAMUSCULAR; INTRAVENOUS at 14:14

## 2022-01-31 RX ADMIN — SODIUM CHLORIDE, PRESERVATIVE FREE 10 ML: 5 INJECTION INTRAVENOUS at 07:04

## 2022-01-31 RX ADMIN — VANCOMYCIN HYDROCHLORIDE 1000 MG: 1 INJECTION, POWDER, LYOPHILIZED, FOR SOLUTION INTRAVENOUS at 11:22

## 2022-01-31 RX ADMIN — CYCLOBENZAPRINE 5 MG: 10 TABLET, FILM COATED ORAL at 02:07

## 2022-01-31 RX ADMIN — POTASSIUM CHLORIDE 40 MEQ: 750 TABLET, FILM COATED, EXTENDED RELEASE ORAL at 09:05

## 2022-01-31 RX ADMIN — THIAMINE HYDROCHLORIDE 100 MG: 100 INJECTION, SOLUTION INTRAMUSCULAR; INTRAVENOUS at 22:09

## 2022-01-31 RX ADMIN — Medication 3 G: at 20:29

## 2022-01-31 RX ADMIN — SODIUM CHLORIDE, SODIUM LACTATE, POTASSIUM CHLORIDE, CALCIUM CHLORIDE: 600; 310; 30; 20 INJECTION, SOLUTION INTRAVENOUS at 13:05

## 2022-01-31 RX ADMIN — Medication: at 09:08

## 2022-01-31 RX ADMIN — ROCURONIUM BROMIDE 10 MG: 10 SOLUTION INTRAVENOUS at 13:09

## 2022-01-31 RX ADMIN — HYDROMORPHONE HYDROCHLORIDE 0.25 MG: 2 INJECTION, SOLUTION INTRAMUSCULAR; INTRAVENOUS; SUBCUTANEOUS at 14:44

## 2022-01-31 NOTE — BRIEF OP NOTE
Brief Postoperative Note    Patient: Osorio Salomon  YOB: 1945  MRN: [id number]    Date of Procedure: 1/31/2022     Pre-Op Diagnosis:  LEFT HIP SUBACUTE FEMORAL NECK FRACTURE    Post-Op Diagnosis: Same as preoperative diagnosis. Procedure(s):  LEFT HIP HEMIARTHROPLASTY    Surgeon(s):  Karin Singh MD    Surgical Assistant: Physician Assistant: Angelica Ng PA-C  Surg Asst-1: Myra Williamson    Anesthesia: General     Estimated Blood Loss (mL): less than 50     Complications: None    Specimens: * No specimens in log *     Implants:   Implant Name Type Inv.  Item Serial No.  Lot No. LRB No. Used Action   HEAD BPLR OD44MM [id number] FEM HIP GRY POS ECC SELF CNTR - SNA  HEAD BPLR OD44MM [id number] FEM HIP GRY POS ECC SELF CNTR NA [facility] [employer] [facility] [id number] Left 1 Implanted   HEAD FEM [id number] NK L+1.5MM HIP MTL ON MTL 12/14 TAPR - SNA  HEAD FEM [id number] NK L+1.5MM HIP MTL ON MTL 12/14 [employer]APR NA [employer] [facility] [id number] Left 1 Implanted   STEM FEM SZ 6 L150MM 130DEG STD OFFSET CALCAR HIP BILAT TI - SNA  STEM FEM SZ 6 L150MM 130DEG STD OFFSET CALCAR HIP BILAT TI NA [employer] [facility] [id number] Left 1 Implanted       Drains:   External Urinary Catheter 01/27/22 (Active)   Site Assessment Intact 01/30/22 2030   Repositioned No 01/29/22 2000   Perineal Care Yes 01/30/22 0733   Wick Changed Yes 01/30/22 0733   Suction Canister/Tubing Changed Yes 01/30/22 0733   Urine Output (mL) 150 ml 01/31/22 0858       [REMOVED] External Urinary Catheter 01/27/22 (Removed)       Findings: subacute displaced femoral neck fracture    Electronically Signed by Nilay Lopez MD on 1/31/2022 at 2:39 PM

## 2022-01-31 NOTE — ANESTHESIA PREPROCEDURE EVALUATION
Relevant Problems   NEUROLOGY   (+) Alcohol abuse, in remission       Anesthetic History   No history of anesthetic complications            Review of Systems / Medical History  Patient summary reviewed, nursing notes reviewed and pertinent labs reviewed    Pulmonary  Within defined limits                 Neuro/Psych   Within defined limits           Cardiovascular    Hypertension              Exercise tolerance: <4 METS     GI/Hepatic/Renal               Comments: SIADH on salt pills Endo/Other             Other Findings   Comments: EtOH         Physical Exam    Airway  Mallampati: II  TM Distance: > 6 cm  Neck ROM: normal range of motion   Mouth opening: Normal     Cardiovascular  Regular rate and rhythm,  S1 and S2 normal,  no murmur, click, rub, or gallop             Dental    Dentition: Edentulous     Pulmonary  Breath sounds clear to auscultation               Abdominal  GI exam deferred       Other Findings            Anesthetic Plan    ASA: 3  Anesthesia type: general          Induction: Intravenous  Anesthetic plan and risks discussed with: Patient

## 2022-01-31 NOTE — PROGRESS NOTES
HISTORY AND PHYSICAL  Gino Rae MD        PCP: Jack Ramsay NP    Please note that this dictation was completed with [employer], the computer voice recognition software. Quite often unanticipated grammatical, syntax, homophones, and other interpretive errors are inadvertently transcribed by the computer software. Please disregard these errors. Please excuse any errors that have escaped final proofreading. Admission history: This is a 36-year-old female with a significant past medical history of chronic hyponatremia due to SIADH on salt tablets, alcohol abuse, hypertension presented to the ED for evaluation of progressive leg swelling, left more than right and fatigue. Upon evaluation in the ED, she was found to have sodium of 125, potassium 2.9, anion gap of 16 with CO2 of 19. I saw and examined patient in the ED room 15. Patient presently is alert oriented x3, on room air, not in distress and she was able to provide good history. She confirmed above history, admitted to having poor oral intake, getting progressively fatigued. She says she does not drink much however on further questioning she admitted drinking 1-2 shots of scotch about 4 days a week, she does not feel she would go through withdrawal.  She has chronic intermittent diarrhea, she is not sure if she had GI work-up. She denied cough, fever, shortness of breath, chest pain, palpitations. She was started on IV fluid with KCl in the ED and repeat BMP showed sodium has improved to 130 and potassium has improved to 3.2 anion gap almost normalized. Subjectively  --She came back from the OR. She is resting, denies pain. Assessment & Plan: This is a 36-year-old female with PMH of hyponatremia, SIADH, chronic alcohol abuse, hypertension, peripheral neuropathy presented with generalized weakness increasing leg edema.     Subacute displaced left femoral neck fracture  --Underwent left hip arthroplasty on 1/31  --Pain control, PT and OT. DVT prophylaxis with Lovenox when cleared by surgery    Acute on chronic hyponatremia, component of prerenal on top of chronic SIADH and alcohol-related: now improved with holding fluids    AGMA, dehydration due to poor oral intake, starvation: resolved with fluids    Moderate hypokalemia due to poor oral intake: replace as needed    Peripheral edema due to poor nutrition, hypoalbuminemia:no evidence of CHF. Bilateral venous Dopplers negative for DVT. Chronic hypertension. BP improved. Resume Lopressor    Alcohol abuse. She admits to drinking 1-2 scotch 4 days a week. No hallucination, delusions, asterixis or any other signs of withdrawal.  --Vitamin supplements with folic acid and thiamine ordered. --Initiate symptom triggered CIWA protocol  --No evidence of withdrawal this for    Klebsiella UTI : Continue ceftriaxone (1/27--1/31)  Chronic intermittent diarrhea:outpatient GI eval  Chronic peripheral neuropathy. Regular diet    PT/OT SNF    Patient's Baseline: Uses cane/rolling walker  DVT ppx: Lovenox    Plan: Medically stable, awaiting SNF  Code status:DNR  Disposition: SNF          PMH/PSH:  Past Medical History:   Diagnosis Date    History of mammogram 10 years ago    Hypertension     Hyponatremia 09/11/2014    hospitalized for severe hyponatremia due to SIADH    Menopause     at age 37    Pap smear for cervical cancer screening 15 years ago    SIADH (syndrome of inappropriate ADH production) ([facility] [address]     UTI (urinary tract infection)      Past Surgical History:   Procedure Laterality Date    HX CATARACT REMOVAL Right 1/14/2014    HX CHOLECYSTECTOMY      HX THROMBECTOMY Right     leg    HX TUBAL LIGATION         Home meds:   Prior to Admission medications    Medication Sig Start Date End Date Taking? Authorizing Provider   cyclobenzaprine (FLEXERIL) 5 mg tablet Take 1 Tablet by mouth two (2) times daily as needed for Muscle Spasm(s).  1/10/22   Maylin Kaiser, NP   diazePAM (Valium) 2 mg tablet Take 1 Tablet by mouth every eight (8) hours as needed for Muscle Spasm(s). Max Daily Amount: 6 mg. 1/6/22   Cornelio Mclaughlin MD   erythromycin (ILOTYCIN) ophthalmic ointment Apply 0.5 inch to affected eye 4 times per day. Indications: an infection on the surface of the eye 9/17/21   ROXANA Sloan   amLODIPine (NORVASC) 10 mg tablet TAKE 1 TABLET BY MOUTH DAILY 6/21/21   Ritu Kaiser NP   metoprolol tartrate (LOPRESSOR) 50 mg tablet TAKE 1 TABLET BY MOUTH TWICE DAILY 6/21/21   Ritu Kaiser NP   sodium chloride 1 gram tablet TAKE 3 TABLETS BY MOUTH TWICE DAILY WITH MEALS 6/21/21   Ritu Kaiser NP   polyvinyl alcohol/povidone (ARTIFICIAL TEARS OP) 2 Drops by IntraOCUlar route every two (2) hours as needed for Other (dry eyes). 6/8/20   Provider, Historical   ipratropium (ATROVENT HFA) 17 mcg/actuation inhaler Take 2 Puffs by inhalation every four (4) hours as needed for Cough, Respiratory Distress or Shortness of Breath. 6/8/20   Provider, Historical   aspirin delayed-release 81 mg tablet Take 81 mg by mouth daily. Provider, Historical   multivitamin (ONE A DAY) tablet Take 1 Tab by mouth daily. Provider, Historical       Allergies: Allergies   Allergen Reactions    Levaquin [Levofloxacin] Anaphylaxis     Throat and face became swollen.  Ace Inhibitors Vertigo    Hydrochlorothiazide Other (comments)     Hypotension and severe hyponatremia.  Penicillins Hives     While pregnant       FH:  Family History   Problem Relation Age of Onset    Kidney Disease Mother         uncertain       SH:  Social History     Tobacco Use    Smoking status: Current Every Day Smoker     Packs/day: 1.00     Years: 40.00     Pack years: 40.00     Types: Cigarettes    Smokeless tobacco: Former User     Quit date: 9/11/2014   Substance Use Topics    Alcohol use:  Yes     Alcohol/week: 3.0 standard drinks     Types: 3 Standard drinks or equivalent per week       ROS: A comprehensive review of systems was negative except for that written in the HPI. PHYSICAL EXAM:  Generally: Patient is alert and oriented x3. She is not in any distress. HEENT: No pallor or jaundice. Buccal mucosa is mildly dry. Lungs: On room air. Not in distress. Coarse breath sounds otherwise symmetrical.  Cardiovascular: S1-S2 well heard, no gallop or rub. Abdomen: Flat, normoactive, soft and nontender. ANALIA: No CVA or suprapubic tenderness. Extremities: Both lower extremities straight, surgical dressing on the left hip clean, dry and intact  CNS: Alert oriented x3. Cranial nerves are intact. Motor exam symmetrical.  No asterixis. Psych: Calm and cooperative. Labs/Imaging:  Available labs and imaging studies reviewed.                       Signed By: Bon Brenner MD     January 31, 2022

## 2022-01-31 NOTE — PROGRESS NOTES
Chart reviewed. Note ortho consult 1/30 with plan for L hip hemiarthroplasty today due to femoral neck fx. OT intervention held at this time. Will con't to follow for OT re-evaluation post-op as appropriate. Thank you.

## 2022-01-31 NOTE — PROGRESS NOTES
Physical Therapy    Chart reviewed. Note ortho consult 1/30 with plan for L hip hemiarthroplasty today due to femoral neck fx. PT intervention held at this time. Will con't to follow for PT re-evaluation post-op as appropriate.     Mahi Sheikh, PT, MPT

## 2022-01-31 NOTE — PROGRESS NOTES
Pt to OR for Hip Sx  Not seen  Chart revd    Na upto [address] Rick Masterson MD  [facility]

## 2022-01-31 NOTE — ANESTHESIA POSTPROCEDURE EVALUATION
Procedure(s):  HIP HEMIARTHROPLASTY. general    Anesthesia Post Evaluation        Patient location during evaluation: PACU  Patient participation: complete - patient participated  Level of consciousness: awake and alert  Pain management: adequate  Airway patency: patent  Anesthetic complications: no  Cardiovascular status: acceptable  Respiratory status: acceptable  Hydration status: acceptable  Comments: I have seen and evaluated the patient and is ready for discharge. Joanne Rosen MD    Post anesthesia nausea and vomiting:  none      INITIAL Post-op Vital signs:   Vitals Value Taken Time   /61 01/31/22 1645   Temp 37.2 °C (98.9 °F) 01/31/22 1535   Pulse 79 01/31/22 1649   Resp 14 01/31/22 1649   SpO2 97 % 01/31/22 1649   Vitals shown include unvalidated device data.

## 2022-01-31 NOTE — PERIOP NOTES
TRANSFER - OUT REPORT:    Verbal report given to Phyllis Schroeder RN(name) on Osorio Salomon  being transferred to CVSU(unit) for routine post - op       Report consisted of patients Situation, Background, Assessment and   Recommendations(SBAR). Time Pre op antibiotic given:1122  Anesthesia Stop time: 3139  Camacho Present on Transfer to floor:YES  Order for Camacho on Chart:YES  Discharge Prescriptions with Chart:no    Information from the following report(s) SBAR, OR Summary, Intake/Output and MAR was reviewed with the receiving nurse. Opportunity for questions and clarification was provided. Is the patient on 02? YES       L/Min 2       Other N/A    Is the patient on a monitor? YES    Is the nurse transporting with the patient? YES    Surgical Waiting Area notified of patient's transfer from PACU?  NO      The following personal items collected during your admission accompanied patient upon transfer:   Dental Appliance:    Vision:    Hearing Aid:    Jewelry:    Clothing:    Other Valuables:    Valuables sent to safe:

## 2022-01-31 NOTE — PROGRESS NOTES
Ortho  Met with patient, chart and xrays reviewed  Discussed left hip hemiarthroplasty with patient including risks/complications and expected outcomes  She agrees to proceed

## 2022-01-31 NOTE — PROGRESS NOTES
0745:Bedside and Verbal shift change report given to EVONNE Jhaveri  (oncoming nurse) by Ricardo Norman  (offgoing nurse). Report included the following information SBAR, Kardex, Intake/Output, MAR, Recent Results, Med Rec Status and Cardiac Rhythm NSR/ST.

## 2022-01-31 NOTE — PROGRESS NOTES
0745hrs:  Bedside and Verbal shift change report given to [facility], RN (oncoming nurse) by Matthew RN (offgoing nurse). Report included the following information SBAR, Kardex, Intake/Output, MAR, Recent Results, Med Rec Status and Cardiac Rhythm 's.     1000hrs:  EVONNE Romero, from holding called for report. 1656hrs:  Post op report received from [address] [place] [address], RN PACU.     1720hrs:  Patient returned to CVSU via bed. Left hip dressing clean, dry, intact. No bleeding or hematoma noted. Palpable pulses distal to surgical site. Vitals stable:  HR 78 NSR, 96-98% SpO2 on 2L/min nasal cannula, /62 at rest.  Patient able to tolerate ice chips.

## 2022-01-31 NOTE — OP NOTES
[address]   [address] [facility], [address] [place] [address]     OP NOTE       Name: Karen Molina  MR#: [id number]  : 1945  Account #: [de-identified] Surgery Date: 2022  Date of Adm: 2022         ATTENDING PHYSICIAN: Debbie Moore MD     ASSISTANT: Moira Peterson, [facility]     PREOPERATIVE DIAGNOSIS: Left hip displaced femoral neck   fracture. POSTOPERATIVE DIAGNOSIS: Left hip displaced femoral neck   fracture. PROCEDURES PERFORMED: Left hip hemiarthroplasty. ANESTHESIA: general anesthesia. ESTIMATED BLOOD LOSS: 50 mL. SPECIMENS REMOVED: Femoral head. INDICATIONS: A 77 y.o. patient with a displaced   femoral neck fracture. OPERATION: The patient taken to the operating room, underwent   general anesthesia anesthesia. She was placed on the [place] table   with both feet in boots. Intermittent compression hose were placed on   bilateral lower legs. Right hip and leg were prepped and draped in the   usual fashion. A standard anterior approach was made to the hip down through skin   and subcutaneous tissue. Fascia was incised longitudinally. Tensor   muscle was dissected off the fascia and retracted posterolaterally. Fascia was incised and the rectus muscle was retracted   anteromedially. The circumflex vessels were clamped and cauterized. Rectus was elevated off the anterior capsule and the capsule was   incised in a T-shaped fashion. The capsule was dissected   subperiosteally. Each side of the capsule was tagged with #2 Vicryl   suture. Retractors were placed intracapsular. The hip was externally   rotated to 50 degrees. Femoral neck was recut at the appropriate level. The fracture had partially healed. Corkscrew was used to capture the   femoral head and remove it from the acetabulum. The femoral head   was sized to a size 44. Femoral elevator was placed around the femur   and femoral retractor was used to retract the femur anterolaterally.  The leg was brought down into hyperextension and external rotation. Capsular releases were performed. Femoral elevator was used to   bring the proximal femur up into the wound. After further capsular   releases, we had excellent exposure of the proximal femur. Canal was   reamed up to a size 6 tapered reamer. Canal was broached up to a   size 6 broach. [place] Citron was removed. Canal was cleansed using   pulsatile lavage system with antibiotic solution. A size 6 Gilchrist stem   was impacted down the intramedullary canal with good fixation. Size   44  bipolar component with a 1.5 head was impacted onto the femoral   component. The hip was then reduced and excellent range of motion   and stability was noted. Fluoroscopy was used to confirm placement of   our implants and leg lengths. The joint was extensively irrigated with antibiotic solution. Capsule was   repaired anatomically using #2 Vicryl interrupted figure-of-eight   fashion. Fascia was repaired using #1 Stratafix in a running fashion. Subcutaneous tissue was approximated using 2-0 Vicryl in interrupted   fashion. The skin edges were approximated using 3-0 Monocryl in   running subcuticular fashion, followed by Dermabond. Sterile dressing   was applied and the patient was awakened, extubated and transferred   to the recovery room in stable condition. There were no complications. The Physician Assistant was critical during the procedure by assisting with positioning of the leg, retraction, hemostasis, and wound closure.         Cyndie Guajardo MD

## 2022-02-01 LAB
ANION GAP SERPL CALC-SCNC: 2 MMOL/L (ref 5–15)
BUN SERPL-MCNC: 7 MG/DL (ref 6–20)
BUN/CREAT SERPL: 19 (ref 12–20)
CALCIUM SERPL-MCNC: 8.3 MG/DL (ref 8.5–10.1)
CHLORIDE SERPL-SCNC: 103 MMOL/L (ref 97–108)
CO2 SERPL-SCNC: 30 MMOL/L (ref 21–32)
CREAT SERPL-MCNC: 0.36 MG/DL (ref 0.55–1.02)
GLUCOSE SERPL-MCNC: 110 MG/DL (ref 65–100)
HCT VFR BLD AUTO: 34.3 % (ref 35–47)
HGB BLD-MCNC: 11.4 G/DL (ref 11.5–16)
POTASSIUM SERPL-SCNC: 4.7 MMOL/L (ref 3.5–5.1)
SODIUM SERPL-SCNC: 135 MMOL/L (ref 136–145)

## 2022-02-01 PROCEDURE — 97116 GAIT TRAINING THERAPY: CPT

## 2022-02-01 PROCEDURE — 74011000250 HC RX REV CODE- 250: Performed by: PHYSICIAN ASSISTANT

## 2022-02-01 PROCEDURE — 97164 PT RE-EVAL EST PLAN CARE: CPT

## 2022-02-01 PROCEDURE — 65660000000 HC RM CCU STEPDOWN

## 2022-02-01 PROCEDURE — 51798 US URINE CAPACITY MEASURE: CPT

## 2022-02-01 PROCEDURE — 77010033678 HC OXYGEN DAILY

## 2022-02-01 PROCEDURE — 80048 BASIC METABOLIC PNL TOTAL CA: CPT

## 2022-02-01 PROCEDURE — 97110 THERAPEUTIC EXERCISES: CPT

## 2022-02-01 PROCEDURE — 74011250636 HC RX REV CODE- 250/636: Performed by: PHYSICIAN ASSISTANT

## 2022-02-01 PROCEDURE — 85018 HEMOGLOBIN: CPT

## 2022-02-01 PROCEDURE — 74011250637 HC RX REV CODE- 250/637: Performed by: PHYSICIAN ASSISTANT

## 2022-02-01 PROCEDURE — 97168 OT RE-EVAL EST PLAN CARE: CPT

## 2022-02-01 PROCEDURE — 97535 SELF CARE MNGMENT TRAINING: CPT

## 2022-02-01 PROCEDURE — 74011250637 HC RX REV CODE- 250/637: Performed by: HOSPITALIST

## 2022-02-01 PROCEDURE — 36415 COLL VENOUS BLD VENIPUNCTURE: CPT

## 2022-02-01 RX ORDER — LANOLIN ALCOHOL/MO/W.PET/CERES
100 CREAM (GRAM) TOPICAL DAILY
Status: DISCONTINUED | OUTPATIENT
Start: 2022-02-01 | End: 2022-02-03 | Stop reason: HOSPADM

## 2022-02-01 RX ADMIN — Medication: at 09:24

## 2022-02-01 RX ADMIN — OXYCODONE 2.5 MG: 5 TABLET ORAL at 16:06

## 2022-02-01 RX ADMIN — CALCIUM CARBONATE-VITAMIN D TAB 500 MG-200 UNIT 1 TABLET: 500-200 TAB at 18:02

## 2022-02-01 RX ADMIN — ENOXAPARIN SODIUM 40 MG: 40 INJECTION SUBCUTANEOUS at 09:23

## 2022-02-01 RX ADMIN — SENNOSIDES AND DOCUSATE SODIUM 1 TABLET: 50; 8.6 TABLET ORAL at 18:02

## 2022-02-01 RX ADMIN — Medication 100 MG: at 22:10

## 2022-02-01 RX ADMIN — CALCIUM CARBONATE-VITAMIN D TAB 500 MG-200 UNIT 1 TABLET: 500-200 TAB at 12:07

## 2022-02-01 RX ADMIN — SODIUM CHLORIDE, PRESERVATIVE FREE 10 ML: 5 INJECTION INTRAVENOUS at 06:50

## 2022-02-01 RX ADMIN — SODIUM CHLORIDE, PRESERVATIVE FREE 10 ML: 5 INJECTION INTRAVENOUS at 16:07

## 2022-02-01 RX ADMIN — METOPROLOL TARTRATE 50 MG: 50 TABLET, FILM COATED ORAL at 09:24

## 2022-02-01 RX ADMIN — FOLIC ACID 1 MG: 1 TABLET ORAL at 09:24

## 2022-02-01 RX ADMIN — Medication: at 18:02

## 2022-02-01 RX ADMIN — POLYETHYLENE GLYCOL 3350 17 G: 17 POWDER, FOR SOLUTION ORAL at 09:23

## 2022-02-01 RX ADMIN — SODIUM CHLORIDE, PRESERVATIVE FREE 10 ML: 5 INJECTION INTRAVENOUS at 22:10

## 2022-02-01 RX ADMIN — Medication 3 G: at 09:24

## 2022-02-01 RX ADMIN — SENNOSIDES AND DOCUSATE SODIUM 1 TABLET: 50; 8.6 TABLET ORAL at 09:24

## 2022-02-01 RX ADMIN — SODIUM CHLORIDE, PRESERVATIVE FREE 10 ML: 5 INJECTION INTRAVENOUS at 06:52

## 2022-02-01 RX ADMIN — Medication 3 G: at 18:02

## 2022-02-01 RX ADMIN — CYCLOBENZAPRINE 5 MG: 10 TABLET, FILM COATED ORAL at 23:09

## 2022-02-01 RX ADMIN — CALCIUM CARBONATE-VITAMIN D TAB 500 MG-200 UNIT 1 TABLET: 500-200 TAB at 09:24

## 2022-02-01 RX ADMIN — ASPIRIN 81 MG: 81 TABLET, COATED ORAL at 09:24

## 2022-02-01 RX ADMIN — METOPROLOL TARTRATE 50 MG: 50 TABLET, FILM COATED ORAL at 18:02

## 2022-02-01 NOTE — PROGRESS NOTES
Problem: Mobility Impaired (Adult and Pediatric)  Goal: *Acute Goals and Plan of Care (Insert Text)  Description: FUNCTIONAL STATUS PRIOR TO ADMISSION: Patient is a limited historian but reports she was modified independent using a rollator for functional mobility most recently. At \"baseline\" she is independent. HOME SUPPORT PRIOR TO ADMISSION: The patient lived alone with support from her daughter for transportation and groceries. Physical Therapy Goals  1. Patient will move from supine to sit and sit to supine , scoot up and down, and roll side to side in bed with minimal assistance within 4 days. 2. Patient will perform sit to stand with minimal assistance within 4 days. 3. Patient will ambulate with minimal assistance for 25 feet with the least restrictive device within 4 days. 4. Patient will perform hip home exercise program per protocol with modified independence within 4 days. Initiated 1/27/2022  1. Patient will move from supine to sit and sit to supine  in bed with minimal assistance/contact guard assist within 7 day(s). 2.  Patient will transfer from bed to chair and chair to bed with minimal assistance/contact guard assist using the least restrictive device within 7 day(s). 3.  Patient will perform sit to stand with minimal assistance/contact guard assist within 7 day(s). 4.  Patient will ambulate with moderate assistance  for 50 feet with the least restrictive device within 7 day(s). Outcome: Progressing Towards Goal   PHYSICAL THERAPY REEVALUATION  Patient:  Bhupendra Nichols (77 y.o. female)  Date: 2/1/2022  Primary Diagnosis: High anion gap metabolic acidosis [L17.2]  Hypokalemia [E87.6]  Hyponatremia [H28.7]  Metabolic acidosis [J78.1]  Procedure(s) (LRB):  HIP HEMIARTHROPLASTY (Left) 1 Day Post-Op   Precautions:   Fall,WBAT      ASSESSMENT  Based on the objective data described below, the patient presents with post op pain, decreased endurance, decreased activity tolerance, impaired balance, generalized weakness, and overall significant decline from baseline following admission for leg swelling, found to have an anion gap and later L hip fx. Now POD 1 from L hip hemiarthroplasty. Received supine and completed exercises as below. Able to progress to EOB, VSS. Not able to progress ambulation due to flexed posture, hyperextended knees, and sliding feet in standing. Able to correct somewhat on second attempt but unable to weightshift enough to progress to chair. Recommend SNF. Current Level of Function Impacting Discharge (mobility/balance): max Ax2 to maintain standing    Functional Outcome Measure: The patient scored 1/28 on the Tinetti outcome measure which is indicative of high fall risk. Other factors to consider for discharge: far from baseline, new L hip ricardo     Patient will benefit from skilled therapy intervention to address the above noted impairments. PLAN :  Recommendations and Planned Interventions: bed mobility training, transfer training, gait training, therapeutic exercises, edema management/control, patient and family training/education, and therapeutic activities      Frequency/Duration: Patient will be followed by physical therapy:  daily to address goals. Recommendation for discharge: (in order for the patient to meet his/her long term goals)  Therapy up to 5 days/week in SNF setting    This discharge recommendation:  Has been made in collaboration with the attending provider and/or case management    Equipment recommendations for successful discharge (if) home: TBD         SUBJECTIVE:   Patient stated I'm sliding!     OBJECTIVE DATA SUMMARY:   HISTORY:    Past Medical History:   Diagnosis Date    History of mammogram 10 years ago    Hypertension     Hyponatremia 09/11/2014    hospitalized for severe hyponatremia due to SIADH    Menopause     at age 37    Pap smear for cervical cancer screening 15 years ago    SIADH (syndrome of inappropriate ADH production) [facility])     UTI (urinary tract infection)      Past Surgical History:   Procedure Laterality Date    HX CATARACT REMOVAL Right 1/14/2014    HX CHOLECYSTECTOMY      HX THROMBECTOMY Right     leg    HX TUBAL LIGATION       Hospital course since last seen and reason for reevaluation: POD 1 L hip ricardo    Personal factors and/or comorbidities impacting plan of care: PMH    Home Situation  Home Environment: Apartment  # Steps to Enter: 0  One/Two Story Residence: One story  Living Alone: Yes  Support Systems: [place]  Patient Expects to be Discharged to[de-identified] Skilled nursing facility  Current DME Used/Available at Home: Grab bars,Cane, straight,Cane, quad,Shower chair,Walker, [employer], rollator  Tub or Shower Type: Tub/Shower combination    EXAMINATION/PRESENTATION/DECISION MAKING:   Critical Behavior:  Neurologic State: Alert  Orientation Level: Oriented X4  Cognition: Appropriate decision making,Appropriate for age attention/concentration,Appropriate safety awareness,Follows commands  Safety/Judgement: Insight into deficits  Hearing: Auditory  Auditory Impairment: None  Range Of Motion:  AROM: Generally decreased, functional  Strength:    Strength: Generally decreased, functional  Tone & Sensation:   Tone: Normal  Coordination:  Coordination: Generally decreased, functional  Functional Mobility:  Bed Mobility:  Supine to Sit: Maximum assistance;Assist x2  Sit to Supine: Maximum assistance;Assist x2  Scooting: Moderate assistance  Transfers:  Sit to Stand: Minimum assistance;Assist x2  Stand to Sit: Maximum assistance;Assist x2  Balance:   Sitting: Intact; Without support  Standing: Impaired; With support  Standing - Static: Poor  Standing - Dynamic : Poor    Functional Measure:  Tinetti test:    Sitting Balance: 1  Arises: 0  Attempts to Rise: 0  Immediate Standing Balance: 0  Standing Balance: 0  Nudged: 0  Eyes Closed: 0  Turn 360 Degrees - Continuous/Discontinuous: 0  Turn 360 Degrees - Steady/Unsteady: 0  Sitting Down: 0  Balance Score: 1 Balance total score  Indication of Gait: 0  R Step Length/Height: 0  L Step Length/Height: 0  R Foot Clearance: 0  L Foot Clearance: 0  Step Symmetry: 0  Step Continuity: 0  Path: 0  Trunk: 0  Walking Time: 0  Gait Score: 0 Gait total score  Total Score:  Overall total score         Tinetti Tool Score Risk of Falls  <19 = High Fall Risk  19-24 = Moderate Fall Risk  25-28 = Low Fall Risk  Tinetti ME. Performance-Oriented Assessment of Mobility Problems in Elderly Patients. [facility] 66; [id number]. (Scoring Description: PT Bulletin Feb. 10, 1993)    Older adults: Gricelda Ghotra et al, 2009; n = 1000 [facility] elderly evaluated with ABC, MARLI, ADL, and IADL)  · Mean MARLI score for males aged 69-68 years = 26.21(3.40)  · Mean MARLI score for females age 69-68 years = 25.16(4.30)  · Mean MARLI score for males over 80 years = 23.29(6.02)  · Mean MARLI score for females over 80 years = 17.20(8.32)              Pain Ratin/10 standing    Activity Tolerance:   Fair    After treatment patient left in no apparent distress:   Supine in bed, Heels elevated for pressure relief, Call bell within reach, and Side rails x 3    COMMUNICATION/EDUCATION:   The patients plan of care was discussed with: Occupational therapist and Registered nurse. Fall prevention education was provided and the patient/caregiver indicated understanding., Patient/family have participated as able in goal setting and plan of care. , and Patient/family agree to work toward stated goals and plan of care.     Thank you for this referral.  Gloria Bartholomew, PT, DPT   Time Calculation: 29 mins

## 2022-02-01 NOTE — PROGRESS NOTES
0730: Bedside and Verbal shift change report given to [address] [place] [address] (oncoming nurse) by Esteban Homans (offgoing nurse). Report included the following information SBAR, Kardex, Intake/Output, MAR, Accordion, Recent Results and Cardiac Rhythm NSR.     1658: patient bladder scan shows 620 ml urine, patient wishes to attempt to void    1800: patient unable to void-straight cathed for 500ml urine    1930: Bedside and Verbal shift change report given to EVONNE Argueta (oncoming nurse) by [address] [place] [address] (offgoing nurse). Report included the following information SBAR, Kardex, Intake/Output, MAR, Accordion, Recent Results and Cardiac Rhythm NSR.

## 2022-02-01 NOTE — PROGRESS NOTES
Problem: Self Care Deficits Care Plan (Adult)  Goal: *Acute Goals and Plan of Care (Insert Text)  Description: FUNCTIONAL STATUS PRIOR TO ADMISSION: The patient lived alone and primarily used a rollator for functional mobility (stated she used to use a walker however moving LLE has been more difficult lately). HOME SUPPORT: The patient lived alone with daughter who lives nearby to provide assistance. Occupational Therapy Goals  Initiated 1/28/2022, goals reviewed 2/01/2022  1. Patient will perform bathing with minimal assistance/contact guard assist within 7 day(s). Cont. 2.  Patient will perform lower body ADLs with AE supervision/set-up within 7 day(s). Updated  3. Patient will perform upper body ADLs standing 5 mins without fatigue or LOB with supervision/set-up within 7 day(s). Updated  4. Patient will perform toilet transfers with minimal/ contact guard assistance within 7 day(s). Downgraded  5. Patient will perform all aspects of toileting with minimal assistance/contact guard assist within 7 day(s). Cont. 6.  Patient will participate in upper extremity therapeutic exercise/activities with supervision/set-up for 5 minutes within 7 day(s). Cont. 7.  Patient will utilize energy conservation techniques during functional activities with verbal cues within 7 day(s). Cont. Outcome: Progressing Towards Goal   OCCUPATIONAL THERAPY RE-EVALUATION  Patient: Domitila Mojica (77 y.o. female)  Date: 2/1/2022  Diagnosis: High anion gap metabolic acidosis [I18.9]  Hypokalemia [E87.6]  Hyponatremia [E43.3]  Metabolic acidosis [N96.5] Hyponatremia  Procedure(s) (LRB):  HIP HEMIARTHROPLASTY (Left) 1 Day Post-Op  Precautions: Fall,WBAT  Chart, occupational therapy assessment, plan of care, and goals were reviewed.     ASSESSMENT  Based on the objective data described below, patient is limited by pain in left hip, decreased generalized strength, decreased activity tolerance, difficulty weight bearing in standing requiring blocking of bilateral lower extremities for safety, and requiring increased assist for self care and functional mobility/transfers. Patient was on OT caseload and underwent a left hip hemiarthroplasty by Dr. Sangeeta Hoffman on 1/31/2022 and is currently WBAT. New evaluation orders placed for OT following surgery and seen today for re-evaluation. Patient attempted standing twice, with improved balance on second attempt with verbal cueing and demonstration. Attempting to take steps at edge of bed but difficulty completing and not safe for transfer to recliner at this time. Patient sat edge of bed ~5 minutes after standing attempts to complete grooming prior to returning to supine. Overall patient did fair with session today with limited complaints of pain during mobility and decreased assistance for sit -> stand, although remains max x2 for stand -> sit and attempts at standing at edge of bed. Patient would benefit from skilled OT services during admission to improve independence with self care and functional mobility/transfers. Recommend discharge to SNF at this time. Current Level of Function Impacting Discharge (ADLs): mod A lower extremity activities of daily living, min to max A x2 for mobility/transfers    Other factors to consider for discharge: time since onset, new left hip surgery, WBAT on left lower extremity, lives alone and mod independent at baseline         PLAN :  Recommendations and Planned Interventions: self care training, functional mobility training, therapeutic exercise, balance training, therapeutic activities, endurance activities, patient education and family training/education    Frequency/Duration: Patient will be followed by occupational therapy 5 times a week to address goals.     Recommend with staff: [facility] for toileting, stand pivot transfer with gait belt    Recommend next OT session: BSC/chair transfer    Recommendation for discharge: (in order for the patient to meet his/her long term goals)  Therapy up to 5 days/week in SNF setting    This discharge recommendation:  Has been made in collaboration with the attending provider and/or case management    Equipment recommendations for successful discharge (if) home: TBD       SUBJECTIVE:   Patient stated It hurts but it's not too bad.     OBJECTIVE DATA SUMMARY:   Hospital course since last seen and reason for reevaluation: patient underwent a left hip hemiarthroplasty 1/31/2022 and is WBAT     Cognitive/Behavioral Status:  Neurologic State: Alert  Orientation Level: Oriented X4  Cognition: Appropriate decision making; Appropriate for age attention/concentration; Appropriate safety awareness; Follows commands             Skin: bandage over left hip    Edema: none noted    Hearing: Auditory  Auditory Impairment: None    Vision/Perceptual:                                     Range of Motion:  AROM: Generally decreased, functional                         Strength:  Strength: Generally decreased, functional                Coordination:  Coordination: Generally decreased, functional            Tone & Sensation:  Tone: Normal                           Functional Mobility and Transfers for ADLs:  Bed Mobility:  Supine to Sit: Maximum assistance;Assist x2  Sit to Supine: Maximum assistance;Assist x2  Scooting: Moderate assistance    Transfers:  Sit to Stand: Minimum assistance;Assist x2  Functional Transfers  Toilet Transfer : Maximum assistance;Assist x2 (infer, not completed due to safety concerns)       Balance:  Sitting: Intact; Without support  Standing: Impaired; With support  Standing - Static: Poor  Standing - Dynamic : Poor    ADL Assessment:                                            ADL Intervention and task modifications:       Grooming  Position Performed: Seated edge of bed  Washing Face: Set-up  Brushing Teeth: Set-up                   Lower Body Dressing Assistance  Socks:  Moderate assistance (able to don right, needs assist for left)  Leg Crossed Method Used: Yes  Position Performed: Seated edge of bed  Cues: Physical assistance; Don              Functional Measure:    Barthel Index:  Bathin  Bladder: 10  Bowels: 10  Groomin  Dressin  Feedin  Mobility: 0  Stairs: 0  Toilet Use: 0  Transfer (Bed to Chair and Back): 5  Total: 40/100      The Barthel ADL Index: Guidelines  1. The index should be used as a record of what a patient does, not as a record of what a patient could do. 2. The main aim is to establish degree of independence from any help, physical or verbal, however minor and for whatever reason. 3. The need for supervision renders the patient not independent. 4. A patient's performance should be established using the best available evidence. Asking the patient, friends/relatives and nurses are the usual sources, but direct observation and common sense are also important. However direct testing is not needed. 5. Usually the patient's performance over the preceding 24-48 hours is important, but occasionally longer periods will be relevant. 6. Middle categories imply that the patient supplies over 50 per cent of the effort. 7. Use of aids to be independent is allowed. Score Interpretation (from [address] [address])    Independent   60-79 Minimally independent   40-59 Partially dependent   20-39 Very dependent   <20 Totally dependent     -Albert Purcell., Barthel, D.W. (1965). Functional evaluation: the Barthel Index. [facility] ([address] [facility] [address] (1997). The Barthel activities of daily living index: self-reporting versus actual performance in the old (> or = 75 years). Journal of [address] [place] [address] 45(7), [facility], ..., Leeroy Tong., Mayank Nelson. (1999). Measuring the change in disability after inpatient rehabilitation; comparison of the responsiveness of the Barthel Index and Functional Camas Measure.  Journal of Neurology, Neurosurgery, and Psychiatry, 66(4), [phone]. STACY Torres, ZACH Brewer, & Ermias Carvajal M.A. (2004) Assessment of post-stroke quality of life in cost-effectiveness studies: The usefulness of the Barthel Index and the EuroQoL-5D. Quality of Life Research, 13, 427-43       Pain:  Complaints of pain in left hip/leg    Activity Tolerance:   Fair and requires rest breaks    After treatment patient left in no apparent distress:   Supine in bed, Call bell within reach, Side rails x 3 and ice in place over left hip    COMMUNICATION/COLLABORATION:   The patients plan of care was discussed with: Physical therapist and Registered nurse.      CIRILO Hong/L  Time Calculation: 23 mins

## 2022-02-01 NOTE — PROGRESS NOTES
Transitions of Care:    RUR - 13%    Disposition: SNF - [facility] accept - set up for pick-up at 1 pm tomorrow if medically ready for discharge     Follow-up: PCP/Ortho/Specialist    Transport: BLS    CM placed a call to Gwyn Agosto with [facility] - will review referrals and let CM know. Anticipate possible discharge tomorrow. 4 pm - Spoke with Gwyn Agosto and [facility] patient - they will call daughter to work out arrangements. 4:40 pm - Spoke with Doug Medina, daughter [phone] and she is agreeable to transition to [facility] tomorrow if stable. Medicare pt has received, reviewed, and signed 2nd IM letter informing them of their right to appeal the discharge. Signed copied has been placed on pt bedside chart. Medicaid LTSS Screening submitted for processing.    LEONIE Barbosa

## 2022-02-01 NOTE — PROGRESS NOTES
Ortho Daily Progress Note      Patient: Sheila Portillo                   MRN: [id number]  Sex: female  YOB: 1945           Age: 77 y.o.     1 Day Post-Op     Procedure(s):  HIP HEMIARTHROPLASTY    Subjective:    -Pain:  Well controlled with ice packs and current pain regimen  -No complaints overnight. Patient yet to ambulate with PT post-op. -Patient tolerating PO diet, no nausea. Pain meds tolerated. -Camacho in place  -Vitals stable    Visit Vitals  BP (!) 150/42 (BP 1 Location: Left upper arm, BP Patient Position: At rest)   Pulse 70   Temp 97.8 °F (36.6 °C)   Resp 18   Ht 5' 1\" (1.549 m)   Wt 49.1 kg (108 lb 4.8 oz)   SpO2 98%   BMI 20.46 kg/m²        Recent Labs     02/01/22  0253 01/31/22  0201 01/31/22  0201 01/30/22  0357 01/30/22  0357 01/28/22  1057 01/28/22  0449 01/27/22  0314 01/27/22  0314 01/26/22  1720 01/26/22  1720 01/06/22  1358 01/06/22  1358 04/13/21  1604 04/13/21  1604   HGB 11.4*   < > 12.3   < > 11.6   < >  --   --   --   --  15.3   < > 15.1   < > 16.6*   CREA 0.36*   < > 0.41*   < > 0.32*  --  0.48*   < > 0.67   < > 0.85   < > 0.61   < > 0.75   BUN 7  --  9  --  6  --  6  --  11  --  11  --  9  --  9    < > = values in this interval not displayed. Physical Exam:    GENERAL: alert, no acute distress  NEURO:  Sensation grossly intact. Some baseline numbness/tingling in bilateral toes  VASCULAR: DP/TP pulses 2+. Extremity warm. mild edema of LLE  DRESSING:  Clean, dry, and intact and Aquacel in place  MSK:  Moving lower extremities well  DVT EXAM: No evidence of DVT seen on physical exam.  No posterior calf tenderness, tightness, erythema, palpable cords, or pain upon active dorsi/plantar flexion of the foot.       Plan:    DVT ppx: Lovenox 40mg daily  Activity: WBAT with PT-mobilization  Pain Control: stable, mild-to-moderate joint symptoms intermittently, reasonably well controlled by current meds  Dressing: Continue aquacel x7 days unless the integrity of the seal is lost.  Replace w/ abd pads that are to be changed daily if this is the case. Hgb:  Stable, repeat in AM  Camacho:  Discontinue this morning.   Discharge: Pending PT celsa Peterson PA-C  2/1/2022   8:21 AM

## 2022-02-01 NOTE — PROGRESS NOTES
1930: Bedside shift change report given to Jackie DOUGLASS (oncoming nurse) by Uziel Leavitt RN (offgoing nurse). Report included the following information SBAR, Kardex, Procedure Summary, Intake/Output, MAR and Recent Results. 0730: Bedside shift change report given to [address] [place] [address] (oncoming nurse) by Mandi Rangel RN (offgoing nurse). Report included the following information SBAR, Kardex, Procedure Summary, Intake/Output, MAR and Recent Results.

## 2022-02-01 NOTE — PROGRESS NOTES
Name: Madhavi Scott   MRN: [id number]  : 1945      Assessment:  Chronic hyponatremia ,better,SIADH + excessive free water intake  High AG met acidosis,better,ketozcidosis(starvation,etoh. Trula Blew ) RESOLVED  Hypokalemia-better  HTN     Na is better. upto 135. K is also nl    Plan/Recommendations:  Ct salt tabs  Encourage increase solute intake  Avoid excessive free water intake  Replace KCL PRN    Will sign off at this point. Please call us back with any questions/concerns      Subjective:  Resting. Feels better.  No acute c/o    ROS:   No nausea, no vomiting  No chest pain, no shortness of breath    Exam:  Visit Vitals  /61 (BP 1 Location: Left arm, BP Patient Position: At rest)   Pulse 84   Temp 97.8 °F (36.6 °C)   Resp 20   Ht 5' 1\" (1.549 m)   Wt 49.1 kg (108 lb 4.8 oz)   SpO2 93%   BMI 20.46 kg/m²     NAD  No icterus, mmm  No edema  AOx3    Current Facility-Administered Medications   Medication Dose Route Frequency Last Admin    thiamine HCL (B-1) tablet 100 mg  100 mg Oral DAILY      aspirin delayed-release tablet 81 mg  81 mg Oral DAILY 81 mg at 22 0924    sodium chloride (NS) flush 5-40 mL  5-40 mL IntraVENous Q8H 10 mL at 22 1213    sodium chloride (NS) flush 5-40 mL  5-40 mL IntraVENous PRN      naloxone (NARCAN) injection 0.4 mg  0.4 mg IntraVENous PRN      calcium-vitamin D (OS-AGAPITO +D3) 500 mg-200 unit per tablet 1 Tablet  1 Tablet Oral TID WITH MEALS 1 Tablet at 22 1207    senna-docusate (PERICOLACE) 8.6-50 mg per tablet 1 Tablet  1 Tablet Oral BID 1 Tablet at 22 0924    polyethylene glycol (MIRALAX) packet 17 g  17 g Oral DAILY 17 g at 22 0923    [START ON 2022] bisacodyL (DULCOLAX) suppository 10 mg  10 mg Rectal DAILY PRN      enoxaparin (LOVENOX) injection 40 mg  40 mg SubCUTAneous DAILY 40 mg at 22 1145  oxyCODONE IR (ROXICODONE) tablet 2.5 mg  2.5 mg Oral Q4H PRN      oxyCODONE IR (ROXICODONE) tablet 5 mg  5 mg Oral Q4H PRN      lidocaine 4 % patch 1 Patch  1 Patch TransDERmal Q24H 1 Patch at 01/31/22 1745    cyclobenzaprine (FLEXERIL) tablet 5 mg  5 mg Oral TID PRN 5 mg at 01/31/22 2212    sodium chloride (NS) flush 5-40 mL  5-40 mL IntraVENous Q8H 10 mL at 02/01/22 0650    sodium chloride (NS) flush 5-40 mL  5-40 mL IntraVENous PRN      acetaminophen (TYLENOL) tablet 650 mg  650 mg Oral Q6H  mg at 01/30/22 1656    Or    acetaminophen (TYLENOL) suppository 650 mg  650 mg Rectal Q6H PRN      polyethylene glycol (MIRALAX) packet 17 g  17 g Oral DAILY PRN      ondansetron (ZOFRAN ODT) tablet 4 mg  4 mg Oral Q8H PRN      Or    ondansetron (ZOFRAN) injection 4 mg  4 mg IntraVENous Q6H PRN      sodium chloride tablet 3 g  3 g Oral BID WITH MEALS 3 g at 85/31/01 5732    folic acid (FOLVITE) tablet 1 mg  1 mg Oral DAILY 1 mg at 02/01/22 0924    albuterol-ipratropium (DUO-NEB) 2.5 MG-0.5 MG/3 ML  3 mL Nebulization Q6H PRN      LORazepam (ATIVAN) tablet 2 mg  2 mg Oral Q1H PRN      LORazepam (ATIVAN) tablet 4 mg  4 mg Oral Q1H PRN      balsam peru-castor oiL (VENELEX) ointment   Topical BID Given at 02/01/22 0924    metoprolol tartrate (LOPRESSOR) tablet 50 mg  50 mg Oral BID 50 mg at 02/01/22 8203       Labs/Data:    Lab Results   Component Value Date/Time    WBC 8.0 01/31/2022 02:01 AM    HGB 11.4 (L) 02/01/2022 02:53 AM    HCT 34.3 (L) 02/01/2022 02:53 AM    PLATELET 437 96/27/8665 02:01 AM    .1 (H) 01/31/2022 02:01 AM       Lab Results   Component Value Date/Time    Sodium 135 (L) 02/01/2022 02:53 AM    Potassium 4.7 02/01/2022 02:53 AM    Chloride 103 02/01/2022 02:53 AM    CO2 30 02/01/2022 02:53 AM    Anion gap 2 (L) 02/01/2022 02:53 AM    Glucose 110 (H) 02/01/2022 02:53 AM    BUN 7 02/01/2022 02:53 AM    Creatinine 0.36 (L) 02/01/2022 02:53 AM    BUN/Creatinine ratio 19 02/01/2022 02:53 AM    GFR est AA >60 02/01/2022 02:53 AM    GFR est non-AA >60 02/01/2022 02:53 AM    Calcium 8.3 (L) 02/01/2022 02:53 AM       Wt Readings from Last 3 Encounters:   02/01/22 49.1 kg (108 lb 4.8 oz)   04/13/21 47.4 kg (104 lb 6.4 oz)   01/11/21 54.4 kg (120 lb)         Intake/Output Summary (Last 24 hours) at 2/1/2022 1448  Last data filed at 2/1/2022 0800  Gross per 24 hour   Intake 2193.33 ml   Output 1350 ml   Net 843.33 ml       Patient seen and examined. Chart reviewed. Labs, data and other pertinent notes reviewed in last 24 hrs.     PMH/SH/FH reviewed and unchanged compared to H&P    Discussed with pt      Suleman Farnsworth MD

## 2022-02-01 NOTE — PROGRESS NOTES
HISTORY AND PHYSICAL  Smith Richey MD        PCP: Munir Tiwari NP    Please note that this dictation was completed with [employer], the computer voice recognition software. Quite often unanticipated grammatical, syntax, homophones, and other interpretive errors are inadvertently transcribed by the computer software. Please disregard these errors. Please excuse any errors that have escaped final proofreading. Admission history: This is a 69-year-old female with a significant past medical history of chronic hyponatremia due to SIADH on salt tablets, alcohol abuse, hypertension presented to the ED for evaluation of progressive leg swelling, left more than right and fatigue. Upon evaluation in the ED, she was found to have sodium of 125, potassium 2.9, anion gap of 16 with CO2 of 19. I saw and examined patient in the ED room 15. Patient presently is alert oriented x3, on room air, not in distress and she was able to provide good history. She confirmed above history, admitted to having poor oral intake, getting progressively fatigued. She says she does not drink much however on further questioning she admitted drinking 1-2 shots of scotch about 4 days a week, she does not feel she would go through withdrawal.  She has chronic intermittent diarrhea, she is not sure if she had GI work-up. She denied cough, fever, shortness of breath, chest pain, palpitations. She was started on IV fluid with KCl in the ED and repeat BMP showed sodium has improved to 130 and potassium has improved to 3.2 anion gap almost normalized. Subjectively  --POD #1,no complaints. She had worked with PT,she said she took few steps. Assessment & Plan: This is a 69-year-old female with PMH of hyponatremia, SIADH, chronic alcohol abuse, hypertension, peripheral neuropathy presented with generalized weakness increasing leg edema.     Subacute displaced left femoral neck fracture  --Underwent left hip arthroplasty on 1/31  --Pain control, PT and OT. DVT prophylaxis with Lovenox. Acute on chronic hyponatremia, component of prerenal on top of chronic SIADH and alcohol-related: now improved with holding fluids    AGMA, dehydration due to poor oral intake, starvation: resolved with fluids    Moderate hypokalemia due to poor oral intake: replace as needed    Peripheral edema due to poor nutrition, hypoalbuminemia:no evidence of CHF. Bilateral venous Dopplers negative for DVT. Chronic hypertension. BP improved. Resume Lopressor    Alcohol abuse. She admits to drinking 1-2 scotch 4 days a week. No hallucination, delusions, asterixis or any other signs of withdrawal.  --Vitamin supplements with folic acid and thiamine ordered. --Initiate symptom triggered CIWA protocol  --No evidence of withdrawal this for    Klebsiella UTI : Continue ceftriaxone (1/27--1/31)  Chronic intermittent diarrhea:outpatient GI eval  Chronic peripheral neuropathy. Regular diet    PT/OT SNF    Patient's Baseline: Uses cane/rolling walker  DVT ppx: Lovenox    Plan: Medically stable, awaiting SNF  Code status:DNR  Disposition: SNF          PMH/PSH:  Past Medical History:   Diagnosis Date    History of mammogram 10 years ago    Hypertension     Hyponatremia 09/11/2014    hospitalized for severe hyponatremia due to SIADH    Menopause     at age 37    Pap smear for cervical cancer screening 15 years ago    SIADH (syndrome of inappropriate ADH production) ([facility] [address]     UTI (urinary tract infection)      Past Surgical History:   Procedure Laterality Date    HX CATARACT REMOVAL Right 1/14/2014    HX CHOLECYSTECTOMY      HX THROMBECTOMY Right     leg    HX TUBAL LIGATION         Home meds:   Prior to Admission medications    Medication Sig Start Date End Date Taking? Authorizing Provider   cyclobenzaprine (FLEXERIL) 5 mg tablet Take 1 Tablet by mouth two (2) times daily as needed for Muscle Spasm(s).  1/10/22   Antonia Kaiser, DOMENIC   diazePAM (Valium) 2 mg tablet Take 1 Tablet by mouth every eight (8) hours as needed for Muscle Spasm(s). Max Daily Amount: 6 mg. 1/6/22   Babak Cruz MD   erythromycin (ILOTYCIN) ophthalmic ointment Apply 0.5 inch to affected eye 4 times per day. Indications: an infection on the surface of the eye 9/17/21   ROXANA Vega   amLODIPine (NORVASC) 10 mg tablet TAKE 1 TABLET BY MOUTH DAILY 6/21/21   Kesha Kaiser NP   metoprolol tartrate (LOPRESSOR) 50 mg tablet TAKE 1 TABLET BY MOUTH TWICE DAILY 6/21/21   Kesha Kaiser NP   sodium chloride 1 gram tablet TAKE 3 TABLETS BY MOUTH TWICE DAILY WITH MEALS 6/21/21   Kesha Kaiser NP   polyvinyl alcohol/povidone (ARTIFICIAL TEARS OP) 2 Drops by IntraOCUlar route every two (2) hours as needed for Other (dry eyes). 6/8/20   Provider, Historical   ipratropium (ATROVENT HFA) 17 mcg/actuation inhaler Take 2 Puffs by inhalation every four (4) hours as needed for Cough, Respiratory Distress or Shortness of Breath. 6/8/20   Provider, Historical   aspirin delayed-release 81 mg tablet Take 81 mg by mouth daily. Provider, Historical   multivitamin (ONE A DAY) tablet Take 1 Tab by mouth daily. Provider, Historical       Allergies: Allergies   Allergen Reactions    Levaquin [Levofloxacin] Anaphylaxis     Throat and face became swollen.  Ace Inhibitors Vertigo    Hydrochlorothiazide Other (comments)     Hypotension and severe hyponatremia.  Penicillins Hives     While pregnant       FH:  Family History   Problem Relation Age of Onset    Kidney Disease Mother         uncertain       SH:  Social History     Tobacco Use    Smoking status: Current Every Day Smoker     Packs/day: 1.00     Years: 40.00     Pack years: 40.00     Types: Cigarettes    Smokeless tobacco: Former User     Quit date: 9/11/2014   Substance Use Topics    Alcohol use:  Yes     Alcohol/week: 3.0 standard drinks     Types: 3 Standard drinks or equivalent per week       ROS: A comprehensive review of systems was negative except for that written in the HPI. PHYSICAL EXAM:  Generally: Patient is alert and oriented x3. She is not in any distress. HEENT: No pallor or jaundice. Buccal mucosa is mildly dry. Lungs: On room air. Not in distress. Coarse breath sounds otherwise symmetrical.  Cardiovascular: S1-S2 well heard, no gallop or rub. Abdomen: Flat, normoactive, soft and nontender. ANALIA: No CVA or suprapubic tenderness. Extremities: Both lower extremities straight, surgical dressing on the left hip clean, dry and intact  CNS: Alert oriented x3. Cranial nerves are intact. Motor exam symmetrical.  No asterixis. Psych: Calm and cooperative. Labs/Imaging:  Available labs and imaging studies reviewed.                       Signed By: Rashid Fountain MD     February 1, 2022

## 2022-02-02 LAB
ALBUMIN SERPL-MCNC: 2.4 G/DL (ref 3.5–5)
ALBUMIN/GLOB SERPL: 0.6 {RATIO} (ref 1.1–2.2)
ALP SERPL-CCNC: 144 U/L (ref 45–117)
ALT SERPL-CCNC: 28 U/L (ref 12–78)
ANION GAP SERPL CALC-SCNC: 5 MMOL/L (ref 5–15)
AST SERPL-CCNC: 26 U/L (ref 15–37)
BILIRUB DIRECT SERPL-MCNC: 0.1 MG/DL (ref 0–0.2)
BILIRUB SERPL-MCNC: 0.4 MG/DL (ref 0.2–1)
BUN SERPL-MCNC: 8 MG/DL (ref 6–20)
BUN/CREAT SERPL: 18 (ref 12–20)
CALCIUM SERPL-MCNC: 8.9 MG/DL (ref 8.5–10.1)
CHLORIDE SERPL-SCNC: 94 MMOL/L (ref 97–108)
CO2 SERPL-SCNC: 32 MMOL/L (ref 21–32)
CREAT SERPL-MCNC: 0.45 MG/DL (ref 0.55–1.02)
GLOBULIN SER CALC-MCNC: 4 G/DL (ref 2–4)
GLUCOSE SERPL-MCNC: 100 MG/DL (ref 65–100)
HGB BLD-MCNC: 11.1 G/DL (ref 11.5–16)
POTASSIUM SERPL-SCNC: 3.4 MMOL/L (ref 3.5–5.1)
PROT SERPL-MCNC: 6.4 G/DL (ref 6.4–8.2)
SODIUM SERPL-SCNC: 131 MMOL/L (ref 136–145)

## 2022-02-02 PROCEDURE — 36415 COLL VENOUS BLD VENIPUNCTURE: CPT

## 2022-02-02 PROCEDURE — 74011250636 HC RX REV CODE- 250/636: Performed by: PHYSICIAN ASSISTANT

## 2022-02-02 PROCEDURE — 74011000250 HC RX REV CODE- 250: Performed by: PHYSICIAN ASSISTANT

## 2022-02-02 PROCEDURE — 97110 THERAPEUTIC EXERCISES: CPT

## 2022-02-02 PROCEDURE — 80076 HEPATIC FUNCTION PANEL: CPT

## 2022-02-02 PROCEDURE — 74011250637 HC RX REV CODE- 250/637: Performed by: PHYSICIAN ASSISTANT

## 2022-02-02 PROCEDURE — 77030027138 HC INCENT SPIROMETER -A

## 2022-02-02 PROCEDURE — 65270000029 HC RM PRIVATE

## 2022-02-02 PROCEDURE — 94760 N-INVAS EAR/PLS OXIMETRY 1: CPT

## 2022-02-02 PROCEDURE — 80048 BASIC METABOLIC PNL TOTAL CA: CPT

## 2022-02-02 PROCEDURE — 77030038269 HC DRN EXT URIN PURWCK BARD -A

## 2022-02-02 PROCEDURE — 85018 HEMOGLOBIN: CPT

## 2022-02-02 PROCEDURE — 97535 SELF CARE MNGMENT TRAINING: CPT

## 2022-02-02 PROCEDURE — 74011250637 HC RX REV CODE- 250/637: Performed by: HOSPITALIST

## 2022-02-02 RX ORDER — ENOXAPARIN SODIUM 100 MG/ML
40 INJECTION SUBCUTANEOUS DAILY
Qty: 5.6 ML | Refills: 0 | Status: SHIPPED
Start: 2022-02-02 | End: 2022-02-16

## 2022-02-02 RX ORDER — FOLIC ACID 1 MG/1
1 TABLET ORAL DAILY
Qty: 30 TABLET | Refills: 0 | Status: SHIPPED | OUTPATIENT
Start: 2022-02-02 | End: 2022-03-04

## 2022-02-02 RX ORDER — LANOLIN ALCOHOL/MO/W.PET/CERES
100 CREAM (GRAM) TOPICAL DAILY
Qty: 30 TABLET | Refills: 0 | Status: SHIPPED
Start: 2022-02-02 | End: 2022-03-04

## 2022-02-02 RX ORDER — AMOXICILLIN 250 MG
1 CAPSULE ORAL 2 TIMES DAILY
Qty: 30 TABLET | Refills: 0 | Status: SHIPPED
Start: 2022-02-02 | End: 2022-03-29 | Stop reason: ALTCHOICE

## 2022-02-02 RX ORDER — ACETAMINOPHEN 325 MG/1
650 TABLET ORAL
Qty: 20 TABLET | Refills: 0 | Status: SHIPPED | OUTPATIENT
Start: 2022-02-02

## 2022-02-02 RX ORDER — AMLODIPINE BESYLATE 5 MG/1
10 TABLET ORAL DAILY
Status: DISCONTINUED | OUTPATIENT
Start: 2022-02-02 | End: 2022-02-03 | Stop reason: HOSPADM

## 2022-02-02 RX ORDER — TRAZODONE HYDROCHLORIDE 50 MG/1
50 TABLET ORAL
Status: DISCONTINUED | OUTPATIENT
Start: 2022-02-02 | End: 2022-02-03 | Stop reason: HOSPADM

## 2022-02-02 RX ORDER — POTASSIUM CHLORIDE 750 MG/1
40 TABLET, FILM COATED, EXTENDED RELEASE ORAL DAILY
Status: DISCONTINUED | OUTPATIENT
Start: 2022-02-02 | End: 2022-02-03 | Stop reason: HOSPADM

## 2022-02-02 RX ORDER — POTASSIUM CHLORIDE 1500 MG/1
40 TABLET, FILM COATED, EXTENDED RELEASE ORAL DAILY
Qty: 6 TABLET | Refills: 0 | Status: SHIPPED
Start: 2022-02-02 | End: 2022-03-29 | Stop reason: SDUPTHER

## 2022-02-02 RX ORDER — ACETAMINOPHEN 500 MG
1000 TABLET ORAL 2 TIMES DAILY
Status: DISCONTINUED | OUTPATIENT
Start: 2022-02-02 | End: 2022-02-03 | Stop reason: HOSPADM

## 2022-02-02 RX ADMIN — SODIUM CHLORIDE, PRESERVATIVE FREE 10 ML: 5 INJECTION INTRAVENOUS at 21:56

## 2022-02-02 RX ADMIN — Medication 3 G: at 09:09

## 2022-02-02 RX ADMIN — ENOXAPARIN SODIUM 40 MG: 40 INJECTION SUBCUTANEOUS at 09:10

## 2022-02-02 RX ADMIN — Medication: at 18:20

## 2022-02-02 RX ADMIN — Medication: at 09:09

## 2022-02-02 RX ADMIN — OXYCODONE 5 MG: 5 TABLET ORAL at 01:28

## 2022-02-02 RX ADMIN — SODIUM CHLORIDE, PRESERVATIVE FREE 10 ML: 5 INJECTION INTRAVENOUS at 15:23

## 2022-02-02 RX ADMIN — Medication 100 MG: at 21:56

## 2022-02-02 RX ADMIN — Medication 3 G: at 18:20

## 2022-02-02 RX ADMIN — OXYCODONE 5 MG: 5 TABLET ORAL at 18:28

## 2022-02-02 RX ADMIN — METOPROLOL TARTRATE 50 MG: 50 TABLET, FILM COATED ORAL at 18:21

## 2022-02-02 RX ADMIN — OXYCODONE 5 MG: 5 TABLET ORAL at 10:28

## 2022-02-02 RX ADMIN — POLYETHYLENE GLYCOL 3350 17 G: 17 POWDER, FOR SOLUTION ORAL at 09:06

## 2022-02-02 RX ADMIN — POTASSIUM CHLORIDE 40 MEQ: 750 TABLET, EXTENDED RELEASE ORAL at 09:06

## 2022-02-02 RX ADMIN — ACETAMINOPHEN 650 MG: 325 TABLET ORAL at 09:08

## 2022-02-02 RX ADMIN — CYCLOBENZAPRINE 5 MG: 10 TABLET, FILM COATED ORAL at 15:30

## 2022-02-02 RX ADMIN — CYCLOBENZAPRINE 5 MG: 10 TABLET, FILM COATED ORAL at 09:07

## 2022-02-02 RX ADMIN — ASPIRIN 81 MG: 81 TABLET, COATED ORAL at 09:06

## 2022-02-02 RX ADMIN — ACETAMINOPHEN 1000 MG: 500 TABLET ORAL at 21:56

## 2022-02-02 RX ADMIN — METOPROLOL TARTRATE 50 MG: 50 TABLET, FILM COATED ORAL at 09:06

## 2022-02-02 RX ADMIN — TRAZODONE HYDROCHLORIDE 50 MG: 50 TABLET ORAL at 22:07

## 2022-02-02 RX ADMIN — CALCIUM CARBONATE-VITAMIN D TAB 500 MG-200 UNIT 1 TABLET: 500-200 TAB at 12:03

## 2022-02-02 RX ADMIN — FOLIC ACID 1 MG: 1 TABLET ORAL at 09:06

## 2022-02-02 RX ADMIN — CALCIUM CARBONATE-VITAMIN D TAB 500 MG-200 UNIT 1 TABLET: 500-200 TAB at 09:06

## 2022-02-02 RX ADMIN — ACETAMINOPHEN 1000 MG: 500 TABLET ORAL at 15:30

## 2022-02-02 RX ADMIN — SENNOSIDES AND DOCUSATE SODIUM 1 TABLET: 50; 8.6 TABLET ORAL at 18:21

## 2022-02-02 RX ADMIN — CALCIUM CARBONATE-VITAMIN D TAB 500 MG-200 UNIT 1 TABLET: 500-200 TAB at 18:20

## 2022-02-02 RX ADMIN — AMLODIPINE BESYLATE 10 MG: 5 TABLET ORAL at 12:03

## 2022-02-02 RX ADMIN — SENNOSIDES AND DOCUSATE SODIUM 1 TABLET: 50; 8.6 TABLET ORAL at 09:06

## 2022-02-02 NOTE — PROGRESS NOTES
1930: Receive report from [facility]. Patient resting in bed at this time without any complaints. 0730: Bedside and Verbal shift change report given to Jourdan Jhaveri  (oncoming nurse) by Namrata Lewis  (offgoing nurse). Report included the following information SBAR, Kardex, Intake/Output, MAR, Recent Results, Med Rec Status and Cardiac Rhythm NSR/ST.

## 2022-02-02 NOTE — PROGRESS NOTES
Ortho Daily Progress Note      Patient: Sheila Portillo                   MRN: [id number]  Sex: female  YOB: 1945           Age: 77 y.o.     2 Days Post-Op     Procedure(s):  HIP HEMIARTHROPLASTY    Subjective:    -Pain:  Currently well controlled  -No complaints overnight. Patient ambulating with PT  -Patient tolerating PO diet, no nausea. Pain meds tolerated. -Patient voiding appropriately  -Vitals stable, was feeling dizzy this morning but has since resolved. Visit Vitals  BP (!) 107/55 (BP 1 Location: Left upper arm, BP Patient Position: Sitting)   Pulse 92   Temp 98.4 °F (36.9 °C)   Resp 24   Ht 5' 1\" (1.549 m)   Wt 46.7 kg (102 lb 15.3 oz)   SpO2 94%   BMI 19.45 kg/m²        Recent Labs     02/02/22  0308 02/01/22  0253 02/01/22  0253 01/31/22  0201 01/31/22  0201 01/30/22  0357 01/30/22  0357 01/28/22  1057 01/28/22  0449 01/27/22  0314 01/27/22  0314 01/26/22  1720 01/26/22  1720 01/06/22  1358 01/06/22  1358 04/13/21  1604 04/13/21  1604   HGB 11.1*   < > 11.4*   < > 12.3   < > 11.6   < >  --   --   --   --  15.3   < > 15.1   < > 16.6*   CREA 0.45*   < > 0.36*   < > 0.41*   < > 0.32*  --  0.48*   < > 0.67   < > 0.85   < > 0.61   < > 0.75   BUN 8  --  7  --  9  --  6  --  6  --  11  --  11  --  9  --  9    < > = values in this interval not displayed. Physical Exam:    GENERAL: alert, no acute distress  NEURO:  Sensation grossly intact. VASCULAR: DP/TP pulses 2+. Extremity warm. mild edema of LLE  DRESSING:  Clean, dry, and intact and Aquacel in place  MSK:  Moving lower extremities well  DVT EXAM: No evidence of DVT seen on physical exam.  No posterior calf tenderness, tightness, erythema, palpable cords, or pain upon active dorsi/plantar flexion of the foot.       Plan:    DVT ppx: LMW heparin  Activity: WBAT with PT-mobilization  Pain Control: stable, mild-to-moderate joint symptoms intermittently, reasonably well controlled by current meds  Dressing: Continue aquacel x7 days unless the integrity of the seal is lost.  Replace w/ abd pads that are to be changed daily if this is the case.   Hgb:  Stable, repeat in AM  Discharge: Hopeful discharge tomorrow to [address] [place] [address]  2/2/2022   5:55 PM

## 2022-02-02 NOTE — PROGRESS NOTES
Transitions of Care:    RUR- 13%    Dispostion [facility]    Follow-up: PCP/Ortho/Specialst    Transport: [facility]    Patient not medically ready for discharge today. Both Aparna Williamson, daughter and [facility] with [phone] made aware - [facility] scheduled for pickup tomorrow at 1 pm.  Envelope on chart with checklist of medicals to go with patient. [facility] (extra copy provided to go to facilty for daughter to pick-up). Daughter plans to enroll patient in [facility] after SNF rehab.  LEONIE Gamez

## 2022-02-02 NOTE — PROGRESS NOTES
Problem: Self Care Deficits Care Plan (Adult)  Goal: *Acute Goals and Plan of Care (Insert Text)  Description: FUNCTIONAL STATUS PRIOR TO ADMISSION: The patient lived alone and primarily used a rollator for functional mobility (stated she used to use a walker however moving LLE has been more difficult lately). HOME SUPPORT: The patient lived alone with daughter who lives nearby to provide assistance. Occupational Therapy Goals  Initiated 1/28/2022, goals reviewed 2/01/2022  1. Patient will perform bathing with minimal assistance/contact guard assist within 7 day(s). Cont. 2.  Patient will perform lower body ADLs with AE supervision/set-up within 7 day(s). Updated  3. Patient will perform upper body ADLs standing 5 mins without fatigue or LOB with supervision/set-up within 7 day(s). Updated  4. Patient will perform toilet transfers with minimal/ contact guard assistance within 7 day(s). Downgraded  5. Patient will perform all aspects of toileting with minimal assistance/contact guard assist within 7 day(s). Cont. 6.  Patient will participate in upper extremity therapeutic exercise/activities with supervision/set-up for 5 minutes within 7 day(s). Cont. 7.  Patient will utilize energy conservation techniques during functional activities with verbal cues within 7 day(s). Cont. Outcome: Progressing Towards Goal   OCCUPATIONAL THERAPY TREATMENT  Patient: Wendy Baig (77 y.o. female)  Date: 2/2/2022  Diagnosis: High anion gap metabolic acidosis [Z98.1]  Hypokalemia [E87.6]  Hyponatremia [R00.5]  Metabolic acidosis [Y10.2] Hyponatremia  Procedure(s) (LRB):  HIP HEMIARTHROPLASTY (Left) 2 Days Post-Op  Precautions: Fall,WBAT  Chart, occupational therapy assessment, plan of care, and goals were reviewed. ASSESSMENT  Patient continues with skilled OT services and is progressing towards goals. Patient semi supine in bed upon OT arrival and agreeable to working with therapy.  Patient reporting episode of light headedness while supine in bed this AM, patient orthostatics taken in bed and sitting edge of bed, see below. Patient complaints of light headedness at edge of bed and had a drop in BP that improved with ankle pumps at edge of bed. Patient able to sit at edge of bed ~10 minutes to participate in grooming and lotion applied to back per patient's request due to itching. Patient declined standing at edge of bed this day due to light headedness and self-reported fear with standing. Overall patient continues to present with fear of falling, light headedness with movement, decreased activity tolerance, pain in left lower extremity. Patient would benefit from skilled OT services during admission to improve independence with self care and functional mobility/transfers. Recommend discharge to SNF at this time, discharge today moved to tomorrow until patient medically appropriate. Vitals:    02/02/22 1100 02/02/22 1108 02/02/22 1114 02/02/22 1159   BP: (!) 152/76 (!) 112/57 139/61 133/55   BP 1 Location: Left upper arm Left upper arm Left upper arm Left upper arm   BP Patient Position: At rest Sitting Sitting; Other (Comment)  Comment: with exercises At rest; post activity   Pulse: 90 95     Temp:       Resp: 21 23     Height:       Weight:       SpO2: 98% 91%         Current Level of Function Impacting Discharge (ADLs): setup A grooming at edge of bed, mod A sup <> sit, declined standing today    Other factors to consider for discharge: time since onset, prior level of function, history of falls         PLAN :  Patient continues to benefit from skilled intervention to address the above impairments. Continue treatment per established plan of care to address goals.     Recommend with staff: [facility] for toileting as able    Recommendation for discharge: (in order for the patient to meet his/her long term goals)  Therapy up to 5 days/week in SNF setting    This discharge recommendation:  Has been made in collaboration with the attending provider and/or case management    IF patient discharges home will need the following DME: TBD       SUBJECTIVE:   Patient stated My left leg never fully recovered after the last time I was in [facility].     OBJECTIVE DATA SUMMARY:   Cognitive/Behavioral Status:  Neurologic State: Alert  Orientation Level: Oriented X4  Cognition: Appropriate decision making; Appropriate safety awareness; Appropriate for age attention/concentration; Follows commands;Recognition of people/places             Functional Mobility and Transfers for ADLs:  Bed Mobility:  Rolling: Minimum assistance  Supine to Sit: Moderate assistance; Additional time  Sit to Supine: Moderate assistance; Additional time    Transfers:  Sit to Stand: Other (comment) (pt declined)          Balance:  Sitting: Intact; Without support    ADL Intervention:       Grooming  Position Performed: Seated edge of bed  Washing Face: Set-up  Brushing Teeth: Set-up                                Patient recalled and demonstrated avoiding extreme planes of movement with Left LE during ADLs and functional mobility with verbal cues. Bathing: Patient instructed when bathing to not submerge wound in water, stand to shower or sponge bathe, cover wound with plastic and tape to ensure no water reaches bandage/wound without cues. Patient indicated understanding. Dressing joint: Patient instructed to don/doff Left LE first/last verbal cues. Patient instructed to don all clothing while sitting prior to standing, doff all clothing to knees while standing, then sit to doff clothing off from knees to feet in order to facilitate fall prevention, pain management, and energy conservation.       Pain:  Patient reports 7+/10 pain in left lower extremity at rest, decreases to 5/10 while sitting EOB    Activity Tolerance:   Fair, SpO2 stable on RA, requires rest breaks and signs and symptoms of orthostatic hypotension    After treatment patient left in no apparent distress: Supine in bed, Call bell within reach and Side rails x 3    COMMUNICATION/COLLABORATION:   The patients plan of care was discussed with: Registered nurse.      Karin Mcwilliams OTR/L  Time Calculation: 26 mins

## 2022-02-02 NOTE — PROGRESS NOTES
HISTORY AND PHYSICAL  Raheem Pandey MD        PCP: Chanel Lockett NP    Please note that this dictation was completed with [employer], the computer voice recognition software. Quite often unanticipated grammatical, syntax, homophones, and other interpretive errors are inadvertently transcribed by the computer software. Please disregard these errors. Please excuse any errors that have escaped final proofreading. Admission history: This is a 52-year-old female with a significant past medical history of chronic hyponatremia due to SIADH on salt tablets, alcohol abuse, hypertension presented to the ED for evaluation of progressive leg swelling, left more than right and fatigue. Upon evaluation in the ED, she was found to have sodium of 125, potassium 2.9, anion gap of 16 with CO2 of 19. I saw and examined patient in the ED room 15. Patient presently is alert oriented x3, on room air, not in distress and she was able to provide good history. She confirmed above history, admitted to having poor oral intake, getting progressively fatigued. She says she does not drink much however on further questioning she admitted drinking 1-2 shots of scotch about 4 days a week, she does not feel she would go through withdrawal.  She has chronic intermittent diarrhea, she is not sure if she had GI work-up. She denied cough, fever, shortness of breath, chest pain, palpitations. She was started on IV fluid with KCl in the ED and repeat BMP showed sodium has improved to 130 and potassium has improved to 3.2 anion gap almost normalized. Subjectively  --POD #2. Patient said she felt dizzy when she sat up in the bed to eat breakfast.  She says she is not feeling well, she has not had a good night straight since after her surgery. Her BP was elevated. She is experiencing this left hip spasm. I have encouraged her to ask for pain medications, I have scheduled Tylenol.   She denied dizziness, headache, diplopia or any other symptoms at this time. Plan is to DC to rehab. Will be holding discharge and monitor for 1 more day. Assessment & Plan: This is a 43-year-old female with PMH of hyponatremia, SIADH, chronic alcohol abuse, hypertension, peripheral neuropathy presented with generalized weakness increasing leg edema. Subacute displaced left femoral neck fracture  --Underwent left hip arthroplasty on 1/31  --Pain control, PT and OT. DVT prophylaxis with Lovenox. --Scheduled Tylenol. Resume PTA muscle relaxant. Encourage patient to ask for pain medicine when she experiences pain. Acute on chronic hyponatremia, component of prerenal on top of chronic SIADH and alcohol-related: now improved with holding fluids    AGMA, dehydration due to poor oral intake, starvation: resolved with fluids    Moderate hypokalemia due to poor oral intake: replace as needed    Peripheral edema due to poor nutrition, hypoalbuminemia:no evidence of CHF. Bilateral venous Dopplers negative for DVT. Chronic hypertension BP elevated likely due to pain and lack of sleep. She was already getting home Lopressor. Resume Norvasc, give 1 dose now. Alcohol abuse. She admits to drinking 1-2 scotch 4 days a week. No hallucination, delusions, asterixis or any other signs of withdrawal.  --Vitamin supplements with folic acid and thiamine ordered. --Initiate symptom triggered CIWA protocol  --No evidence of withdrawal this for    Klebsiella UTI : Continue ceftriaxone (1/27--1/31)  Chronic intermittent diarrhea:outpatient GI eval  Chronic peripheral neuropathy. Insomnia: Trazodone as needed added. Regular diet    PT/OT SNF    Patient's Baseline: Uses cane/rolling walker  DVT ppx: Lovenox    Plan: Patient had episode of dizziness, elevated blood pressure also she did have a good night rest for the last 2 nights so she is not feeling well.   We will hold discharge, she could hopefully be discharged tomorrow  Code status:DNR  Disposition: SNF          PMH/PSH:  Past Medical History:   Diagnosis Date    History of mammogram 10 years ago    Hypertension     Hyponatremia 09/11/2014    hospitalized for severe hyponatremia due to SIADH    Menopause     at age 37    Pap smear for cervical cancer screening 15 years ago    SIADH (syndrome of inappropriate ADH production) ([facility] [address]     UTI (urinary tract infection)      Past Surgical History:   Procedure Laterality Date    HX CATARACT REMOVAL Right 1/14/2014    HX CHOLECYSTECTOMY      HX THROMBECTOMY Right     leg    HX TUBAL LIGATION         Home meds:   Prior to Admission medications    Medication Sig Start Date End Date Taking? Authorizing Provider   acetaminophen (TYLENOL) 325 mg tablet Take 2 Tablets by mouth every six (6) hours as needed for Pain or Fever. 2/2/22  Yes Ryan Iyer MD   folic acid (FOLVITE) 1 mg tablet Take 1 Tablet by mouth daily for 30 days. 2/2/22 3/4/22 Yes Ryan Iyer MD   potassium chloride SR (K-TAB) 20 mEq tablet Take 2 Tablets by mouth daily for 3 days. 2/2/22 2/5/22 Yes Ryan Iyer MD   senna-docusate (PERICOLACE) 8.6-50 mg per tablet Take 1 Tablet by mouth two (2) times a day. 2/2/22  Yes Ryan Iyer MD   thiamine HCL (B-1) 100 mg tablet Take 1 Tablet by mouth daily for 30 days. 2/2/22 3/4/22 Yes Ryan Iyer MD   enoxaparin (LOVENOX) 40 mg/0.4 mL 0.4 mL by SubCUTAneous route daily for 14 days. 2/2/22 2/16/22 Yes Cody Mills MD   cyclobenzaprine (FLEXERIL) 5 mg tablet Take 1 Tablet by mouth two (2) times daily as needed for Muscle Spasm(s). 1/10/22   Lara Kaiser NP   diazePAM (Valium) 2 mg tablet Take 1 Tablet by mouth every eight (8) hours as needed for Muscle Spasm(s). Max Daily Amount: 6 mg. 1/6/22   Annamaria Pope MD   erythromycin (ILOTYCIN) ophthalmic ointment Apply 0.5 inch to affected eye 4 times per day.   Indications: an infection on the surface of the eye 9/17/21   ROXANA Green   amLODIPine (NORVASC) 10 mg tablet TAKE 1 TABLET BY MOUTH DAILY 6/21/21   Jessenia Kaiser, DOMENIC   metoprolol tartrate (LOPRESSOR) 50 mg tablet TAKE 1 TABLET BY MOUTH TWICE DAILY 6/21/21   Jessenia Kaiser NP   sodium chloride 1 gram tablet TAKE 3 TABLETS BY MOUTH TWICE DAILY WITH MEALS 6/21/21   Jessenia Kaiser NP   polyvinyl alcohol/povidone (ARTIFICIAL TEARS OP) 2 Drops by IntraOCUlar route every two (2) hours as needed for Other (dry eyes). 6/8/20   Provider, Historical   ipratropium (ATROVENT HFA) 17 mcg/actuation inhaler Take 2 Puffs by inhalation every four (4) hours as needed for Cough, Respiratory Distress or Shortness of Breath. 6/8/20   Provider, Historical   aspirin delayed-release 81 mg tablet Take 81 mg by mouth daily. Provider, Historical   multivitamin (ONE A DAY) tablet Take 1 Tab by mouth daily. Provider, Historical       Allergies: Allergies   Allergen Reactions    Levaquin [Levofloxacin] Anaphylaxis     Throat and face became swollen.  Ace Inhibitors Vertigo    Hydrochlorothiazide Other (comments)     Hypotension and severe hyponatremia.  Penicillins Hives     While pregnant       FH:  Family History   Problem Relation Age of Onset    Kidney Disease Mother         uncertain       SH:  Social History     Tobacco Use    Smoking status: Current Every Day Smoker     Packs/day: 1.00     Years: 40.00     Pack years: 40.00     Types: Cigarettes    Smokeless tobacco: Former User     Quit date: 9/11/2014   Substance Use Topics    Alcohol use: Yes     Alcohol/week: 3.0 standard drinks     Types: 3 Standard drinks or equivalent per week       ROS: A comprehensive review of systems was negative except for that written in the HPI. PHYSICAL EXAM:  Generally: Patient is alert and oriented x3. She is not in any distress. HEENT: No pallor or jaundice. Buccal mucosa is mildly dry. Lungs: On room air. Not in distress.   Coarse breath sounds otherwise symmetrical.  Cardiovascular: S1-S2 well heard, no gallop or rub. Abdomen: Flat, normoactive, soft and nontender. ANALIA: No CVA or suprapubic tenderness. Extremities: Both lower extremities straight, surgical dressing on the left hip clean, dry and intact  CNS: Alert oriented x3. Cranial nerves are intact. Motor exam symmetrical.  No asterixis. Psych: Calm and cooperative. Labs/Imaging:  Available labs and imaging studies reviewed.                       Signed By: Smith Richey MD     February 2, 2022

## 2022-02-02 NOTE — PROGRESS NOTES
Problem: Mobility Impaired (Adult and Pediatric)  Goal: *Acute Goals and Plan of Care (Insert Text)  Description: FUNCTIONAL STATUS PRIOR TO ADMISSION: Patient is a limited historian but reports she was modified independent using a rollator for functional mobility most recently. At \"baseline\" she is independent. HOME SUPPORT PRIOR TO ADMISSION: The patient lived alone with support from her daughter for transportation and groceries. Physical Therapy Goals  1. Patient will move from supine to sit and sit to supine , scoot up and down, and roll side to side in bed with minimal assistance within 4 days. 2. Patient will perform sit to stand with minimal assistance within 4 days. 3. Patient will ambulate with minimal assistance for 25 feet with the least restrictive device within 4 days. 4. Patient will perform hip home exercise program per protocol with modified independence within 4 days. Initiated 1/27/2022  1. Patient will move from supine to sit and sit to supine  in bed with minimal assistance/contact guard assist within 7 day(s). 2.  Patient will transfer from bed to chair and chair to bed with minimal assistance/contact guard assist using the least restrictive device within 7 day(s). 3.  Patient will perform sit to stand with minimal assistance/contact guard assist within 7 day(s). 4.  Patient will ambulate with moderate assistance  for 50 feet with the least restrictive device within 7 day(s). Outcome: Progressing Towards Goal   PHYSICAL THERAPY TREATMENT  Patient: Amy Garcia (77 y.o. female)  Date: 2/2/2022  Diagnosis: High anion gap metabolic acidosis [J59.4]  Hypokalemia [E87.6]  Hyponatremia [V51.8]  Metabolic acidosis [X41.6] Hyponatremia  Procedure(s) (LRB):  HIP HEMIARTHROPLASTY (Left) 2 Days Post-Op  Precautions: Fall,WBAT  Chart, physical therapy assessment, plan of care and goals were reviewed.     ASSESSMENT  Patient continues with skilled PT services and is progressing towards goals. Participated in supine exercises as below and improved with bed mobility. BP stable sitting EOB despite dizziness but this improved with time. Continues standing with forward flexion at hips and hyperextended knees thus very poor balance. With max Ax2 (second person for RW management) able to stand pivot to chair with guidance for hip pivot as well. Recommend 2 assist for transfer back to bed with chair positioned directly next to bed. Recommend SNF. Current Level of Function Impacting Discharge (mobility/balance): maxAx2 for stand pivot transfer    Other factors to consider for discharge: BP stable supine>sit         PLAN :  Patient continues to benefit from skilled intervention to address the above impairments. Continue treatment per established plan of care. to address goals. Recommendation for discharge: (in order for the patient to meet his/her long term goals)  Therapy up to 5 days/week in SNF setting    This discharge recommendation:  Has been made in collaboration with the attending provider and/or case management    IF patient discharges home will need the following DME: to be determined (TBD)       SUBJECTIVE:   Patient stated Pop the champagne, I'm in the chair!     OBJECTIVE DATA SUMMARY:   Critical Behavior:  Neurologic State: Alert  Orientation Level: Oriented X4  Cognition: Appropriate decision making,Appropriate safety awareness,Appropriate for age attention/concentration,Follows commands,Recognition of people/places  Safety/Judgement: Insight into deficits  Functional Mobility Training:  Bed Mobility:  Rolling: Minimum assistance  Supine to Sit: Minimum assistance (L LE management)  Sit to Supine: Moderate assistance; Additional time  Scooting: Moderate assistance  Transfers:  Sit to Stand: Maximum assistance  Stand to Sit: Maximum assistance  Balance:  Sitting: Intact; Without support  Standing: Impaired; With support  Standing - Static: Poor  Standing - Dynamic : Poor    Therapeutic Exercises:   Supine:  -heel slides  -quad sets  -glut sets  -hip abduction  -ankle pumps  Pain Ratin/10    Activity Tolerance:   Good and SpO2 stable on RA    After treatment patient left in no apparent distress:   Sitting in chair and Call bell within reach    COMMUNICATION/COLLABORATION:   The patients plan of care was discussed with: Registered nurse.      Андрей Arechiga, PT, DPT   Time Calculation: 16 mins

## 2022-02-02 NOTE — PROGRESS NOTES
0857hrs:  Patient assessment completed. Patient complained of sudden lightheadedness. /75, HR 90's NSR with rare PAC's. SpO2 96% on room air. Lungs clear, S1S2. Patient denies pain. Negative neuro assessment. Medications administered--will recheck BP.    0905hrs:  Janette Jimenez CM at bedside. Patient scheduled for transport at 1300hrs today to rehab. Awaiting discharge orders.

## 2022-02-03 VITALS
BODY MASS INDEX: 20.6 KG/M2 | DIASTOLIC BLOOD PRESSURE: 53 MMHG | OXYGEN SATURATION: 90 % | HEIGHT: 61 IN | RESPIRATION RATE: 18 BRPM | SYSTOLIC BLOOD PRESSURE: 133 MMHG | HEART RATE: 78 BPM | TEMPERATURE: 98 F | WEIGHT: 109.13 LBS

## 2022-02-03 PROBLEM — E87.20 METABOLIC ACIDOSIS: Status: RESOLVED | Noted: 2022-01-27 | Resolved: 2022-02-03

## 2022-02-03 PROBLEM — E87.29 HIGH ANION GAP METABOLIC ACIDOSIS: Status: RESOLVED | Noted: 2022-01-27 | Resolved: 2022-02-03

## 2022-02-03 PROBLEM — S72.002K CLOSED FRACTURE OF NECK OF LEFT FEMUR WITH NONUNION: Chronic | Status: RESOLVED | Noted: 2022-01-31 | Resolved: 2022-02-03

## 2022-02-03 PROBLEM — E87.6 HYPOKALEMIA: Status: RESOLVED | Noted: 2022-01-27 | Resolved: 2022-02-03

## 2022-02-03 LAB
ANION GAP SERPL CALC-SCNC: 6 MMOL/L (ref 5–15)
BASOPHILS # BLD: 0 K/UL (ref 0–0.1)
BASOPHILS NFR BLD: 0 % (ref 0–1)
BUN SERPL-MCNC: 10 MG/DL (ref 6–20)
BUN/CREAT SERPL: 19 (ref 12–20)
CALCIUM SERPL-MCNC: 9.2 MG/DL (ref 8.5–10.1)
CHLORIDE SERPL-SCNC: 95 MMOL/L (ref 97–108)
CO2 SERPL-SCNC: 31 MMOL/L (ref 21–32)
CREAT SERPL-MCNC: 0.54 MG/DL (ref 0.55–1.02)
DIFFERENTIAL METHOD BLD: ABNORMAL
EOSINOPHIL # BLD: 0.1 K/UL (ref 0–0.4)
EOSINOPHIL NFR BLD: 1 % (ref 0–7)
ERYTHROCYTE [DISTWIDTH] IN BLOOD BY AUTOMATED COUNT: 13.7 % (ref 11.5–14.5)
GLUCOSE SERPL-MCNC: 96 MG/DL (ref 65–100)
HCT VFR BLD AUTO: 30.3 % (ref 35–47)
HGB BLD-MCNC: 10.4 G/DL (ref 11.5–16)
IMM GRANULOCYTES # BLD AUTO: 0 K/UL (ref 0–0.04)
IMM GRANULOCYTES NFR BLD AUTO: 0 % (ref 0–0.5)
LYMPHOCYTES # BLD: 2 K/UL (ref 0.8–3.5)
LYMPHOCYTES NFR BLD: 21 % (ref 12–49)
MCH RBC QN AUTO: 35.5 PG (ref 26–34)
MCHC RBC AUTO-ENTMCNC: 34.3 G/DL (ref 30–36.5)
MCV RBC AUTO: 103.4 FL (ref 80–99)
MONOCYTES # BLD: 1.4 K/UL (ref 0–1)
MONOCYTES NFR BLD: 14 % (ref 5–13)
NEUTS SEG # BLD: 6 K/UL (ref 1.8–8)
NEUTS SEG NFR BLD: 64 % (ref 32–75)
NRBC # BLD: 0 K/UL (ref 0–0.01)
NRBC BLD-RTO: 0 PER 100 WBC
PLATELET # BLD AUTO: 297 K/UL (ref 150–400)
PMV BLD AUTO: 8.8 FL (ref 8.9–12.9)
POTASSIUM SERPL-SCNC: 4.4 MMOL/L (ref 3.5–5.1)
RBC # BLD AUTO: 2.93 M/UL (ref 3.8–5.2)
SODIUM SERPL-SCNC: 132 MMOL/L (ref 136–145)
WBC # BLD AUTO: 9.5 K/UL (ref 3.6–11)

## 2022-02-03 PROCEDURE — 74011000250 HC RX REV CODE- 250: Performed by: PHYSICIAN ASSISTANT

## 2022-02-03 PROCEDURE — 97530 THERAPEUTIC ACTIVITIES: CPT

## 2022-02-03 PROCEDURE — 74011250637 HC RX REV CODE- 250/637: Performed by: PHYSICIAN ASSISTANT

## 2022-02-03 PROCEDURE — 85025 COMPLETE CBC W/AUTO DIFF WBC: CPT

## 2022-02-03 PROCEDURE — 97110 THERAPEUTIC EXERCISES: CPT

## 2022-02-03 PROCEDURE — 80048 BASIC METABOLIC PNL TOTAL CA: CPT

## 2022-02-03 PROCEDURE — 74011250637 HC RX REV CODE- 250/637: Performed by: HOSPITALIST

## 2022-02-03 PROCEDURE — 74011250636 HC RX REV CODE- 250/636: Performed by: PHYSICIAN ASSISTANT

## 2022-02-03 PROCEDURE — 36415 COLL VENOUS BLD VENIPUNCTURE: CPT

## 2022-02-03 RX ORDER — OXYCODONE HYDROCHLORIDE 5 MG/1
5 TABLET ORAL
Qty: 40 TABLET | Refills: 0 | Status: SHIPPED | OUTPATIENT
Start: 2022-02-03 | End: 2022-02-10

## 2022-02-03 RX ORDER — TRAZODONE HYDROCHLORIDE 50 MG/1
50 TABLET ORAL
Qty: 30 TABLET | Refills: 0 | Status: SHIPPED
Start: 2022-02-03

## 2022-02-03 RX ADMIN — POTASSIUM CHLORIDE 40 MEQ: 750 TABLET, EXTENDED RELEASE ORAL at 08:57

## 2022-02-03 RX ADMIN — ASPIRIN 81 MG: 81 TABLET, COATED ORAL at 08:59

## 2022-02-03 RX ADMIN — SENNOSIDES AND DOCUSATE SODIUM 1 TABLET: 50; 8.6 TABLET ORAL at 08:59

## 2022-02-03 RX ADMIN — CALCIUM CARBONATE-VITAMIN D TAB 500 MG-200 UNIT 1 TABLET: 500-200 TAB at 08:59

## 2022-02-03 RX ADMIN — FOLIC ACID 1 MG: 1 TABLET ORAL at 08:59

## 2022-02-03 RX ADMIN — SODIUM CHLORIDE, PRESERVATIVE FREE 10 ML: 5 INJECTION INTRAVENOUS at 06:54

## 2022-02-03 RX ADMIN — Medication: at 08:57

## 2022-02-03 RX ADMIN — OXYCODONE 5 MG: 5 TABLET ORAL at 00:55

## 2022-02-03 RX ADMIN — ACETAMINOPHEN 1000 MG: 500 TABLET ORAL at 09:01

## 2022-02-03 RX ADMIN — CALCIUM CARBONATE-VITAMIN D TAB 500 MG-200 UNIT 1 TABLET: 500-200 TAB at 11:48

## 2022-02-03 RX ADMIN — CYCLOBENZAPRINE 5 MG: 10 TABLET, FILM COATED ORAL at 15:43

## 2022-02-03 RX ADMIN — CYCLOBENZAPRINE 5 MG: 10 TABLET, FILM COATED ORAL at 08:58

## 2022-02-03 RX ADMIN — OXYCODONE 5 MG: 5 TABLET ORAL at 15:43

## 2022-02-03 RX ADMIN — ENOXAPARIN SODIUM 40 MG: 40 INJECTION SUBCUTANEOUS at 08:57

## 2022-02-03 RX ADMIN — Medication 3 G: at 08:58

## 2022-02-03 RX ADMIN — POLYETHYLENE GLYCOL 3350 17 G: 17 POWDER, FOR SOLUTION ORAL at 08:56

## 2022-02-03 RX ADMIN — OXYCODONE 5 MG: 5 TABLET ORAL at 11:48

## 2022-02-03 RX ADMIN — AMLODIPINE BESYLATE 10 MG: 5 TABLET ORAL at 08:59

## 2022-02-03 RX ADMIN — METOPROLOL TARTRATE 50 MG: 50 TABLET, FILM COATED ORAL at 08:59

## 2022-02-03 NOTE — PROGRESS NOTES
Problem: Mobility Impaired (Adult and Pediatric)  Goal: *Acute Goals and Plan of Care (Insert Text)  Description: FUNCTIONAL STATUS PRIOR TO ADMISSION: Patient is a limited historian but reports she was modified independent using a rollator for functional mobility most recently. At \"baseline\" she is independent. HOME SUPPORT PRIOR TO ADMISSION: The patient lived alone with support from her daughter for transportation and groceries. Physical Therapy Goals  1. Patient will move from supine to sit and sit to supine , scoot up and down, and roll side to side in bed with minimal assistance within 4 days. 2. Patient will perform sit to stand with minimal assistance within 4 days. 3. Patient will ambulate with minimal assistance for 25 feet with the least restrictive device within 4 days. 4. Patient will perform hip home exercise program per protocol with modified independence within 4 days. Initiated 1/27/2022  1. Patient will move from supine to sit and sit to supine  in bed with minimal assistance/contact guard assist within 7 day(s). 2.  Patient will transfer from bed to chair and chair to bed with minimal assistance/contact guard assist using the least restrictive device within 7 day(s). 3.  Patient will perform sit to stand with minimal assistance/contact guard assist within 7 day(s). 4.  Patient will ambulate with moderate assistance  for 50 feet with the least restrictive device within 7 day(s). Outcome: Progressing Towards Goal   PHYSICAL THERAPY TREATMENT  Patient: Osorio Salomon (77 y.o. female)  Date: 2/3/2022  Diagnosis: High anion gap metabolic acidosis [L67.7]  Hypokalemia [E87.6]  Hyponatremia [J91.0]  Metabolic acidosis [G86.5] Hyponatremia  Procedure(s) (LRB):  HIP HEMIARTHROPLASTY (Left) 3 Days Post-Op  Precautions: Fall,WBAT  Chart, physical therapy assessment, plan of care and goals were reviewed.     ASSESSMENT  Patient continues with skilled PT services and is progressing towards goals. Patient received supine in bed, wishing to change her wet brief. Able to roll in bed with min A for LE management for hygiene, brief, and chux change. Noted excoriated bottom with wounds (RN present to visualize and place new mepilex dressing on sacrum). Performed supine exercises with assistance and shown exercises in patient handbook. She preferred not to attempt EOB and mobilize to chair due to desire to eat breakfast while hot. Note plan for d/c to SNF at d/c. Current Level of Function Impacting Discharge (mobility/balance): min A rolling    Other factors to consider for discharge: lives alone         PLAN :  Patient continues to benefit from skilled intervention to address the above impairments. Continue treatment per established plan of care. to address goals.     Recommendation for discharge: (in order for the patient to meet his/her long term goals)  Therapy up to 5 days/week in SNF setting    This discharge recommendation:  Has been made in collaboration with the attending provider and/or case management    IF patient discharges home will need the following DME: none       SUBJECTIVE:   Patient stated I'd like to have a cup of coffee and some breakfast.    OBJECTIVE DATA SUMMARY:   Critical Behavior:  Neurologic State: Alert,Appropriate for age  Orientation Level: Oriented X4  Cognition: Appropriate decision making,Appropriate for age attention/concentration,Appropriate safety awareness,Follows commands,Recognition of people/places  Safety/Judgement: Insight into deficits  Functional Mobility Training:  Bed Mobility:  Rolling: Minimum assistance    Therapeutic Exercises:   SUPINE  EXERCISES   Sets   Reps   Active Active Assist   Passive Self ROM   Comments   Ankle Pumps 1 10 [x]                                        []                                        []                                        []                                           Quad Sets 1 10 [x] []                                        []                                        []                                           Heel Slides 1 10 [x]                                        []                                        []                                        []                                           Glut Sets 1 10 [x]                                        []                                        []                                        []                                           Hamstring sets 1 5 [x]                                        []                                        []                                        []                                               Pain Ratin/10    Activity Tolerance:   Good      After treatment patient left in no apparent distress:   Supine in bed, Call bell within reach, and Side rails x 3    COMMUNICATION/COLLABORATION:   The patients plan of care was discussed with: Occupational therapist, Registered nurse, and Case management.      Meño Varela, PT, DPT   Time Calculation: 25 mins

## 2022-02-03 NOTE — PROGRESS NOTES
Ortho Daily Progress Note      Patient: Sheila Portillo                   MRN: [id number]  Sex: female  YOB: 1945           Age: 77 y.o.     3 Days Post-Op     Procedure(s):  HIP HEMIARTHROPLASTY    Subjective:    -Pain:  Well controlled  -No complaints overnight. Patient ambulating with PT  -Patient tolerating PO diet, no nausea. Pain meds tolerated. -Patient voiding appropriately and w/ flatus. -Vitals stable    Visit Vitals  BP (!) 162/77 (BP 1 Location: Right upper arm, BP Patient Position: At rest)   Pulse 94   Temp 98 °F (36.7 °C)   Resp 16   Ht 5' 1\" (1.549 m)   Wt 49.5 kg (109 lb 2 oz)   SpO2 95%   BMI 20.62 kg/m²        Recent Labs     02/03/22  0321 02/02/22  0308 02/02/22  0308 02/01/22  0253 02/01/22  0253 01/31/22  0201 01/31/22  0201 01/30/22  0357 01/30/22  0357 01/28/22  1057 01/28/22  0449 01/27/22  0314 01/27/22  0314 01/26/22  1720 01/26/22  1720 01/06/22  1358 01/06/22  1358 04/13/21  1604 04/13/21  1604   HGB 10.4*   < > 11.1*   < > 11.4*   < > 12.3   < > 11.6   < >  --   --   --   --  15.3   < > 15.1   < > 16.6*   CREA 0.54*   < > 0.45*   < > 0.36*   < > 0.41*   < > 0.32*  --  0.48*   < > 0.67   < > 0.85   < > 0.61   < > 0.75   BUN 10  --  8  --  7  --  9  --  6  --  6  --  11  --  11  --  9  --  9    < > = values in this interval not displayed. Physical Exam:    GENERAL:      alert, no acute distress  NEURO:          Sensation grossly intact. VASCULAR:    DP/TP pulses 2+. Extremity warm. mild edema of LLE  DRESSING:    Clean, dry, and intact and Aquacel in place  MSK:               Moving lower extremities well  DVT EXAM:     No evidence of DVT seen on physical exam.  No posterior calf tenderness, tightness, erythema, palpable cords, or pain upon active dorsi/plantar flexion of the foot.       Plan:    DVT ppx:         LMW heparin x14 days post-op, last dose 2/15/22  Activity:            WBAT with PT-mobilization  Pain Control: stable, mild-to-moderate joint symptoms intermittently, reasonably well controlled by current meds  Dressing:         Continue aquacel x7 days unless the integrity of the seal is lost.  Replace w/ abd pads that are to be changed daily if this is the case. Hgb:                 Stable  Discharge:       Cleared for discharge from orthopedic standpoint.  Oxycodone script placed in patient's chart    Mayra Mandujano PA-C  2/3/2022   8:10 AM

## 2022-02-03 NOTE — PROGRESS NOTES
Transitions of Care:    RUR - 14%    Disposition: [facility]     Transport: [facility] at 4 pm    Follow-up: PCP/Ortho/Specilialist      Transition of Care Plan to SNF/Rehab    SNF/Rehab Transition:  Patient has been accepted to [facility] and meets criteria for admission. Patient will transported by [facility] and expected to leave at 4 pm.    Communication to Patient/Family:  Met with patient and Jamaal Joiner daughter (identified care giver) and they are agreeable to the transition plan. Communication to SNF/Rehab:  Bedside RN, Robert Mcfarland, has been notified to update the transition plan to the facility and call report (phone number [phone]. Discharge information has been updated on the AVS.     Discharge instructions to be fax'd to facility at [facility] # [phone]). Nursing Please include all hard scripts for controlled substances, med rec and dc summary, and AVS in packet. Reviewed and confirmed with facility, Lukasz Evans, can manage the patient care needs for the following:     Davon Casiano with (X) only those applicable:    Medication:  [x]  Medications will be available at the facility  []  IV Antibiotics   [x]  Controlled Substance - hard copy to be sent with patient   []  Weekly Labs   Documents:  [x] Hard RX  [x] MAR  [x] Kardex  [x] AVS  []Transfer Summary  []Discharge   Equipment:  []  CPAP/BiPAP  []  Wound Vacuum  []  Camacho or Urinary Device  []  PICC/Central Line  []  Nebulizer  []  Ventilator   Treatment:  []Isolation (for MRSA, VRE, etc.)  []Surgical Drain Management  []Tracheostomy Care  []Dressing Changes  []Dialysis with transportation and chair time . []PEG Care  [x]Oxygen  []Daily Weights for Heart Failure   Dietary:  []Any diet limitations  []Tube Feedings   []Total Parenteral Management (TPN)   Eligible for Medicaid Long Term Services and Supports  Yes:  [] Eligible for medical assistance or will become eligible within 180 days and UAI completed.    [] Provider/Patient and/or support system has requested screening. [x] UAI copy provided to patient or responsible party, per famiiy request sent with patient to facility  [] UAI unavailable at discharge will send once processed to SNF provider. [] UAI unavailable at discharged mailed to patient  No:   [] Private pay and is not financially eligible for Medicaid within the next 180 days. [] Reside out-of-state.   [] A residents of a state owned/operated facility that is licensed  by [address] [facility] or [facility]  [] Enrollment in [facility] hospice services  [] [address] [place] [address]  [] Patient /Family declines to have screening completed or provide financial information for screening     Financial Resources:  Medicaid    [] Initiated and application pending   [x] Full coverage     Advanced Care Plan:  []Surrogate Decision Maker of Care  []POA  []Communicated Code Status  (DDNR\", \"Full\")    Other

## 2022-02-03 NOTE — PROGRESS NOTES
1336hrs:  Patient expected discharge is 1600hrs today with [facility]. RN called report to [facility] [phone]. Vicki Hawthorne

## 2022-02-03 NOTE — DISCHARGE INSTRUCTIONS
Discharge SNF/Rehab Instructions/LTAC       PATIENT ID: Brissa Bethea  MRN: [id number]   YOB: 1945    DATE OF ADMISSION: 1/26/2022  8:49 PM    DATE OF DISCHARGE: 2/2/2022    PRIMARY CARE PROVIDER: Ming Reynolds NP       ATTENDING PHYSICIAN: Carolyn Myers MD  DISCHARGING PROVIDER: Dylan Velazquez MD     To contact this individual call [phone] and ask the  to page. If unavailable ask to be transferred the Adult Hospitalist Department. 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 Upon evaluation in the ED, she was found to have sodium of 125, potassium 2.9, anion gap of 16 with CO2 of 19. Admission evaluation, she confirmed above history, admitted to having poor oral intake, getting progressively fatigued. She says she does not drink much however on further questioning she admitted drinking 1-2 shots of scotch about 4 days a week, she does not feel she would go through withdrawal.  She has chronic intermittent diarrhea, she is not sure if she had GI work-up. She denied cough, fever, shortness of breath, chest pain, palpitations. Subacute displaced left femoral neck fracture  --Underwent left hip arthroplasty on 1/31. WBAT with PT-mobilization. Continue aquacel x7 days unless the integrity of the seal is lost.  Replace w/ abd pads that are to be changed daily if this is the case. --Pain control, PT and OT. DVT prophylaxis with Lovenox.      Acute on chronic hyponatremia, component of prerenal on top of chronic SIADH and alcohol-related: Sodium improved and remained in the 130s. She was seen and followed by nephrologist.     Arletmarcus Adeelernesto, dehydration due to poor oral intake, starvation: resolved with fluids     Moderate hypokalemia due to poor oral intake: K3.4 today, continue potassium replacement. Check BMP in 3 days. Peripheral edema due to poor nutrition, hypoalbuminemia:no evidence of CHF. Bilateral venous Dopplers negative for DVT. Chronic hypertension. Continue current antihypertensive medications. Alcohol abuse. She admits to drinking 1-2 scotch 4 days a week. No hallucination, delusions, asterixis or any other signs of withdrawal.  --Vitamin supplements with folic acid and thiamine ordered. --Patient did not exhibit any sign of alcohol withdrawal during her entire hospitalization. She is urged to quit. Klebsiella UTI :  Completed treatment with ceftriaxone (1/27--1/31)  Chronic intermittent diarrhea:outpatient GI eval  Chronic peripheral neuropathy. Patient was evaluated by physical Occupational Therapy is recommended skilled rehab. Patient and family in agreement plan she is discharged to rehab facility in stable condition. PENDING TEST RESULTS:   At the time of discharge the following test results are still pending: none any    FOLLOW UP APPOINTMENTS:    Follow-up Information     Follow up With Specialties Details Why Contact Info    Elsy Lopez NP Nurse Practitioner   [address]  [phone]             ADDITIONAL CARE RECOMMENDATIONS: None    DIET: Regular Diet and Cardiac Diet      OXYGEN / BiPAP SETTINGS: On room air    ACTIVITY:     Activity as tolerated and PT/OT Eval and Treat  WBAT with PT-mobilization        EQUIPMENT needed: To be determined after rehab. DISCHARGE MEDICATIONS:   See Medication Reconciliation Form      NOTIFY YOUR PHYSICIAN FOR ANY OF THE FOLLOWING:   Fever over 101 degrees for 24 hours.    Chest pain, shortness of breath, fever, chills, nausea, vomiting, diarrhea, change in mentation, falling, weakness, bleeding. Severe pain or pain not relieved by medications. Or, any other signs or symptoms that you may have questions about. DISPOSITION:    Home With:   OT  PT  HH  RN      x SNF/Inpatient Rehab/LTAC    Independent/assisted living    Hospice    Other:       PATIENT CONDITION AT DISCHARGE:     Functional status    Poor    x Deconditioned     Independent      Cognition    x Lucid     Forgetful     Dementia      Catheters/lines (plus indication)    Camacho     PICC     PEG    x None      Code status   x  Full code     DNR      PHYSICAL EXAMINATION AT DISCHARGE:     PHYSICAL EXAM:  Generally: Patient is alert and oriented x3. She is not in any distress. HEENT: No pallor or jaundice. Buccal mucosa is mildly dry. Lungs: On room air. Not in distress. Coarse breath sounds otherwise symmetrical.  Cardiovascular: S1-S2 well heard, no gallop or rub. Abdomen: Flat, normoactive, soft and nontender. ANALIA: No CVA or suprapubic tenderness. Extremities: Left hip range of motion limited due to fracture surgery. Aquacel dressing on the left hip was is dry, clean and intact. No peripheral edema. CNS: Alert oriented x3. Cranial nerves are intact. Motor exam symmetrical.  No asterixis. Psych: Calm and cooperative.       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) ICD-10-CM: S48.009L  ICD-9-CM: 733.82  1/31/2022 - Present        High anion gap metabolic acidosis [id number]: E87.2  ICD-9-CM: 276.2  1/27/2022 - Present        Hypokalemia ICD-10-CM: E87.6  ICD-9-CM: 276.8  1/27/2022 - Present        Metabolic acidosis [id number]: E87.2  ICD-9-CM: 276.2  1/27/2022 - Present        Lower extremity weakness ICD-10-CM: R29.898  ICD-9-CM: 729.89  5/13/2020 - Present        Elevated blood pressure reading in office with diagnosis of hypertension ICD-10-CM: I10  ICD-9-CM: 401.9  4/9/2019 - Present        Alcohol abuse, in remission ICD-10-CM: F10.11  ICD-9-CM: 305.03  1/5/2015 - Present        SIADH (syndrome of inappropriate ADH production) ([facility] ICD-10-CM: E22.2  ICD-9-CM: 253.6  11/4/2014 - Present        * (Principal) Hyponatremia ICD-10-CM: E87.1  ICD-9-CM: 276.1  8/28/2014 - Present        Glaucoma ICD-10-CM: H40.9  ICD-9-CM: 365.9  7/31/2013 - Present    Overview Signed 7/31/2013 11:51 AM by Cde Ramirez     In right eye only. Menopause ICD-10-CM: Z78.0  ICD-9-CM: 627.2  Unknown - Present    Overview Signed 4/9/2013  9:36 AM by Odalis Sparks RN     at age 37             RESOLVED: Midline low back pain without sciatica ICD-10-CM: M54.50  ICD-9-CM: 724.2  4/16/2015 - 12/4/2018        RESOLVED: GERD (gastroesophageal reflux disease) ICD-10-CM: K21.9  ICD-9-CM: 530.81  11/3/2014 - 12/4/2018        RESOLVED: Syncope and collapse ICD-10-CM: R55  ICD-9-CM: 780.2  8/28/2014 - 9/3/2014        RESOLVED: Alcoholism ([facility] ICD-10-CM: F10.20  ICD-9-CM: 303.90  5/7/2013 - 5/7/2013        RESOLVED: Alcohol abuse, continuous ICD-10-CM: F10.10  ICD-9-CM: 305.01  5/7/2013 - 1/5/2015        RESOLVED: Smoking addiction ICD-10-CM: O20.585  ICD-9-CM: 305.1  5/7/2013 - 5/28/2015        RESOLVED: Essential hypertension ICD-10-CM: I10  ICD-9-CM: 401.9  4/9/2013 - 8/3/2021            Signed: Charlie Long MD  2/2/2022  8:46 AM     After [facility] for SNF/Rehab    Discharge Instructions Anterior Approach Hip Replacement- Dr. Roselyn Montoya    Patient Name  Tangela Canela  Date of procedure  1/31/2022    Procedure  Procedure(s):  HIP HEMIARTHROPLASTY  Surgeon  Surgeon(s) and Role:     * Bandar You MD - Primary      PCP:  Kaiser Traore NP   Date of discharge: Follow up appointments:  - Follow up with Dr. Roselyn Montoya in 3 weeks. Call [phone] to make an appointment.       Activity:  - Weight bearing as tolerated with walker or crutches; discontinue as tolerated  - Avoid extreme movement of hip to prevent hip dislocation      Incision Care:  - The Aquacel (brown, waterproof) surgical dressing is to remain over the incision for 7 days post-op. On the 7th day (2-8-22) gently peel the dressing off by carefully lifting the edge and stretching it slightly to break the adhesive seal.  - You may now leave the incision open to air as it should no longer be draining. The patient has absorbable sutures, therefore, suture removal is not necessary.    - If the Aquacel dressing becomes loose or begins to fall off before the 7th day, replace it with a dry sterile gauze dressing that is to be changed daily. Once the incision is not draining, you may leave it open to air.  - Showering is allowed with the Aquacel dressing in place or beginning on the 3rd day after surgery if the Aquacel dressing came off prematurely.   Gently washing the incision with soap and water is fine.  - Do not submerge the incision under water until the incision is completely healed (bath tub, hot tub, swimming pool, etc.)      Preventing blood clots:  -Give Lovenox 40mg daily for 2 weeks post-op with last dose on 2/15/22

## 2022-02-03 NOTE — PROGRESS NOTES
0730: Bedside and Verbal shift change report given to Ike Moreira  (oncoming nurse) by Moises Casper  (offgoing nurse). Report included the following information SBAR, Kardex, Intake/Output, MAR, Recent Results, Med Rec Status and Cardiac Rhythm NSR.

## 2022-02-03 NOTE — DISCHARGE SUMMARY
Discharge Summary       PATIENT ID: Andi Blanc  MRN: [id number]   YOB: 1945    DATE OF ADMISSION: 1/26/2022  8:49 PM    DATE OF DISCHARGE: 2/3/22   PRIMARY CARE PROVIDER: Ava Barbour NP     ATTENDING PHYSICIAN: Raheel Hill MD  DISCHARGING PROVIDER: Raheel Hill MD    To contact this individual call [phone] and ask the  to page. If unavailable ask to be transferred the Adult Hospitalist Department. CONSULTATIONS: IP CONSULT TO NEPHROLOGY  IP CONSULT TO ORTHOPEDIC SURGERY    PROCEDURES/SURGERIES: Procedure(s):  HIP HEMIARTHROPLASTY    [id number] [facility] COURSE:   This is a 61-year-old female with a significant past medical history of chronic hyponatremia due to SIADH on salt tablets, alcohol abuse, hypertension presented to the ED for evaluation of progressive leg swelling, left more than right and fatigue. Upon evaluation in the ED, she was found to have sodium of 125, potassium 2.9, anion gap of 16 with CO2 of 19. I saw and examined patient in the ED room 15. Patient presently is alert oriented x3, on room air, not in distress and she was able to provide good history. She confirmed above history, admitted to having poor oral intake, getting progressively fatigued. She says she does not drink much however on further questioning she admitted drinking 1-2 shots of scotch about 4 days a week, she does not feel she would go through withdrawal.  She has chronic intermittent diarrhea, she is not sure if she had GI work-up. She denied cough, fever, shortness of breath, chest pain, palpitations. She was started on IV fluid with KCl in the ED and repeat BMP showed sodium has improved to 130 and potassium has improved to 3.2 anion gap almost normalized. Acute on chronic hyponatremia, component of prerenal on top of chronic SIADH  --Sodium improved from 125-130 this morning after being on IV fluid overnight.   I have discontinued IV fluids to avoid rapid correction of sodium. Check urine lites. Encourage oral intake. Consulted nephrology, discussed. Corrected to 132     Anion gap metabolic acidosis, dehydration due to poor oral intake  --Improved with IV fluid. Discontinue IV fluids to avoid rapid correction of sodium. Encourage oral intake. Resolved     Moderate hypokalemia due to poor oral intake: Improving, continue to replete and monitor     Peripheral edema due to poor nutrition, hypoalbuminemia. There is evidence of CHF. Bilateral venous Dopplers negative for DVT.     Chronic hypertension. BP is soft. Patient with features of dehydration. Hold antihypertensives for now     Alcohol abuse. She admits to drinking 1-2 scotch 4 days a week. No hallucination, delusions, asterixis or any other signs of withdrawal.  --Vitamin supplements with folic acid and thiamine ordered. --Initiate symptom triggered CIWA protocol     UA consistent with UTI. Urine culture requested. Start ceftriaxone     Leukocytosis, thrombocytosis likely reflective of hemoconcentration due to dehydration than sepsis. Repeat labs after hydration.     Chronic intermittent diarrhea: No active issues at present. Patient will need outpatient GI eval     Chronic peripheral neuropathy. DISCHARGE DIAGNOSES / PLAN:      1. Left femoral fracture. Status post surgery. 2. Acute on chronic hyponatremia, component of prerenal on top of chronic SIADH and alcohol related. Improved. 3. AG MA, dehydration due to poor oral intake, starvation. Resolved  4. Moderate hypokalemia due to poor oral intake. Resolved  5. Chronic hypertension. Stable  6. Peripheral edema due to poor nutrition's/hypoalbuminemia. 7. Alcohol abuse. 8. Klebsiella UTI. Treated. 9. Chronic intermittent diarrhea. Recommend outpatient GI evaluation. 10. Chronic peripheral neuropathy. Stable. 11. Insomnia.        PENDING TEST RESULTS:   At the time of discharge the following test results are still pending: none    FOLLOW UP APPOINTMENTS:    Follow-up Information     Follow up With Specialties Details Why Contact Info    Jenni Gee NP Nurse Practitioner   [address]  [place] [address]  [phone]      [address] [place] [address] [place]   [address]  [phone]    Jenni Gee NP Nurse Practitioner In 1 week Hospital follow up [address]  [phone]             ADDITIONAL CARE RECOMMENDATIONS: none    DIET: Regular Diet    ACTIVITY: Activity as tolerated    WOUND CARE: none    EQUIPMENT needed: none      DISCHARGE MEDICATIONS:  Current Discharge Medication List      START taking these medications    Details   oxyCODONE IR (ROXICODONE) 5 mg immediate release tablet Take 1 Tablet by mouth every four (4) hours as needed for Pain for up to 7 days. Max Daily Amount: 30 mg.  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 Qty: 30 Tablet, Refills: 0  Start date: 2/3/2022      acetaminophen (TYLENOL) 325 mg tablet Take 2 Tablets by mouth every six (6) hours as needed for Pain or Fever. Qty: 20 Tablet, Refills: 0  Start date: 3/4/5594      folic acid (FOLVITE) 1 mg tablet Take 1 Tablet by mouth daily for 30 days. Qty: 30 Tablet, Refills: 0  Start date: 2/2/2022, End date: 3/4/2022      potassium chloride SR (K-TAB) 20 mEq tablet Take 2 Tablets by mouth daily for 3 days. Qty: 6 Tablet, Refills: 0  Start date: 2/2/2022, End date: 2/5/2022      senna-docusate (PERICOLACE) 8.6-50 mg per tablet Take 1 Tablet by mouth two (2) times a day. Qty: 30 Tablet, Refills: 0  Start date: 2/2/2022      thiamine HCL (B-1) 100 mg tablet Take 1 Tablet by mouth daily for 30 days.   Qty: 30 Tablet, Refills: 0  Start date: 2/2/2022, End date: 3/4/2022      enoxaparin (LOVENOX) 40 mg/0.4 mL 0.4 mL by SubCUTAneous route daily for 14 days. Qty: 5.6 mL, Refills: 0  Start date: 2/2/2022, End date: 2/16/2022         CONTINUE these medications which have NOT CHANGED    Details   cyclobenzaprine (FLEXERIL) 5 mg tablet Take 1 Tablet by mouth two (2) times daily as needed for Muscle Spasm(s). Qty: 30 Tablet, Refills: 1    Associated Diagnoses: Muscle spasm      diazePAM (Valium) 2 mg tablet Take 1 Tablet by mouth every eight (8) hours as needed for Muscle Spasm(s). Max Daily Amount: 6 mg. Qty: 12 Tablet, Refills: 0    Associated Diagnoses: Acute leg pain, left      erythromycin (ILOTYCIN) ophthalmic ointment Apply 0.5 inch to affected eye 4 times per day. Indications: an infection on the surface of the eye  Qty: 3.5 g, Refills: 0      amLODIPine (NORVASC) 10 mg tablet TAKE 1 TABLET BY MOUTH DAILY  Qty: 90 Tablet, Refills: 3    Associated Diagnoses: Essential hypertension      metoprolol tartrate (LOPRESSOR) 50 mg tablet TAKE 1 TABLET BY MOUTH TWICE DAILY  Qty: 180 Tablet, Refills: 3    Associated Diagnoses: Essential hypertension      sodium chloride 1 gram tablet TAKE 3 TABLETS BY MOUTH TWICE DAILY WITH MEALS  Qty: 540 Tablet, Refills: 3      polyvinyl alcohol/povidone (ARTIFICIAL TEARS OP) 2 Drops by IntraOCUlar route every two (2) hours as needed for Other (dry eyes). ipratropium (ATROVENT HFA) 17 mcg/actuation inhaler Take 2 Puffs by inhalation every four (4) hours as needed for Cough, Respiratory Distress or Shortness of Breath. aspirin delayed-release 81 mg tablet Take 81 mg by mouth daily. multivitamin (ONE A DAY) tablet Take 1 Tab by mouth daily. NOTIFY YOUR PHYSICIAN FOR ANY OF THE FOLLOWING:   Fever over 101 degrees for 24 hours. Chest pain, shortness of breath, fever, chills, nausea, vomiting, diarrhea, change in mentation, falling, weakness, bleeding.  Severe pain or pain not relieved by medications. Or, any other signs or symptoms that you may have questions about. DISPOSITION:    Home With:   OT  PT  HH  RN      X Long term SNF/Inpatient Rehab    Independent/assisted living    Hospice    Other:       PATIENT CONDITION AT DISCHARGE:     Functional status    Poor    X Deconditioned     Independent      Cognition     Lucid    X Forgetful     Dementia      Catheters/lines (plus indication)    Cam[place]     PICC     PEG    X None      Code status     Full code    X DNR      PHYSICAL EXAMINATION AT DISCHARGE:  Generally: Patient is alert and oriented x3. She is not in any distress. HEENT: No pallor or jaundice. Buccal mucosa is mildly dry. Lungs: On room air. Not in distress. Coarse breath sounds otherwise symmetrical.  Cardiovascular: S1-S2 well heard, no gallop or rub. Abdomen: Flat, normoactive, soft and nontender. ANALIA: No CVA or suprapubic tenderness. Extremities: Both lower extremities straight, surgical dressing on the left hip clean, dry and intact  CNS: Alert oriented x3. Cranial nerves are intact. Motor exam symmetrical.  No asterixis. Psych: Calm and cooperative. CHRONIC MEDICAL DIAGNOSES:  Problem List as of 2/3/2022 Date Reviewed: 1/31/2022          Codes Class Noted - Resolved    Lower extremity weakness ICD-10-CM: R29.898  ICD-9-CM: 729.89  5/13/2020 - Present        Elevated blood pressure reading in office with diagnosis of hypertension ICD-10-CM: I10  ICD-9-CM: 401.9  4/9/2019 - Present        Alcohol abuse, in remission ICD-10-CM: F10.11  ICD-9-CM: 305.03  1/5/2015 - Present        SIADH (syndrome of inappropriate ADH production) ([facility] ICD-10-CM: E22.2  ICD-9-CM: 253.6  11/4/2014 - Present        * (Principal) Hyponatremia ICD-10-CM: E87.1  ICD-9-CM: 276.1  8/28/2014 - Present        Glaucoma ICD-10-CM: H40.9  ICD-9-CM: 365.9  7/31/2013 - Present    Overview Signed 7/31/2013 11:51 AM by Roverto Valdez     In right eye only.              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) ICD-10-CM: V15.035S  ICD-9-CM: 733.82  1/31/2022 - 2/3/2022        RESOLVED: High anion gap metabolic acidosis [id number]: E87.2  ICD-9-CM: 276.2  1/27/2022 - 2/3/2022        RESOLVED: Hypokalemia ICD-10-CM: E87.6  ICD-9-CM: 276.8  1/27/2022 - 2/3/2022        RESOLVED: Metabolic acidosis [id number]: E87.2  ICD-9-CM: 276.2  1/27/2022 - 2/3/2022        RESOLVED: Midline low back pain without sciatica ICD-10-CM: M54.50  ICD-9-CM: 724.2  4/16/2015 - 12/4/2018        RESOLVED: GERD (gastroesophageal reflux disease) ICD-10-CM: K21.9  ICD-9-CM: 530.81  11/3/2014 - 12/4/2018        RESOLVED: Syncope and collapse ICD-10-CM: R55  ICD-9-CM: 780.2  8/28/2014 - 9/3/2014        RESOLVED: Alcoholism ([address] ICD-10-CM: F10.20  ICD-9-CM: 303.90  5/7/2013 - 5/7/2013        RESOLVED: Alcohol abuse, continuous ICD-10-CM: F10.10  ICD-9-CM: 305.01  5/7/2013 - 1/5/2015        RESOLVED: Smoking addiction ICD-10-CM: K99.503  ICD-9-CM: 305.1  5/7/2013 - 5/28/2015        RESOLVED: Essential hypertension ICD-10-CM: I10  ICD-9-CM: 401.9  4/9/2013 - 8/3/2021              Greater than 35 minutes were spent with the patient on counseling and coordination of care    Signed:   Dallin Long MD  2/3/2022  2:43 PM

## 2022-03-18 PROBLEM — R29.898 LOWER EXTREMITY WEAKNESS: Status: ACTIVE | Noted: 2020-05-13

## 2022-03-19 PROBLEM — I10 ELEVATED BLOOD PRESSURE READING IN OFFICE WITH DIAGNOSIS OF HYPERTENSION: Status: ACTIVE | Noted: 2019-04-09

## 2022-03-21 ENCOUNTER — TELEPHONE (OUTPATIENT)
Dept: FAMILY MEDICINE CLINIC | Age: 77
End: 2022-03-21

## 2022-03-21 NOTE — TELEPHONE ENCOUNTER
Verified patient with two type of identifiers. Pt scheduled for 3/29/22 at 11 AM. Pt verbalized understanding. Paulino Reid NP will follow.

## 2022-03-21 NOTE — TELEPHONE ENCOUNTER
Lexis Vazquez, from [facility] called regarding patient. She was release [facility] last week, and they ordered nursing, P/T, and O/T. They want to see if Bev Lennox will follow her. Please call @[phone].

## 2022-03-21 NOTE — TELEPHONE ENCOUNTER
Valeria Reece, from [facility] called, and would like the notes from 1/12/22. Fax @[phone].

## 2022-03-29 ENCOUNTER — OFFICE VISIT (OUTPATIENT)
Dept: FAMILY MEDICINE CLINIC | Age: 77
End: 2022-03-29
Payer: MEDICARE

## 2022-03-29 VITALS
SYSTOLIC BLOOD PRESSURE: 135 MMHG | OXYGEN SATURATION: 100 % | DIASTOLIC BLOOD PRESSURE: 64 MMHG | RESPIRATION RATE: 14 BRPM | HEART RATE: 73 BPM | HEIGHT: 61 IN | BODY MASS INDEX: 19.3 KG/M2 | TEMPERATURE: 96.8 F | WEIGHT: 102.2 LBS

## 2022-03-29 DIAGNOSIS — S72.92XD CLOSED FRACTURE OF LEFT FEMUR WITH ROUTINE HEALING, UNSPECIFIED FRACTURE MORPHOLOGY, UNSPECIFIED PORTION OF FEMUR, SUBSEQUENT ENCOUNTER: ICD-10-CM

## 2022-03-29 DIAGNOSIS — Z09 HOSPITAL DISCHARGE FOLLOW-UP: Primary | ICD-10-CM

## 2022-03-29 DIAGNOSIS — Z12.11 COLON CANCER SCREENING: ICD-10-CM

## 2022-03-29 DIAGNOSIS — G61.0 AIDP (ACUTE INFLAMMATORY DEMYELINATING POLYNEUROPATHY) (HCC): ICD-10-CM

## 2022-03-29 DIAGNOSIS — I10 ESSENTIAL HYPERTENSION: ICD-10-CM

## 2022-03-29 DIAGNOSIS — F10.10 ALCOHOL ABUSE: ICD-10-CM

## 2022-03-29 DIAGNOSIS — E22.2 SIADH (SYNDROME OF INAPPROPRIATE ADH PRODUCTION) (HCC): ICD-10-CM

## 2022-03-29 DIAGNOSIS — Z79.899 ENCOUNTER FOR LONG-TERM (CURRENT) USE OF MEDICATIONS: ICD-10-CM

## 2022-03-29 DIAGNOSIS — F41.9 ANXIETY: ICD-10-CM

## 2022-03-29 DIAGNOSIS — Z87.891 PERSONAL HISTORY OF TOBACCO USE, PRESENTING HAZARDS TO HEALTH: ICD-10-CM

## 2022-03-29 LAB
ALBUMIN SERPL-MCNC: 3.8 G/DL (ref 3.5–5)
ALBUMIN/GLOB SERPL: 1 {RATIO} (ref 1.1–2.2)
ALP SERPL-CCNC: 150 U/L (ref 45–117)
ALT SERPL-CCNC: 16 U/L (ref 12–78)
ANION GAP SERPL CALC-SCNC: 5 MMOL/L (ref 5–15)
AST SERPL-CCNC: 14 U/L (ref 15–37)
BILIRUB SERPL-MCNC: 0.5 MG/DL (ref 0.2–1)
BUN SERPL-MCNC: 9 MG/DL (ref 6–20)
BUN/CREAT SERPL: 12 (ref 12–20)
CALCIUM SERPL-MCNC: 9.2 MG/DL (ref 8.5–10.1)
CHLORIDE SERPL-SCNC: 98 MMOL/L (ref 97–108)
CHOLEST SERPL-MCNC: 188 MG/DL
CO2 SERPL-SCNC: 31 MMOL/L (ref 21–32)
CREAT SERPL-MCNC: 0.75 MG/DL (ref 0.55–1.02)
ERYTHROCYTE [DISTWIDTH] IN BLOOD BY AUTOMATED COUNT: 14.6 % (ref 11.5–14.5)
FOLATE SERPL-MCNC: 33.6 NG/ML (ref 5–21)
GLOBULIN SER CALC-MCNC: 4 G/DL (ref 2–4)
GLUCOSE SERPL-MCNC: 97 MG/DL (ref 65–100)
HCT VFR BLD AUTO: 41.1 % (ref 35–47)
HDLC SERPL-MCNC: 63 MG/DL
HDLC SERPL: 3 {RATIO} (ref 0–5)
HGB BLD-MCNC: 13.3 G/DL (ref 11.5–16)
LDLC SERPL CALC-MCNC: 103.6 MG/DL (ref 0–100)
MCH RBC QN AUTO: 32 PG (ref 26–34)
MCHC RBC AUTO-ENTMCNC: 32.4 G/DL (ref 30–36.5)
MCV RBC AUTO: 98.8 FL (ref 80–99)
NRBC # BLD: 0 K/UL (ref 0–0.01)
NRBC BLD-RTO: 0 PER 100 WBC
PLATELET # BLD AUTO: 428 K/UL (ref 150–400)
PMV BLD AUTO: 8.8 FL (ref 8.9–12.9)
POTASSIUM SERPL-SCNC: 3.4 MMOL/L (ref 3.5–5.1)
PROT SERPL-MCNC: 7.8 G/DL (ref 6.4–8.2)
RBC # BLD AUTO: 4.16 M/UL (ref 3.8–5.2)
SODIUM SERPL-SCNC: 134 MMOL/L (ref 136–145)
TRIGL SERPL-MCNC: 107 MG/DL (ref ?–150)
VIT B12 SERPL-MCNC: 234 PG/ML (ref 193–986)
VLDLC SERPL CALC-MCNC: 21.4 MG/DL
WBC # BLD AUTO: 11.1 K/UL (ref 3.6–11)

## 2022-03-29 PROCEDURE — G8752 SYS BP LESS 140: HCPCS | Performed by: NURSE PRACTITIONER

## 2022-03-29 PROCEDURE — 1111F DSCHRG MED/CURRENT MED MERGE: CPT | Performed by: NURSE PRACTITIONER

## 2022-03-29 PROCEDURE — G8420 CALC BMI NORM PARAMETERS: HCPCS | Performed by: NURSE PRACTITIONER

## 2022-03-29 PROCEDURE — G8536 NO DOC ELDER MAL SCRN: HCPCS | Performed by: NURSE PRACTITIONER

## 2022-03-29 PROCEDURE — G8432 DEP SCR NOT DOC, RNG: HCPCS | Performed by: NURSE PRACTITIONER

## 2022-03-29 PROCEDURE — 1101F PT FALLS ASSESS-DOCD LE1/YR: CPT | Performed by: NURSE PRACTITIONER

## 2022-03-29 PROCEDURE — 3017F COLORECTAL CA SCREEN DOC REV: CPT | Performed by: NURSE PRACTITIONER

## 2022-03-29 PROCEDURE — G0439 PPPS, SUBSEQ VISIT: HCPCS | Performed by: NURSE PRACTITIONER

## 2022-03-29 PROCEDURE — G8427 DOCREV CUR MEDS BY ELIG CLIN: HCPCS | Performed by: NURSE PRACTITIONER

## 2022-03-29 PROCEDURE — G8399 PT W/DXA RESULTS DOCUMENT: HCPCS | Performed by: NURSE PRACTITIONER

## 2022-03-29 PROCEDURE — G8754 DIAS BP LESS 90: HCPCS | Performed by: NURSE PRACTITIONER

## 2022-03-29 RX ORDER — DIAZEPAM 2 MG/1
2 TABLET ORAL
Qty: 15 TABLET | Refills: 1 | Status: SHIPPED | OUTPATIENT
Start: 2022-03-29

## 2022-03-29 RX ORDER — FOLIC ACID 1 MG/1
1 TABLET ORAL DAILY
COMMUNITY
End: 2022-03-31

## 2022-03-29 RX ORDER — POTASSIUM CHLORIDE 750 MG/1
10 TABLET, FILM COATED, EXTENDED RELEASE ORAL 2 TIMES DAILY
COMMUNITY
Start: 2022-03-29 | End: 2022-04-04

## 2022-03-29 RX ORDER — NYSTATIN 100000 U/G
CREAM TOPICAL
COMMUNITY
End: 2022-03-29 | Stop reason: ALTCHOICE

## 2022-03-29 RX ORDER — SENNOSIDES 8.6 MG/1
2 TABLET ORAL 2 TIMES DAILY
COMMUNITY
End: 2022-03-29 | Stop reason: ALTCHOICE

## 2022-03-29 RX ORDER — POLYETHYLENE GLYCOL 3350 17 G/17G
17 POWDER, FOR SOLUTION ORAL DAILY
COMMUNITY
End: 2022-03-29 | Stop reason: ALTCHOICE

## 2022-03-29 RX ORDER — FACIAL-BODY WIPES
10 EACH TOPICAL
COMMUNITY
End: 2022-03-29 | Stop reason: ALTCHOICE

## 2022-03-29 NOTE — PROGRESS NOTES
Chief Complaint   Patient presents with   [facility] Follow Up     1/26/22-2/3/22 ; then rehab ([facility])     Pt released from [facility] 3/16/22. Pt states currently taking potassium 1 tablet BID. 1. \"Have you been to the ER, urgent care clinic since your last visit? Hospitalized since your last visit? \" No    2. \"Have you seen or consulted any other health care providers outside of the [address] [place] [address] since your last visit? \" No     3. For patients aged 39-70: Has the patient had a colonoscopy / FIT/ Cologuard? No      If the patient is female:    4. For patients aged 41-77: Has the patient had a mammogram within the past 2 years? NA - based on age or sex      11. For patients aged 21-65: Has the patient had a pap smear?  NA - based on age or sex      Health Maintenance Due   Topic Date Due    Shingrix Vaccine Age 49> (1 of 2) Never done    Low dose CT lung screening  Never done    Colorectal Cancer Screening Combo  09/02/2015    Medicare Yearly Exam  04/14/2021    Flu Vaccine (1) 09/01/2021

## 2022-03-29 NOTE — PROGRESS NOTES
Transitional Care Management Progress Note    Patient: Michael Rocha  : 1945  PCP: Jenni Gee NP    Date of office visit: 3/29/2022   Date of admission: 22  Date of discharge: 2/3/22  Hospital: [facility]    Call initiated w/i 2 business dates of discharge: *No response documented in the last 14 days   Date of the most recent call to the patient: *No documented post hospital discharge outreach found in the last 14 days      Assessment/Plan:     Diagnoses and all orders for this visit:    1. Hospital discharge follow-up  -     [place] DISCHARGE MEDS RECONCILED W/ CURRENT OUTPATIENT MED LIST    2. Closed fracture of left femur with routine healing, unspecified fracture morphology, unspecified portion of femur, subsequent encounter    3. Essential hypertension  -     CBC W/O DIFF; Future  -     METABOLIC PANEL, COMPREHENSIVE; Future  -     LIPID PANEL; Future    4. Anxiety  -     diazePAM (Valium) 2 mg tablet; Take 1 Tablet by mouth every eight (8) hours as needed for Anxiety. Max Daily Amount: 6 mg.    5. AIDP (acute inflammatory demyelinating polyneuropathy) ([facility])    6. SIADH (syndrome of inappropriate ADH production) ([facility])  -     CBC W/O DIFF; Future  -     METABOLIC PANEL, COMPREHENSIVE; Future    7. Personal history of tobacco use, presenting hazards to health  -     CT LOW DOSE LUNG CANCER SCREENING; Future    8. Alcohol abuse  -     VITAMIN B12 & FOLATE; Future    9. Encounter for long-term (current) use of medications  -     CBC W/O DIFF; Future  -     METABOLIC PANEL, COMPREHENSIVE; Future  -     VITAMIN B12 & FOLATE; Future    10. Colon cancer screening  -     COLOGUARD TEST (FECAL DNA COLORECTAL CANCER SCREENING)      Follow-up and Dispositions    · Return in about 6 months (around 2022). Subjective: Michael Rocha is a 77 y.o. female presenting today for follow-up after hospital discharge. This encounter and supporting documentation was reviewed if available. Medication reconciliation was performed today. The main problem requiring admission was femur fracture. Complications during admission: 31 Shaniqua Ordaz Follow Up       1/26/22-2/3/22 ; then rehab ([facility])    fracture left femur, s/p hip hemiarthroplasty  Pt released from [facility] after 6 weeks, d/c 3/16/22.    has home health coming out to home for nursing, PT, OT  Pt states currently taking potassium 1 tablet BID. Has followed up with ortho - normal healing    Interval history/Current status: Followed by neurology for acute inflammatory demyelinating polyneuropathy      Smoker 1ppd for 54 years. Had 4 years where she did not smoke but vaped. Thinks relates to some depression. Had some anxiety in her 20-30s, was treated for few years. Takes Calms Forte - helps her sleep, will use if she feels like she is jumping out of her skin  Denies changes in vision, chest pains, dyspnea, edema.     Hx SIADH - Takes sodium tabs daily.       HTN-stable.  taking amlodipine and metoprolol. Compliant w/ meds, low salt diet. No swelling, headache or dizziness. No chest pain, SOB, palpitations. Admitting symptoms have: significantly improved      Medications marked \"taking\" at this time:  Prior to Admission medications    Medication Sig Start Date End Date Taking? Authorizing Provider   diazePAM (Valium) 2 mg tablet Take 1 Tablet by mouth every eight (8) hours as needed for Anxiety. Max Daily Amount: 6 mg. 3/29/22  Yes Neto Kaiser NP   potassium chloride SR (KLOR-CON 10) 10 mEq tablet Take 1 Tablet by mouth two (2) times a day. 3/29/22  Yes Neto Kaiser NP   traZODone (DESYREL) 50 mg tablet Take 1 Tablet by mouth nightly as needed for Sleep. 2/3/22  Yes Emma García MD   acetaminophen (TYLENOL) 325 mg tablet Take 2 Tablets by mouth every six (6) hours as needed for Pain or Fever.  2/2/22  Yes Leidy Reeves MD   cyclobenzaprine (FLEXERIL) 5 mg tablet Take 1 Tablet by mouth two (2) times daily as needed for Muscle Spasm(s). 1/10/22  Yes Neto Kaiser NP   amLODIPine (NORVASC) 10 mg tablet TAKE 1 TABLET BY MOUTH DAILY 6/21/21  Yes Neto Kaiser NP   metoprolol tartrate (LOPRESSOR) 50 mg tablet TAKE 1 TABLET BY MOUTH TWICE DAILY 6/21/21  Yes Tessa Kaiser NP   sodium chloride 1 gram tablet TAKE 3 TABLETS BY MOUTH TWICE DAILY WITH MEALS 6/21/21  Yes Neto Kaiser NP   polyvinyl alcohol/povidone (ARTIFICIAL TEARS OP) 2 Drops by IntraOCUlar route every two (2) hours as needed for Other (dry eyes). 6/8/20  Yes Provider, Historical   aspirin delayed-release 81 mg tablet Take 81 mg by mouth daily. Yes Provider, Historical   multivitamin (ONE A DAY) tablet Take 1 Tab by mouth daily. Yes Provider, Historical   folic acid (FOLVITE) 1 mg tablet Take 1 Tablet by mouth daily. Patient not taking: Reported on 3/29/2022    Provider, Historical   senna (SENOKOT) 8.6 mg tablet Take 2 Tablets by mouth two (2) times a day. Patient not taking: Reported on 3/29/2022  3/29/22  Provider, Historical   bisacodyL (DULCOLAX) 10 mg supp Insert 10 mg into rectum daily as needed. 3/29/22  Provider, Historical   nystatin (MYCOSTATIN) topical cream Apply to sacrum topically as needed for yeast  3/29/22  Provider, Historical   polyethylene glycol (Miralax) 17 gram packet Take 17 g by mouth daily. Patient not taking: Reported on 3/29/2022  3/29/22  Provider, Historical   potassium chloride SR (K-TAB) 20 mEq tablet Take 2 Tablets by mouth daily for 3 days. 2/2/22 3/29/22  Leidy Reeves MD   senna-docusate (PERICOLACE) 8.6-50 mg per tablet Take 1 Tablet by mouth two (2) times a day. Patient not taking: Reported on 3/29/2022 2/2/22 3/29/22  Leidy Reeves MD   diazePAM (Valium) 2 mg tablet Take 1 Tablet by mouth every eight (8) hours as needed for Muscle Spasm(s). Max Daily Amount: 6 mg.   Patient not taking: Reported on 3/29/2022 1/6/22 3/29/22  Lorne Levine MD   erythromycin (ILOTYCIN) ophthalmic ointment Apply 0.5 inch to affected eye 4 times per day. Indications: an infection on the surface of the eye 9/17/21 3/29/22  Severo Joshi, PA   ipratropium (ATROVENT HFA) 17 mcg/actuation inhaler Take 2 Puffs by inhalation every four (4) hours as needed for Cough, Respiratory Distress or Shortness of Breath. Patient not taking: Reported on 3/29/2022 6/8/20   Provider, Historical        Review of Systems   Constitutional: Negative for chills and fever. Respiratory: Negative for cough and shortness of breath. Cardiovascular: Negative for chest pain and palpitations. Gastrointestinal: Negative for abdominal pain, diarrhea, nausea and vomiting. Genitourinary: Negative for dysuria, flank pain, frequency, hematuria and urgency. Musculoskeletal: Positive for joint pain (left hip, s/p hemiarthroplasty). Negative for falls. Using walker for ambulation, no falls        Patient Active Problem List   Diagnosis Code    Menopause Z78.0    Glaucoma H40.9    Hyponatremia E87.1    SIADH (syndrome of inappropriate ADH production) ([facility] E22.2    Alcohol abuse, in remission F10.11    Elevated blood pressure reading in office with diagnosis of hypertension I10    Lower extremity weakness R29.898     Current Outpatient Medications   Medication Sig Dispense Refill    diazePAM (Valium) 2 mg tablet Take 1 Tablet by mouth every eight (8) hours as needed for Anxiety. Max Daily Amount: 6 mg. 15 Tablet 1    potassium chloride SR (KLOR-CON 10) 10 mEq tablet Take 1 Tablet by mouth two (2) times a day.  traZODone (DESYREL) 50 mg tablet Take 1 Tablet by mouth nightly as needed for Sleep. 30 Tablet 0    acetaminophen (TYLENOL) 325 mg tablet Take 2 Tablets by mouth every six (6) hours as needed for Pain or Fever. 20 Tablet 0    cyclobenzaprine (FLEXERIL) 5 mg tablet Take 1 Tablet by mouth two (2) times daily as needed for Muscle Spasm(s).  30 Tablet 1    amLODIPine (NORVASC) 10 mg tablet TAKE 1 TABLET BY MOUTH DAILY 90 Tablet 3    metoprolol tartrate (LOPRESSOR) 50 mg tablet TAKE 1 TABLET BY MOUTH TWICE DAILY 180 Tablet 3    sodium chloride 1 gram tablet TAKE 3 TABLETS BY MOUTH TWICE DAILY WITH MEALS 540 Tablet 3    polyvinyl alcohol/povidone (ARTIFICIAL TEARS OP) 2 Drops by IntraOCUlar route every two (2) hours as needed for Other (dry eyes).  aspirin delayed-release 81 mg tablet Take 81 mg by mouth daily.  multivitamin (ONE A DAY) tablet Take 1 Tab by mouth daily.  folic acid (FOLVITE) 1 mg tablet Take 1 Tablet by mouth daily. (Patient not taking: Reported on 3/29/2022)      ipratropium (ATROVENT HFA) 17 mcg/actuation inhaler Take 2 Puffs by inhalation every four (4) hours as needed for Cough, Respiratory Distress or Shortness of Breath. (Patient not taking: Reported on 3/29/2022)       Allergies   Allergen Reactions    Levaquin [Levofloxacin] Anaphylaxis     Throat and face became swollen.  Ace Inhibitors Vertigo    Hydrochlorothiazide Other (comments)     Hypotension and severe hyponatremia.     Penicillins Hives     While pregnant     Past Medical History:   Diagnosis Date    History of mammogram 10 years ago    Hypertension     Hyponatremia 09/11/2014    hospitalized for severe hyponatremia due to SIADH    Menopause     at age 37    Pap smear for cervical cancer screening 15 years ago    [facility]     UTI (urinary tract infection)      Past Surgical History:   Procedure Laterality Date    HX CATARACT REMOVAL Right 1/14/2014    HX CHOLECYSTECTOMY      HX THROMBECTOMY Right     leg    HX TUBAL LIGATION       Family History   Problem Relation Age of Onset    Kidney Disease Mother         uncertain     Social History     Tobacco Use    Smoking status: Current Every Day Smoker     Packs/day: 1.00     Years: 40.00     Pack years: 40.00     Types: Cigarettes    Smokeless tobacco: Former User     Quit date: 9/11/2014 Substance Use Topics    Alcohol use: Yes     Alcohol/week: 3.0 standard drinks     Types: 3 Standard drinks or equivalent per week          Objective:     Visit Vitals  /64 (BP 1 Location: Right arm, BP Patient Position: Sitting, BP Cuff Size: Adult)   Pulse 73   Temp 96.8 °F (36 °C) (Oral)   Resp 14   Ht 5' 1\" (1.549 m)   Wt 102 lb 3.2 oz (46.4 kg)   SpO2 100%   BMI 19.31 kg/m²    Physical Exam  Vitals reviewed. Constitutional:       Appearance: Normal appearance. She is well-developed and well-groomed. HENT:      Right Ear: Hearing normal.      Left Ear: Hearing normal.   Neck:      Thyroid: No thyromegaly. Cardiovascular:      Rate and Rhythm: Normal rate and regular rhythm. Heart sounds: Normal heart sounds, S1 normal and S2 normal.   Pulmonary:      Effort: Pulmonary effort is normal.      Breath sounds: Normal breath sounds. Musculoskeletal:      Right lower leg: No edema. Left lower leg: No edema. Lymphadenopathy:      Cervical: No cervical adenopathy. Skin:     General: Skin is warm and dry. Neurological:      Mental Status: She is alert and oriented to person, place, and time. Psychiatric:         Attention and Perception: Attention normal.         Mood and Affect: Mood and affect normal.         Speech: Speech normal.         Behavior: Behavior normal. Behavior is cooperative. Thought Content: Thought content normal.         Cognition and Memory: Cognition and memory normal.        We discussed the expected course, resolution and complications of the diagnosis(es) in detail. Medication risks, benefits, costs, interactions, and alternatives were discussed as indicated. I advised her to contact the office if her condition worsens, changes or fails to improve as anticipated. She expressed understanding with the diagnosis(es) and plan.      Hima Hollis NP    This is the Subsequent Medicare Annual Wellness Exam, performed 12 months or more after the Initial AWV or the last Subsequent AWV    I have reviewed the patient's medical history in detail and updated the computerized patient record. Assessment/Plan   Education and counseling provided:  Are appropriate based on today's review and evaluation  Pneumococcal Vaccine  Influenza Vaccine  Screening Mammography  Colorectal cancer screening tests  Cardiovascular screening blood test  Bone mass measurement (DEXA)  Screening for glaucoma    1. Hospital discharge follow-up  -     [place] DISCHARGE MEDS RECONCILED W/ CURRENT OUTPATIENT MED LIST  2. Closed fracture of left femur with routine healing, unspecified fracture morphology, unspecified portion of femur, subsequent encounter  3. Essential hypertension  -     CBC W/O DIFF; Future  -     METABOLIC PANEL, COMPREHENSIVE; Future  -     LIPID PANEL; Future  4. Anxiety  -     diazePAM (Valium) 2 mg tablet; Take 1 Tablet by mouth every eight (8) hours as needed for Anxiety. Max Daily Amount: 6 mg., Normal, Disp-15 Tablet, R-1  5. AIDP (acute inflammatory demyelinating polyneuropathy) ([facility])  6. SIADH (syndrome of inappropriate ADH production) ([facility])  -     CBC W/O DIFF; Future  -     METABOLIC PANEL, COMPREHENSIVE; Future  7. Personal history of tobacco use, presenting hazards to health  -     CT LOW DOSE LUNG CANCER SCREENING; Future  8. Alcohol abuse  -     VITAMIN B12 & FOLATE; Future  9. Encounter for long-term (current) use of medications  -     CBC W/O DIFF; Future  -     METABOLIC PANEL, COMPREHENSIVE; Future  -     VITAMIN B12 & FOLATE; Future  10.  Colon cancer screening  -     COLOGUARD TEST (FECAL DNA COLORECTAL CANCER SCREENING)       Depression Risk Factor Screening     3 most recent PHQ Screens 3/29/2022   Little interest or pleasure in doing things Not at all   Feeling down, depressed, irritable, or hopeless Not at all   Total Score PHQ 2 0       Alcohol & Drug Abuse Risk Screen    Do you average more than 1 drink per night or more than 7 drinks a week:  Yes    On any one occasion in the past three months have you have had more than 3 drinks containing alcohol:  Yes          Functional Ability and Level of Safety    Hearing: Hearing is good. Activities of Daily Living: The home contains: handrails and grab bars  Patient does total self care      Ambulation: with difficulty, uses a walker     Fall Risk:  Fall Risk Assessment, last 12 mths 3/29/2022   Able to walk? Yes   Fall in past 12 months? 0   Do you feel unsteady? 0   Are you worried about falling 0   Number of falls in past 12 months -   Fall with injury?  -      Abuse Screen:  Patient is not abused       Cognitive Screening    Has your family/caregiver stated any concerns about your memory: no     Cognitive Screening: Normal - Mini Cog Test    Health Maintenance Due     Health Maintenance Due   Topic Date Due    Shingrix Vaccine Age 49> (1 of 2) Never done    Low dose CT lung screening  Never done    Colorectal Cancer Screening Combo  09/02/2015    Medicare Yearly Exam  04/14/2021       Patient Care Team   Patient Care Team:  Napoleon Austin NP as PCP - General (Nurse Practitioner)  Napoleon Austin NP as PCP - [facility] Empaneled Provider  Khushi Winn    History     Patient Active Problem List   Diagnosis Code    Menopause Z78.0    Glaucoma H40.9    Hyponatremia E87.1    SIADH (syndrome of inappropriate ADH production) ([facility] E22.2    Alcohol abuse, in remission F10.11    Elevated blood pressure reading in office with diagnosis of hypertension I10    Lower extremity weakness R29.898     Past Medical History:   Diagnosis Date    History of mammogram 10 years ago    Hypertension     Hyponatremia 09/11/2014    hospitalized for severe hyponatremia due to SIADH    Menopause     at age 37    Pap smear for cervical cancer screening 15 years ago    SIADH (syndrome of inappropriate ADH production) [facility]     UTI (urinary tract infection)       Past Surgical History:   Procedure Laterality Date    HX CATARACT REMOVAL Right 1/14/2014    HX CHOLECYSTECTOMY      HX THROMBECTOMY Right     leg    HX TUBAL LIGATION       Current Outpatient Medications   Medication Sig Dispense Refill    diazePAM (Valium) 2 mg tablet Take 1 Tablet by mouth every eight (8) hours as needed for Anxiety. Max Daily Amount: 6 mg. 15 Tablet 1    potassium chloride SR (KLOR-CON 10) 10 mEq tablet Take 1 Tablet by mouth two (2) times a day.  traZODone (DESYREL) 50 mg tablet Take 1 Tablet by mouth nightly as needed for Sleep. 30 Tablet 0    acetaminophen (TYLENOL) 325 mg tablet Take 2 Tablets by mouth every six (6) hours as needed for Pain or Fever. 20 Tablet 0    cyclobenzaprine (FLEXERIL) 5 mg tablet Take 1 Tablet by mouth two (2) times daily as needed for Muscle Spasm(s). 30 Tablet 1    amLODIPine (NORVASC) 10 mg tablet TAKE 1 TABLET BY MOUTH DAILY 90 Tablet 3    metoprolol tartrate (LOPRESSOR) 50 mg tablet TAKE 1 TABLET BY MOUTH TWICE DAILY 180 Tablet 3    sodium chloride 1 gram tablet TAKE 3 TABLETS BY MOUTH TWICE DAILY WITH MEALS 540 Tablet 3    polyvinyl alcohol/povidone (ARTIFICIAL TEARS OP) 2 Drops by IntraOCUlar route every two (2) hours as needed for Other (dry eyes).  aspirin delayed-release 81 mg tablet Take 81 mg by mouth daily.  multivitamin (ONE A DAY) tablet Take 1 Tab by mouth daily.  folic acid (FOLVITE) 1 mg tablet Take 1 Tablet by mouth daily. (Patient not taking: Reported on 3/29/2022)      ipratropium (ATROVENT HFA) 17 mcg/actuation inhaler Take 2 Puffs by inhalation every four (4) hours as needed for Cough, Respiratory Distress or Shortness of Breath. (Patient not taking: Reported on 3/29/2022)       Allergies   Allergen Reactions    Levaquin [Levofloxacin] Anaphylaxis     Throat and face became swollen.  Ace Inhibitors Vertigo    Hydrochlorothiazide Other (comments)     Hypotension and severe hyponatremia.     Penicillins Hives     While pregnant       Family History   Problem Relation Age of Onset    Kidney Disease Mother         uncertain     Social History     Tobacco Use    Smoking status: Current Every Day Smoker     Packs/day: 1.00     Years: 40.00     Pack years: 40.00     Types: Cigarettes    Smokeless tobacco: Former User     Quit date: 9/11/2014   Substance Use Topics    Alcohol use:  Yes     Alcohol/week: 3.0 standard drinks     Types: 3 Standard drinks or equivalent per week         Manju Gross NP

## 2022-03-30 NOTE — PATIENT INSTRUCTIONS

## 2022-03-31 NOTE — PROGRESS NOTES
Vitamin B12 on the low end of normal.  You can take vitamin B12 supplement 1,000mcg daily. Blood counts normal.  Liver and kidney function normal.    Potassium is just a little low. Make sure you are taking potassium supplement twice daily. What is your current dose of potassium? Cholesterol looks good.

## 2022-04-04 ENCOUNTER — TELEPHONE (OUTPATIENT)
Dept: FAMILY MEDICINE CLINIC | Age: 77
End: 2022-04-04

## 2022-04-04 DIAGNOSIS — E87.6 HYPOKALEMIA: Primary | ICD-10-CM

## 2022-04-04 RX ORDER — POTASSIUM CHLORIDE 1500 MG/1
20 TABLET, FILM COATED, EXTENDED RELEASE ORAL 2 TIMES DAILY
Qty: 180 TABLET | Refills: 3 | Status: SHIPPED | OUTPATIENT
Start: 2022-04-04

## 2022-04-04 NOTE — TELEPHONE ENCOUNTER
----- Message from Praveena Downing NP sent at 3/31/2022  4:41 PM EDT -----  Vitamin B12 on the low end of normal.  You can take vitamin B12 supplement 1,000mcg daily. Blood counts normal.  Liver and kidney function normal.    Potassium is just a little low. Make sure you are taking potassium supplement twice daily. What is your current dose of potassium? Cholesterol looks good.

## 2022-04-04 NOTE — TELEPHONE ENCOUNTER
Verified patient with two type of identifiers. Informed pt of lab results and/or prescription(s). Pt verbalized understanding. Pt states is currently taking 10 meq BID of potassium.

## 2022-04-04 NOTE — TELEPHONE ENCOUNTER
Increase potassium to 20 mEq BID    Orders Placed This Encounter    potassium chloride SR (K-TAB) 20 mEq tablet     Sig: Take 1 Tablet by mouth two (2) times a day.      Dispense:  180 Tablet     Refill:  3

## 2022-04-04 NOTE — TELEPHONE ENCOUNTER
Verified patient with two type of identifiers. Informed pt of suggestions/instructions per [place] NP. Pt verbalized understanding.

## 2022-04-20 ENCOUNTER — TELEPHONE (OUTPATIENT)
Dept: FAMILY MEDICINE CLINIC | Age: 77
End: 2022-04-20

## 2022-04-20 NOTE — TELEPHONE ENCOUNTER
Nara Callahan ([email or web address].) with [facility] would like to get a verbal order to extend therapy for three weeks she can be reached @ [phone]

## 2022-08-11 DIAGNOSIS — I10 ESSENTIAL HYPERTENSION: ICD-10-CM

## 2022-08-12 RX ORDER — AMLODIPINE BESYLATE 10 MG/1
TABLET ORAL
Qty: 90 TABLET | Refills: 3 | Status: SHIPPED | OUTPATIENT
Start: 2022-08-12

## 2023-04-20 ENCOUNTER — VIRTUAL VISIT (OUTPATIENT)
Dept: FAMILY MEDICINE CLINIC | Age: 78
End: 2023-04-20
Payer: MEDICARE

## 2023-04-20 DIAGNOSIS — E22.2 SIADH (SYNDROME OF INAPPROPRIATE ADH PRODUCTION) (HCC): ICD-10-CM

## 2023-04-20 DIAGNOSIS — G61.0 AIDP (ACUTE INFLAMMATORY DEMYELINATING POLYNEUROPATHY) (HCC): ICD-10-CM

## 2023-04-20 DIAGNOSIS — I10 ESSENTIAL HYPERTENSION: Primary | ICD-10-CM

## 2023-04-20 DIAGNOSIS — F41.9 ANXIETY: ICD-10-CM

## 2023-04-20 DIAGNOSIS — E87.6 HYPOKALEMIA: ICD-10-CM

## 2023-04-20 DIAGNOSIS — Z87.891 PERSONAL HISTORY OF TOBACCO USE, PRESENTING HAZARDS TO HEALTH: ICD-10-CM

## 2023-04-20 PROCEDURE — G8432 DEP SCR NOT DOC, RNG: HCPCS | Performed by: NURSE PRACTITIONER

## 2023-04-20 PROCEDURE — G0463 HOSPITAL OUTPT CLINIC VISIT: HCPCS | Performed by: NURSE PRACTITIONER

## 2023-04-20 PROCEDURE — G8399 PT W/DXA RESULTS DOCUMENT: HCPCS | Performed by: NURSE PRACTITIONER

## 2023-04-20 PROCEDURE — 1123F ACP DISCUSS/DSCN MKR DOCD: CPT | Performed by: NURSE PRACTITIONER

## 2023-04-20 PROCEDURE — G8427 DOCREV CUR MEDS BY ELIG CLIN: HCPCS | Performed by: NURSE PRACTITIONER

## 2023-04-20 PROCEDURE — 1101F PT FALLS ASSESS-DOCD LE1/YR: CPT | Performed by: NURSE PRACTITIONER

## 2023-04-20 PROCEDURE — 99213 OFFICE O/P EST LOW 20 MIN: CPT | Performed by: NURSE PRACTITIONER

## 2023-04-20 PROCEDURE — 1090F PRES/ABSN URINE INCON ASSESS: CPT | Performed by: NURSE PRACTITIONER

## 2023-04-20 RX ORDER — AMLODIPINE BESYLATE 10 MG/1
10 TABLET ORAL DAILY
Qty: 90 TABLET | Refills: 3 | Status: SHIPPED | OUTPATIENT
Start: 2023-04-20

## 2023-04-20 RX ORDER — DIAZEPAM 2 MG/1
2 TABLET ORAL
Qty: 15 TABLET | Refills: 1 | Status: SHIPPED | OUTPATIENT
Start: 2023-04-20

## 2023-04-20 RX ORDER — SODIUM CHLORIDE 1 G/1
TABLET ORAL
Qty: 540 TABLET | Refills: 1 | Status: SHIPPED | OUTPATIENT
Start: 2023-04-20

## 2023-04-20 NOTE — PROGRESS NOTES
Chief Complaint   Patient presents with    Medication Refill       1. \"Have you been to the ER, urgent care clinic since your last visit? Hospitalized since your last visit? \" No    2. \"Have you seen or consulted any other health care providers outside of the [address] [place] [address] since your last visit? \" No     3. For patients aged 39-70: Has the patient had a colonoscopy / FIT/ Cologuard? NA - based on age      If the patient is female:    4. For patients aged 41-77: Has the patient had a mammogram within the past 2 years? NA - based on age or sex      11. For patients aged 21-65: Has the patient had a pap smear?  NA - based on age or sex    Health Maintenance Due   Topic Date Due    Shingles Vaccine (1 of 2) Never done    Pneumococcal 65+ years (2 - PCV) 09/30/2015    COVID-19 Vaccine (3 - Booster for Magdalena series) 01/24/2022    Depression Screen  03/29/2023    Medicare Yearly Exam  03/30/2023

## 2023-04-20 NOTE — PROGRESS NOTES
King Patino (: 1945) is a 69 y.o. female, established patient, here for evaluation of the following chief complaint(s):   Medication Refill       ASSESSMENT/PLAN:  Below is the assessment and plan developed based on review of pertinent history, labs, studies, and medications. 1. Essential hypertension  -     amLODIPine (NORVASC) 10 mg tablet; Take 1 Tablet by mouth daily. , Normal, Disp-90 Tablet, R-3  2. Anxiety  -     diazePAM (Valium) 2 mg tablet; Take 1 Tablet by mouth every eight (8) hours as needed for Anxiety. Max Daily Amount: 6 mg., Normal, Disp-15 Tablet, R-1  3. Hypokalemia  4. SIADH (syndrome of inappropriate ADH production) ([address]  5. AIDP (acute inflammatory demyelinating polyneuropathy) ([facility])  6. Personal history of tobacco use, presenting hazards to health    Has CPE schedule in 2023      SUBJECTIVE/OBJECTIVE:  HPI  patient is seen virtually for medication refill  Had diarrhea yesterday. Drinking gatorade to stay hydrated. Winter gets her down, starting to feel better. Thinks she has seasonal affective disorder. Wants to start walking again. Has not seen neurology yue while for acute inflammatory demyelinating polyneuropathy. Has been doing exercises in the house. Tries to walk every 30 minutes. Appetite fair. No scales at home. HTN-stable. Monitors BP at home 130/80. taking amlodipine and metoprolol. Compliant w/ meds, low salt diet. No swelling, headache or dizziness. No chest pain, SOB, palpitations. Smoker 1ppd for 54 years. Had 4 years where she did not smoke but vaped. Had some anxiety in her 20-30s, was treated for few years. Takes Calms Forte - helps her sleep, will use if she feels like she is jumping out of her skin  Denies changes in vision, chest pains, dyspnea, edema. Hx SIADH - Takes sodium tabs daily.       Review of Systems   Constitutional:  Negative for appetite change, chills, diaphoresis, fatigue, fever and unexpected weight change. Respiratory:  Negative for cough and shortness of breath. Cardiovascular:  Negative for chest pain, palpitations and leg swelling. Gastrointestinal:  Positive for diarrhea. Negative for abdominal pain, blood in stool, constipation, nausea and vomiting. Genitourinary:  Negative for dysuria, flank pain, frequency, hematuria and urgency. Musculoskeletal:  Negative for back pain and myalgias. Skin: Negative. Neurological:  Positive for weakness and numbness. Negative for dizziness, speech difficulty, light-headedness and headaches. Psychiatric/Behavioral:  Positive for dysphoric mood. Negative for sleep disturbance. The patient is not nervous/anxious. No data recorded     Physical Exam  Constitutional: [x] Appears well-developed and well-nourished [x] No apparent distress      Mental status: [x] Alert and awake  [x] Oriented to person/place/time [x] Able to follow commands      Neck: [x] No visualized mass    Pulmonary/Chest: [x] Respiratory effort normal   [x] No visualized signs of difficulty breathing or respiratory distresS     Musculoskeletal:   [x] Normal range of motion of neck    Neurological:        [x] No Facial Asymmetry (Cranial nerve 7 motor function) (limited exam due to video visit)           Skin:        [x] No significant exanthematous lesions or discoloration noted on facial skin            Psychiatric:       [x] Normal Affect       [x] No Hallucinations    On this date 04/20/2023 I have spent 22 minutes reviewing previous notes, test results and face to face (virtual) with the patient discussing the diagnosis and importance of compliance with the treatment plan as well as documenting on the day of the visit. Lexis Schneider, was evaluated through a synchronous (real-time) audio-video encounter. The patient (or guardian if applicable) is aware that this is a billable service, which includes applicable co-pays.  This Virtual Visit was conducted with patient's (and/or legal guardian's) consent. The visit was conducted pursuant to the emergency declaration under the [facility], [address] [place] [address] authority and the [employer] and [place] Act. Patient identification was verified, and a caregiver was present when appropriate. The patient was located at: Home: [address] [address]  The provider was located at: Home: [address] was used to authenticate this note.   -- Xander Oswald NP

## 2023-05-04 NOTE — ED NOTES
Hospitalist paged regarding MRI. Cimzia Counseling:  I discussed with the patient the risks of Cimzia including but not limited to immunosuppression, allergic reactions and infections.  The patient understands that monitoring is required including a PPD at baseline and must alert us or the primary physician if symptoms of infection or other concerning signs are noted.

## 2023-06-18 ENCOUNTER — HOSPITAL ENCOUNTER (EMERGENCY)
Facility: HOSPITAL | Age: 78
Discharge: HOME | End: 2023-06-18
Attending: EMERGENCY MEDICINE
Payer: MEDICARE

## 2023-06-18 VITALS
OXYGEN SATURATION: 92 % | DIASTOLIC BLOOD PRESSURE: 67 MMHG | SYSTOLIC BLOOD PRESSURE: 144 MMHG | HEART RATE: 90 BPM | TEMPERATURE: 97.6 F | RESPIRATION RATE: 18 BRPM

## 2023-06-18 DIAGNOSIS — R45.89 DEPRESSED MOOD: Primary | ICD-10-CM

## 2023-06-18 PROCEDURE — 90791 PSYCH DIAGNOSTIC EVALUATION: CPT

## 2023-06-18 PROCEDURE — 99283 EMERGENCY DEPT VISIT LOW MDM: CPT

## 2023-06-18 RX ORDER — 0.9 % SODIUM CHLORIDE 0.9 %
1000 INTRAVENOUS SOLUTION INTRAVENOUS ONCE
Status: DISCONTINUED | OUTPATIENT
Start: 2023-06-18 | End: 2023-06-18 | Stop reason: HOSPADM

## 2023-06-18 ASSESSMENT — PAIN - FUNCTIONAL ASSESSMENT: PAIN_FUNCTIONAL_ASSESSMENT: NONE - DENIES PAIN

## 2023-07-13 ENCOUNTER — APPOINTMENT (OUTPATIENT)
Facility: HOSPITAL | Age: 78
DRG: 193 | End: 2023-07-13
Payer: MEDICARE

## 2023-07-13 ENCOUNTER — ANESTHESIA (OUTPATIENT)
Facility: HOSPITAL | Age: 78
End: 2023-07-13
Payer: MEDICARE

## 2023-07-13 ENCOUNTER — ANESTHESIA EVENT (OUTPATIENT)
Facility: HOSPITAL | Age: 78
End: 2023-07-13
Payer: MEDICARE

## 2023-07-13 ENCOUNTER — HOSPITAL ENCOUNTER (INPATIENT)
Facility: HOSPITAL | Age: 78
LOS: 4 days | Discharge: NURSING, REHAB OR LONG-TERM CARE | DRG: 193 | End: 2023-07-17
Attending: EMERGENCY MEDICINE | Admitting: FAMILY MEDICINE
Payer: MEDICARE

## 2023-07-13 DIAGNOSIS — R74.8 ELEVATED LIPASE: ICD-10-CM

## 2023-07-13 DIAGNOSIS — R19.7 NAUSEA VOMITING AND DIARRHEA: Primary | ICD-10-CM

## 2023-07-13 DIAGNOSIS — J18.9 PNEUMONIA OF RIGHT LOWER LOBE DUE TO INFECTIOUS ORGANISM: ICD-10-CM

## 2023-07-13 DIAGNOSIS — E87.29 STARVATION KETOACIDOSIS: ICD-10-CM

## 2023-07-13 DIAGNOSIS — E87.20 METABOLIC ACIDOSIS: ICD-10-CM

## 2023-07-13 DIAGNOSIS — T73.0XXA STARVATION KETOACIDOSIS: ICD-10-CM

## 2023-07-13 DIAGNOSIS — E87.6 HYPOKALEMIA: ICD-10-CM

## 2023-07-13 DIAGNOSIS — E87.1 HYPONATREMIA: ICD-10-CM

## 2023-07-13 DIAGNOSIS — F32.A DEPRESSION, UNSPECIFIED DEPRESSION TYPE: ICD-10-CM

## 2023-07-13 DIAGNOSIS — R11.2 NAUSEA VOMITING AND DIARRHEA: Primary | ICD-10-CM

## 2023-07-13 DIAGNOSIS — R74.01 TRANSAMINITIS: ICD-10-CM

## 2023-07-13 DIAGNOSIS — E86.0 DEHYDRATION: ICD-10-CM

## 2023-07-13 PROBLEM — J15.9 COMMUNITY ACQUIRED BACTERIAL PNEUMONIA: Status: ACTIVE | Noted: 2023-07-13

## 2023-07-13 LAB
ALBUMIN SERPL-MCNC: 3 G/DL (ref 3.5–5)
ALBUMIN SERPL-MCNC: 4 G/DL (ref 3.5–5)
ALBUMIN/GLOB SERPL: 0.9 (ref 1.1–2.2)
ALBUMIN/GLOB SERPL: 0.9 (ref 1.1–2.2)
ALP SERPL-CCNC: 153 U/L (ref 45–117)
ALP SERPL-CCNC: 216 U/L (ref 45–117)
ALT SERPL-CCNC: 106 U/L (ref 12–78)
ALT SERPL-CCNC: 73 U/L (ref 12–78)
AMPHET UR QL SCN: NEGATIVE
ANION GAP SERPL CALC-SCNC: 18 MMOL/L (ref 5–15)
ANION GAP SERPL CALC-SCNC: 31 MMOL/L (ref 5–15)
APAP SERPL-MCNC: <2 UG/ML (ref 10–30)
APPEARANCE UR: ABNORMAL
AST SERPL-CCNC: 124 U/L (ref 15–37)
AST SERPL-CCNC: 73 U/L (ref 15–37)
B-OH-BUTYR SERPL-SCNC: >4.42 MMOL/L
BACTERIA URNS QL MICRO: ABNORMAL /HPF
BARBITURATES UR QL SCN: NEGATIVE
BENZODIAZ UR QL: NEGATIVE
BILIRUB SERPL-MCNC: 1.3 MG/DL (ref 0.2–1)
BILIRUB SERPL-MCNC: 2.5 MG/DL (ref 0.2–1)
BILIRUB UR QL: NEGATIVE
BUN SERPL-MCNC: 15 MG/DL (ref 6–20)
BUN SERPL-MCNC: 20 MG/DL (ref 6–20)
BUN/CREAT SERPL: 20 (ref 12–20)
BUN/CREAT SERPL: 21 (ref 12–20)
CALCIUM SERPL-MCNC: 7.3 MG/DL (ref 8.5–10.1)
CALCIUM SERPL-MCNC: 9.1 MG/DL (ref 8.5–10.1)
CANNABINOIDS UR QL SCN: NEGATIVE
CHLORIDE SERPL-SCNC: 82 MMOL/L (ref 97–108)
CHLORIDE SERPL-SCNC: 96 MMOL/L (ref 97–108)
CO2 SERPL-SCNC: 13 MMOL/L (ref 21–32)
CO2 SERPL-SCNC: 18 MMOL/L (ref 21–32)
COCAINE UR QL SCN: NEGATIVE
COLOR UR: ABNORMAL
COMMENT:: NORMAL
CREAT SERPL-MCNC: 0.75 MG/DL (ref 0.55–1.02)
CREAT SERPL-MCNC: 0.95 MG/DL (ref 0.55–1.02)
EKG ATRIAL RATE: 106 BPM
EKG DIAGNOSIS: NORMAL
EKG P AXIS: 71 DEGREES
EKG P-R INTERVAL: 134 MS
EKG Q-T INTERVAL: 372 MS
EKG QRS DURATION: 76 MS
EKG QTC CALCULATION (BAZETT): 494 MS
EKG R AXIS: -56 DEGREES
EKG T AXIS: 50 DEGREES
EKG VENTRICULAR RATE: 106 BPM
EPITH CASTS URNS QL MICRO: ABNORMAL /LPF
ERYTHROCYTE [DISTWIDTH] IN BLOOD BY AUTOMATED COUNT: 12.1 % (ref 11.5–14.5)
ETHANOL SERPL-MCNC: <10 MG/DL (ref 0–0.08)
GLOBULIN SER CALC-MCNC: 3.4 G/DL (ref 2–4)
GLOBULIN SER CALC-MCNC: 4.5 G/DL (ref 2–4)
GLUCOSE BLD STRIP.AUTO-MCNC: 99 MG/DL (ref 65–117)
GLUCOSE SERPL-MCNC: 156 MG/DL (ref 65–100)
GLUCOSE SERPL-MCNC: 78 MG/DL (ref 65–100)
GLUCOSE UR STRIP.AUTO-MCNC: NEGATIVE MG/DL
HCT VFR BLD AUTO: 45.1 % (ref 35–47)
HGB BLD-MCNC: 15.7 G/DL (ref 11.5–16)
HGB UR QL STRIP: ABNORMAL
KETONES UR QL STRIP.AUTO: >80 MG/DL
LACTATE BLD-SCNC: 1.23 MMOL/L (ref 0.4–2)
LEUKOCYTE ESTERASE UR QL STRIP.AUTO: ABNORMAL
LIPASE SERPL-CCNC: 560 U/L (ref 73–393)
Lab: NORMAL
MAGNESIUM SERPL-MCNC: 2.5 MG/DL (ref 1.6–2.4)
MCH RBC QN AUTO: 35.2 PG (ref 26–34)
MCHC RBC AUTO-ENTMCNC: 34.8 G/DL (ref 30–36.5)
MCV RBC AUTO: 101.1 FL (ref 80–99)
METHADONE UR QL: NEGATIVE
NITRITE UR QL STRIP.AUTO: NEGATIVE
NRBC # BLD: 0 K/UL (ref 0–0.01)
NRBC BLD-RTO: 0 PER 100 WBC
OPIATES UR QL: NEGATIVE
PCP UR QL: NEGATIVE
PH UR STRIP: 5.5 (ref 5–8)
PLATELET # BLD AUTO: 355 K/UL (ref 150–400)
PMV BLD AUTO: 9.6 FL (ref 8.9–12.9)
POTASSIUM SERPL-SCNC: 2.6 MMOL/L (ref 3.5–5.1)
POTASSIUM SERPL-SCNC: 3.1 MMOL/L (ref 3.5–5.1)
PROCALCITONIN SERPL-MCNC: 0.05 NG/ML
PROT SERPL-MCNC: 6.4 G/DL (ref 6.4–8.2)
PROT SERPL-MCNC: 8.5 G/DL (ref 6.4–8.2)
PROT UR STRIP-MCNC: 100 MG/DL
RBC # BLD AUTO: 4.46 M/UL (ref 3.8–5.2)
RBC #/AREA URNS HPF: ABNORMAL /HPF (ref 0–5)
SALICYLATES SERPL-MCNC: 8.2 MG/DL (ref 2.8–20)
SERVICE CMNT-IMP: NORMAL
SODIUM SERPL-SCNC: 126 MMOL/L (ref 136–145)
SODIUM SERPL-SCNC: 132 MMOL/L (ref 136–145)
SP GR UR REFRACTOMETRY: 1.01 (ref 1–1.03)
SPECIMEN HOLD: NORMAL
SPECIMEN HOLD: NORMAL
UROBILINOGEN UR QL STRIP.AUTO: 1 EU/DL (ref 0.2–1)
WBC # BLD AUTO: 10.5 K/UL (ref 3.6–11)
WBC URNS QL MICRO: ABNORMAL /HPF (ref 0–4)

## 2023-07-13 PROCEDURE — 80053 COMPREHEN METABOLIC PANEL: CPT

## 2023-07-13 PROCEDURE — 0DB78ZX EXCISION OF STOMACH, PYLORUS, VIA NATURAL OR ARTIFICIAL OPENING ENDOSCOPIC, DIAGNOSTIC: ICD-10-PCS | Performed by: SPECIALIST

## 2023-07-13 PROCEDURE — 93005 ELECTROCARDIOGRAM TRACING: CPT | Performed by: EMERGENCY MEDICINE

## 2023-07-13 PROCEDURE — 97530 THERAPEUTIC ACTIVITIES: CPT

## 2023-07-13 PROCEDURE — 2720000010 HC SURG SUPPLY STERILE: Performed by: SPECIALIST

## 2023-07-13 PROCEDURE — 3700000000 HC ANESTHESIA ATTENDED CARE: Performed by: SPECIALIST

## 2023-07-13 PROCEDURE — 7100000010 HC PHASE II RECOVERY - FIRST 15 MIN: Performed by: SPECIALIST

## 2023-07-13 PROCEDURE — 6360000002 HC RX W HCPCS: Performed by: EMERGENCY MEDICINE

## 2023-07-13 PROCEDURE — 99285 EMERGENCY DEPT VISIT HI MDM: CPT

## 2023-07-13 PROCEDURE — 87086 URINE CULTURE/COLONY COUNT: CPT

## 2023-07-13 PROCEDURE — 96374 THER/PROPH/DIAG INJ IV PUSH: CPT

## 2023-07-13 PROCEDURE — 6360000002 HC RX W HCPCS: Performed by: NURSE ANESTHETIST, CERTIFIED REGISTERED

## 2023-07-13 PROCEDURE — 81001 URINALYSIS AUTO W/SCOPE: CPT

## 2023-07-13 PROCEDURE — A4216 STERILE WATER/SALINE, 10 ML: HCPCS

## 2023-07-13 PROCEDURE — 82077 ASSAY SPEC XCP UR&BREATH IA: CPT

## 2023-07-13 PROCEDURE — 3600007502: Performed by: SPECIALIST

## 2023-07-13 PROCEDURE — 83735 ASSAY OF MAGNESIUM: CPT

## 2023-07-13 PROCEDURE — 83605 ASSAY OF LACTIC ACID: CPT

## 2023-07-13 PROCEDURE — 2580000003 HC RX 258: Performed by: EMERGENCY MEDICINE

## 2023-07-13 PROCEDURE — 2709999900 HC NON-CHARGEABLE SUPPLY: Performed by: SPECIALIST

## 2023-07-13 PROCEDURE — 85027 COMPLETE CBC AUTOMATED: CPT

## 2023-07-13 PROCEDURE — 6370000000 HC RX 637 (ALT 250 FOR IP)

## 2023-07-13 PROCEDURE — 2060000000 HC ICU INTERMEDIATE R&B

## 2023-07-13 PROCEDURE — 2500000003 HC RX 250 WO HCPCS

## 2023-07-13 PROCEDURE — 2580000003 HC RX 258

## 2023-07-13 PROCEDURE — 87077 CULTURE AEROBIC IDENTIFY: CPT

## 2023-07-13 PROCEDURE — 6360000002 HC RX W HCPCS

## 2023-07-13 PROCEDURE — 82962 GLUCOSE BLOOD TEST: CPT

## 2023-07-13 PROCEDURE — 74176 CT ABD & PELVIS W/O CONTRAST: CPT

## 2023-07-13 PROCEDURE — C1713 ANCHOR/SCREW BN/BN,TIS/BN: HCPCS | Performed by: SPECIALIST

## 2023-07-13 PROCEDURE — 7100000011 HC PHASE II RECOVERY - ADDTL 15 MIN: Performed by: SPECIALIST

## 2023-07-13 PROCEDURE — 96375 TX/PRO/DX INJ NEW DRUG ADDON: CPT

## 2023-07-13 PROCEDURE — 83690 ASSAY OF LIPASE: CPT

## 2023-07-13 PROCEDURE — 71045 X-RAY EXAM CHEST 1 VIEW: CPT

## 2023-07-13 PROCEDURE — 36415 COLL VENOUS BLD VENIPUNCTURE: CPT

## 2023-07-13 PROCEDURE — 84145 PROCALCITONIN (PCT): CPT

## 2023-07-13 PROCEDURE — 97161 PT EVAL LOW COMPLEX 20 MIN: CPT

## 2023-07-13 PROCEDURE — 80143 DRUG ASSAY ACETAMINOPHEN: CPT

## 2023-07-13 PROCEDURE — 76705 ECHO EXAM OF ABDOMEN: CPT

## 2023-07-13 PROCEDURE — 87040 BLOOD CULTURE FOR BACTERIA: CPT

## 2023-07-13 PROCEDURE — 87186 SC STD MICRODIL/AGAR DIL: CPT

## 2023-07-13 PROCEDURE — C9113 INJ PANTOPRAZOLE SODIUM, VIA: HCPCS

## 2023-07-13 PROCEDURE — 80307 DRUG TEST PRSMV CHEM ANLYZR: CPT

## 2023-07-13 PROCEDURE — 82010 KETONE BODYS QUAN: CPT

## 2023-07-13 PROCEDURE — 2500000003 HC RX 250 WO HCPCS: Performed by: NURSE ANESTHETIST, CERTIFIED REGISTERED

## 2023-07-13 PROCEDURE — 80179 DRUG ASSAY SALICYLATE: CPT

## 2023-07-13 RX ORDER — SODIUM CHLORIDE 0.9 % (FLUSH) 0.9 %
5-40 SYRINGE (ML) INJECTION PRN
Status: DISCONTINUED | OUTPATIENT
Start: 2023-07-13 | End: 2023-07-17 | Stop reason: HOSPADM

## 2023-07-13 RX ORDER — POTASSIUM CHLORIDE 7.45 MG/ML
10 INJECTION INTRAVENOUS
Status: COMPLETED | OUTPATIENT
Start: 2023-07-13 | End: 2023-07-13

## 2023-07-13 RX ORDER — SODIUM CHLORIDE 0.9 % (FLUSH) 0.9 %
5-40 SYRINGE (ML) INJECTION PRN
Status: DISCONTINUED | OUTPATIENT
Start: 2023-07-13 | End: 2023-07-13 | Stop reason: HOSPADM

## 2023-07-13 RX ORDER — SODIUM CHLORIDE 9 MG/ML
25 INJECTION, SOLUTION INTRAVENOUS PRN
Status: DISCONTINUED | OUTPATIENT
Start: 2023-07-13 | End: 2023-07-13 | Stop reason: HOSPADM

## 2023-07-13 RX ORDER — ONDANSETRON 4 MG/1
4 TABLET, ORALLY DISINTEGRATING ORAL EVERY 8 HOURS PRN
Status: DISCONTINUED | OUTPATIENT
Start: 2023-07-13 | End: 2023-07-17 | Stop reason: HOSPADM

## 2023-07-13 RX ORDER — ONDANSETRON 2 MG/ML
4 INJECTION INTRAMUSCULAR; INTRAVENOUS ONCE
Status: COMPLETED | OUTPATIENT
Start: 2023-07-13 | End: 2023-07-13

## 2023-07-13 RX ORDER — SODIUM CHLORIDE 0.9 % (FLUSH) 0.9 %
5-40 SYRINGE (ML) INJECTION EVERY 12 HOURS SCHEDULED
Status: DISCONTINUED | OUTPATIENT
Start: 2023-07-13 | End: 2023-07-17 | Stop reason: HOSPADM

## 2023-07-13 RX ORDER — ONDANSETRON 2 MG/ML
4 INJECTION INTRAMUSCULAR; INTRAVENOUS EVERY 6 HOURS PRN
Status: DISCONTINUED | OUTPATIENT
Start: 2023-07-13 | End: 2023-07-17 | Stop reason: HOSPADM

## 2023-07-13 RX ORDER — POLYETHYLENE GLYCOL 3350 17 G/17G
17 POWDER, FOR SOLUTION ORAL DAILY PRN
Status: DISCONTINUED | OUTPATIENT
Start: 2023-07-13 | End: 2023-07-17 | Stop reason: HOSPADM

## 2023-07-13 RX ORDER — DEXTROSE AND SODIUM CHLORIDE 5; .45 G/100ML; G/100ML
INJECTION, SOLUTION INTRAVENOUS CONTINUOUS
Status: DISCONTINUED | OUTPATIENT
Start: 2023-07-13 | End: 2023-07-14

## 2023-07-13 RX ORDER — NICOTINE 21 MG/24HR
1 PATCH, TRANSDERMAL 24 HOURS TRANSDERMAL DAILY
Status: DISCONTINUED | OUTPATIENT
Start: 2023-07-13 | End: 2023-07-17 | Stop reason: HOSPADM

## 2023-07-13 RX ORDER — ACETAMINOPHEN 160 MG/5ML
650 SOLUTION ORAL EVERY 8 HOURS PRN
Status: DISCONTINUED | OUTPATIENT
Start: 2023-07-13 | End: 2023-07-17 | Stop reason: HOSPADM

## 2023-07-13 RX ORDER — 0.9 % SODIUM CHLORIDE 0.9 %
1000 INTRAVENOUS SOLUTION INTRAVENOUS ONCE
Status: DISCONTINUED | OUTPATIENT
Start: 2023-07-13 | End: 2023-07-17 | Stop reason: HOSPADM

## 2023-07-13 RX ORDER — HYDRALAZINE HYDROCHLORIDE 20 MG/ML
10 INJECTION INTRAMUSCULAR; INTRAVENOUS EVERY 6 HOURS PRN
Status: DISCONTINUED | OUTPATIENT
Start: 2023-07-13 | End: 2023-07-17 | Stop reason: HOSPADM

## 2023-07-13 RX ORDER — SODIUM CHLORIDE 0.9 % (FLUSH) 0.9 %
5-40 SYRINGE (ML) INJECTION EVERY 12 HOURS SCHEDULED
Status: DISCONTINUED | OUTPATIENT
Start: 2023-07-13 | End: 2023-07-13 | Stop reason: HOSPADM

## 2023-07-13 RX ORDER — SODIUM CHLORIDE 9 MG/ML
INJECTION, SOLUTION INTRAVENOUS CONTINUOUS
Status: DISCONTINUED | OUTPATIENT
Start: 2023-07-13 | End: 2023-07-14

## 2023-07-13 RX ORDER — SODIUM CHLORIDE 9 MG/ML
INJECTION, SOLUTION INTRAVENOUS PRN
Status: DISCONTINUED | OUTPATIENT
Start: 2023-07-13 | End: 2023-07-17 | Stop reason: HOSPADM

## 2023-07-13 RX ORDER — LIDOCAINE HYDROCHLORIDE 20 MG/ML
INJECTION, SOLUTION EPIDURAL; INFILTRATION; INTRACAUDAL; PERINEURAL PRN
Status: DISCONTINUED | OUTPATIENT
Start: 2023-07-13 | End: 2023-07-13 | Stop reason: SDUPTHER

## 2023-07-13 RX ORDER — POTASSIUM CHLORIDE 750 MG/1
20 TABLET, FILM COATED, EXTENDED RELEASE ORAL ONCE
Status: DISCONTINUED | OUTPATIENT
Start: 2023-07-13 | End: 2023-07-17 | Stop reason: HOSPADM

## 2023-07-13 RX ORDER — METOPROLOL TARTRATE 50 MG/1
50 TABLET, FILM COATED ORAL 2 TIMES DAILY
Status: DISCONTINUED | OUTPATIENT
Start: 2023-07-13 | End: 2023-07-17 | Stop reason: HOSPADM

## 2023-07-13 RX ORDER — ALBUTEROL SULFATE 2.5 MG/3ML
2.5 SOLUTION RESPIRATORY (INHALATION) EVERY 6 HOURS PRN
Status: DISCONTINUED | OUTPATIENT
Start: 2023-07-13 | End: 2023-07-17 | Stop reason: HOSPADM

## 2023-07-13 RX ORDER — 0.9 % SODIUM CHLORIDE 0.9 %
1000 INTRAVENOUS SOLUTION INTRAVENOUS ONCE
Status: COMPLETED | OUTPATIENT
Start: 2023-07-13 | End: 2023-07-13

## 2023-07-13 RX ORDER — DEXTROSE AND SODIUM CHLORIDE 5; .9 G/100ML; G/100ML
INJECTION, SOLUTION INTRAVENOUS CONTINUOUS
Status: DISCONTINUED | OUTPATIENT
Start: 2023-07-13 | End: 2023-07-13

## 2023-07-13 RX ORDER — AMLODIPINE BESYLATE 5 MG/1
10 TABLET ORAL DAILY
Status: DISCONTINUED | OUTPATIENT
Start: 2023-07-13 | End: 2023-07-17 | Stop reason: HOSPADM

## 2023-07-13 RX ORDER — POTASSIUM CHLORIDE 7.45 MG/ML
10 INJECTION INTRAVENOUS
Status: DISPENSED | OUTPATIENT
Start: 2023-07-13 | End: 2023-07-13

## 2023-07-13 RX ORDER — SODIUM CHLORIDE 450 MG/100ML
INJECTION, SOLUTION INTRAVENOUS CONTINUOUS
Status: DISCONTINUED | OUTPATIENT
Start: 2023-07-13 | End: 2023-07-13

## 2023-07-13 RX ADMIN — SODIUM CHLORIDE: 4.5 INJECTION, SOLUTION INTRAVENOUS at 08:59

## 2023-07-13 RX ADMIN — SODIUM CHLORIDE 1000 ML: 9 INJECTION, SOLUTION INTRAVENOUS at 07:14

## 2023-07-13 RX ADMIN — POTASSIUM CHLORIDE 10 MEQ: 7.46 INJECTION, SOLUTION INTRAVENOUS at 07:05

## 2023-07-13 RX ADMIN — LIDOCAINE HYDROCHLORIDE 60 MG: 20 INJECTION, SOLUTION EPIDURAL; INFILTRATION; INTRACAUDAL; PERINEURAL at 16:46

## 2023-07-13 RX ADMIN — PROPOFOL 60 MG: 10 INJECTION, EMULSION INTRAVENOUS at 16:46

## 2023-07-13 RX ADMIN — AZITHROMYCIN MONOHYDRATE 500 MG: 500 INJECTION, POWDER, LYOPHILIZED, FOR SOLUTION INTRAVENOUS at 07:14

## 2023-07-13 RX ADMIN — POTASSIUM CHLORIDE 10 MEQ: 10 INJECTION, SOLUTION INTRAVENOUS at 12:42

## 2023-07-13 RX ADMIN — FOLIC ACID 1 MG: 5 INJECTION, SOLUTION INTRAMUSCULAR; INTRAVENOUS; SUBCUTANEOUS at 10:10

## 2023-07-13 RX ADMIN — WATER 1000 MG: 1 INJECTION INTRAMUSCULAR; INTRAVENOUS; SUBCUTANEOUS at 07:05

## 2023-07-13 RX ADMIN — PROPOFOL 20 MG: 10 INJECTION, EMULSION INTRAVENOUS at 16:48

## 2023-07-13 RX ADMIN — SODIUM CHLORIDE, PRESERVATIVE FREE 40 MG: 5 INJECTION INTRAVENOUS at 20:00

## 2023-07-13 RX ADMIN — SODIUM CHLORIDE, PRESERVATIVE FREE 40 MG: 5 INJECTION INTRAVENOUS at 10:10

## 2023-07-13 RX ADMIN — Medication 10 ML: at 08:32

## 2023-07-13 RX ADMIN — ACETAMINOPHEN 650 MG: 650 SOLUTION ORAL at 19:58

## 2023-07-13 RX ADMIN — THIAMINE HYDROCHLORIDE 100 MG: 100 INJECTION, SOLUTION INTRAMUSCULAR; INTRAVENOUS at 11:01

## 2023-07-13 RX ADMIN — ONDANSETRON 4 MG: 2 INJECTION INTRAMUSCULAR; INTRAVENOUS at 07:12

## 2023-07-13 RX ADMIN — POTASSIUM CHLORIDE 10 MEQ: 10 INJECTION, SOLUTION INTRAVENOUS at 14:10

## 2023-07-13 RX ADMIN — DEXTROSE AND SODIUM CHLORIDE: 5; 450 INJECTION, SOLUTION INTRAVENOUS at 11:01

## 2023-07-13 RX ADMIN — Medication 10 ML: at 20:58

## 2023-07-13 RX ADMIN — POTASSIUM CHLORIDE 10 MEQ: 7.46 INJECTION, SOLUTION INTRAVENOUS at 11:04

## 2023-07-13 RX ADMIN — METOPROLOL TARTRATE 50 MG: 50 TABLET, FILM COATED ORAL at 20:01

## 2023-07-13 RX ADMIN — ONDANSETRON 4 MG: 2 INJECTION INTRAMUSCULAR; INTRAVENOUS at 10:09

## 2023-07-13 RX ADMIN — POTASSIUM CHLORIDE 10 MEQ: 7.46 INJECTION, SOLUTION INTRAVENOUS at 08:32

## 2023-07-13 ASSESSMENT — LIFESTYLE VARIABLES
HOW OFTEN DO YOU HAVE A DRINK CONTAINING ALCOHOL: 4 OR MORE TIMES A WEEK
HOW MANY STANDARD DRINKS CONTAINING ALCOHOL DO YOU HAVE ON A TYPICAL DAY: 1 OR 2

## 2023-07-13 ASSESSMENT — PAIN SCALES - GENERAL
PAINLEVEL_OUTOF10: 6
PAINLEVEL_OUTOF10: 0

## 2023-07-13 ASSESSMENT — PAIN DESCRIPTION - LOCATION: LOCATION: BACK

## 2023-07-13 ASSESSMENT — PAIN DESCRIPTION - DESCRIPTORS: DESCRIPTORS: DULL

## 2023-07-13 ASSESSMENT — PAIN - FUNCTIONAL ASSESSMENT: PAIN_FUNCTIONAL_ASSESSMENT: 0-10

## 2023-07-13 ASSESSMENT — PAIN DESCRIPTION - ORIENTATION: ORIENTATION: LOWER

## 2023-07-14 LAB
ALBUMIN SERPL-MCNC: 2.8 G/DL (ref 3.5–5)
ALBUMIN/GLOB SERPL: 0.8 (ref 1.1–2.2)
ALP SERPL-CCNC: 142 U/L (ref 45–117)
ALT SERPL-CCNC: 62 U/L (ref 12–78)
ANION GAP SERPL CALC-SCNC: 10 MMOL/L (ref 5–15)
AST SERPL-CCNC: 52 U/L (ref 15–37)
BASOPHILS # BLD: 0 K/UL (ref 0–0.1)
BASOPHILS NFR BLD: 0 % (ref 0–1)
BILIRUB SERPL-MCNC: 1 MG/DL (ref 0.2–1)
BUN SERPL-MCNC: 11 MG/DL (ref 6–20)
BUN/CREAT SERPL: 16 (ref 12–20)
CALCIUM SERPL-MCNC: 7.5 MG/DL (ref 8.5–10.1)
CHLORIDE SERPL-SCNC: 98 MMOL/L (ref 97–108)
CO2 SERPL-SCNC: 25 MMOL/L (ref 21–32)
CREAT SERPL-MCNC: 0.67 MG/DL (ref 0.55–1.02)
DIFFERENTIAL METHOD BLD: ABNORMAL
EOSINOPHIL # BLD: 0 K/UL (ref 0–0.4)
EOSINOPHIL NFR BLD: 0 % (ref 0–7)
ERYTHROCYTE [DISTWIDTH] IN BLOOD BY AUTOMATED COUNT: 12 % (ref 11.5–14.5)
GLOBULIN SER CALC-MCNC: 3.3 G/DL (ref 2–4)
GLUCOSE SERPL-MCNC: 82 MG/DL (ref 65–100)
HCT VFR BLD AUTO: 34.1 % (ref 35–47)
HGB BLD-MCNC: 11.8 G/DL (ref 11.5–16)
IMM GRANULOCYTES # BLD AUTO: 0 K/UL (ref 0–0.04)
IMM GRANULOCYTES NFR BLD AUTO: 0 % (ref 0–0.5)
LIPASE SERPL-CCNC: 542 U/L (ref 73–393)
LYMPHOCYTES # BLD: 0.6 K/UL (ref 0.8–3.5)
LYMPHOCYTES NFR BLD: 9 % (ref 12–49)
MAGNESIUM SERPL-MCNC: 1.5 MG/DL (ref 1.6–2.4)
MCH RBC QN AUTO: 34.9 PG (ref 26–34)
MCHC RBC AUTO-ENTMCNC: 34.6 G/DL (ref 30–36.5)
MCV RBC AUTO: 100.9 FL (ref 80–99)
MONOCYTES # BLD: 0.8 K/UL (ref 0–1)
MONOCYTES NFR BLD: 12 % (ref 5–13)
NEUTS SEG # BLD: 5.6 K/UL (ref 1.8–8)
NEUTS SEG NFR BLD: 79 % (ref 32–75)
NRBC # BLD: 0 K/UL (ref 0–0.01)
NRBC BLD-RTO: 0 PER 100 WBC
PLATELET # BLD AUTO: 272 K/UL (ref 150–400)
PMV BLD AUTO: 9 FL (ref 8.9–12.9)
POTASSIUM SERPL-SCNC: 2.4 MMOL/L (ref 3.5–5.1)
POTASSIUM SERPL-SCNC: 3.3 MMOL/L (ref 3.5–5.1)
PROT SERPL-MCNC: 6.1 G/DL (ref 6.4–8.2)
RBC # BLD AUTO: 3.38 M/UL (ref 3.8–5.2)
RBC MORPH BLD: ABNORMAL
SODIUM SERPL-SCNC: 133 MMOL/L (ref 136–145)
WBC # BLD AUTO: 7 K/UL (ref 3.6–11)

## 2023-07-14 PROCEDURE — 6360000002 HC RX W HCPCS: Performed by: STUDENT IN AN ORGANIZED HEALTH CARE EDUCATION/TRAINING PROGRAM

## 2023-07-14 PROCEDURE — 97530 THERAPEUTIC ACTIVITIES: CPT

## 2023-07-14 PROCEDURE — 6370000000 HC RX 637 (ALT 250 FOR IP): Performed by: NURSE PRACTITIONER

## 2023-07-14 PROCEDURE — C9113 INJ PANTOPRAZOLE SODIUM, VIA: HCPCS

## 2023-07-14 PROCEDURE — 94760 N-INVAS EAR/PLS OXIMETRY 1: CPT

## 2023-07-14 PROCEDURE — 85025 COMPLETE CBC W/AUTO DIFF WBC: CPT

## 2023-07-14 PROCEDURE — 88305 TISSUE EXAM BY PATHOLOGIST: CPT

## 2023-07-14 PROCEDURE — 2580000003 HC RX 258

## 2023-07-14 PROCEDURE — 2580000003 HC RX 258: Performed by: STUDENT IN AN ORGANIZED HEALTH CARE EDUCATION/TRAINING PROGRAM

## 2023-07-14 PROCEDURE — 6360000002 HC RX W HCPCS

## 2023-07-14 PROCEDURE — 83735 ASSAY OF MAGNESIUM: CPT

## 2023-07-14 PROCEDURE — 2500000003 HC RX 250 WO HCPCS

## 2023-07-14 PROCEDURE — A4216 STERILE WATER/SALINE, 10 ML: HCPCS

## 2023-07-14 PROCEDURE — 6370000000 HC RX 637 (ALT 250 FOR IP)

## 2023-07-14 PROCEDURE — 84132 ASSAY OF SERUM POTASSIUM: CPT

## 2023-07-14 PROCEDURE — 83690 ASSAY OF LIPASE: CPT

## 2023-07-14 PROCEDURE — 97165 OT EVAL LOW COMPLEX 30 MIN: CPT

## 2023-07-14 PROCEDURE — 88342 IMHCHEM/IMCYTCHM 1ST ANTB: CPT

## 2023-07-14 PROCEDURE — 36415 COLL VENOUS BLD VENIPUNCTURE: CPT

## 2023-07-14 PROCEDURE — 2060000000 HC ICU INTERMEDIATE R&B

## 2023-07-14 PROCEDURE — 2580000003 HC RX 258: Performed by: SPECIALIST

## 2023-07-14 PROCEDURE — 2700000000 HC OXYGEN THERAPY PER DAY

## 2023-07-14 PROCEDURE — 80053 COMPREHEN METABOLIC PANEL: CPT

## 2023-07-14 RX ORDER — IPRATROPIUM BROMIDE AND ALBUTEROL SULFATE 2.5; .5 MG/3ML; MG/3ML
1 SOLUTION RESPIRATORY (INHALATION) EVERY 4 HOURS PRN
Status: DISCONTINUED | OUTPATIENT
Start: 2023-07-14 | End: 2023-07-17 | Stop reason: HOSPADM

## 2023-07-14 RX ORDER — MIRTAZAPINE 15 MG/1
7.5 TABLET, FILM COATED ORAL NIGHTLY
Status: DISCONTINUED | OUTPATIENT
Start: 2023-07-14 | End: 2023-07-17 | Stop reason: HOSPADM

## 2023-07-14 RX ORDER — POTASSIUM CHLORIDE 7.45 MG/ML
10 INJECTION INTRAVENOUS
Status: COMPLETED | OUTPATIENT
Start: 2023-07-14 | End: 2023-07-14

## 2023-07-14 RX ORDER — POTASSIUM CHLORIDE 14.9 MG/ML
10 INJECTION INTRAVENOUS
Status: DISCONTINUED | OUTPATIENT
Start: 2023-07-14 | End: 2023-07-14

## 2023-07-14 RX ADMIN — ACETAMINOPHEN 650 MG: 650 SOLUTION ORAL at 08:52

## 2023-07-14 RX ADMIN — WATER 1000 MG: 1 INJECTION INTRAMUSCULAR; INTRAVENOUS; SUBCUTANEOUS at 17:36

## 2023-07-14 RX ADMIN — MIRTAZAPINE 7.5 MG: 15 TABLET, FILM COATED ORAL at 21:15

## 2023-07-14 RX ADMIN — FOLIC ACID 1 MG: 5 INJECTION, SOLUTION INTRAMUSCULAR; INTRAVENOUS; SUBCUTANEOUS at 11:48

## 2023-07-14 RX ADMIN — METOPROLOL TARTRATE 50 MG: 50 TABLET, FILM COATED ORAL at 08:50

## 2023-07-14 RX ADMIN — POTASSIUM CHLORIDE 10 MEQ: 10 INJECTION, SOLUTION INTRAVENOUS at 07:24

## 2023-07-14 RX ADMIN — POTASSIUM CHLORIDE 10 MEQ: 10 INJECTION, SOLUTION INTRAVENOUS at 10:26

## 2023-07-14 RX ADMIN — SODIUM CHLORIDE: 9 INJECTION, SOLUTION INTRAVENOUS at 03:18

## 2023-07-14 RX ADMIN — POTASSIUM BICARBONATE 40 MEQ: 782 TABLET, EFFERVESCENT ORAL at 08:05

## 2023-07-14 RX ADMIN — POTASSIUM CHLORIDE 10 MEQ: 10 INJECTION, SOLUTION INTRAVENOUS at 11:49

## 2023-07-14 RX ADMIN — THIAMINE HYDROCHLORIDE 100 MG: 100 INJECTION, SOLUTION INTRAMUSCULAR; INTRAVENOUS at 13:38

## 2023-07-14 RX ADMIN — AMLODIPINE BESYLATE 10 MG: 5 TABLET ORAL at 08:50

## 2023-07-14 RX ADMIN — POTASSIUM CHLORIDE 10 MEQ: 10 INJECTION, SOLUTION INTRAVENOUS at 08:09

## 2023-07-14 RX ADMIN — Medication 10 ML: at 08:51

## 2023-07-14 RX ADMIN — Medication 10 ML: at 21:16

## 2023-07-14 RX ADMIN — SODIUM CHLORIDE, PRESERVATIVE FREE 40 MG: 5 INJECTION INTRAVENOUS at 08:07

## 2023-07-14 RX ADMIN — SODIUM CHLORIDE, PRESERVATIVE FREE 40 MG: 5 INJECTION INTRAVENOUS at 21:15

## 2023-07-14 RX ADMIN — METOPROLOL TARTRATE 50 MG: 50 TABLET, FILM COATED ORAL at 21:16

## 2023-07-14 ASSESSMENT — PAIN SCALES - GENERAL: PAINLEVEL_OUTOF10: 0

## 2023-07-14 NOTE — ANESTHESIA POSTPROCEDURE EVALUATION
Department of Anesthesiology  Postprocedure Note    Patient:  Timothy Osman  MRN: [id number]  YOB: 1945  Date of evaluation: 7/14/2023      Procedure Summary     Date: 07/13/23 Room / Location: [facility] ENDO  / [facility] ENDOSCOPY    Anesthesia Start: 1644 Anesthesia Stop: 3118    Procedures:       EGD ESOPHAGOGASTRODUODENOSCOPY      EGD BIOPSY (Upper GI Region) Diagnosis:       Coffee ground emesis      (Coffee ground emesis [K92.0])    Surgeons: Bella Stevens MD Responsible Provider: Alvaro Tate MD    Anesthesia Type: MAC ASA Status: 2          Anesthesia Type: MAC    Herman Phase I: Herman Score: 10    Herman Phase II:        Anesthesia Post Evaluation    Patient location during evaluation: PACU  Patient participation: complete - patient participated  Level of consciousness: awake  Airway patency: patent  Nausea & Vomiting: no nausea  Complications: no  Cardiovascular status: blood pressure returned to baseline and hemodynamically stable  Respiratory status: acceptable  Hydration status: stable

## 2023-07-15 LAB
ANION GAP SERPL CALC-SCNC: 10 MMOL/L (ref 5–15)
BUN SERPL-MCNC: 6 MG/DL (ref 6–20)
BUN/CREAT SERPL: 8 (ref 12–20)
C COLI+JEJUNI TUF STL QL NAA+PROBE: NEGATIVE
C DIFF GDH STL QL: NEGATIVE
C DIFF TOX A+B STL QL IA: NEGATIVE
C DIFF TOXIN INTERPRETATION: NORMAL
CALCIUM SERPL-MCNC: 8.6 MG/DL (ref 8.5–10.1)
CHLORIDE SERPL-SCNC: 97 MMOL/L (ref 97–108)
CO2 SERPL-SCNC: 26 MMOL/L (ref 21–32)
CREAT SERPL-MCNC: 0.74 MG/DL (ref 0.55–1.02)
EC STX1+STX2 GENES STL QL NAA+PROBE: NEGATIVE
ERYTHROCYTE [DISTWIDTH] IN BLOOD BY AUTOMATED COUNT: 11.9 % (ref 11.5–14.5)
ETEC ELTA+ESTB GENES STL QL NAA+PROBE: NEGATIVE
GLUCOSE SERPL-MCNC: 151 MG/DL (ref 65–100)
HCT VFR BLD AUTO: 34.1 % (ref 35–47)
HEMOCCULT STL QL: POSITIVE
HGB BLD-MCNC: 12.1 G/DL (ref 11.5–16)
MCH RBC QN AUTO: 35.3 PG (ref 26–34)
MCHC RBC AUTO-ENTMCNC: 35.5 G/DL (ref 30–36.5)
MCV RBC AUTO: 99.4 FL (ref 80–99)
NRBC # BLD: 0 K/UL (ref 0–0.01)
NRBC BLD-RTO: 0 PER 100 WBC
P SHIGELLOIDES DNA STL QL NAA+PROBE: NEGATIVE
PLATELET # BLD AUTO: 265 K/UL (ref 150–400)
PMV BLD AUTO: 9.1 FL (ref 8.9–12.9)
POTASSIUM SERPL-SCNC: 2.4 MMOL/L (ref 3.5–5.1)
POTASSIUM SERPL-SCNC: 3.3 MMOL/L (ref 3.5–5.1)
RBC # BLD AUTO: 3.43 M/UL (ref 3.8–5.2)
SALMONELLA SP SPAO STL QL NAA+PROBE: NEGATIVE
SHIGELLA SP+EIEC IPAH STL QL NAA+PROBE: NEGATIVE
SODIUM SERPL-SCNC: 133 MMOL/L (ref 136–145)
V CHOL+PARA+VUL DNA STL QL NAA+NON-PROBE: NEGATIVE
WBC # BLD AUTO: 7 K/UL (ref 3.6–11)
Y ENTEROCOL DNA STL QL NAA+NON-PROBE: NEGATIVE

## 2023-07-15 PROCEDURE — 85027 COMPLETE CBC AUTOMATED: CPT

## 2023-07-15 PROCEDURE — C9113 INJ PANTOPRAZOLE SODIUM, VIA: HCPCS

## 2023-07-15 PROCEDURE — 87449 NOS EACH ORGANISM AG IA: CPT

## 2023-07-15 PROCEDURE — 6360000002 HC RX W HCPCS: Performed by: STUDENT IN AN ORGANIZED HEALTH CARE EDUCATION/TRAINING PROGRAM

## 2023-07-15 PROCEDURE — A4216 STERILE WATER/SALINE, 10 ML: HCPCS

## 2023-07-15 PROCEDURE — 6370000000 HC RX 637 (ALT 250 FOR IP): Performed by: NURSE PRACTITIONER

## 2023-07-15 PROCEDURE — 6370000000 HC RX 637 (ALT 250 FOR IP)

## 2023-07-15 PROCEDURE — 87324 CLOSTRIDIUM AG IA: CPT

## 2023-07-15 PROCEDURE — 6370000000 HC RX 637 (ALT 250 FOR IP): Performed by: STUDENT IN AN ORGANIZED HEALTH CARE EDUCATION/TRAINING PROGRAM

## 2023-07-15 PROCEDURE — 87506 IADNA-DNA/RNA PROBE TQ 6-11: CPT

## 2023-07-15 PROCEDURE — 2060000000 HC ICU INTERMEDIATE R&B

## 2023-07-15 PROCEDURE — 2580000003 HC RX 258: Performed by: STUDENT IN AN ORGANIZED HEALTH CARE EDUCATION/TRAINING PROGRAM

## 2023-07-15 PROCEDURE — 6360000002 HC RX W HCPCS

## 2023-07-15 PROCEDURE — 84132 ASSAY OF SERUM POTASSIUM: CPT

## 2023-07-15 PROCEDURE — 82272 OCCULT BLD FECES 1-3 TESTS: CPT

## 2023-07-15 PROCEDURE — 80048 BASIC METABOLIC PNL TOTAL CA: CPT

## 2023-07-15 PROCEDURE — 2580000003 HC RX 258

## 2023-07-15 PROCEDURE — 36415 COLL VENOUS BLD VENIPUNCTURE: CPT

## 2023-07-15 RX ORDER — POTASSIUM CHLORIDE 750 MG/1
40 TABLET, FILM COATED, EXTENDED RELEASE ORAL ONCE
Status: COMPLETED | OUTPATIENT
Start: 2023-07-15 | End: 2023-07-15

## 2023-07-15 RX ORDER — FOLIC ACID 1 MG/1
1 TABLET ORAL DAILY
Status: DISCONTINUED | OUTPATIENT
Start: 2023-07-15 | End: 2023-07-17 | Stop reason: HOSPADM

## 2023-07-15 RX ORDER — PANTOPRAZOLE SODIUM 40 MG/1
40 TABLET, DELAYED RELEASE ORAL 2 TIMES DAILY
Status: DISCONTINUED | OUTPATIENT
Start: 2023-07-15 | End: 2023-07-17 | Stop reason: HOSPADM

## 2023-07-15 RX ORDER — LANOLIN ALCOHOL/MO/W.PET/CERES
100 CREAM (GRAM) TOPICAL DAILY
Status: DISCONTINUED | OUTPATIENT
Start: 2023-07-15 | End: 2023-07-17 | Stop reason: HOSPADM

## 2023-07-15 RX ORDER — POTASSIUM CHLORIDE 750 MG/1
60 TABLET, FILM COATED, EXTENDED RELEASE ORAL ONCE
Status: COMPLETED | OUTPATIENT
Start: 2023-07-15 | End: 2023-07-15

## 2023-07-15 RX ORDER — POTASSIUM CHLORIDE 14.9 MG/ML
10 INJECTION INTRAVENOUS
Status: COMPLETED | OUTPATIENT
Start: 2023-07-15 | End: 2023-07-15

## 2023-07-15 RX ADMIN — Medication 100 MG: at 09:45

## 2023-07-15 RX ADMIN — POTASSIUM CHLORIDE 10 MEQ: 200 INJECTION, SOLUTION INTRAVENOUS at 13:27

## 2023-07-15 RX ADMIN — Medication 10 ML: at 09:00

## 2023-07-15 RX ADMIN — POTASSIUM CHLORIDE 10 MEQ: 200 INJECTION, SOLUTION INTRAVENOUS at 09:50

## 2023-07-15 RX ADMIN — ACETAMINOPHEN 650 MG: 650 SOLUTION ORAL at 09:53

## 2023-07-15 RX ADMIN — Medication 1 MG: at 09:45

## 2023-07-15 RX ADMIN — PANTOPRAZOLE SODIUM 40 MG: 40 TABLET, DELAYED RELEASE ORAL at 20:51

## 2023-07-15 RX ADMIN — SODIUM CHLORIDE, PRESERVATIVE FREE 40 MG: 5 INJECTION INTRAVENOUS at 07:15

## 2023-07-15 RX ADMIN — POTASSIUM CHLORIDE 60 MEQ: 750 TABLET, FILM COATED, EXTENDED RELEASE ORAL at 18:54

## 2023-07-15 RX ADMIN — METOPROLOL TARTRATE 50 MG: 50 TABLET, FILM COATED ORAL at 09:45

## 2023-07-15 RX ADMIN — MAGNESIUM SULFATE HEPTAHYDRATE 3000 MG: 500 INJECTION, SOLUTION INTRAMUSCULAR; INTRAVENOUS at 09:45

## 2023-07-15 RX ADMIN — Medication 10 ML: at 20:52

## 2023-07-15 RX ADMIN — POTASSIUM CHLORIDE 10 MEQ: 200 INJECTION, SOLUTION INTRAVENOUS at 12:10

## 2023-07-15 RX ADMIN — AMLODIPINE BESYLATE 10 MG: 5 TABLET ORAL at 09:45

## 2023-07-15 RX ADMIN — POTASSIUM CHLORIDE 10 MEQ: 200 INJECTION, SOLUTION INTRAVENOUS at 11:01

## 2023-07-15 RX ADMIN — WATER 1000 MG: 1 INJECTION INTRAMUSCULAR; INTRAVENOUS; SUBCUTANEOUS at 16:47

## 2023-07-15 RX ADMIN — ACETAMINOPHEN 650 MG: 650 SOLUTION ORAL at 16:53

## 2023-07-15 RX ADMIN — POTASSIUM CHLORIDE 40 MEQ: 750 TABLET, FILM COATED, EXTENDED RELEASE ORAL at 09:45

## 2023-07-15 RX ADMIN — MIRTAZAPINE 7.5 MG: 15 TABLET, FILM COATED ORAL at 20:51

## 2023-07-15 RX ADMIN — METOPROLOL TARTRATE 50 MG: 50 TABLET, FILM COATED ORAL at 20:51

## 2023-07-15 ASSESSMENT — PAIN SCALES - GENERAL
PAINLEVEL_OUTOF10: 5
PAINLEVEL_OUTOF10: 0
PAINLEVEL_OUTOF10: 0
PAINLEVEL_OUTOF10: 5
PAINLEVEL_OUTOF10: 0

## 2023-07-15 ASSESSMENT — PAIN DESCRIPTION - LOCATION
LOCATION: GENERALIZED
LOCATION: GENERALIZED

## 2023-07-15 ASSESSMENT — PAIN DESCRIPTION - DESCRIPTORS
DESCRIPTORS: ACHING
DESCRIPTORS: ACHING

## 2023-07-15 ASSESSMENT — PAIN DESCRIPTION - ORIENTATION
ORIENTATION: LOWER
ORIENTATION: LOWER

## 2023-07-16 LAB
ANION GAP SERPL CALC-SCNC: 5 MMOL/L (ref 5–15)
BACTERIA SPEC CULT: ABNORMAL
BUN SERPL-MCNC: 8 MG/DL (ref 6–20)
BUN/CREAT SERPL: 13 (ref 12–20)
CALCIUM SERPL-MCNC: 8.3 MG/DL (ref 8.5–10.1)
CC UR VC: ABNORMAL
CHLORIDE SERPL-SCNC: 105 MMOL/L (ref 97–108)
CO2 SERPL-SCNC: 28 MMOL/L (ref 21–32)
CREAT SERPL-MCNC: 0.63 MG/DL (ref 0.55–1.02)
ERYTHROCYTE [DISTWIDTH] IN BLOOD BY AUTOMATED COUNT: 12.2 % (ref 11.5–14.5)
GLUCOSE SERPL-MCNC: 124 MG/DL (ref 65–100)
HCT VFR BLD AUTO: 35.4 % (ref 35–47)
HGB BLD-MCNC: 12 G/DL (ref 11.5–16)
MCH RBC QN AUTO: 34.9 PG (ref 26–34)
MCHC RBC AUTO-ENTMCNC: 33.9 G/DL (ref 30–36.5)
MCV RBC AUTO: 102.9 FL (ref 80–99)
NRBC # BLD: 0 K/UL (ref 0–0.01)
NRBC BLD-RTO: 0 PER 100 WBC
PLATELET # BLD AUTO: 269 K/UL (ref 150–400)
PMV BLD AUTO: 9.2 FL (ref 8.9–12.9)
POTASSIUM SERPL-SCNC: 4.1 MMOL/L (ref 3.5–5.1)
RBC # BLD AUTO: 3.44 M/UL (ref 3.8–5.2)
SERVICE CMNT-IMP: ABNORMAL
SODIUM SERPL-SCNC: 138 MMOL/L (ref 136–145)
WBC # BLD AUTO: 6.5 K/UL (ref 3.6–11)

## 2023-07-16 PROCEDURE — 6370000000 HC RX 637 (ALT 250 FOR IP)

## 2023-07-16 PROCEDURE — 6370000000 HC RX 637 (ALT 250 FOR IP): Performed by: NURSE PRACTITIONER

## 2023-07-16 PROCEDURE — 6360000002 HC RX W HCPCS: Performed by: STUDENT IN AN ORGANIZED HEALTH CARE EDUCATION/TRAINING PROGRAM

## 2023-07-16 PROCEDURE — 6370000000 HC RX 637 (ALT 250 FOR IP): Performed by: STUDENT IN AN ORGANIZED HEALTH CARE EDUCATION/TRAINING PROGRAM

## 2023-07-16 PROCEDURE — 2060000000 HC ICU INTERMEDIATE R&B

## 2023-07-16 PROCEDURE — 2580000003 HC RX 258

## 2023-07-16 PROCEDURE — 36415 COLL VENOUS BLD VENIPUNCTURE: CPT

## 2023-07-16 PROCEDURE — 2580000003 HC RX 258: Performed by: STUDENT IN AN ORGANIZED HEALTH CARE EDUCATION/TRAINING PROGRAM

## 2023-07-16 PROCEDURE — 80048 BASIC METABOLIC PNL TOTAL CA: CPT

## 2023-07-16 PROCEDURE — 85027 COMPLETE CBC AUTOMATED: CPT

## 2023-07-16 RX ORDER — MIRTAZAPINE 7.5 MG/1
7.5 TABLET, FILM COATED ORAL NIGHTLY
Qty: 30 TABLET | Refills: 0 | Status: SHIPPED
Start: 2023-07-16 | End: 2023-08-15

## 2023-07-16 RX ORDER — CEFUROXIME AXETIL 250 MG/1
250 TABLET ORAL 2 TIMES DAILY
Qty: 6 TABLET | Refills: 0 | Status: SHIPPED
Start: 2023-07-16 | End: 2023-07-17 | Stop reason: SDUPTHER

## 2023-07-16 RX ORDER — PANTOPRAZOLE SODIUM 40 MG/1
40 TABLET, DELAYED RELEASE ORAL 2 TIMES DAILY
Qty: 60 TABLET | Refills: 0 | Status: SHIPPED
Start: 2023-07-16 | End: 2023-08-15

## 2023-07-16 RX ADMIN — MIRTAZAPINE 7.5 MG: 15 TABLET, FILM COATED ORAL at 21:07

## 2023-07-16 RX ADMIN — METOPROLOL TARTRATE 50 MG: 50 TABLET, FILM COATED ORAL at 09:49

## 2023-07-16 RX ADMIN — ACETAMINOPHEN 650 MG: 650 SOLUTION ORAL at 11:20

## 2023-07-16 RX ADMIN — Medication 1 MG: at 09:49

## 2023-07-16 RX ADMIN — PANTOPRAZOLE SODIUM 40 MG: 40 TABLET, DELAYED RELEASE ORAL at 21:07

## 2023-07-16 RX ADMIN — Medication 10 ML: at 09:50

## 2023-07-16 RX ADMIN — WATER 1000 MG: 1 INJECTION INTRAMUSCULAR; INTRAVENOUS; SUBCUTANEOUS at 17:37

## 2023-07-16 RX ADMIN — METOPROLOL TARTRATE 50 MG: 50 TABLET, FILM COATED ORAL at 21:07

## 2023-07-16 RX ADMIN — Medication 10 ML: at 21:13

## 2023-07-16 RX ADMIN — Medication 100 MG: at 09:49

## 2023-07-16 RX ADMIN — PANTOPRAZOLE SODIUM 40 MG: 40 TABLET, DELAYED RELEASE ORAL at 09:49

## 2023-07-16 RX ADMIN — AMLODIPINE BESYLATE 10 MG: 5 TABLET ORAL at 09:49

## 2023-07-16 ASSESSMENT — PAIN DESCRIPTION - LOCATION
LOCATION: HEAD
LOCATION: HEAD

## 2023-07-16 ASSESSMENT — PAIN SCALES - GENERAL
PAINLEVEL_OUTOF10: 0
PAINLEVEL_OUTOF10: 4
PAINLEVEL_OUTOF10: 6

## 2023-07-17 VITALS
WEIGHT: 101 LBS | DIASTOLIC BLOOD PRESSURE: 79 MMHG | OXYGEN SATURATION: 93 % | TEMPERATURE: 98.2 F | RESPIRATION RATE: 16 BRPM | BODY MASS INDEX: 19.07 KG/M2 | SYSTOLIC BLOOD PRESSURE: 136 MMHG | HEIGHT: 61 IN | HEART RATE: 78 BPM

## 2023-07-17 LAB
ANION GAP SERPL CALC-SCNC: 7 MMOL/L (ref 5–15)
BUN SERPL-MCNC: 15 MG/DL (ref 6–20)
BUN/CREAT SERPL: 21 (ref 12–20)
CALCIUM SERPL-MCNC: 9 MG/DL (ref 8.5–10.1)
CHLORIDE SERPL-SCNC: 102 MMOL/L (ref 97–108)
CO2 SERPL-SCNC: 27 MMOL/L (ref 21–32)
CREAT SERPL-MCNC: 0.71 MG/DL (ref 0.55–1.02)
ERYTHROCYTE [DISTWIDTH] IN BLOOD BY AUTOMATED COUNT: 12.5 % (ref 11.5–14.5)
GLUCOSE SERPL-MCNC: 96 MG/DL (ref 65–100)
HCT VFR BLD AUTO: 37.1 % (ref 35–47)
HGB BLD-MCNC: 12.7 G/DL (ref 11.5–16)
MCH RBC QN AUTO: 35.2 PG (ref 26–34)
MCHC RBC AUTO-ENTMCNC: 34.2 G/DL (ref 30–36.5)
MCV RBC AUTO: 102.8 FL (ref 80–99)
NRBC # BLD: 0 K/UL (ref 0–0.01)
NRBC BLD-RTO: 0 PER 100 WBC
PLATELET # BLD AUTO: 254 K/UL (ref 150–400)
PMV BLD AUTO: 9.1 FL (ref 8.9–12.9)
POTASSIUM SERPL-SCNC: 4.2 MMOL/L (ref 3.5–5.1)
RBC # BLD AUTO: 3.61 M/UL (ref 3.8–5.2)
SODIUM SERPL-SCNC: 136 MMOL/L (ref 136–145)
WBC # BLD AUTO: 7.2 K/UL (ref 3.6–11)

## 2023-07-17 PROCEDURE — 6370000000 HC RX 637 (ALT 250 FOR IP): Performed by: STUDENT IN AN ORGANIZED HEALTH CARE EDUCATION/TRAINING PROGRAM

## 2023-07-17 PROCEDURE — 6370000000 HC RX 637 (ALT 250 FOR IP)

## 2023-07-17 PROCEDURE — 80048 BASIC METABOLIC PNL TOTAL CA: CPT

## 2023-07-17 PROCEDURE — 2580000003 HC RX 258

## 2023-07-17 PROCEDURE — 36415 COLL VENOUS BLD VENIPUNCTURE: CPT

## 2023-07-17 PROCEDURE — 85027 COMPLETE CBC AUTOMATED: CPT

## 2023-07-17 RX ORDER — CEFUROXIME AXETIL 250 MG/1
250 TABLET ORAL 2 TIMES DAILY
Qty: 4 TABLET | Refills: 0 | Status: SHIPPED
Start: 2023-07-17 | End: 2023-07-19

## 2023-07-17 RX ADMIN — Medication 10 ML: at 09:21

## 2023-07-17 RX ADMIN — Medication 100 MG: at 09:19

## 2023-07-17 RX ADMIN — PANTOPRAZOLE SODIUM 40 MG: 40 TABLET, DELAYED RELEASE ORAL at 09:19

## 2023-07-17 RX ADMIN — Medication 1 MG: at 09:19

## 2023-07-17 RX ADMIN — AMLODIPINE BESYLATE 10 MG: 5 TABLET ORAL at 09:19

## 2023-07-17 RX ADMIN — METOPROLOL TARTRATE 50 MG: 50 TABLET, FILM COATED ORAL at 09:19

## 2023-07-17 RX ADMIN — ACETAMINOPHEN 650 MG: 650 SOLUTION ORAL at 11:06

## 2023-07-17 ASSESSMENT — PAIN SCALES - GENERAL
PAINLEVEL_OUTOF10: 0
PAINLEVEL_OUTOF10: 0

## 2023-07-17 NOTE — PLAN OF CARE
Problem: Pain  Goal: Verbalizes/displays adequate comfort level or baseline comfort level  7/17/2023 0946 by Easter Lesch, RN  Outcome: Progressing  7/16/2023 2320 by James White RN  Outcome: Progressing     Problem: Safety - Adult  Goal: Free from fall injury  7/17/2023 0946 by Easter Lesch, RN  Outcome: Progressing  7/16/2023 2320 by James White RN  Outcome: Progressing  Flowsheets (Taken 7/16/2023 2316)  Free From Fall Injury: Instruct family/caregiver on patient safety     Problem: Discharge Planning  Goal: Discharge to home or other facility with appropriate resources  7/17/2023 0946 by Easter Lesch, RN  Outcome: Progressing  7/16/2023 2320 by James White RN  Outcome: Progressing  Flowsheets (Taken 7/16/2023 2100)  Discharge to home or other facility with appropriate resources:   Identify barriers to discharge with patient and caregiver   Arrange for needed discharge resources and transportation as appropriate     Problem: Skin/Tissue Integrity  Goal: Absence of new skin breakdown  Description: 1. Monitor for areas of redness and/or skin breakdown  2. Assess vascular access sites hourly  3. Every 4-6 hours minimum:  Change oxygen saturation probe site  4. Every 4-6 hours:  If on nasal continuous positive airway pressure, respiratory therapy assess nares and determine need for appliance change or resting period.   7/17/2023 0946 by Easter Lesch, RN  Outcome: Progressing  7/16/2023 2320 by James White RN  Outcome: Progressing     Problem: Nutrition Deficit:  Goal: Optimize nutritional status  7/17/2023 0946 by Easter Lesch, RN  Outcome: Progressing  7/16/2023 2320 by James White RN  Outcome: Progressing

## 2023-07-17 NOTE — BSMART NOTE
outpatient mental health referrals upon discharge from there. Pt denies any other issues or concerns at this time. Barriers to Discharge: Physical rehabilitation placement    Outpatient provider(s):  none reported currently. Hx of psychiatric tx up to 40 years ago. Insurance info/prescription coverage:  MEDICARE      Diagnosis: unspecified mood disorder    Plan:  Pt advises that she is being discharged to [facility] at 1:30 PM today. This is verified by case management note today. Please defer to psychiatric consult note for recommendations. Liaison advised pt to follow up with  at rehabilitation center for continued outpatient mental health services. Follow up Psych Consult placed? No   Psychiatrist updated? No      Participating treatment team members:  Varsha Davila LCSW

## 2023-07-17 NOTE — PLAN OF CARE
Problem: Pain  Goal: Verbalizes/displays adequate comfort level or baseline comfort level  7/17/2023 1158 by Shell Adkins RN  Outcome: Completed  7/17/2023 0946 by Shell Adkins RN  Outcome: Progressing  7/16/2023 2320 by Liz Crow RN  Outcome: Progressing     Problem: Safety - Adult  Goal: Free from fall injury  7/17/2023 1158 by Shell Adkins RN  Outcome: Completed  Flowsheets (Taken 7/17/2023 1111)  Free From Fall Injury: Based on caregiver fall risk screen, instruct family/caregiver to ask for assistance with transferring infant if caregiver noted to have fall risk factors  7/17/2023 0946 by Shell Adkins RN  Outcome: Progressing  7/16/2023 2320 by Liz Crow RN  Outcome: Progressing  [address] (Taken 7/16/2023 2316)  Free From Fall Injury: Instruct family/caregiver on patient safety     Problem: Discharge Planning  Goal: Discharge to home or other facility with appropriate resources  7/17/2023 1158 by Sehll Adkins RN  Outcome: Completed  7/17/2023 0946 by Shell Adkins RN  Outcome: Progressing  7/16/2023 2320 by Liz Crow RN  Outcome: Progressing  Flowsheets (Taken 7/16/2023 2100)  Discharge to home or other facility with appropriate resources:   Identify barriers to discharge with patient and caregiver   Arrange for needed discharge resources and transportation as appropriate     Problem: Skin/Tissue Integrity  Goal: Absence of new skin breakdown  Description: 1. Monitor for areas of redness and/or skin breakdown  2. Assess vascular access sites hourly  3. Every 4-6 hours minimum:  Change oxygen saturation probe site  4. Every 4-6 hours:  If on nasal continuous positive airway pressure, respiratory therapy assess nares and determine need for appliance change or resting period.   7/17/2023 1158 by Shell Adkins RN  Outcome: Completed  7/17/2023 0946 by Shell Adkins RN  Outcome: Progressing  7/16/2023 2320 by Liz Crow RN  Outcome: Progressing     Problem:

## 2023-07-17 NOTE — DISCHARGE SUMMARY
Discharge Summary       PATIENT ID: Kaleigh Short  MRN: [id number]   YOB: 1945    DATE OF ADMISSION: 7/13/2023  5:37 AM    DATE OF DISCHARGE: 07/17/23    PRIMARY CARE PROVIDER: ASHWINI Mercado NP     ATTENDING PHYSICIAN: Val Calixto MD   DISCHARGING PROVIDER: Val Calixto MD    To contact this individual call [phone] and ask the  to page. If unavailable ask to be transferred the Adult Hospitalist Department. CONSULTATIONS: IP CONSULT TO BSMART  IP CONSULT TO GI  IP CONSULT TO SOCIAL WORK  IP CONSULT TO DIETITIAN  IP CONSULT TO PSYCHIATRY  IP CONSULT TO NEPHROLOGY  IP CONSULT TO CASE MANAGEMENT    PROCEDURES/SURGERIES: Procedure(s):  EGD ESOPHAGOGASTRODUODENOSCOPY  EGD BIOPSY     ADMITTING DIAGNOSES & HOSPITAL COURSE:   HPI: Pacheco Schmidt is a 69 y.o. female with PMH HTN, hyponatremia due to SIADH, alcohol abuse (reports 6-7 cups of scotch per week), and tobacco abuse (2 packs per day) who presented to ED from home with complaints of generalized weakness for several weeks. She reports that she became depressed several weeks ago, feeling like she has lost all marily in her life - when asked about suicidal ideations, she states \"I guess that's suicidal.\" However, she denies any current SI/HI. She has not been taking her scheduled home medications for several weeks. She reports several days of diarrhea and coffee-ground emesis; decided to come to the hospital as she was so weak yesterday that she was unable to get to the bathroom. States that she has been living independently and typically gets around using a walker. She endorses congested cough, which she relates to smoking. Denies fever, chills, chest pain, shortness of breath, abdominal pain, difficulty urinating. Denies having gone through alcohol withdrawal in the past.      Remarkable labs in ED: K 2.6, Na 126, CO2 13, alk phos 216, , , bilirubin 2.5, lipase 560, UA with >80 ketones.

## 2023-07-17 NOTE — CARE COORDINATION
Transition of Care Plan:     RUR: 13%  Prior Level of Functioning: Needs assistance  Disposition: SNF Placement-[facility], room 127 P, nurse report [phone]  If SNF or IPR:[facility]  Date FOC offered: 7/15/23  Date [address] received: 7/15/23  Accepting facility: [facility]  Follow up appointments: Follow up with PCP and/or Specialist   DME needed: pt has DME at home   Transportation at discharge: Wheelchair [place] to Home-[phone]:30 pm-Private pay-$72.46  IM/IMM Medicare/ letter given: 7/13/23; 7/17/23   Is patient a [place] and connected with [facility]? N/A              If yes, was [employer] transfer form completed and VA notified? Caregiver Contact: Kal Olson (daughter) [phone]  Discharge Caregiver contacted prior to discharge? Yes  Care Conference needed? No  Barriers to discharge: NA    CM confirmed with liaison Ingrid at [address] [place] [address], patient can admit today after 12 noon. Patient assigned to room 127 P. CM contacted daughter Robi Nguyen with update. Robi Nguyen will pay for wheelchair Mariluz Gilmarran if patient can safely transport. DC envelope will be placed on chart when complete.      [address], [facility] [address]

## 2023-07-17 NOTE — PLAN OF CARE
Problem: Pain  Goal: Verbalizes/displays adequate comfort level or baseline comfort level  7/16/2023 2320 by Diana Mares RN  Outcome: Progressing  7/16/2023 1235 by Tash Apodaca RN  Outcome: Progressing     Problem: Safety - Adult  Goal: Free from fall injury  7/16/2023 2320 by Diana Mares RN  Outcome: [address] Dec (Taken 7/16/2023 2316)  Free From Fall Injury: Instruct family/caregiver on patient safety  7/16/2023 1235 by Tash Apodaca RN  Outcome: Progressing     Problem: Discharge Planning  Goal: Discharge to home or other facility with appropriate resources  7/16/2023 2320 by Diana Mares RN  Outcome: Progressing  Flowsheets (Taken 7/16/2023 2100)  Discharge to home or other facility with appropriate resources:   Identify barriers to discharge with patient and caregiver   Arrange for needed discharge resources and transportation as appropriate  7/16/2023 1235 by Tash Apodaca RN  Outcome: Progressing     Problem: Skin/Tissue Integrity  Goal: Absence of new skin breakdown  Description: 1. Monitor for areas of redness and/or skin breakdown  2. Assess vascular access sites hourly  3. Every 4-6 hours minimum:  Change oxygen saturation probe site  4. Every 4-6 hours:  If on nasal continuous positive airway pressure, respiratory therapy assess nares and determine need for appliance change or resting period.   7/16/2023 2320 by Diana Mares RN  Outcome: Progressing  7/16/2023 1235 by Tash Apodaca RN  Outcome: Progressing     Problem: Nutrition Deficit:  Goal: Optimize nutritional status  7/16/2023 2320 by Diana Mares RN  Outcome: Progressing  7/16/2023 1235 by Tash Apodaca RN  Outcome: Progressing

## 2023-07-18 LAB
BACTERIA SPEC CULT: NORMAL
BACTERIA SPEC CULT: NORMAL
SERVICE CMNT-IMP: NORMAL
SERVICE CMNT-IMP: NORMAL

## 2023-07-21 NOTE — PROGRESS NOTES
Bedside shift change report given to Heather Ryder Barker  (oncoming nurse) by Nino Rowell  (offgoing nurse). Report included the following information Nurse Handoff Report, Intake/Output, MAR, and Recent Results.
Hospitalist Progress Note  Rush Nichols MD  Answering service: [phone] OR 36 from in house phone        Date of Service:  2023  NAME:  Tisha Avila  :  1945  MRN:  [id number]      Admission Summary:   HPI: Tisha Avila is a 69 y.o. female with PMH HTN, hyponatremia due to SIADH, alcohol abuse (reports 6-7 cups of scotch per week), and tobacco abuse (2 packs per day) who presented to ED from home with complaints of generalized weakness for several weeks. She reports that she became depressed several weeks ago, feeling like she has lost all marily in her life - when asked about suicidal ideations, she states \"I guess that's suicidal.\" However, she denies any current SI/HI. She has not been taking her scheduled home medications for several weeks. She reports several days of diarrhea and coffee-ground emesis; decided to come to the hospital as she was so weak yesterday that she was unable to get to the bathroom. States that she has been living independently and typically gets around using a walker. She endorses congested cough, which she relates to smoking. Denies fever, chills, chest pain, shortness of breath, abdominal pain, difficulty urinating. Denies having gone through alcohol withdrawal in the past.      Remarkable labs in ED: K 2.6, Na 126, CO2 13, alk phos 216, , , bilirubin 2.5, lipase 560, UA with >80 ketones. Abdominal/pelvic CT showed no evidence of acute abdominal or pelvic process, small simple left renal cyst which does not require imaging follow-up, old L1 compression fracture, chronic nodular airspace disease at the right lung base is not significantly changed and likely represents infectious bronchiolitis. She received 1 L NS bolus, 500 mg azithromycin, 1 g ceftriaxone, and 50 mEq potassium chloride in ED. \"       Interval history / Subjective:   Patient seen and
Report was given to KASSIDY Alexandra RN at [facility] , pt is hemodynamically stable and ready for discharge.     1330 - pt left the unit via wheel chair with transporter    Hiram Murcia RN
Clinically unable to determine / Unknown  -- Refer to Clinical Documentation Reviewer    PROVIDER RESPONSE TEXT:    This patient has severe protein calorie malnutrition.     Query created by: Venkatesh Duran on 7/19/2023 9:31 AM      Electronically signed by:  Rush Nichols MD 7/19/2023 2:08 PM

## 2023-07-25 ENCOUNTER — TELEPHONE (OUTPATIENT)
Age: 78
End: 2023-07-25

## 2023-07-25 NOTE — TELEPHONE ENCOUNTER
Verified patient with two type of identifiers. Spoke to pt - advised we cannot perform virtual visit when she is admitted in a facility. She will call to schedule follow up from rehab when she is out. Cancelled visit on 7/27.

## 2023-07-25 NOTE — TELEPHONE ENCOUNTER
----- Message from Karin Lan sent at 7/25/2023  1:27 PM EDT -----  Subject: Message to Provider    QUESTIONS  Information for Provider? Pt called in to let pcp know that she is   currently at [facility] and will be at the time of AWV on 7/27/2023   but can do a VV if that is ok. Please advise   ---------------------------------------------------------------------------  --------------  Estephania Mackay INFO  [phone]; Do not leave any message, patient will call back for answer  ---------------------------------------------------------------------------  --------------  SCRIPT ANSWERS  Relationship to Patient?  Self

## 2023-08-16 ENCOUNTER — TELEPHONE (OUTPATIENT)
Age: 78
End: 2023-08-16

## 2023-08-16 NOTE — TELEPHONE ENCOUNTER
----- Message from Tonia Jostin sent at 8/15/2023  2:09 PM EDT -----  Subject: Message to Provider    QUESTIONS  Information for Provider? Thomas Mac from [facility] called, Thomas Mac is   requesting if Pt's PCP will follow Pt for Segundo Yee, Pt is planning to   discharge on 8/17/23 from [facility] and Pt has been ordered both PT &   OT. Please reach back out to Thomas Mac at [phone], please leave a VM,   voicemail is secure.   ---------------------------------------------------------------------------  --------------  Kirk EDWARDS  [phone]; OK to leave message on voicemail  ---------------------------------------------------------------------------  --------------  SCRIPT ANSWERS  Relationship to Patient? Covered Entity  Covered Entity Type? Home Health Care? Representative Name?  [address]

## 2023-08-17 ENCOUNTER — TELEPHONE (OUTPATIENT)
Age: 78
End: 2023-08-17

## 2023-08-17 NOTE — TELEPHONE ENCOUNTER
Destin Gibbons (nurse) with [facility] would like to let Blair Murillo know they have open patient to home health services they will see her once a week for one week and twice a week for two weeks regarding new meds and resp.status she can be reached @ [phone]

## 2023-09-04 SDOH — ECONOMIC STABILITY: TRANSPORTATION INSECURITY
IN THE PAST 12 MONTHS, HAS LACK OF TRANSPORTATION KEPT YOU FROM MEETINGS, WORK, OR FROM GETTING THINGS NEEDED FOR DAILY LIVING?: YES

## 2023-09-04 SDOH — ECONOMIC STABILITY: HOUSING INSECURITY
IN THE LAST 12 MONTHS, WAS THERE A TIME WHEN YOU DID NOT HAVE A STEADY PLACE TO SLEEP OR SLEPT IN A SHELTER (INCLUDING NOW)?: NO

## 2023-09-04 SDOH — ECONOMIC STABILITY: FOOD INSECURITY: WITHIN THE PAST 12 MONTHS, THE FOOD YOU BOUGHT JUST DIDN'T LAST AND YOU DIDN'T HAVE MONEY TO GET MORE.: NEVER TRUE

## 2023-09-04 SDOH — ECONOMIC STABILITY: FOOD INSECURITY: WITHIN THE PAST 12 MONTHS, YOU WORRIED THAT YOUR FOOD WOULD RUN OUT BEFORE YOU GOT MONEY TO BUY MORE.: SOMETIMES TRUE

## 2023-09-04 SDOH — ECONOMIC STABILITY: INCOME INSECURITY: HOW HARD IS IT FOR YOU TO PAY FOR THE VERY BASICS LIKE FOOD, HOUSING, MEDICAL CARE, AND HEATING?: HARD

## 2023-09-05 ENCOUNTER — TELEMEDICINE (OUTPATIENT)
Age: 78
End: 2023-09-05
Payer: MEDICARE

## 2023-09-05 DIAGNOSIS — G47.00 INSOMNIA, UNSPECIFIED TYPE: ICD-10-CM

## 2023-09-05 DIAGNOSIS — I10 ESSENTIAL (PRIMARY) HYPERTENSION: ICD-10-CM

## 2023-09-05 DIAGNOSIS — Z09 HOSPITAL DISCHARGE FOLLOW-UP: Primary | ICD-10-CM

## 2023-09-05 DIAGNOSIS — K29.50 CHRONIC GASTRITIS WITHOUT BLEEDING, UNSPECIFIED GASTRITIS TYPE: ICD-10-CM

## 2023-09-05 DIAGNOSIS — E87.1 HYPONATREMIA: ICD-10-CM

## 2023-09-05 DIAGNOSIS — E87.6 HYPOKALEMIA: ICD-10-CM

## 2023-09-05 DIAGNOSIS — Z87.891 PERSONAL HISTORY OF NICOTINE DEPENDENCE: ICD-10-CM

## 2023-09-05 DIAGNOSIS — F10.10 ALCOHOL ABUSE, UNCOMPLICATED: ICD-10-CM

## 2023-09-05 PROCEDURE — G8400 PT W/DXA NO RESULTS DOC: HCPCS | Performed by: NURSE PRACTITIONER

## 2023-09-05 PROCEDURE — G8420 CALC BMI NORM PARAMETERS: HCPCS | Performed by: NURSE PRACTITIONER

## 2023-09-05 PROCEDURE — 1090F PRES/ABSN URINE INCON ASSESS: CPT | Performed by: NURSE PRACTITIONER

## 2023-09-05 PROCEDURE — 1036F TOBACCO NON-USER: CPT | Performed by: NURSE PRACTITIONER

## 2023-09-05 PROCEDURE — 1111F DSCHRG MED/CURRENT MED MERGE: CPT | Performed by: NURSE PRACTITIONER

## 2023-09-05 PROCEDURE — 99214 OFFICE O/P EST MOD 30 MIN: CPT | Performed by: NURSE PRACTITIONER

## 2023-09-05 PROCEDURE — 1123F ACP DISCUSS/DSCN MKR DOCD: CPT | Performed by: NURSE PRACTITIONER

## 2023-09-05 PROCEDURE — G8427 DOCREV CUR MEDS BY ELIG CLIN: HCPCS | Performed by: NURSE PRACTITIONER

## 2023-09-05 RX ORDER — METOPROLOL TARTRATE 50 MG/1
50 TABLET, FILM COATED ORAL 2 TIMES DAILY
Qty: 60 TABLET | Refills: 5 | Status: SHIPPED | OUTPATIENT
Start: 2023-09-05 | End: 2024-03-03

## 2023-09-05 RX ORDER — PANTOPRAZOLE SODIUM 40 MG/1
40 TABLET, DELAYED RELEASE ORAL 2 TIMES DAILY
Qty: 60 TABLET | Refills: 5 | Status: SHIPPED | OUTPATIENT
Start: 2023-09-05 | End: 2024-03-03

## 2023-09-05 RX ORDER — TRAZODONE HYDROCHLORIDE 50 MG/1
50 TABLET ORAL NIGHTLY
Qty: 30 TABLET | Refills: 5 | Status: SHIPPED | OUTPATIENT
Start: 2023-09-05

## 2023-09-05 RX ORDER — MIRTAZAPINE 7.5 MG/1
7.5 TABLET, FILM COATED ORAL NIGHTLY
Qty: 30 TABLET | Refills: 5 | Status: SHIPPED | OUTPATIENT
Start: 2023-09-05 | End: 2024-03-03

## 2023-09-05 SDOH — ECONOMIC STABILITY: FOOD INSECURITY

## 2023-09-05 SDOH — ECONOMIC STABILITY: INCOME INSECURITY

## 2023-09-05 SDOH — ECONOMIC STABILITY: HOUSING INSECURITY

## 2023-09-05 ASSESSMENT — PATIENT HEALTH QUESTIONNAIRE - PHQ9
SUM OF ALL RESPONSES TO PHQ QUESTIONS 1-9: 0
2. FEELING DOWN, DEPRESSED OR HOPELESS: 0
SUM OF ALL RESPONSES TO PHQ9 QUESTIONS 1 & 2: 0
SUM OF ALL RESPONSES TO PHQ QUESTIONS 1-9: 0
SUM OF ALL RESPONSES TO PHQ QUESTIONS 1-9: 0
1. LITTLE INTEREST OR PLEASURE IN DOING THINGS: 0
SUM OF ALL RESPONSES TO PHQ QUESTIONS 1-9: 0

## 2023-09-05 NOTE — PROGRESS NOTES
Post-Discharge Virtual Transitional Care  Follow Up      Sage Sánchez   YOB: 1945    Date of Office Visit:  9/5/2023  Date of Hospital Admission: 7/13/23  Date of Hospital Discharge: 7/17/23  Risk of hospital readmission (high >=14%. Medium >=10%) :Readmission Risk Score: 11.9      Care management risk score Rising risk (score 2-5) and Complex Care (Scores >=6): No Risk Score On File     Non face to face  following discharge, date last encounter closed (first attempt may have been earlier): *No documented post hospital discharge outreach found in the last 14 days    Call initiated 2 business days of discharge: *No response recorded in the last 14 days    ASSESSMENT/PLAN:   Below is the assessment and plan developed based on review of pertinent history, physical exam, labs, studies, and medications. Hospital discharge follow-up  -     TX DISCHARGE MEDS RECONCILED W/ CURRENT OUTPATIENT MED LIST  Chronic gastritis without bleeding, unspecified gastritis type  -     pantoprazole (PROTONIX) 40 MG tablet; Take 1 tablet by mouth 2 times daily, Disp-60 tablet, R-5Normal  Essential (primary) hypertension  -     metoprolol tartrate (LOPRESSOR) 50 MG tablet; Take 1 tablet by mouth 2 times daily, Disp-60 tablet, R-5Normal  -     CBC; Future  -     Comprehensive Metabolic Panel; Future  Insomnia, unspecified type  -     mirtazapine (REMERON) 7.5 MG tablet; Take 1 tablet by mouth nightly, Disp-30 tablet, R-5Normal  -     traZODone (DESYREL) 50 MG tablet; Take 1 tablet by mouth nightly, Disp-30 tablet, R-5Normal  Hyponatremia  -     CBC; Future  -     Comprehensive Metabolic Panel; Future  Hypokalemia  -     CBC; Future  -     Comprehensive Metabolic Panel; Future  Alcohol abuse, uncomplicated  Personal history of nicotine dependence    Spoke with Vicenta Raymond at [facility] [phone] , nurse with home health, will draw CBC and CMP tomorrow during visit.     Medical Decision Making: moderate complexity  Follow up

## 2023-09-05 NOTE — PROGRESS NOTES
Chief Complaint   Patient presents with    Follow-up     Release from Rehab        1. Have you been to the ER, urgent care clinic since your last visit? Hospitalized since your last visit? yes    2. Have you seen or consulted any other health care providers outside of the [address] [place] [address] since your last visit? Include any pap smears or colon screening.  No    The patient, Prasad Castro, identity was verified by  Name and

## 2023-10-18 ENCOUNTER — CLINICAL DOCUMENTATION (OUTPATIENT)
Age: 78
End: 2023-10-18

## 2023-12-20 NOTE — PROGRESS NOTES
Problem: Pain  Goal: *Control of Pain  Outcome: Progressing Towards Goal     Problem: Pressure Injury - Risk of  Goal: *Prevention of pressure injury  Description: Document Sameer Scale and appropriate interventions in the flowsheet. Outcome: Progressing Towards Goal  Note: Pressure Injury Interventions:  Sensory Interventions: Check visual cues for pain, Discuss PT/OT consult with provider, Float heels, Keep linens dry and wrinkle-free, Maintain/enhance activity level, Minimize linen layers, Monitor skin under medical devices, Assess changes in LOC, Pad between skin to skin    Moisture Interventions: Internal/External urinary devices, Maintain skin hydration (lotion/cream), Minimize layers, Moisture barrier, Apply protective barrier, creams and emollients    Activity Interventions: Increase time out of bed, Pressure redistribution bed/mattress(bed type), PT/OT evaluation    Mobility Interventions: Float heels, Pressure redistribution bed/mattress (bed type), HOB 30 degrees or less, PT/OT evaluation    Nutrition Interventions: Document food/fluid/supplement intake    Friction and Shear Interventions: HOB 30 degrees or less, Lift sheet, Lift team/patient mobility team, Minimize layers, Foam dressings/transparent film/skin sealants, Apply protective barrier, creams and emollients, Feet elevated on foot rest                Problem: Falls - Risk of  Goal: *Absence of Falls  Description: Document Katey Fall Risk and appropriate interventions in the flowsheet.   Outcome: Progressing Towards Goal  Note: Fall Risk Interventions:  Mobility Interventions: Communicate number of staff needed for ambulation/transfer, OT consult for ADLs, PT Consult for assist device competence, PT Consult for mobility concerns, Patient to call before getting OOB, Utilize gait belt for transfers/ambulation    Mentation Interventions: Bed/chair exit alarm, Adequate sleep, hydration, pain control, Eyeglasses and hearing aids, More frequent Pt is a 25 year old  at 36w2d, PRIETO: 2024; CC: routine prenatal visit  Chief Complaint   Patient presents with    Routine Prenatal Visit     25 yr old, 36w2d, GBBS today, c/o pt having no problems,     No bleeding, No rupture of membranes, No uterine contractions  Diet controlled    FBS -  most    Highest 106)  2 hour PPD - highest 116     Visit Vitals  /80 (BP Location: LUE - Left upper extremity, Patient Position: Sitting, Cuff Size: Regular)   Pulse (!) 105   Temp 98.3 °F (36.8 °C) (Oral)   Resp 18   Ht 5' (1.524 m)   Wt 87.5 kg (193 lb)   LMP 2023 (Exact Date)   SpO2 97%   BMI 37.69 kg/m²      growth   Vertex   Anterior placenta    bpm   MVP 7.1cm   EFW 38w4d 92% 7lb 7oz 3401 g   JG     ASSESSMENT:  Supervision of other normal pregnancy, antepartum  - POCT Urine Dip Auto    PLAN:  Labor instructions given  GBBS done  OB US reviewed  Return in 1 week  IOL 2023 at 2 am   Reviewed her US and LGA --> pt would like to deliver vaginally    Shoulder dystocia can happen with babies more frequently than small babies, but can happen at every stage of pregnancy.  It is dependent on maternal pelvis. This an emergency and have about 15-20 minutes to deliver a baby. US has error of +/- 1 lb hard to predict exactly.  Risk of shoulder dystocia is brachial plexus injury which resolves most of the times but can be a permanent injury, and hypoxia can happen if delivery is delayed.  Most shoulders recover but even rare things can happen. Emergency  may be needed.  You have the option of electively having  section.    rounding, Self-releasing belt, Toileting rounds, Update white board    Medication Interventions: Evaluate medications/consider consulting pharmacy, Patient to call before getting OOB, Teach patient to arise slowly, Utilize gait belt for transfers/ambulation    Elimination Interventions: Call light in reach, Patient to call for help with toileting needs, Stay With Me (per policy), Toilet paper/wipes in reach, Toileting schedule/hourly rounds              Problem: TIA/CVA Stroke: Day 2 Until Discharge  Goal: Activity/Safety  Outcome: Progressing Towards Goal  Goal: Diagnostic Test/Procedures  Outcome: Progressing Towards Goal  Goal: Discharge Planning  Outcome: Progressing Towards Goal  Goal: Medications  Outcome: Progressing Towards Goal  Goal: Respiratory  Outcome: Progressing Towards Goal  Goal: Treatments/Interventions/Procedures  Outcome: Progressing Towards Goal  Goal: *Stroke education continued(Stroke Metric)  Outcome: Progressing Towards Goal

## 2024-01-04 ENCOUNTER — OFFICE VISIT (OUTPATIENT)
Age: 79
End: 2024-01-04
Payer: MEDICARE

## 2024-01-04 VITALS
BODY MASS INDEX: 20.78 KG/M2 | DIASTOLIC BLOOD PRESSURE: 63 MMHG | OXYGEN SATURATION: 93 % | WEIGHT: 110 LBS | TEMPERATURE: 97.8 F | HEART RATE: 76 BPM | RESPIRATION RATE: 16 BRPM | SYSTOLIC BLOOD PRESSURE: 139 MMHG

## 2024-01-04 DIAGNOSIS — I65.23 BILATERAL CAROTID ARTERY STENOSIS: ICD-10-CM

## 2024-01-04 DIAGNOSIS — I10 ESSENTIAL (PRIMARY) HYPERTENSION: ICD-10-CM

## 2024-01-04 DIAGNOSIS — Z91.81 AT HIGH RISK FOR FALLS: ICD-10-CM

## 2024-01-04 DIAGNOSIS — Z00.00 MEDICARE ANNUAL WELLNESS VISIT, SUBSEQUENT: Primary | ICD-10-CM

## 2024-01-04 PROCEDURE — 3075F SYST BP GE 130 - 139MM HG: CPT | Performed by: NURSE PRACTITIONER

## 2024-01-04 PROCEDURE — G0439 PPPS, SUBSEQ VISIT: HCPCS | Performed by: NURSE PRACTITIONER

## 2024-01-04 PROCEDURE — 1123F ACP DISCUSS/DSCN MKR DOCD: CPT | Performed by: NURSE PRACTITIONER

## 2024-01-04 PROCEDURE — 3078F DIAST BP <80 MM HG: CPT | Performed by: NURSE PRACTITIONER

## 2024-01-04 PROCEDURE — G8484 FLU IMMUNIZE NO ADMIN: HCPCS | Performed by: NURSE PRACTITIONER

## 2024-01-04 RX ORDER — ATORVASTATIN CALCIUM 20 MG/1
20 TABLET, FILM COATED ORAL DAILY
Qty: 90 TABLET | Refills: 1 | Status: SHIPPED | OUTPATIENT
Start: 2024-01-04

## 2024-01-04 SDOH — ECONOMIC STABILITY: FOOD INSECURITY: WITHIN THE PAST 12 MONTHS, YOU WORRIED THAT YOUR FOOD WOULD RUN OUT BEFORE YOU GOT MONEY TO BUY MORE.: NEVER TRUE

## 2024-01-04 SDOH — HEALTH STABILITY: MENTAL HEALTH
STRESS IS WHEN SOMEONE FEELS TENSE, NERVOUS, ANXIOUS, OR CAN'T SLEEP AT NIGHT BECAUSE THEIR MIND IS TROUBLED. HOW STRESSED ARE YOU?: NOT AT ALL

## 2024-01-04 SDOH — SOCIAL STABILITY: SOCIAL INSECURITY
WITHIN THE LAST YEAR, HAVE TO BEEN RAPED OR FORCED TO HAVE ANY KIND OF SEXUAL ACTIVITY BY YOUR PARTNER OR EX-PARTNER?: NO

## 2024-01-04 SDOH — SOCIAL STABILITY: SOCIAL NETWORK: HOW OFTEN DO YOU ATTEND CHURCH OR RELIGIOUS SERVICES?: NEVER

## 2024-01-04 SDOH — SOCIAL STABILITY: SOCIAL NETWORK: IN A TYPICAL WEEK, HOW MANY TIMES DO YOU TALK ON THE PHONE WITH FAMILY, FRIENDS, OR NEIGHBORS?: ONCE A WEEK

## 2024-01-04 SDOH — SOCIAL STABILITY: SOCIAL INSECURITY
WITHIN THE LAST YEAR, HAVE YOU BEEN KICKED, HIT, SLAPPED, OR OTHERWISE PHYSICALLY HURT BY YOUR PARTNER OR EX-PARTNER?: NO

## 2024-01-04 SDOH — ECONOMIC STABILITY: INCOME INSECURITY: IN THE LAST 12 MONTHS, WAS THERE A TIME WHEN YOU WERE NOT ABLE TO PAY THE MORTGAGE OR RENT ON TIME?: NO

## 2024-01-04 SDOH — SOCIAL STABILITY: SOCIAL NETWORK
DO YOU BELONG TO ANY CLUBS OR ORGANIZATIONS SUCH AS CHURCH GROUPS UNIONS, FRATERNAL OR ATHLETIC GROUPS, OR SCHOOL GROUPS?: NO

## 2024-01-04 SDOH — ECONOMIC STABILITY: HOUSING INSECURITY: IN THE LAST 12 MONTHS, HOW MANY PLACES HAVE YOU LIVED?: 1

## 2024-01-04 SDOH — SOCIAL STABILITY: SOCIAL NETWORK: HOW OFTEN DO YOU ATTENT MEETINGS OF THE CLUB OR ORGANIZATION YOU BELONG TO?: NEVER

## 2024-01-04 SDOH — ECONOMIC STABILITY: FOOD INSECURITY: WITHIN THE PAST 12 MONTHS, THE FOOD YOU BOUGHT JUST DIDN'T LAST AND YOU DIDN'T HAVE MONEY TO GET MORE.: NEVER TRUE

## 2024-01-04 SDOH — SOCIAL STABILITY: SOCIAL NETWORK: HOW OFTEN DO YOU GET TOGETHER WITH FRIENDS OR RELATIVES?: ONCE A WEEK

## 2024-01-04 SDOH — ECONOMIC STABILITY: TRANSPORTATION INSECURITY
IN THE PAST 12 MONTHS, HAS LACK OF TRANSPORTATION KEPT YOU FROM MEETINGS, WORK, OR FROM GETTING THINGS NEEDED FOR DAILY LIVING?: NO

## 2024-01-04 SDOH — SOCIAL STABILITY: SOCIAL INSECURITY: WITHIN THE LAST YEAR, HAVE YOU BEEN AFRAID OF YOUR PARTNER OR EX-PARTNER?: NO

## 2024-01-04 SDOH — ECONOMIC STABILITY: TRANSPORTATION INSECURITY
IN THE PAST 12 MONTHS, HAS THE LACK OF TRANSPORTATION KEPT YOU FROM MEDICAL APPOINTMENTS OR FROM GETTING MEDICATIONS?: NO

## 2024-01-04 SDOH — ECONOMIC STABILITY: INCOME INSECURITY: HOW HARD IS IT FOR YOU TO PAY FOR THE VERY BASICS LIKE FOOD, HOUSING, MEDICAL CARE, AND HEATING?: NOT VERY HARD

## 2024-01-04 SDOH — SOCIAL STABILITY: SOCIAL INSECURITY: WITHIN THE LAST YEAR, HAVE YOU BEEN HUMILIATED OR EMOTIONALLY ABUSED IN OTHER WAYS BY YOUR PARTNER OR EX-PARTNER?: NO

## 2024-01-04 ASSESSMENT — LIFESTYLE VARIABLES
HOW OFTEN DO YOU HAVE A DRINK CONTAINING ALCOHOL: 2-3 TIMES A WEEK
HOW MANY STANDARD DRINKS CONTAINING ALCOHOL DO YOU HAVE ON A TYPICAL DAY: 1 OR 2

## 2024-01-04 ASSESSMENT — PATIENT HEALTH QUESTIONNAIRE - PHQ9
2. FEELING DOWN, DEPRESSED OR HOPELESS: 1
SUM OF ALL RESPONSES TO PHQ QUESTIONS 1-9: 1
1. LITTLE INTEREST OR PLEASURE IN DOING THINGS: 0
SUM OF ALL RESPONSES TO PHQ QUESTIONS 1-9: 1
SUM OF ALL RESPONSES TO PHQ9 QUESTIONS 1 & 2: 1
SUM OF ALL RESPONSES TO PHQ QUESTIONS 1-9: 1
SUM OF ALL RESPONSES TO PHQ QUESTIONS 1-9: 1

## 2024-01-04 NOTE — PROGRESS NOTES
Medicare Annual Wellness Visit    Vidya Das is here for Medicare AWV    Assessment & Plan   Medicare annual wellness visit, subsequent  Essential (primary) hypertension  -     TSH; Future  -     Lipid Panel; Future  -     Comprehensive Metabolic Panel; Future  -     CBC; Future  Bilateral carotid artery stenosis - started on statin.  Would like to reassess in 3 months.  Did not want carotid dopplers done today, wants to wait 3 months.  Discussed risk of stroke   -     atorvastatin (LIPITOR) 20 MG tablet; Take 1 tablet by mouth daily, Disp-90 tablet, R-1Normal  At high risk for falls    Recommendations for Preventive Services Due: see orders and patient instructions/AVS.  Recommended screening schedule for the next 5-10 years is provided to the patient in written form: see Patient Instructions/AVS.     No follow-ups on file.     Subjective   The following acute and/or chronic problems were also addressed today:     In last 6 months, having more pain in low back.  Thinks because of way she walks.  If she walks long distances.  Takes tylenol and pain resolves.  No recent falls.  Takes trazodone and remeron at night about 4 times weekly.      Hair is thining    Went to Rehab after hospitalization, dc 8/17/23  Has been receiving home health nursing, PT and OT - this is last week of therapy and nursing  States appetite has increased.  She is taking mirtazapine.  She is also taking trazodone to help with sleep.  States rehab dc her potasium and sodium.  States she had been on those for years.  No appt with renal.    Reviewed gastric pathology report - reactive gastritis.  Patient is taking pantoprazole.  Due to see Pulm - needs to have PFTs - [facility] 11/7    Had diarrhea yesterday.  Drinking gatorade to stay hydrated.       Winter gets her down, starting to feel better.  Thinks she has seasonal affective disorder.   Wants to start walking again.   Has not seen neurology jannie while for acute inflammatory demyelinating

## 2024-01-04 NOTE — PROGRESS NOTES
Chief Complaint   Patient presents with    Medicare AWV       1. \"Have you been to the ER, urgent care clinic since your last visit?  Hospitalized since your last visit?\" No    2. \"Have you seen or consulted any other health care providers outside of the [facility] since your last visit?\" No     3. For patients aged 45-75: Has the patient had a colonoscopy / FIT/ Cologuard? N/A      If the patient is female:    4. For patients aged 40-74: Has the patient had a mammogram within the past 2 years? N/A      5. For patients aged 21-65: Has the patient had a pap smear? N/A       The patient, Vidya Hernandez, identity was verified by    Health Maintenance Due   Topic Date Due    Hepatitis C screen  Never done    Low dose CT lung screening &/or counseling  Never done    DEXA (modify frequency per FRAX score)  Never done    Respiratory Syncytial Virus (RSV) Pregnant or age 60 yrs+ (1 - 1-dose 60+ series) Never done    Pneumococcal 65+ years Vaccine (2 - PCV) 2015    Annual Wellness Visit (Medicare)  Never done

## 2024-01-04 NOTE — PATIENT INSTRUCTIONS
1442-2701 mg of calcium and 9401-5283 IU of vitamin D per day. It is possible to meet your calcium requirement with diet alone, but a vitamin D supplement is usually necessary to meet this goal.  When exposed to the sun, use a sunscreen that protects against both UVA and UVB radiation with an SPF of 30 or greater. Reapply every 2 to 3 hours or after sweating, drying off with a towel, or swimming.  Always wear a seat belt when traveling in a car. Always wear a helmet when riding a bicycle or motorcycle.

## 2024-01-05 LAB
ALBUMIN SERPL-MCNC: 4 G/DL (ref 3.5–5)
ALBUMIN/GLOB SERPL: 1 (ref 1.1–2.2)
ALP SERPL-CCNC: 129 U/L (ref 45–117)
ALT SERPL-CCNC: 15 U/L (ref 12–78)
ANION GAP SERPL CALC-SCNC: 9 MMOL/L (ref 5–15)
AST SERPL-CCNC: 13 U/L (ref 15–37)
BILIRUB SERPL-MCNC: 0.9 MG/DL (ref 0.2–1)
BUN SERPL-MCNC: 8 MG/DL (ref 6–20)
BUN/CREAT SERPL: 8 (ref 12–20)
CALCIUM SERPL-MCNC: 9.2 MG/DL (ref 8.5–10.1)
CHLORIDE SERPL-SCNC: 98 MMOL/L (ref 97–108)
CHOLEST SERPL-MCNC: 200 MG/DL
CO2 SERPL-SCNC: 31 MMOL/L (ref 21–32)
CREAT SERPL-MCNC: 1.01 MG/DL (ref 0.55–1.02)
ERYTHROCYTE [DISTWIDTH] IN BLOOD BY AUTOMATED COUNT: 17.3 % (ref 11.5–14.5)
GLOBULIN SER CALC-MCNC: 4.2 G/DL (ref 2–4)
GLUCOSE SERPL-MCNC: 100 MG/DL (ref 65–100)
HCT VFR BLD AUTO: 44.5 % (ref 35–47)
HDLC SERPL-MCNC: 87 MG/DL
HDLC SERPL: 2.3 (ref 0–5)
HGB BLD-MCNC: 14.9 G/DL (ref 11.5–16)
LDLC SERPL CALC-MCNC: 97 MG/DL (ref 0–100)
MCH RBC QN AUTO: 30.8 PG (ref 26–34)
MCHC RBC AUTO-ENTMCNC: 33.5 G/DL (ref 30–36.5)
MCV RBC AUTO: 91.9 FL (ref 80–99)
NRBC # BLD: 0 K/UL (ref 0–0.01)
NRBC BLD-RTO: 0 PER 100 WBC
PLATELET # BLD AUTO: 347 K/UL (ref 150–400)
PMV BLD AUTO: 9.5 FL (ref 8.9–12.9)
POTASSIUM SERPL-SCNC: 3.3 MMOL/L (ref 3.5–5.1)
PROT SERPL-MCNC: 8.2 G/DL (ref 6.4–8.2)
RBC # BLD AUTO: 4.84 M/UL (ref 3.8–5.2)
SODIUM SERPL-SCNC: 138 MMOL/L (ref 136–145)
TRIGL SERPL-MCNC: 80 MG/DL
TSH SERPL DL<=0.05 MIU/L-ACNC: 1.61 UIU/ML (ref 0.36–3.74)
VLDLC SERPL CALC-MCNC: 16 MG/DL
WBC # BLD AUTO: 9.1 K/UL (ref 3.6–11)

## 2024-01-22 ENCOUNTER — TELEPHONE (OUTPATIENT)
Age: 79
End: 2024-01-22

## 2024-01-22 RX ORDER — POTASSIUM CHLORIDE 20 MEQ/1
20 TABLET, EXTENDED RELEASE ORAL 2 TIMES DAILY
Qty: 60 TABLET | COMMUNITY
Start: 2024-01-22

## 2024-01-22 NOTE — TELEPHONE ENCOUNTER
----- Message from Liana Mclean LPN sent at 1/22/2024  9:58 AM EST -----  Patient stated she just filled her Potassium 20meq and will start taking today. Is that the dose you wanted her to take?  ----- Message -----  From: Diana Avelar APRN - NP  Sent: 1/15/2024   9:34 PM EST  To: Liana Mclean LPN    Thyroid lab normal  Liver and kidney function normal/stable.  Blood counts normal  Cholesterol levels look great!    Potassium is low.  Would like you to take potassium supplement once daily

## 2024-01-22 NOTE — TELEPHONE ENCOUNTER
Added potassium back to med list    Orders Placed This Encounter    potassium chloride (KLOR-CON M) 20 MEQ extended release tablet     Sig: Take 1 tablet by mouth 2 times daily     Dispense:  60 tablet

## 2024-01-22 NOTE — TELEPHONE ENCOUNTER
The potassium was removed from her med list in Sept 2023  Has she been off the potassium?  How many times is she taking potassium supplement

## 2024-01-22 NOTE — TELEPHONE ENCOUNTER
Patient contacted and made aware of lab results. Patient voiced understanding of low potassium level and NP recommendations of Potassium supplement daily. Patient stated she just filled her Potassium 20meq and will start taking today. No concerns voiced.      ----- Message from ASHWINI Gibbs - NP sent at 1/15/2024  9:34 PM EST -----  Thyroid lab normal  Liver and kidney function normal/stable.  Blood counts normal  Cholesterol levels look great!    Potassium is low.  Would like you to take potassium supplement once daily

## 2024-02-05 ENCOUNTER — APPOINTMENT (OUTPATIENT)
Facility: HOSPITAL | Age: 79
End: 2024-02-05
Payer: COMMERCIAL

## 2024-02-05 ENCOUNTER — HOSPITAL ENCOUNTER (EMERGENCY)
Facility: HOSPITAL | Age: 79
Discharge: HOME | End: 2024-02-05
Attending: EMERGENCY MEDICINE
Payer: COMMERCIAL

## 2024-02-05 VITALS
HEART RATE: 98 BPM | DIASTOLIC BLOOD PRESSURE: 73 MMHG | RESPIRATION RATE: 17 BRPM | BODY MASS INDEX: 20.77 KG/M2 | HEIGHT: 61 IN | OXYGEN SATURATION: 93 % | WEIGHT: 110 LBS | SYSTOLIC BLOOD PRESSURE: 127 MMHG | TEMPERATURE: 97.5 F

## 2024-02-05 DIAGNOSIS — S32.020A CLOSED COMPRESSION FRACTURE OF L2 LUMBAR VERTEBRA, INITIAL ENCOUNTER (HCC): Primary | ICD-10-CM

## 2024-02-05 PROCEDURE — 72131 CT LUMBAR SPINE W/O DYE: CPT

## 2024-02-05 PROCEDURE — 72192 CT PELVIS W/O DYE: CPT

## 2024-02-05 PROCEDURE — 99284 EMERGENCY DEPT VISIT MOD MDM: CPT

## 2024-02-05 PROCEDURE — 6370000000 HC RX 637 (ALT 250 FOR IP): Performed by: FAMILY MEDICINE

## 2024-02-05 RX ORDER — IBUPROFEN 600 MG/1
600 TABLET ORAL EVERY 8 HOURS PRN
Qty: 21 TABLET | Refills: 0 | Status: SHIPPED | OUTPATIENT
Start: 2024-02-05 | End: 2024-02-12

## 2024-02-05 RX ORDER — HYDROCODONE BITARTRATE AND ACETAMINOPHEN 5; 325 MG/1; MG/1
1 TABLET ORAL
Status: COMPLETED | OUTPATIENT
Start: 2024-02-05 | End: 2024-02-05

## 2024-02-05 RX ORDER — LIDOCAINE 50 MG/G
1 PATCH TOPICAL DAILY
Qty: 10 PATCH | Refills: 0 | Status: SHIPPED | OUTPATIENT
Start: 2024-02-05 | End: 2024-02-15

## 2024-02-05 RX ORDER — HYDROCODONE BITARTRATE AND ACETAMINOPHEN 5; 325 MG/1; MG/1
1 TABLET ORAL EVERY 6 HOURS PRN
Qty: 12 TABLET | Refills: 0 | Status: SHIPPED | OUTPATIENT
Start: 2024-02-05 | End: 2024-02-08

## 2024-02-05 RX ADMIN — HYDROCODONE BITARTRATE AND ACETAMINOPHEN 1 TABLET: 5; 325 TABLET ORAL at 10:43

## 2024-02-05 ASSESSMENT — PAIN - FUNCTIONAL ASSESSMENT
PAIN_FUNCTIONAL_ASSESSMENT: PREVENTS OR INTERFERES SOME ACTIVE ACTIVITIES AND ADLS
PAIN_FUNCTIONAL_ASSESSMENT: PREVENTS OR INTERFERES SOME ACTIVE ACTIVITIES AND ADLS
PAIN_FUNCTIONAL_ASSESSMENT: 0-10

## 2024-02-05 ASSESSMENT — PAIN DESCRIPTION - DESCRIPTORS
DESCRIPTORS: ACHING
DESCRIPTORS: ACHING

## 2024-02-05 ASSESSMENT — PAIN DESCRIPTION - ONSET
ONSET: PROGRESSIVE
ONSET: PROGRESSIVE

## 2024-02-05 ASSESSMENT — PAIN DESCRIPTION - LOCATION
LOCATION: HIP
LOCATION: HIP

## 2024-02-05 ASSESSMENT — PAIN SCALES - GENERAL
PAINLEVEL_OUTOF10: 9
PAINLEVEL_OUTOF10: 9

## 2024-02-05 ASSESSMENT — ENCOUNTER SYMPTOMS
SHORTNESS OF BREATH: 0
BACK PAIN: 1
NAUSEA: 0
ABDOMINAL PAIN: 0
COUGH: 0
VOMITING: 0

## 2024-02-05 ASSESSMENT — PAIN DESCRIPTION - ORIENTATION
ORIENTATION: LEFT
ORIENTATION: LEFT

## 2024-02-05 ASSESSMENT — PAIN DESCRIPTION - FREQUENCY
FREQUENCY: CONTINUOUS
FREQUENCY: CONTINUOUS

## 2024-02-05 ASSESSMENT — PAIN DESCRIPTION - PAIN TYPE
TYPE: ACUTE PAIN
TYPE: ACUTE PAIN

## 2024-02-05 NOTE — ED TRIAGE NOTES
Patient arrives to ER via EMS from home with c/o left hip pain. States she had a GLF Saturday evening. Said she was closing her patio door when she tripped over her walker. Hx hip replacement in 2022. Denies LOC or any other injuries.

## 2024-02-05 NOTE — ED PROVIDER NOTES
HISTORY     Past Medical History:   Diagnosis Date    History of mammogram 10 years ago    Hypertension     Hyponatremia 09/11/2014    hospitalized for severe hyponatremia due to SIADH    Menopause     at age 41    Pap smear for cervical cancer screening 15 years ago    SIADH (syndrome of inappropriate ADH production) (HCC)     UTI (urinary tract infection)          SURGICAL HISTORY       Past Surgical History:   Procedure Laterality Date    CATARACT REMOVAL Right 1/14/2014    CHOLECYSTECTOMY      HIP FRACTURE SURGERY Left 2022    left hemiarthroplasty    THROMBECTOMY Right     leg    TUBAL LIGATION      UPPER GASTROINTESTINAL ENDOSCOPY N/A 7/13/2023    EGD ESOPHAGOGASTRODUODENOSCOPY performed by Derrell Stacy MD at [facility] ENDOSCOPY    UPPER GASTROINTESTINAL ENDOSCOPY N/A 7/13/2023    EGD BIOPSY performed by Derrell Stacy MD at [facility] ENDOSCOPY         CURRENT MEDICATIONS       Previous Medications    ACETAMINOPHEN (TYLENOL) 325 MG TABLET    Take 2 tablets by mouth every 6 hours as needed    AMLODIPINE (NORVASC) 10 MG TABLET    Take 1 tablet by mouth daily    ASPIRIN 81 MG EC TABLET    Take 1 tablet by mouth daily    ATORVASTATIN (LIPITOR) 20 MG TABLET    Take 1 tablet by mouth daily    DIAZEPAM (VALIUM) 2 MG TABLET    Take 1 tablet by mouth every 8 hours as needed.    METOPROLOL TARTRATE (LOPRESSOR) 50 MG TABLET    Take 1 tablet by mouth 2 times daily    MIRTAZAPINE (REMERON) 7.5 MG TABLET    Take 1 tablet by mouth nightly    PANTOPRAZOLE (PROTONIX) 40 MG TABLET    Take 1 tablet by mouth 2 times daily    POTASSIUM CHLORIDE (KLOR-CON M) 20 MEQ EXTENDED RELEASE TABLET    Take 1 tablet by mouth 2 times daily    TRAZODONE (DESYREL) 50 MG TABLET    Take 1 tablet by mouth nightly       ALLERGIES     Levofloxacin, Ace inhibitors, Hydrochlorothiazide, and Penicillins    FAMILY HISTORY       Family History   Problem Relation Age of Onset    Kidney Disease Mother         uncertain          SOCIAL HISTORY       Social History

## 2024-02-14 ENCOUNTER — HOSPITAL ENCOUNTER (INPATIENT)
Facility: HOSPITAL | Age: 79
LOS: 8 days | Discharge: NURSING, REHAB OR LONG-TERM CARE | DRG: 515 | End: 2024-02-22
Attending: STUDENT IN AN ORGANIZED HEALTH CARE EDUCATION/TRAINING PROGRAM | Admitting: STUDENT IN AN ORGANIZED HEALTH CARE EDUCATION/TRAINING PROGRAM
Payer: MEDICARE

## 2024-02-14 ENCOUNTER — APPOINTMENT (OUTPATIENT)
Facility: HOSPITAL | Age: 79
DRG: 515 | End: 2024-02-14
Payer: MEDICARE

## 2024-02-14 DIAGNOSIS — N17.9 AKI (ACUTE KIDNEY INJURY) (HCC): ICD-10-CM

## 2024-02-14 DIAGNOSIS — R62.7 FAILURE TO THRIVE IN ADULT: ICD-10-CM

## 2024-02-14 DIAGNOSIS — M54.59 INTRACTABLE LOW BACK PAIN: ICD-10-CM

## 2024-02-14 DIAGNOSIS — S32.020A COMPRESSION FRACTURE OF L2 VERTEBRA, INITIAL ENCOUNTER (HCC): Primary | ICD-10-CM

## 2024-02-14 DIAGNOSIS — E87.6 HYPOKALEMIA: ICD-10-CM

## 2024-02-14 DIAGNOSIS — E87.1 HYPONATREMIA: ICD-10-CM

## 2024-02-14 DIAGNOSIS — F10.11 ALCOHOL ABUSE, IN REMISSION: ICD-10-CM

## 2024-02-14 DIAGNOSIS — R74.01 TRANSAMINITIS: ICD-10-CM

## 2024-02-14 DIAGNOSIS — F10.11 HISTORY OF ALCOHOL ABUSE: ICD-10-CM

## 2024-02-14 PROBLEM — R79.89 ELEVATED LIVER FUNCTION TESTS: Status: ACTIVE | Noted: 2024-02-14

## 2024-02-14 LAB
ALBUMIN SERPL-MCNC: 3.4 G/DL (ref 3.5–5)
ALBUMIN/GLOB SERPL: 0.8 (ref 1.1–2.2)
ALP SERPL-CCNC: 387 U/L (ref 45–117)
ALT SERPL-CCNC: 169 U/L (ref 12–78)
ANION GAP SERPL CALC-SCNC: 16 MMOL/L (ref 5–15)
APPEARANCE UR: CLEAR
AST SERPL-CCNC: 63 U/L (ref 15–37)
BACTERIA URNS QL MICRO: NEGATIVE /HPF
BASOPHILS # BLD: 0.1 K/UL (ref 0–0.1)
BASOPHILS NFR BLD: 1 % (ref 0–1)
BILIRUB SERPL-MCNC: 3 MG/DL (ref 0.2–1)
BILIRUB UR QL CFM: NEGATIVE
BUN SERPL-MCNC: 38 MG/DL (ref 6–20)
BUN/CREAT SERPL: 29 (ref 12–20)
CALCIUM SERPL-MCNC: 9.7 MG/DL (ref 8.5–10.1)
CHLORIDE SERPL-SCNC: 91 MMOL/L (ref 97–108)
CO2 SERPL-SCNC: 22 MMOL/L (ref 21–32)
COLOR UR: ABNORMAL
COMMENT:: NORMAL
CREAT SERPL-MCNC: 1.3 MG/DL (ref 0.55–1.02)
DIFFERENTIAL METHOD BLD: ABNORMAL
EOSINOPHIL # BLD: 0 K/UL (ref 0–0.4)
EOSINOPHIL NFR BLD: 0 % (ref 0–7)
EPITH CASTS URNS QL MICRO: ABNORMAL /LPF
ERYTHROCYTE [DISTWIDTH] IN BLOOD BY AUTOMATED COUNT: 16 % (ref 11.5–14.5)
ETHANOL SERPL-MCNC: <10 MG/DL (ref 0–0.08)
GLOBULIN SER CALC-MCNC: 4.2 G/DL (ref 2–4)
GLUCOSE SERPL-MCNC: 67 MG/DL (ref 65–100)
GLUCOSE UR STRIP.AUTO-MCNC: NEGATIVE MG/DL
HCT VFR BLD AUTO: 40 % (ref 35–47)
HGB BLD-MCNC: 14.3 G/DL (ref 11.5–16)
HGB UR QL STRIP: ABNORMAL
IMM GRANULOCYTES # BLD AUTO: 0 K/UL (ref 0–0.04)
IMM GRANULOCYTES NFR BLD AUTO: 0 % (ref 0–0.5)
INR PPP: 1.2 (ref 0.9–1.1)
KETONES UR QL STRIP.AUTO: 40 MG/DL
LEUKOCYTE ESTERASE UR QL STRIP.AUTO: NEGATIVE
LYMPHOCYTES # BLD: 0.9 K/UL (ref 0.8–3.5)
LYMPHOCYTES NFR BLD: 7 % (ref 12–49)
MAGNESIUM SERPL-MCNC: 2.2 MG/DL (ref 1.6–2.4)
MAGNESIUM SERPL-MCNC: 2.3 MG/DL (ref 1.6–2.4)
MCH RBC QN AUTO: 31.5 PG (ref 26–34)
MCHC RBC AUTO-ENTMCNC: 35.8 G/DL (ref 30–36.5)
MCV RBC AUTO: 88.1 FL (ref 80–99)
MONOCYTES # BLD: 0.6 K/UL (ref 0–1)
MONOCYTES NFR BLD: 5 % (ref 5–13)
NEUTS SEG # BLD: 10.7 K/UL (ref 1.8–8)
NEUTS SEG NFR BLD: 87 % (ref 32–75)
NITRITE UR QL STRIP.AUTO: NEGATIVE
NRBC # BLD: 0 K/UL (ref 0–0.01)
NRBC BLD-RTO: 0 PER 100 WBC
PH UR STRIP: 5.5 (ref 5–8)
PLATELET # BLD AUTO: 202 K/UL (ref 150–400)
PMV BLD AUTO: 9.7 FL (ref 8.9–12.9)
POTASSIUM SERPL-SCNC: 2.9 MMOL/L (ref 3.5–5.1)
PROT SERPL-MCNC: 7.6 G/DL (ref 6.4–8.2)
PROT UR STRIP-MCNC: 30 MG/DL
PROTHROMBIN TIME: 12.2 SEC (ref 9–11.1)
RBC # BLD AUTO: 4.54 M/UL (ref 3.8–5.2)
RBC #/AREA URNS HPF: ABNORMAL /HPF (ref 0–5)
SODIUM SERPL-SCNC: 129 MMOL/L (ref 136–145)
SP GR UR REFRACTOMETRY: 1.01 (ref 1–1.03)
SPECIMEN HOLD: NORMAL
URINE CULTURE IF INDICATED: ABNORMAL
UROBILINOGEN UR QL STRIP.AUTO: 1 EU/DL (ref 0.2–1)
WBC # BLD AUTO: 12.3 K/UL (ref 3.6–11)
WBC URNS QL MICRO: ABNORMAL /HPF (ref 0–4)

## 2024-02-14 PROCEDURE — 82077 ASSAY SPEC XCP UR&BREATH IA: CPT

## 2024-02-14 PROCEDURE — 99285 EMERGENCY DEPT VISIT HI MDM: CPT

## 2024-02-14 PROCEDURE — 2060000000 HC ICU INTERMEDIATE R&B

## 2024-02-14 PROCEDURE — 96360 HYDRATION IV INFUSION INIT: CPT

## 2024-02-14 PROCEDURE — 6360000002 HC RX W HCPCS: Performed by: STUDENT IN AN ORGANIZED HEALTH CARE EDUCATION/TRAINING PROGRAM

## 2024-02-14 PROCEDURE — 2580000003 HC RX 258: Performed by: STUDENT IN AN ORGANIZED HEALTH CARE EDUCATION/TRAINING PROGRAM

## 2024-02-14 PROCEDURE — 81001 URINALYSIS AUTO W/SCOPE: CPT

## 2024-02-14 PROCEDURE — 83735 ASSAY OF MAGNESIUM: CPT

## 2024-02-14 PROCEDURE — 80053 COMPREHEN METABOLIC PANEL: CPT

## 2024-02-14 PROCEDURE — 85610 PROTHROMBIN TIME: CPT

## 2024-02-14 PROCEDURE — 76705 ECHO EXAM OF ABDOMEN: CPT

## 2024-02-14 PROCEDURE — 6370000000 HC RX 637 (ALT 250 FOR IP): Performed by: STUDENT IN AN ORGANIZED HEALTH CARE EDUCATION/TRAINING PROGRAM

## 2024-02-14 PROCEDURE — 85025 COMPLETE CBC W/AUTO DIFF WBC: CPT

## 2024-02-14 PROCEDURE — 36415 COLL VENOUS BLD VENIPUNCTURE: CPT

## 2024-02-14 PROCEDURE — 2500000003 HC RX 250 WO HCPCS: Performed by: STUDENT IN AN ORGANIZED HEALTH CARE EDUCATION/TRAINING PROGRAM

## 2024-02-14 RX ORDER — LORAZEPAM 1 MG/1
1 TABLET ORAL
Status: DISCONTINUED | OUTPATIENT
Start: 2024-02-14 | End: 2024-02-22 | Stop reason: HOSPADM

## 2024-02-14 RX ORDER — ACETAMINOPHEN 325 MG/1
650 TABLET ORAL EVERY 6 HOURS PRN
Status: DISCONTINUED | OUTPATIENT
Start: 2024-02-14 | End: 2024-02-14

## 2024-02-14 RX ORDER — HEPARIN SODIUM 5000 [USP'U]/ML
5000 INJECTION, SOLUTION INTRAVENOUS; SUBCUTANEOUS 2 TIMES DAILY
Status: DISCONTINUED | OUTPATIENT
Start: 2024-02-14 | End: 2024-02-22 | Stop reason: HOSPADM

## 2024-02-14 RX ORDER — MIRTAZAPINE 15 MG/1
7.5 TABLET, FILM COATED ORAL NIGHTLY
Status: DISCONTINUED | OUTPATIENT
Start: 2024-02-14 | End: 2024-02-22 | Stop reason: HOSPADM

## 2024-02-14 RX ORDER — LORAZEPAM 2 MG/1
2 TABLET ORAL
Status: DISCONTINUED | OUTPATIENT
Start: 2024-02-14 | End: 2024-02-22 | Stop reason: HOSPADM

## 2024-02-14 RX ORDER — ACETAMINOPHEN 650 MG/1
650 SUPPOSITORY RECTAL EVERY 6 HOURS PRN
Status: DISCONTINUED | OUTPATIENT
Start: 2024-02-14 | End: 2024-02-14

## 2024-02-14 RX ORDER — DEXTROSE MONOHYDRATE, SODIUM CHLORIDE, AND POTASSIUM CHLORIDE 50; 1.49; 9 G/1000ML; G/1000ML; G/1000ML
INJECTION, SOLUTION INTRAVENOUS CONTINUOUS
Status: DISCONTINUED | OUTPATIENT
Start: 2024-02-14 | End: 2024-02-16

## 2024-02-14 RX ORDER — SODIUM CHLORIDE 0.9 % (FLUSH) 0.9 %
5-40 SYRINGE (ML) INJECTION EVERY 12 HOURS SCHEDULED
Status: DISCONTINUED | OUTPATIENT
Start: 2024-02-14 | End: 2024-02-22 | Stop reason: HOSPADM

## 2024-02-14 RX ORDER — SODIUM CHLORIDE 9 MG/ML
INJECTION, SOLUTION INTRAVENOUS PRN
Status: DISCONTINUED | OUTPATIENT
Start: 2024-02-14 | End: 2024-02-22 | Stop reason: HOSPADM

## 2024-02-14 RX ORDER — LORAZEPAM 0.5 MG/1
0.5 TABLET ORAL EVERY 4 HOURS PRN
Status: DISCONTINUED | OUTPATIENT
Start: 2024-02-14 | End: 2024-02-22 | Stop reason: HOSPADM

## 2024-02-14 RX ORDER — LANOLIN ALCOHOL/MO/W.PET/CERES
100 CREAM (GRAM) TOPICAL DAILY
Status: DISCONTINUED | OUTPATIENT
Start: 2024-02-14 | End: 2024-02-14 | Stop reason: SDUPTHER

## 2024-02-14 RX ORDER — ONDANSETRON 4 MG/1
4 TABLET, ORALLY DISINTEGRATING ORAL EVERY 8 HOURS PRN
Status: DISCONTINUED | OUTPATIENT
Start: 2024-02-14 | End: 2024-02-22 | Stop reason: HOSPADM

## 2024-02-14 RX ORDER — ONDANSETRON 2 MG/ML
4 INJECTION INTRAMUSCULAR; INTRAVENOUS EVERY 6 HOURS PRN
Status: DISCONTINUED | OUTPATIENT
Start: 2024-02-14 | End: 2024-02-22 | Stop reason: HOSPADM

## 2024-02-14 RX ORDER — 0.9 % SODIUM CHLORIDE 0.9 %
1000 INTRAVENOUS SOLUTION INTRAVENOUS ONCE
Status: COMPLETED | OUTPATIENT
Start: 2024-02-14 | End: 2024-02-14

## 2024-02-14 RX ORDER — POTASSIUM CHLORIDE 750 MG/1
40 TABLET, FILM COATED, EXTENDED RELEASE ORAL ONCE
Status: COMPLETED | OUTPATIENT
Start: 2024-02-14 | End: 2024-02-14

## 2024-02-14 RX ORDER — SODIUM CHLORIDE 0.9 % (FLUSH) 0.9 %
5-40 SYRINGE (ML) INJECTION PRN
Status: DISCONTINUED | OUTPATIENT
Start: 2024-02-14 | End: 2024-02-22 | Stop reason: HOSPADM

## 2024-02-14 RX ORDER — POLYETHYLENE GLYCOL 3350 17 G/17G
17 POWDER, FOR SOLUTION ORAL DAILY PRN
Status: DISCONTINUED | OUTPATIENT
Start: 2024-02-14 | End: 2024-02-22 | Stop reason: HOSPADM

## 2024-02-14 RX ORDER — TRAZODONE HYDROCHLORIDE 50 MG/1
50 TABLET ORAL NIGHTLY
Status: DISCONTINUED | OUTPATIENT
Start: 2024-02-14 | End: 2024-02-22 | Stop reason: HOSPADM

## 2024-02-14 RX ORDER — ATORVASTATIN CALCIUM 20 MG/1
20 TABLET, FILM COATED ORAL NIGHTLY
Status: DISCONTINUED | OUTPATIENT
Start: 2024-02-14 | End: 2024-02-22 | Stop reason: HOSPADM

## 2024-02-14 RX ORDER — LORAZEPAM 2 MG/ML
1 CONCENTRATE ORAL EVERY 8 HOURS PRN
Status: DISCONTINUED | OUTPATIENT
Start: 2024-02-14 | End: 2024-02-22 | Stop reason: HOSPADM

## 2024-02-14 RX ORDER — OXYCODONE HYDROCHLORIDE AND ACETAMINOPHEN 5; 325 MG/1; MG/1
1 TABLET ORAL
Status: COMPLETED | OUTPATIENT
Start: 2024-02-14 | End: 2024-02-14

## 2024-02-14 RX ORDER — LORAZEPAM 2 MG/1
4 TABLET ORAL
Status: DISCONTINUED | OUTPATIENT
Start: 2024-02-14 | End: 2024-02-22 | Stop reason: HOSPADM

## 2024-02-14 RX ORDER — METOPROLOL TARTRATE 50 MG/1
50 TABLET, FILM COATED ORAL 2 TIMES DAILY
Status: DISCONTINUED | OUTPATIENT
Start: 2024-02-14 | End: 2024-02-22 | Stop reason: HOSPADM

## 2024-02-14 RX ADMIN — SODIUM CHLORIDE, PRESERVATIVE FREE 10 ML: 5 INJECTION INTRAVENOUS at 20:24

## 2024-02-14 RX ADMIN — POTASSIUM CHLORIDE, DEXTROSE MONOHYDRATE AND SODIUM CHLORIDE: 150; 5; 900 INJECTION, SOLUTION INTRAVENOUS at 16:11

## 2024-02-14 RX ADMIN — SODIUM CHLORIDE 1000 ML: 9 INJECTION, SOLUTION INTRAVENOUS at 14:13

## 2024-02-14 RX ADMIN — OXYCODONE HYDROCHLORIDE AND ACETAMINOPHEN 1 TABLET: 5; 325 TABLET ORAL at 12:32

## 2024-02-14 RX ADMIN — ATORVASTATIN CALCIUM 20 MG: 10 TABLET, FILM COATED ORAL at 20:10

## 2024-02-14 RX ADMIN — THIAMINE HYDROCHLORIDE 100 MG: 100 INJECTION, SOLUTION INTRAMUSCULAR; INTRAVENOUS at 16:15

## 2024-02-14 RX ADMIN — POTASSIUM CHLORIDE 40 MEQ: 750 TABLET, FILM COATED, EXTENDED RELEASE ORAL at 14:07

## 2024-02-14 RX ADMIN — MIRTAZAPINE 7.5 MG: 15 TABLET, FILM COATED ORAL at 20:24

## 2024-02-14 RX ADMIN — ONDANSETRON 4 MG: 2 INJECTION INTRAMUSCULAR; INTRAVENOUS at 20:29

## 2024-02-14 RX ADMIN — TRAZODONE HYDROCHLORIDE 50 MG: 50 TABLET ORAL at 20:10

## 2024-02-14 RX ADMIN — METOPROLOL TARTRATE 50 MG: 50 TABLET, FILM COATED ORAL at 20:10

## 2024-02-14 RX ADMIN — HEPARIN SODIUM 5000 UNITS: 5000 INJECTION INTRAVENOUS; SUBCUTANEOUS at 20:11

## 2024-02-14 ASSESSMENT — PAIN - FUNCTIONAL ASSESSMENT
PAIN_FUNCTIONAL_ASSESSMENT: 0-10
PAIN_FUNCTIONAL_ASSESSMENT: ACTIVITIES ARE NOT PREVENTED
PAIN_FUNCTIONAL_ASSESSMENT: PREVENTS OR INTERFERES SOME ACTIVE ACTIVITIES AND ADLS
PAIN_FUNCTIONAL_ASSESSMENT: ACTIVITIES ARE NOT PREVENTED

## 2024-02-14 ASSESSMENT — PAIN DESCRIPTION - FREQUENCY
FREQUENCY: CONTINUOUS
FREQUENCY: CONTINUOUS

## 2024-02-14 ASSESSMENT — PAIN DESCRIPTION - LOCATION
LOCATION: BACK

## 2024-02-14 ASSESSMENT — PAIN DESCRIPTION - ORIENTATION
ORIENTATION: MID;LOWER
ORIENTATION: LOWER
ORIENTATION: MID;LOWER

## 2024-02-14 ASSESSMENT — PAIN SCALES - GENERAL
PAINLEVEL_OUTOF10: 5
PAINLEVEL_OUTOF10: 8
PAINLEVEL_OUTOF10: 10

## 2024-02-14 ASSESSMENT — PAIN DESCRIPTION - PAIN TYPE
TYPE: ACUTE PAIN
TYPE: ACUTE PAIN

## 2024-02-14 ASSESSMENT — PAIN DESCRIPTION - DESCRIPTORS
DESCRIPTORS: ACHING
DESCRIPTORS: SHARP;SHOOTING
DESCRIPTORS: SHARP;SHOOTING

## 2024-02-14 ASSESSMENT — PAIN DESCRIPTION - ONSET
ONSET: ON-GOING
ONSET: ON-GOING

## 2024-02-14 NOTE — H&P
History and Physical    Date of Service:  2/14/2024  Primary Care Provider: Diana Avelar APRN - NP  Source of information: The patient and Chart review    Chief Complaint: Back Pain      History of Presenting Illness:   Vidya Das is a 78 y.o. female who presents with ground-level fall.  Found to have compression fracture.  Patient endorsing back pain.  Found to have elevated liver enzymes in the ER history of alcohol use    The patient denies any headache, blurry vision, sore throat, trouble swallowing, trouble with speech, chest pain, SOB, cough, fever, chilln, urinary symptoms, constipation, recent travels, sick contacts, focal or generalized neurological symptoms, falls, injuries, rashes, contact with COVID-19 diagnosed patients, hematemesis, melena, hemoptysis, hematuria, rashes, denies starting any new medications and denies any other concerns or problems besides as mentioned above.         REVIEW OF SYSTEMS:  Pertinent items are noted in the History of Present Illness.     Past Medical History:   Diagnosis Date    History of mammogram 10 years ago    Hypertension     Hyponatremia 09/11/2014    hospitalized for severe hyponatremia due to SIADH    Menopause     at age 41    Pap smear for cervical cancer screening 15 years ago    SIADH (syndrome of inappropriate ADH production) (HCC)     UTI (urinary tract infection)       Past Surgical History:   Procedure Laterality Date    CATARACT REMOVAL Right 1/14/2014    CHOLECYSTECTOMY      HIP FRACTURE SURGERY Left 2022    left hemiarthroplasty    THROMBECTOMY Right     leg    TUBAL LIGATION      UPPER GASTROINTESTINAL ENDOSCOPY N/A 7/13/2023    EGD ESOPHAGOGASTRODUODENOSCOPY performed by Derrell Stacy MD at [facility] ENDOSCOPY    UPPER GASTROINTESTINAL ENDOSCOPY N/A 7/13/2023    EGD BIOPSY performed by Derrell Stacy MD at [facility] ENDOSCOPY     Prior to Admission medications    Medication Sig Start Date End Date Taking? Authorizing Provider   ibuprofen

## 2024-02-14 NOTE — ED TRIAGE NOTES
Patient arrives to ER via EMS from home with c/o back pain. Recent fall on 2/4 and was seen on 2/5 with diagnosed compression fracture. Was suppose to follow-up with Ortho but patient states she lives alone and does not have transportation.

## 2024-02-14 NOTE — ED PROVIDER NOTES
kidney injury) (HCC)    6. Hyponatremia    7. Hypokalemia    8. History of alcohol abuse          DISPOSITION/PLAN   DISPOSITION Decision To Admit 02/14/2024 02:31:40 PM      PATIENT REFERRED TO:  No follow-up provider specified.    DISCHARGE MEDICATIONS:  New Prescriptions    No medications on file         (Please note that portions of this note were completed with a voice recognition program.  Efforts were made to edit the dictations but occasionally words are mis-transcribed.)    Luly Haider MD (electronically signed)  Emergency Attending Physician / Physician Assistant / Nurse Practitioner              Luly Haider MD  02/14/24 [id number]

## 2024-02-15 ENCOUNTER — APPOINTMENT (OUTPATIENT)
Facility: HOSPITAL | Age: 79
DRG: 515 | End: 2024-02-15
Payer: MEDICARE

## 2024-02-15 LAB
ALBUMIN SERPL-MCNC: 2.4 G/DL (ref 3.5–5)
ALBUMIN/GLOB SERPL: 0.8 (ref 1.1–2.2)
ALP SERPL-CCNC: 262 U/L (ref 45–117)
ALT SERPL-CCNC: 106 U/L (ref 12–78)
ANION GAP SERPL CALC-SCNC: 3 MMOL/L (ref 5–15)
AST SERPL-CCNC: 53 U/L (ref 15–37)
BASOPHILS # BLD: 0.1 K/UL (ref 0–0.1)
BASOPHILS NFR BLD: 1 % (ref 0–1)
BILIRUB SERPL-MCNC: 1.9 MG/DL (ref 0.2–1)
BUN SERPL-MCNC: 19 MG/DL (ref 6–20)
BUN/CREAT SERPL: 24 (ref 12–20)
CALCIUM SERPL-MCNC: 7.2 MG/DL (ref 8.5–10.1)
CHLORIDE SERPL-SCNC: 108 MMOL/L (ref 97–108)
CO2 SERPL-SCNC: 25 MMOL/L (ref 21–32)
CREAT SERPL-MCNC: 0.8 MG/DL (ref 0.55–1.02)
DIFFERENTIAL METHOD BLD: ABNORMAL
EOSINOPHIL # BLD: 0.3 K/UL (ref 0–0.4)
EOSINOPHIL NFR BLD: 4 % (ref 0–7)
ERYTHROCYTE [DISTWIDTH] IN BLOOD BY AUTOMATED COUNT: 15.9 % (ref 11.5–14.5)
GLOBULIN SER CALC-MCNC: 3 G/DL (ref 2–4)
GLUCOSE SERPL-MCNC: 127 MG/DL (ref 65–100)
HCT VFR BLD AUTO: 33.2 % (ref 35–47)
HGB BLD-MCNC: 11.9 G/DL (ref 11.5–16)
IMM GRANULOCYTES # BLD AUTO: 0 K/UL (ref 0–0.04)
IMM GRANULOCYTES NFR BLD AUTO: 0 % (ref 0–0.5)
LYMPHOCYTES # BLD: 1.1 K/UL (ref 0.8–3.5)
LYMPHOCYTES NFR BLD: 13 % (ref 12–49)
MCH RBC QN AUTO: 32.1 PG (ref 26–34)
MCHC RBC AUTO-ENTMCNC: 35.8 G/DL (ref 30–36.5)
MCV RBC AUTO: 89.5 FL (ref 80–99)
MONOCYTES # BLD: 0.4 K/UL (ref 0–1)
MONOCYTES NFR BLD: 5 % (ref 5–13)
NEUTS SEG # BLD: 6.3 K/UL (ref 1.8–8)
NEUTS SEG NFR BLD: 77 % (ref 32–75)
NRBC # BLD: 0 K/UL (ref 0–0.01)
NRBC BLD-RTO: 0 PER 100 WBC
PLATELET # BLD AUTO: 166 K/UL (ref 150–400)
PMV BLD AUTO: 9.4 FL (ref 8.9–12.9)
POTASSIUM SERPL-SCNC: 2.7 MMOL/L (ref 3.5–5.1)
PROT SERPL-MCNC: 5.4 G/DL (ref 6.4–8.2)
RBC # BLD AUTO: 3.71 M/UL (ref 3.8–5.2)
SODIUM SERPL-SCNC: 136 MMOL/L (ref 136–145)
WBC # BLD AUTO: 8.3 K/UL (ref 3.6–11)

## 2024-02-15 PROCEDURE — 6360000002 HC RX W HCPCS: Performed by: STUDENT IN AN ORGANIZED HEALTH CARE EDUCATION/TRAINING PROGRAM

## 2024-02-15 PROCEDURE — 6370000000 HC RX 637 (ALT 250 FOR IP): Performed by: HOSPITALIST

## 2024-02-15 PROCEDURE — 36415 COLL VENOUS BLD VENIPUNCTURE: CPT

## 2024-02-15 PROCEDURE — 2060000000 HC ICU INTERMEDIATE R&B

## 2024-02-15 PROCEDURE — 6370000000 HC RX 637 (ALT 250 FOR IP): Performed by: STUDENT IN AN ORGANIZED HEALTH CARE EDUCATION/TRAINING PROGRAM

## 2024-02-15 PROCEDURE — 2580000003 HC RX 258: Performed by: STUDENT IN AN ORGANIZED HEALTH CARE EDUCATION/TRAINING PROGRAM

## 2024-02-15 PROCEDURE — 6360000002 HC RX W HCPCS: Performed by: NURSE PRACTITIONER

## 2024-02-15 PROCEDURE — 80053 COMPREHEN METABOLIC PANEL: CPT

## 2024-02-15 PROCEDURE — 72148 MRI LUMBAR SPINE W/O DYE: CPT

## 2024-02-15 PROCEDURE — 85025 COMPLETE CBC W/AUTO DIFF WBC: CPT

## 2024-02-15 PROCEDURE — 2500000003 HC RX 250 WO HCPCS: Performed by: STUDENT IN AN ORGANIZED HEALTH CARE EDUCATION/TRAINING PROGRAM

## 2024-02-15 RX ORDER — POTASSIUM CHLORIDE 7.45 MG/ML
10 INJECTION INTRAVENOUS
Status: COMPLETED | OUTPATIENT
Start: 2024-02-15 | End: 2024-02-15

## 2024-02-15 RX ORDER — HYDROMORPHONE HYDROCHLORIDE 1 MG/ML
0.5 INJECTION, SOLUTION INTRAMUSCULAR; INTRAVENOUS; SUBCUTANEOUS EVERY 4 HOURS PRN
Status: DISCONTINUED | OUTPATIENT
Start: 2024-02-15 | End: 2024-02-22 | Stop reason: HOSPADM

## 2024-02-15 RX ORDER — LIDOCAINE 4 G/G
1 PATCH TOPICAL DAILY
Status: DISCONTINUED | OUTPATIENT
Start: 2024-02-15 | End: 2024-02-22 | Stop reason: HOSPADM

## 2024-02-15 RX ORDER — OXYCODONE HYDROCHLORIDE AND ACETAMINOPHEN 5; 325 MG/1; MG/1
1 TABLET ORAL EVERY 4 HOURS PRN
Status: DISCONTINUED | OUTPATIENT
Start: 2024-02-15 | End: 2024-02-22 | Stop reason: HOSPADM

## 2024-02-15 RX ADMIN — SODIUM CHLORIDE, PRESERVATIVE FREE 10 ML: 5 INJECTION INTRAVENOUS at 08:19

## 2024-02-15 RX ADMIN — POTASSIUM CHLORIDE, DEXTROSE MONOHYDRATE AND SODIUM CHLORIDE: 150; 5; 900 INJECTION, SOLUTION INTRAVENOUS at 16:27

## 2024-02-15 RX ADMIN — MIRTAZAPINE 7.5 MG: 15 TABLET, FILM COATED ORAL at 20:37

## 2024-02-15 RX ADMIN — ATORVASTATIN CALCIUM 20 MG: 10 TABLET, FILM COATED ORAL at 20:37

## 2024-02-15 RX ADMIN — POTASSIUM CHLORIDE 10 MEQ: 7.46 INJECTION, SOLUTION INTRAVENOUS at 09:09

## 2024-02-15 RX ADMIN — OXYCODONE AND ACETAMINOPHEN 1 TABLET: 5; 325 TABLET ORAL at 20:37

## 2024-02-15 RX ADMIN — SODIUM CHLORIDE, PRESERVATIVE FREE 10 ML: 5 INJECTION INTRAVENOUS at 20:37

## 2024-02-15 RX ADMIN — POTASSIUM CHLORIDE 10 MEQ: 7.46 INJECTION, SOLUTION INTRAVENOUS at 08:19

## 2024-02-15 RX ADMIN — OXYCODONE AND ACETAMINOPHEN 1 TABLET: 5; 325 TABLET ORAL at 09:09

## 2024-02-15 RX ADMIN — TRAZODONE HYDROCHLORIDE 50 MG: 50 TABLET ORAL at 20:37

## 2024-02-15 RX ADMIN — HEPARIN SODIUM 5000 UNITS: 5000 INJECTION INTRAVENOUS; SUBCUTANEOUS at 08:19

## 2024-02-15 RX ADMIN — POTASSIUM CHLORIDE 10 MEQ: 7.46 INJECTION, SOLUTION INTRAVENOUS at 10:02

## 2024-02-15 RX ADMIN — THIAMINE HYDROCHLORIDE 100 MG: 100 INJECTION, SOLUTION INTRAMUSCULAR; INTRAVENOUS at 16:28

## 2024-02-15 RX ADMIN — METOPROLOL TARTRATE 50 MG: 50 TABLET, FILM COATED ORAL at 20:37

## 2024-02-15 RX ADMIN — METOPROLOL TARTRATE 50 MG: 50 TABLET, FILM COATED ORAL at 08:19

## 2024-02-15 RX ADMIN — POTASSIUM CHLORIDE 10 MEQ: 7.46 INJECTION, SOLUTION INTRAVENOUS at 07:02

## 2024-02-15 ASSESSMENT — PAIN DESCRIPTION - LOCATION
LOCATION: BACK

## 2024-02-15 ASSESSMENT — PAIN DESCRIPTION - DESCRIPTORS
DESCRIPTORS: ACHING

## 2024-02-15 ASSESSMENT — PAIN DESCRIPTION - ORIENTATION
ORIENTATION: MID
ORIENTATION: POSTERIOR

## 2024-02-15 ASSESSMENT — PAIN SCALES - GENERAL
PAINLEVEL_OUTOF10: 3
PAINLEVEL_OUTOF10: 8
PAINLEVEL_OUTOF10: 0
PAINLEVEL_OUTOF10: 7
PAINLEVEL_OUTOF10: 7

## 2024-02-15 NOTE — ED NOTES
Pt fell on 2/5 and has a L2 compression fx// pt comes in today reporting pain to he back// pt found to be hyponatremic and hypokalemic// pt states that this is somewhat normal for her but that her numbers are worse today// pt to be admitted for electrolyte imbalances per ermd

## 2024-02-15 NOTE — CARE COORDINATION
Care Management Initial Assessment       RUR:15%  Readmission? No  1st IM letter given? Yes  (2/15/24)  1st  letter given: No       02/15/24 8592   Service Assessment   Patient Orientation Alert and Oriented   Cognition Alert   History Provided By Patient   Primary Caregiver Self   Support Systems Children   Patient's Healthcare Decision Maker is: Legal Next of Kin   PCP Verified by CM Yes   Last Visit to PCP Within last 3 months   Prior Functional Level Independent in ADLs/IADLs   Can patient return to prior living arrangement Unknown at present   Ability to make needs known: Good   Would you like for me to discuss the discharge plan with any other family members/significant others, and if so, who? Yes  (Libertad Bailey [phone])   Financial Resources Medicare  ([facility] Medicare)   Social/Functional History   Lives With Alone   Type of Home Apartment   Home Layout One level   Bathroom Shower/Tub Tub/Shower unit   Bathroom Equipment Shower chair   ADL Assistance Independent   Homemaking Assistance Independent   Discharge Planning   Living Arrangements Alone   One/Two Story Residence One story   History of falls? 1     CM consult received for rehab. EMR reviewed. Met w/patient and introduced to role . Patient acknowledged having a fall 2 weeks ago and had been experiencing weakness at home. Ms. Das lives home alone and utilizes a RW. [facility] providers [facility] Noted pending MRI ,PT/OT evaluations, and IR. Patient states she is receptive of SNF.     Noted DNR status. AMD on file.     [facility]  Med[employer] is insurance and will require authorization for rehab.     CM Department will continue to follow for transitions of care needs.

## 2024-02-16 ENCOUNTER — APPOINTMENT (OUTPATIENT)
Facility: HOSPITAL | Age: 79
DRG: 515 | End: 2024-02-16
Payer: MEDICARE

## 2024-02-16 LAB
ALBUMIN SERPL-MCNC: 2.5 G/DL (ref 3.5–5)
ALBUMIN/GLOB SERPL: 0.8 (ref 1.1–2.2)
ALP SERPL-CCNC: 268 U/L (ref 45–117)
ALT SERPL-CCNC: 96 U/L (ref 12–78)
ANION GAP SERPL CALC-SCNC: 3 MMOL/L (ref 5–15)
AST SERPL-CCNC: 46 U/L (ref 15–37)
BASOPHILS # BLD: 0.1 K/UL (ref 0–0.1)
BASOPHILS NFR BLD: 1 % (ref 0–1)
BILIRUB SERPL-MCNC: 1.4 MG/DL (ref 0.2–1)
BUN SERPL-MCNC: 13 MG/DL (ref 6–20)
BUN/CREAT SERPL: 15 (ref 12–20)
CALCIUM SERPL-MCNC: 8.4 MG/DL (ref 8.5–10.1)
CHLORIDE SERPL-SCNC: 108 MMOL/L (ref 97–108)
CO2 SERPL-SCNC: 26 MMOL/L (ref 21–32)
CREAT SERPL-MCNC: 0.87 MG/DL (ref 0.55–1.02)
D DIMER PPP FEU-MCNC: 2.35 MG/L FEU (ref 0–0.65)
DIFFERENTIAL METHOD BLD: ABNORMAL
EOSINOPHIL # BLD: 0 K/UL (ref 0–0.4)
EOSINOPHIL NFR BLD: 0 % (ref 0–7)
ERYTHROCYTE [DISTWIDTH] IN BLOOD BY AUTOMATED COUNT: 16.1 % (ref 11.5–14.5)
GLOBULIN SER CALC-MCNC: 3.2 G/DL (ref 2–4)
GLUCOSE SERPL-MCNC: 107 MG/DL (ref 65–100)
HCT VFR BLD AUTO: 32.5 % (ref 35–47)
HGB BLD-MCNC: 11.1 G/DL (ref 11.5–16)
IMM GRANULOCYTES # BLD AUTO: 0 K/UL (ref 0–0.04)
IMM GRANULOCYTES NFR BLD AUTO: 0 % (ref 0–0.5)
LYMPHOCYTES # BLD: 1.7 K/UL (ref 0.8–3.5)
LYMPHOCYTES NFR BLD: 20 % (ref 12–49)
MCH RBC QN AUTO: 31.4 PG (ref 26–34)
MCHC RBC AUTO-ENTMCNC: 34.2 G/DL (ref 30–36.5)
MCV RBC AUTO: 92.1 FL (ref 80–99)
MONOCYTES # BLD: 0.8 K/UL (ref 0–1)
MONOCYTES NFR BLD: 9 % (ref 5–13)
NEUTS SEG # BLD: 5.9 K/UL (ref 1.8–8)
NEUTS SEG NFR BLD: 70 % (ref 32–75)
NRBC # BLD: 0 K/UL (ref 0–0.01)
NRBC BLD-RTO: 0 PER 100 WBC
PLATELET # BLD AUTO: 173 K/UL (ref 150–400)
PMV BLD AUTO: 9.9 FL (ref 8.9–12.9)
POTASSIUM SERPL-SCNC: 3.4 MMOL/L (ref 3.5–5.1)
PROT SERPL-MCNC: 5.7 G/DL (ref 6.4–8.2)
RBC # BLD AUTO: 3.53 M/UL (ref 3.8–5.2)
SODIUM SERPL-SCNC: 137 MMOL/L (ref 136–145)
WBC # BLD AUTO: 8.5 K/UL (ref 3.6–11)

## 2024-02-16 PROCEDURE — 6360000002 HC RX W HCPCS: Performed by: HOSPITALIST

## 2024-02-16 PROCEDURE — 71045 X-RAY EXAM CHEST 1 VIEW: CPT

## 2024-02-16 PROCEDURE — 2060000000 HC ICU INTERMEDIATE R&B

## 2024-02-16 PROCEDURE — 85379 FIBRIN DEGRADATION QUANT: CPT

## 2024-02-16 PROCEDURE — 83735 ASSAY OF MAGNESIUM: CPT

## 2024-02-16 PROCEDURE — 2500000003 HC RX 250 WO HCPCS: Performed by: STUDENT IN AN ORGANIZED HEALTH CARE EDUCATION/TRAINING PROGRAM

## 2024-02-16 PROCEDURE — 80053 COMPREHEN METABOLIC PANEL: CPT

## 2024-02-16 PROCEDURE — 6370000000 HC RX 637 (ALT 250 FOR IP): Performed by: STUDENT IN AN ORGANIZED HEALTH CARE EDUCATION/TRAINING PROGRAM

## 2024-02-16 PROCEDURE — 6370000000 HC RX 637 (ALT 250 FOR IP): Performed by: HOSPITALIST

## 2024-02-16 PROCEDURE — 2580000003 HC RX 258: Performed by: STUDENT IN AN ORGANIZED HEALTH CARE EDUCATION/TRAINING PROGRAM

## 2024-02-16 PROCEDURE — 85025 COMPLETE CBC W/AUTO DIFF WBC: CPT

## 2024-02-16 PROCEDURE — 94760 N-INVAS EAR/PLS OXIMETRY 1: CPT

## 2024-02-16 PROCEDURE — 84100 ASSAY OF PHOSPHORUS: CPT

## 2024-02-16 PROCEDURE — 6360000002 HC RX W HCPCS: Performed by: STUDENT IN AN ORGANIZED HEALTH CARE EDUCATION/TRAINING PROGRAM

## 2024-02-16 PROCEDURE — 36415 COLL VENOUS BLD VENIPUNCTURE: CPT

## 2024-02-16 PROCEDURE — 2700000000 HC OXYGEN THERAPY PER DAY

## 2024-02-16 RX ORDER — FUROSEMIDE 10 MG/ML
20 INJECTION INTRAMUSCULAR; INTRAVENOUS ONCE
Status: COMPLETED | OUTPATIENT
Start: 2024-02-16 | End: 2024-02-16

## 2024-02-16 RX ORDER — SENNA AND DOCUSATE SODIUM 50; 8.6 MG/1; MG/1
2 TABLET, FILM COATED ORAL
Status: DISCONTINUED | OUTPATIENT
Start: 2024-02-16 | End: 2024-02-22 | Stop reason: HOSPADM

## 2024-02-16 RX ORDER — POTASSIUM CHLORIDE 750 MG/1
40 TABLET, FILM COATED, EXTENDED RELEASE ORAL ONCE
Status: COMPLETED | OUTPATIENT
Start: 2024-02-16 | End: 2024-02-16

## 2024-02-16 RX ADMIN — OXYCODONE AND ACETAMINOPHEN 1 TABLET: 5; 325 TABLET ORAL at 15:01

## 2024-02-16 RX ADMIN — TRAZODONE HYDROCHLORIDE 50 MG: 50 TABLET ORAL at 20:35

## 2024-02-16 RX ADMIN — SODIUM CHLORIDE, PRESERVATIVE FREE 10 ML: 5 INJECTION INTRAVENOUS at 07:57

## 2024-02-16 RX ADMIN — ATORVASTATIN CALCIUM 20 MG: 10 TABLET, FILM COATED ORAL at 20:36

## 2024-02-16 RX ADMIN — METOPROLOL TARTRATE 50 MG: 50 TABLET, FILM COATED ORAL at 20:36

## 2024-02-16 RX ADMIN — SODIUM CHLORIDE, PRESERVATIVE FREE 10 ML: 5 INJECTION INTRAVENOUS at 07:55

## 2024-02-16 RX ADMIN — SENNOSIDES AND DOCUSATE SODIUM 2 TABLET: 8.6; 5 TABLET ORAL at 20:35

## 2024-02-16 RX ADMIN — HEPARIN SODIUM 5000 UNITS: 5000 INJECTION INTRAVENOUS; SUBCUTANEOUS at 20:35

## 2024-02-16 RX ADMIN — FUROSEMIDE 20 MG: 10 INJECTION, SOLUTION INTRAMUSCULAR; INTRAVENOUS at 17:27

## 2024-02-16 RX ADMIN — OXYCODONE AND ACETAMINOPHEN 1 TABLET: 5; 325 TABLET ORAL at 23:25

## 2024-02-16 RX ADMIN — MIRTAZAPINE 7.5 MG: 15 TABLET, FILM COATED ORAL at 20:35

## 2024-02-16 RX ADMIN — POTASSIUM CHLORIDE 40 MEQ: 750 TABLET, EXTENDED RELEASE ORAL at 11:59

## 2024-02-16 RX ADMIN — METOPROLOL TARTRATE 50 MG: 50 TABLET, FILM COATED ORAL at 08:34

## 2024-02-16 RX ADMIN — POTASSIUM CHLORIDE, DEXTROSE MONOHYDRATE AND SODIUM CHLORIDE: 150; 5; 900 INJECTION, SOLUTION INTRAVENOUS at 03:25

## 2024-02-16 RX ADMIN — SODIUM CHLORIDE, PRESERVATIVE FREE 10 ML: 5 INJECTION INTRAVENOUS at 20:36

## 2024-02-16 RX ADMIN — THIAMINE HYDROCHLORIDE 100 MG: 100 INJECTION, SOLUTION INTRAMUSCULAR; INTRAVENOUS at 17:27

## 2024-02-16 ASSESSMENT — PAIN DESCRIPTION - LOCATION
LOCATION: BACK
LOCATION: BACK

## 2024-02-16 ASSESSMENT — PAIN SCALES - GENERAL
PAINLEVEL_OUTOF10: 8
PAINLEVEL_OUTOF10: 3
PAINLEVEL_OUTOF10: 9

## 2024-02-16 NOTE — CONSULTS
Comprehensive Nutrition Assessment    Type and Reason for Visit: Initial, Consult    Nutrition Recommendations/Plan:   Regular diet  Dislikes ensure- agreed to magic cup, ice cream, yogurt daily  Continue remeron  Continue to monitor and replete electrolytes as needed       Malnutrition Assessment:  Malnutrition Status:  Severe malnutrition (02/16/24 1155)    Context:  Acute Illness     Findings of the 6 clinical characteristics of malnutrition:  Energy Intake:  50% or less of estimated energy requirements for 5 or more days  Weight Loss:  Greater than 2% over 1 week     Body Fat Loss:  Moderate body fat loss Orbital, Buccal region   Muscle Mass Loss:  Moderate muscle mass loss Temples (temporalis), Clavicles (pectoralis & deltoids)  Fluid Accumulation:  No significant fluid accumulation     Strength:  Not Performed       Nutrition Assessment:    PMHx: HTN, h/o SIADH, h/o etoh use disorder    78 y.o. female admitted with spinal compression fracture after a ground level fall at home. Pt was seen in ED 2/5 for same compression fracture, but did not follow up with ortho. Lumbar MRI pending, IR consulted for kyphoplasty. Visited with pt at bedside- MD consult for wt loss.  Pt endorsed a 5-6# wt loss x 1 week pta. \"I wasn't hungry so I wasn't eating.\" Loss is significant. Pt has very little fat or muscle mass. Wt history indicated chronic low body weight.   Her stated her appetite is improving. She wears dentures but denied any issues chewing, or swallowing. No n/v/c/d.   She doesn't like ensure. Has tried magic cup and does like it, along with ice cream and greek yogurt- all ordered.     K 3.4- being repleted. Na WNL. Ordered Phos and Mg labs    Nutritionally Significant Medications:  Lipitor, lopressor, remeron, klor con, thiamine      Estimated Daily Nutrient Needs:  Energy Requirements Based On: Kcal/kg  Weight Used for Energy Requirements: Current  Energy (kcal/day): 1470 (30 kcal/kg)  Weight Used for Protein

## 2024-02-17 ENCOUNTER — APPOINTMENT (OUTPATIENT)
Facility: HOSPITAL | Age: 79
DRG: 515 | End: 2024-02-17
Attending: HOSPITALIST
Payer: MEDICARE

## 2024-02-17 ENCOUNTER — APPOINTMENT (OUTPATIENT)
Facility: HOSPITAL | Age: 79
DRG: 515 | End: 2024-02-17
Payer: MEDICARE

## 2024-02-17 LAB
ALBUMIN SERPL-MCNC: 2.4 G/DL (ref 3.5–5)
ALBUMIN/GLOB SERPL: 0.7 (ref 1.1–2.2)
ALP SERPL-CCNC: 232 U/L (ref 45–117)
ALT SERPL-CCNC: 76 U/L (ref 12–78)
ANION GAP SERPL CALC-SCNC: 4 MMOL/L (ref 5–15)
AST SERPL-CCNC: 29 U/L (ref 15–37)
BASOPHILS # BLD: 0.1 K/UL (ref 0–0.1)
BASOPHILS NFR BLD: 1 % (ref 0–1)
BILIRUB SERPL-MCNC: 1.1 MG/DL (ref 0.2–1)
BUN SERPL-MCNC: 13 MG/DL (ref 6–20)
BUN/CREAT SERPL: 15 (ref 12–20)
CALCIUM SERPL-MCNC: 8.6 MG/DL (ref 8.5–10.1)
CHLORIDE SERPL-SCNC: 105 MMOL/L (ref 97–108)
CO2 SERPL-SCNC: 29 MMOL/L (ref 21–32)
CREAT SERPL-MCNC: 0.85 MG/DL (ref 0.55–1.02)
DIFFERENTIAL METHOD BLD: ABNORMAL
ECHO BSA: 1.45 M2
EOSINOPHIL # BLD: 0.2 K/UL (ref 0–0.4)
EOSINOPHIL NFR BLD: 3 % (ref 0–7)
ERYTHROCYTE [DISTWIDTH] IN BLOOD BY AUTOMATED COUNT: 16 % (ref 11.5–14.5)
GLOBULIN SER CALC-MCNC: 3.3 G/DL (ref 2–4)
GLUCOSE BLD STRIP.AUTO-MCNC: 100 MG/DL (ref 65–117)
GLUCOSE SERPL-MCNC: 98 MG/DL (ref 65–100)
HCT VFR BLD AUTO: 31.8 % (ref 35–47)
HGB BLD-MCNC: 11.1 G/DL (ref 11.5–16)
IMM GRANULOCYTES # BLD AUTO: 0 K/UL (ref 0–0.04)
IMM GRANULOCYTES NFR BLD AUTO: 0 % (ref 0–0.5)
LYMPHOCYTES # BLD: 2.4 K/UL (ref 0.8–3.5)
LYMPHOCYTES NFR BLD: 30 % (ref 12–49)
MAGNESIUM SERPL-MCNC: 1.6 MG/DL (ref 1.6–2.4)
MCH RBC QN AUTO: 32.4 PG (ref 26–34)
MCHC RBC AUTO-ENTMCNC: 34.9 G/DL (ref 30–36.5)
MCV RBC AUTO: 92.7 FL (ref 80–99)
MONOCYTES # BLD: 1 K/UL (ref 0–1)
MONOCYTES NFR BLD: 12 % (ref 5–13)
NEUTS SEG # BLD: 4.4 K/UL (ref 1.8–8)
NEUTS SEG NFR BLD: 54 % (ref 32–75)
NRBC # BLD: 0 K/UL (ref 0–0.01)
NRBC BLD-RTO: 0 PER 100 WBC
NT PRO BNP: 6673 PG/ML
PHOSPHATE SERPL-MCNC: 1.5 MG/DL (ref 2.6–4.7)
PLATELET # BLD AUTO: 182 K/UL (ref 150–400)
PMV BLD AUTO: 9.5 FL (ref 8.9–12.9)
POTASSIUM SERPL-SCNC: 3.5 MMOL/L (ref 3.5–5.1)
PROT SERPL-MCNC: 5.7 G/DL (ref 6.4–8.2)
RBC # BLD AUTO: 3.43 M/UL (ref 3.8–5.2)
SERVICE CMNT-IMP: NORMAL
SODIUM SERPL-SCNC: 138 MMOL/L (ref 136–145)
WBC # BLD AUTO: 8.1 K/UL (ref 3.6–11)

## 2024-02-17 PROCEDURE — 36415 COLL VENOUS BLD VENIPUNCTURE: CPT

## 2024-02-17 PROCEDURE — 93306 TTE W/DOPPLER COMPLETE: CPT

## 2024-02-17 PROCEDURE — 85025 COMPLETE CBC W/AUTO DIFF WBC: CPT

## 2024-02-17 PROCEDURE — 2060000000 HC ICU INTERMEDIATE R&B

## 2024-02-17 PROCEDURE — 97530 THERAPEUTIC ACTIVITIES: CPT | Performed by: PHYSICAL THERAPIST

## 2024-02-17 PROCEDURE — 6370000000 HC RX 637 (ALT 250 FOR IP): Performed by: HOSPITALIST

## 2024-02-17 PROCEDURE — 6370000000 HC RX 637 (ALT 250 FOR IP): Performed by: STUDENT IN AN ORGANIZED HEALTH CARE EDUCATION/TRAINING PROGRAM

## 2024-02-17 PROCEDURE — 2580000003 HC RX 258: Performed by: STUDENT IN AN ORGANIZED HEALTH CARE EDUCATION/TRAINING PROGRAM

## 2024-02-17 PROCEDURE — 83880 ASSAY OF NATRIURETIC PEPTIDE: CPT

## 2024-02-17 PROCEDURE — 6360000004 HC RX CONTRAST MEDICATION: Performed by: RADIOLOGY

## 2024-02-17 PROCEDURE — 97116 GAIT TRAINING THERAPY: CPT | Performed by: PHYSICAL THERAPIST

## 2024-02-17 PROCEDURE — 80053 COMPREHEN METABOLIC PANEL: CPT

## 2024-02-17 PROCEDURE — 97161 PT EVAL LOW COMPLEX 20 MIN: CPT | Performed by: PHYSICAL THERAPIST

## 2024-02-17 PROCEDURE — 82962 GLUCOSE BLOOD TEST: CPT

## 2024-02-17 PROCEDURE — 6360000002 HC RX W HCPCS: Performed by: STUDENT IN AN ORGANIZED HEALTH CARE EDUCATION/TRAINING PROGRAM

## 2024-02-17 PROCEDURE — 94760 N-INVAS EAR/PLS OXIMETRY 1: CPT

## 2024-02-17 PROCEDURE — 71275 CT ANGIOGRAPHY CHEST: CPT

## 2024-02-17 RX ORDER — NICOTINE 21 MG/24HR
1 PATCH, TRANSDERMAL 24 HOURS TRANSDERMAL DAILY
Status: DISCONTINUED | OUTPATIENT
Start: 2024-02-17 | End: 2024-02-22 | Stop reason: HOSPADM

## 2024-02-17 RX ORDER — FUROSEMIDE 40 MG/1
40 TABLET ORAL DAILY
Status: DISCONTINUED | OUTPATIENT
Start: 2024-02-17 | End: 2024-02-18

## 2024-02-17 RX ORDER — LANOLIN ALCOHOL/MO/W.PET/CERES
400 CREAM (GRAM) TOPICAL 2 TIMES DAILY
Status: COMPLETED | OUTPATIENT
Start: 2024-02-17 | End: 2024-02-18

## 2024-02-17 RX ORDER — IPRATROPIUM BROMIDE AND ALBUTEROL SULFATE 2.5; .5 MG/3ML; MG/3ML
1 SOLUTION RESPIRATORY (INHALATION) EVERY 4 HOURS PRN
Status: DISCONTINUED | OUTPATIENT
Start: 2024-02-17 | End: 2024-02-18 | Stop reason: SDUPTHER

## 2024-02-17 RX ORDER — POTASSIUM CHLORIDE 750 MG/1
40 TABLET, FILM COATED, EXTENDED RELEASE ORAL ONCE
Status: COMPLETED | OUTPATIENT
Start: 2024-02-17 | End: 2024-02-17

## 2024-02-17 RX ADMIN — SODIUM CHLORIDE, PRESERVATIVE FREE 10 ML: 5 INJECTION INTRAVENOUS at 09:15

## 2024-02-17 RX ADMIN — IOPAMIDOL 75 ML: 755 INJECTION, SOLUTION INTRAVENOUS at 17:47

## 2024-02-17 RX ADMIN — TRAZODONE HYDROCHLORIDE 50 MG: 50 TABLET ORAL at 21:25

## 2024-02-17 RX ADMIN — METOPROLOL TARTRATE 50 MG: 50 TABLET, FILM COATED ORAL at 09:05

## 2024-02-17 RX ADMIN — METOPROLOL TARTRATE 50 MG: 50 TABLET, FILM COATED ORAL at 21:25

## 2024-02-17 RX ADMIN — ATORVASTATIN CALCIUM 20 MG: 10 TABLET, FILM COATED ORAL at 21:25

## 2024-02-17 RX ADMIN — HEPARIN SODIUM 5000 UNITS: 5000 INJECTION INTRAVENOUS; SUBCUTANEOUS at 21:24

## 2024-02-17 RX ADMIN — THIAMINE HYDROCHLORIDE 100 MG: 100 INJECTION, SOLUTION INTRAMUSCULAR; INTRAVENOUS at 15:49

## 2024-02-17 RX ADMIN — SODIUM CHLORIDE, PRESERVATIVE FREE 10 ML: 5 INJECTION INTRAVENOUS at 21:25

## 2024-02-17 RX ADMIN — SODIUM CHLORIDE, PRESERVATIVE FREE 10 ML: 5 INJECTION INTRAVENOUS at 10:35

## 2024-02-17 RX ADMIN — MAGNESIUM OXIDE TAB 400 MG (241.3 MG ELEMENTAL MG) 400 MG: 400 (241.3 MG) TAB at 09:05

## 2024-02-17 RX ADMIN — MAGNESIUM OXIDE TAB 400 MG (241.3 MG ELEMENTAL MG) 400 MG: 400 (241.3 MG) TAB at 21:25

## 2024-02-17 RX ADMIN — HEPARIN SODIUM 5000 UNITS: 5000 INJECTION INTRAVENOUS; SUBCUTANEOUS at 09:05

## 2024-02-17 RX ADMIN — OXYCODONE AND ACETAMINOPHEN 1 TABLET: 5; 325 TABLET ORAL at 14:38

## 2024-02-17 RX ADMIN — FUROSEMIDE 40 MG: 40 TABLET ORAL at 09:05

## 2024-02-17 RX ADMIN — POTASSIUM CHLORIDE 40 MEQ: 750 TABLET, EXTENDED RELEASE ORAL at 09:04

## 2024-02-17 RX ADMIN — MIRTAZAPINE 7.5 MG: 15 TABLET, FILM COATED ORAL at 21:25

## 2024-02-17 RX ADMIN — SENNOSIDES AND DOCUSATE SODIUM 2 TABLET: 8.6; 5 TABLET ORAL at 21:25

## 2024-02-17 ASSESSMENT — PAIN DESCRIPTION - ORIENTATION: ORIENTATION: LEFT

## 2024-02-17 ASSESSMENT — PAIN SCALES - WONG BAKER: WONGBAKER_NUMERICALRESPONSE: 2

## 2024-02-17 ASSESSMENT — PAIN SCALES - GENERAL
PAINLEVEL_OUTOF10: 7
PAINLEVEL_OUTOF10: 0

## 2024-02-17 ASSESSMENT — PAIN DESCRIPTION - DESCRIPTORS: DESCRIPTORS: SHARP

## 2024-02-17 ASSESSMENT — PAIN DESCRIPTION - LOCATION: LOCATION: LEG;BACK

## 2024-02-18 PROCEDURE — 2580000003 HC RX 258: Performed by: STUDENT IN AN ORGANIZED HEALTH CARE EDUCATION/TRAINING PROGRAM

## 2024-02-18 PROCEDURE — 6370000000 HC RX 637 (ALT 250 FOR IP): Performed by: HOSPITALIST

## 2024-02-18 PROCEDURE — 94760 N-INVAS EAR/PLS OXIMETRY 1: CPT

## 2024-02-18 PROCEDURE — 2060000000 HC ICU INTERMEDIATE R&B

## 2024-02-18 PROCEDURE — 6370000000 HC RX 637 (ALT 250 FOR IP): Performed by: STUDENT IN AN ORGANIZED HEALTH CARE EDUCATION/TRAINING PROGRAM

## 2024-02-18 PROCEDURE — 97165 OT EVAL LOW COMPLEX 30 MIN: CPT

## 2024-02-18 PROCEDURE — 6360000002 HC RX W HCPCS: Performed by: HOSPITALIST

## 2024-02-18 PROCEDURE — 97530 THERAPEUTIC ACTIVITIES: CPT

## 2024-02-18 PROCEDURE — 94640 AIRWAY INHALATION TREATMENT: CPT

## 2024-02-18 PROCEDURE — 6360000002 HC RX W HCPCS: Performed by: STUDENT IN AN ORGANIZED HEALTH CARE EDUCATION/TRAINING PROGRAM

## 2024-02-18 RX ORDER — IPRATROPIUM BROMIDE AND ALBUTEROL SULFATE 2.5; .5 MG/3ML; MG/3ML
1 SOLUTION RESPIRATORY (INHALATION) EVERY 4 HOURS PRN
Status: DISCONTINUED | OUTPATIENT
Start: 2024-02-18 | End: 2024-02-22 | Stop reason: HOSPADM

## 2024-02-18 RX ORDER — PANTOPRAZOLE SODIUM 40 MG/1
40 TABLET, DELAYED RELEASE ORAL
Status: DISCONTINUED | OUTPATIENT
Start: 2024-02-18 | End: 2024-02-22 | Stop reason: HOSPADM

## 2024-02-18 RX ORDER — CALCIUM CARBONATE 500 MG/1
500 TABLET, CHEWABLE ORAL 3 TIMES DAILY PRN
Status: DISCONTINUED | OUTPATIENT
Start: 2024-02-18 | End: 2024-02-22 | Stop reason: HOSPADM

## 2024-02-18 RX ADMIN — THIAMINE HYDROCHLORIDE 100 MG: 100 INJECTION, SOLUTION INTRAMUSCULAR; INTRAVENOUS at 15:11

## 2024-02-18 RX ADMIN — MAGNESIUM OXIDE TAB 400 MG (241.3 MG ELEMENTAL MG) 400 MG: 400 (241.3 MG) TAB at 08:15

## 2024-02-18 RX ADMIN — SODIUM CHLORIDE, PRESERVATIVE FREE 10 ML: 5 INJECTION INTRAVENOUS at 21:26

## 2024-02-18 RX ADMIN — SODIUM CHLORIDE, PRESERVATIVE FREE 10 ML: 5 INJECTION INTRAVENOUS at 21:27

## 2024-02-18 RX ADMIN — OXYCODONE AND ACETAMINOPHEN 1 TABLET: 5; 325 TABLET ORAL at 08:15

## 2024-02-18 RX ADMIN — OXYCODONE AND ACETAMINOPHEN 1 TABLET: 5; 325 TABLET ORAL at 20:15

## 2024-02-18 RX ADMIN — METOPROLOL TARTRATE 50 MG: 50 TABLET, FILM COATED ORAL at 08:15

## 2024-02-18 RX ADMIN — HEPARIN SODIUM 5000 UNITS: 5000 INJECTION INTRAVENOUS; SUBCUTANEOUS at 08:17

## 2024-02-18 RX ADMIN — MIRTAZAPINE 7.5 MG: 15 TABLET, FILM COATED ORAL at 20:15

## 2024-02-18 RX ADMIN — PANTOPRAZOLE SODIUM 40 MG: 40 TABLET, DELAYED RELEASE ORAL at 11:34

## 2024-02-18 RX ADMIN — OXYCODONE AND ACETAMINOPHEN 1 TABLET: 5; 325 TABLET ORAL at 15:13

## 2024-02-18 RX ADMIN — ARFORMOTEROL TARTRATE: 15 SOLUTION RESPIRATORY (INHALATION) at 18:57

## 2024-02-18 RX ADMIN — MAGNESIUM OXIDE TAB 400 MG (241.3 MG ELEMENTAL MG) 400 MG: 400 (241.3 MG) TAB at 20:15

## 2024-02-18 RX ADMIN — SODIUM CHLORIDE, PRESERVATIVE FREE 10 ML: 5 INJECTION INTRAVENOUS at 20:16

## 2024-02-18 RX ADMIN — TRAZODONE HYDROCHLORIDE 50 MG: 50 TABLET ORAL at 20:15

## 2024-02-18 RX ADMIN — SENNOSIDES AND DOCUSATE SODIUM 2 TABLET: 8.6; 5 TABLET ORAL at 20:15

## 2024-02-18 RX ADMIN — ATORVASTATIN CALCIUM 20 MG: 10 TABLET, FILM COATED ORAL at 20:15

## 2024-02-18 RX ADMIN — METOPROLOL TARTRATE 50 MG: 50 TABLET, FILM COATED ORAL at 20:15

## 2024-02-18 RX ADMIN — ARFORMOTEROL TARTRATE: 15 SOLUTION RESPIRATORY (INHALATION) at 11:30

## 2024-02-18 ASSESSMENT — PAIN DESCRIPTION - DESCRIPTORS
DESCRIPTORS: ACHING
DESCRIPTORS: ACHING

## 2024-02-18 ASSESSMENT — PAIN DESCRIPTION - ORIENTATION
ORIENTATION: POSTERIOR
ORIENTATION: POSTERIOR

## 2024-02-18 ASSESSMENT — PAIN SCALES - GENERAL
PAINLEVEL_OUTOF10: 8
PAINLEVEL_OUTOF10: 8

## 2024-02-18 ASSESSMENT — PAIN DESCRIPTION - LOCATION
LOCATION: BACK
LOCATION: BACK

## 2024-02-19 PROCEDURE — 6360000002 HC RX W HCPCS: Performed by: HOSPITALIST

## 2024-02-19 PROCEDURE — 6370000000 HC RX 637 (ALT 250 FOR IP): Performed by: HOSPITALIST

## 2024-02-19 PROCEDURE — 94760 N-INVAS EAR/PLS OXIMETRY 1: CPT

## 2024-02-19 PROCEDURE — 6360000002 HC RX W HCPCS: Performed by: STUDENT IN AN ORGANIZED HEALTH CARE EDUCATION/TRAINING PROGRAM

## 2024-02-19 PROCEDURE — 2700000000 HC OXYGEN THERAPY PER DAY

## 2024-02-19 PROCEDURE — 2580000003 HC RX 258: Performed by: STUDENT IN AN ORGANIZED HEALTH CARE EDUCATION/TRAINING PROGRAM

## 2024-02-19 PROCEDURE — 2060000000 HC ICU INTERMEDIATE R&B

## 2024-02-19 PROCEDURE — 6370000000 HC RX 637 (ALT 250 FOR IP): Performed by: STUDENT IN AN ORGANIZED HEALTH CARE EDUCATION/TRAINING PROGRAM

## 2024-02-19 PROCEDURE — 97535 SELF CARE MNGMENT TRAINING: CPT

## 2024-02-19 PROCEDURE — 94640 AIRWAY INHALATION TREATMENT: CPT

## 2024-02-19 RX ADMIN — HYDROMORPHONE HYDROCHLORIDE 0.5 MG: 1 INJECTION, SOLUTION INTRAMUSCULAR; INTRAVENOUS; SUBCUTANEOUS at 20:49

## 2024-02-19 RX ADMIN — METOPROLOL TARTRATE 50 MG: 50 TABLET, FILM COATED ORAL at 20:55

## 2024-02-19 RX ADMIN — HEPARIN SODIUM 5000 UNITS: 5000 INJECTION INTRAVENOUS; SUBCUTANEOUS at 08:48

## 2024-02-19 RX ADMIN — ARFORMOTEROL TARTRATE: 15 SOLUTION RESPIRATORY (INHALATION) at 20:56

## 2024-02-19 RX ADMIN — ATORVASTATIN CALCIUM 20 MG: 10 TABLET, FILM COATED ORAL at 20:52

## 2024-02-19 RX ADMIN — METOPROLOL TARTRATE 50 MG: 50 TABLET, FILM COATED ORAL at 08:48

## 2024-02-19 RX ADMIN — SODIUM CHLORIDE, PRESERVATIVE FREE 10 ML: 5 INJECTION INTRAVENOUS at 08:08

## 2024-02-19 RX ADMIN — SENNOSIDES AND DOCUSATE SODIUM 2 TABLET: 8.6; 5 TABLET ORAL at 20:53

## 2024-02-19 RX ADMIN — OXYCODONE AND ACETAMINOPHEN 1 TABLET: 5; 325 TABLET ORAL at 08:48

## 2024-02-19 RX ADMIN — OXYCODONE AND ACETAMINOPHEN 1 TABLET: 5; 325 TABLET ORAL at 12:50

## 2024-02-19 RX ADMIN — PANTOPRAZOLE SODIUM 40 MG: 40 TABLET, DELAYED RELEASE ORAL at 08:48

## 2024-02-19 RX ADMIN — TRAZODONE HYDROCHLORIDE 50 MG: 50 TABLET ORAL at 20:53

## 2024-02-19 RX ADMIN — SODIUM CHLORIDE, PRESERVATIVE FREE 10 ML: 5 INJECTION INTRAVENOUS at 20:57

## 2024-02-19 RX ADMIN — ARFORMOTEROL TARTRATE: 15 SOLUTION RESPIRATORY (INHALATION) at 07:38

## 2024-02-19 RX ADMIN — MIRTAZAPINE 7.5 MG: 15 TABLET, FILM COATED ORAL at 20:55

## 2024-02-19 RX ADMIN — HEPARIN SODIUM 5000 UNITS: 5000 INJECTION INTRAVENOUS; SUBCUTANEOUS at 20:53

## 2024-02-19 RX ADMIN — THIAMINE HYDROCHLORIDE 100 MG: 100 INJECTION, SOLUTION INTRAMUSCULAR; INTRAVENOUS at 17:04

## 2024-02-19 ASSESSMENT — PAIN SCALES - GENERAL
PAINLEVEL_OUTOF10: 9

## 2024-02-19 ASSESSMENT — PAIN DESCRIPTION - DESCRIPTORS
DESCRIPTORS: DULL;THROBBING
DESCRIPTORS: DULL

## 2024-02-19 ASSESSMENT — PAIN DESCRIPTION - ORIENTATION: ORIENTATION: LOWER;POSTERIOR

## 2024-02-19 ASSESSMENT — PAIN DESCRIPTION - LOCATION
LOCATION: BACK

## 2024-02-19 ASSESSMENT — PAIN - FUNCTIONAL ASSESSMENT: PAIN_FUNCTIONAL_ASSESSMENT: PREVENTS OR INTERFERES SOME ACTIVE ACTIVITIES AND ADLS

## 2024-02-20 ENCOUNTER — ANESTHESIA EVENT (OUTPATIENT)
Facility: HOSPITAL | Age: 79
DRG: 515 | End: 2024-02-20
Payer: MEDICARE

## 2024-02-20 ENCOUNTER — ANESTHESIA (OUTPATIENT)
Facility: HOSPITAL | Age: 79
DRG: 515 | End: 2024-02-20
Payer: MEDICARE

## 2024-02-20 ENCOUNTER — APPOINTMENT (OUTPATIENT)
Facility: HOSPITAL | Age: 79
DRG: 515 | End: 2024-02-20
Payer: MEDICARE

## 2024-02-20 PROCEDURE — 2700000000 HC OXYGEN THERAPY PER DAY

## 2024-02-20 PROCEDURE — 0QS03ZZ REPOSITION LUMBAR VERTEBRA, PERCUTANEOUS APPROACH: ICD-10-PCS | Performed by: STUDENT IN AN ORGANIZED HEALTH CARE EDUCATION/TRAINING PROGRAM

## 2024-02-20 PROCEDURE — 0QU03JZ SUPPLEMENT LUMBAR VERTEBRA WITH SYNTHETIC SUBSTITUTE, PERCUTANEOUS APPROACH: ICD-10-PCS | Performed by: STUDENT IN AN ORGANIZED HEALTH CARE EDUCATION/TRAINING PROGRAM

## 2024-02-20 PROCEDURE — 6360000002 HC RX W HCPCS: Performed by: STUDENT IN AN ORGANIZED HEALTH CARE EDUCATION/TRAINING PROGRAM

## 2024-02-20 PROCEDURE — C1713 ANCHOR/SCREW BN/BN,TIS/BN: HCPCS

## 2024-02-20 PROCEDURE — 2580000003 HC RX 258: Performed by: STUDENT IN AN ORGANIZED HEALTH CARE EDUCATION/TRAINING PROGRAM

## 2024-02-20 PROCEDURE — 94640 AIRWAY INHALATION TREATMENT: CPT

## 2024-02-20 PROCEDURE — 6360000002 HC RX W HCPCS: Performed by: HOSPITALIST

## 2024-02-20 PROCEDURE — 6370000000 HC RX 637 (ALT 250 FOR IP): Performed by: HOSPITALIST

## 2024-02-20 PROCEDURE — 94760 N-INVAS EAR/PLS OXIMETRY 1: CPT

## 2024-02-20 PROCEDURE — 6370000000 HC RX 637 (ALT 250 FOR IP): Performed by: STUDENT IN AN ORGANIZED HEALTH CARE EDUCATION/TRAINING PROGRAM

## 2024-02-20 PROCEDURE — 2060000000 HC ICU INTERMEDIATE R&B

## 2024-02-20 PROCEDURE — 2500000003 HC RX 250 WO HCPCS: Performed by: STUDENT IN AN ORGANIZED HEALTH CARE EDUCATION/TRAINING PROGRAM

## 2024-02-20 RX ORDER — SUCCINYLCHOLINE/SOD CL,ISO/PF 200MG/10ML
SYRINGE (ML) INTRAVENOUS PRN
Status: DISCONTINUED | OUTPATIENT
Start: 2024-02-20 | End: 2024-02-20 | Stop reason: SDUPTHER

## 2024-02-20 RX ORDER — ROCURONIUM BROMIDE 10 MG/ML
INJECTION, SOLUTION INTRAVENOUS PRN
Status: DISCONTINUED | OUTPATIENT
Start: 2024-02-20 | End: 2024-02-20 | Stop reason: SDUPTHER

## 2024-02-20 RX ORDER — LIDOCAINE HYDROCHLORIDE 20 MG/ML
INJECTION, SOLUTION EPIDURAL; INFILTRATION; INTRACAUDAL; PERINEURAL PRN
Status: DISCONTINUED | OUTPATIENT
Start: 2024-02-20 | End: 2024-02-20 | Stop reason: SDUPTHER

## 2024-02-20 RX ORDER — PROPOFOL 10 MG/ML
INJECTION, EMULSION INTRAVENOUS PRN
Status: DISCONTINUED | OUTPATIENT
Start: 2024-02-20 | End: 2024-02-20 | Stop reason: SDUPTHER

## 2024-02-20 RX ORDER — ONDANSETRON 2 MG/ML
INJECTION INTRAMUSCULAR; INTRAVENOUS PRN
Status: DISCONTINUED | OUTPATIENT
Start: 2024-02-20 | End: 2024-02-20 | Stop reason: SDUPTHER

## 2024-02-20 RX ORDER — 0.9 % SODIUM CHLORIDE 0.9 %
INTRAVENOUS SOLUTION INTRAVENOUS PRN
Status: DISCONTINUED | OUTPATIENT
Start: 2024-02-20 | End: 2024-02-20 | Stop reason: SDUPTHER

## 2024-02-20 RX ORDER — EPHEDRINE SULFATE 50 MG/ML
INJECTION INTRAVENOUS PRN
Status: DISCONTINUED | OUTPATIENT
Start: 2024-02-20 | End: 2024-02-20 | Stop reason: SDUPTHER

## 2024-02-20 RX ORDER — FENTANYL CITRATE 50 UG/ML
INJECTION, SOLUTION INTRAMUSCULAR; INTRAVENOUS PRN
Status: DISCONTINUED | OUTPATIENT
Start: 2024-02-20 | End: 2024-02-20 | Stop reason: SDUPTHER

## 2024-02-20 RX ORDER — DEXAMETHASONE SODIUM PHOSPHATE 4 MG/ML
INJECTION, SOLUTION INTRA-ARTICULAR; INTRALESIONAL; INTRAMUSCULAR; INTRAVENOUS; SOFT TISSUE PRN
Status: DISCONTINUED | OUTPATIENT
Start: 2024-02-20 | End: 2024-02-20 | Stop reason: SDUPTHER

## 2024-02-20 RX ADMIN — HEPARIN SODIUM 5000 UNITS: 5000 INJECTION INTRAVENOUS; SUBCUTANEOUS at 09:59

## 2024-02-20 RX ADMIN — THIAMINE HYDROCHLORIDE 100 MG: 100 INJECTION, SOLUTION INTRAMUSCULAR; INTRAVENOUS at 15:26

## 2024-02-20 RX ADMIN — ONDANSETRON 4 MG: 2 INJECTION INTRAMUSCULAR; INTRAVENOUS at 08:46

## 2024-02-20 RX ADMIN — OXYCODONE AND ACETAMINOPHEN 1 TABLET: 5; 325 TABLET ORAL at 21:36

## 2024-02-20 RX ADMIN — Medication 120 MG: at 08:17

## 2024-02-20 RX ADMIN — ARFORMOTEROL TARTRATE: 15 SOLUTION RESPIRATORY (INHALATION) at 22:07

## 2024-02-20 RX ADMIN — FENTANYL CITRATE 50 MCG: 50 INJECTION, SOLUTION INTRAMUSCULAR; INTRAVENOUS at 08:17

## 2024-02-20 RX ADMIN — HEPARIN SODIUM 5000 UNITS: 5000 INJECTION INTRAVENOUS; SUBCUTANEOUS at 21:36

## 2024-02-20 RX ADMIN — PROPOFOL 100 MG: 10 INJECTION, EMULSION INTRAVENOUS at 08:17

## 2024-02-20 RX ADMIN — SODIUM CHLORIDE, PRESERVATIVE FREE 10 ML: 5 INJECTION INTRAVENOUS at 10:01

## 2024-02-20 RX ADMIN — OXYCODONE AND ACETAMINOPHEN 1 TABLET: 5; 325 TABLET ORAL at 12:43

## 2024-02-20 RX ADMIN — SODIUM CHLORIDE 2000 MG: 9 INJECTION, SOLUTION INTRAVENOUS at 08:37

## 2024-02-20 RX ADMIN — METOPROLOL TARTRATE 50 MG: 50 TABLET, FILM COATED ORAL at 09:59

## 2024-02-20 RX ADMIN — MIRTAZAPINE 7.5 MG: 15 TABLET, FILM COATED ORAL at 21:36

## 2024-02-20 RX ADMIN — ROCURONIUM BROMIDE 5 MG: 10 INJECTION, SOLUTION INTRAVENOUS at 08:17

## 2024-02-20 RX ADMIN — ATORVASTATIN CALCIUM 20 MG: 10 TABLET, FILM COATED ORAL at 21:36

## 2024-02-20 RX ADMIN — SODIUM CHLORIDE 250 ML: 9 INJECTION, SOLUTION INTRAVENOUS at 08:06

## 2024-02-20 RX ADMIN — SENNOSIDES AND DOCUSATE SODIUM 2 TABLET: 8.6; 5 TABLET ORAL at 21:36

## 2024-02-20 RX ADMIN — WATER 2000 MG: 1 INJECTION INTRAMUSCULAR; INTRAVENOUS; SUBCUTANEOUS at 08:34

## 2024-02-20 RX ADMIN — METOPROLOL TARTRATE 50 MG: 50 TABLET, FILM COATED ORAL at 21:36

## 2024-02-20 RX ADMIN — FENTANYL CITRATE 50 MCG: 50 INJECTION, SOLUTION INTRAMUSCULAR; INTRAVENOUS at 08:36

## 2024-02-20 RX ADMIN — EPHEDRINE SULFATE 20 MG: 50 INJECTION INTRAVENOUS at 08:39

## 2024-02-20 RX ADMIN — SODIUM CHLORIDE, PRESERVATIVE FREE 10 ML: 5 INJECTION INTRAVENOUS at 21:46

## 2024-02-20 RX ADMIN — TRAZODONE HYDROCHLORIDE 50 MG: 50 TABLET ORAL at 21:36

## 2024-02-20 RX ADMIN — DEXAMETHASONE SODIUM PHOSPHATE 4 MG: 4 INJECTION INTRA-ARTICULAR; INTRALESIONAL; INTRAMUSCULAR; INTRAVENOUS; SOFT TISSUE at 08:46

## 2024-02-20 RX ADMIN — LIDOCAINE HYDROCHLORIDE 60 MG: 20 INJECTION, SOLUTION EPIDURAL; INFILTRATION; INTRACAUDAL; PERINEURAL at 08:17

## 2024-02-20 ASSESSMENT — PAIN DESCRIPTION - ORIENTATION
ORIENTATION: LOWER

## 2024-02-20 ASSESSMENT — PAIN DESCRIPTION - DESCRIPTORS
DESCRIPTORS: DULL
DESCRIPTORS: ACHING
DESCRIPTORS: ACHING

## 2024-02-20 ASSESSMENT — LIFESTYLE VARIABLES: SMOKING_STATUS: 1

## 2024-02-20 ASSESSMENT — PAIN DESCRIPTION - LOCATION
LOCATION: BACK

## 2024-02-20 ASSESSMENT — PAIN SCALES - GENERAL
PAINLEVEL_OUTOF10: 9
PAINLEVEL_OUTOF10: 7
PAINLEVEL_OUTOF10: 5
PAINLEVEL_OUTOF10: 5
PAINLEVEL_OUTOF10: 7

## 2024-02-20 ASSESSMENT — PAIN - FUNCTIONAL ASSESSMENT: PAIN_FUNCTIONAL_ASSESSMENT: 0-10

## 2024-02-20 ASSESSMENT — PAIN DESCRIPTION - PAIN TYPE
TYPE: ACUTE PAIN
TYPE: ACUTE PAIN

## 2024-02-20 NOTE — PERIOP NOTE
Patient awake and back to baseline. AxOx4. VSS. Pain much improved now only 4/10 pain.   SBAR called to floor VIVIANE Barrientos. Patient to be transported back to impatient room.

## 2024-02-20 NOTE — H&P
Interventional and Vascular Radiology History and Physical    Patient: Vidya Das 78 y.o. female     Chief Complaint: Acute traumatic L2 vertebral body compression fracture.     History of Present Illness: 73 year old female with acute traumatic L2 vertebral body compression fracture presents for percutaneous kyphoplasty.     History:    Past Medical History:   Diagnosis Date    History of mammogram 10 years ago    Hypertension     Hyponatremia 2014    hospitalized for severe hyponatremia due to SIADH    Menopause     at age 41    Pap smear for cervical cancer screening 15 years ago    SIADH (syndrome of inappropriate ADH production) (HCC)     UTI (urinary tract infection)      Family History   Problem Relation Age of Onset    Kidney Disease Mother         uncertain     Social History     Socioeconomic History    Marital status:      Spouse name: Not on file    Number of children: Not on file    Years of education: Not on file    Highest education level: Not on file   Occupational History    Not on file   Tobacco Use    Smoking status: Every Day     Current packs/day: 0.00     Types: Cigarettes     Last attempt to quit: 2014     Years since quittin.4    Smokeless tobacco: Former     Quit date: 2014   Substance and Sexual Activity    Alcohol use: Yes     Alcohol/week: 3.0 standard drinks of alcohol    Drug use: No    Sexual activity: Not Currently   Other Topics Concern    Not on file   Social History Narrative    Not on file     Social Determinants of Health     Financial Resource Strain: Low Risk  (2024)    Overall Financial Resource Strain (CARDIA)     Difficulty of Paying Living Expenses: Not very hard   Food Insecurity: No Food Insecurity (2024)    Hunger Vital Sign     Worried About Running Out of Food in the Last Year: Never true     Ran Out of Food in the Last Year: Never true   Transportation Needs: No Transportation Needs (2024)    PRAPARE - Transportation     Lack

## 2024-02-20 NOTE — ANESTHESIA PRE PROCEDURE
History of mammogram 10 years ago   • Hypertension    • Hyponatremia 2014    hospitalized for severe hyponatremia due to SIADH   • Menopause     at age 41   • Pap smear for cervical cancer screening 15 years ago   • SIADH (syndrome of inappropriate ADH production) (HCC)    • UTI (urinary tract infection)        Past Surgical History:        Procedure Laterality Date   • CATARACT REMOVAL Right 2014   • CHOLECYSTECTOMY     • HIP FRACTURE SURGERY Left     left hemiarthroplasty   • THROMBECTOMY Right     leg   • TUBAL LIGATION     • UPPER GASTROINTESTINAL ENDOSCOPY N/A 2023    EGD ESOPHAGOGASTRODUODENOSCOPY performed by Derrell Stacy MD at [facility] ENDOSCOPY   • UPPER GASTROINTESTINAL ENDOSCOPY N/A 2023    EGD BIOPSY performed by Derrell Stacy MD at [facility] ENDOSCOPY       Social History:    Social History     Tobacco Use   • Smoking status: Every Day     Current packs/day: 0.00     Types: Cigarettes     Last attempt to quit: 2014     Years since quittin.4   • Smokeless tobacco: Former     Quit date: 2014   Substance Use Topics   • Alcohol use: Yes     Alcohol/week: 3.0 standard drinks of alcohol                                Ready to quit: Not Answered  Counseling given: Not Answered      Vital Signs (Current):   Vitals:    24 0347 24 0418 24 0559 24 0600   BP:  (!) 174/68     Pulse: 74 74 73    Resp:       Temp:  99 °F (37.2 °C)     TempSrc:  Oral     SpO2:       Weight:    47.7 kg (105 lb 2.6 oz)   Height:                                                  BP Readings from Last 3 Encounters:   24 (!) 174/68   24 127/73   24 139/63       NPO Status:                                                                                 BMI:   Wt Readings from Last 3 Encounters:   24 47.7 kg (105 lb 2.6 oz)   02/15/24 49.3 kg (108 lb 11 oz)   24 49.9 kg (110 lb)     Body mass index is 19.88 kg/m².    CBC:   Lab Results   Component Value

## 2024-02-20 NOTE — ANESTHESIA POSTPROCEDURE EVALUATION
Department of Anesthesiology  Postprocedure Note    Patient: Vidya Das  MRN: [id number]  YOB: 1945  Date of evaluation: 2/20/2024    Procedure Summary       Date: 02/20/24 Room / Location: [facility] ANGIO IR    Anesthesia Start: 0806 Anesthesia Stop: 0924    Procedure: IR KYPHOPLASTY LUMBAR FIRST LEVEL Diagnosis: (L2 compression fx)    Scheduled Providers: Natasha Randolph DO Responsible Provider: Natasha Randolph DO    Anesthesia Type: General ASA Status: 2            Anesthesia Type: General    Herman Phase I: Herman Score: 9    Herman Phase II:      Anesthesia Post Evaluation    Patient location during evaluation: PACU  Patient participation: complete - patient participated  Level of consciousness: awake and alert  Pain score: 0  Airway patency: patent  Nausea & Vomiting: no nausea and no vomiting  Cardiovascular status: blood pressure returned to baseline and hemodynamically stable  Respiratory status: acceptable and room air  Hydration status: euvolemic  Multimodal analgesia pain management approach  Pain management: adequate and satisfactory to patient    No notable events documented.

## 2024-02-20 NOTE — OR NURSING
Patient placed on angio table with assistance. IR Staff and anesthesia present at bedside. Anesthesia to remain at bedside to manage pt airway, medications, VS and pt status.

## 2024-02-21 LAB
ALBUMIN SERPL-MCNC: 2.4 G/DL (ref 3.5–5)
ALBUMIN/GLOB SERPL: 0.6 (ref 1.1–2.2)
ALP SERPL-CCNC: 215 U/L (ref 45–117)
ALT SERPL-CCNC: 33 U/L (ref 12–78)
ANION GAP SERPL CALC-SCNC: 2 MMOL/L (ref 5–15)
AST SERPL-CCNC: 18 U/L (ref 15–37)
BILIRUB SERPL-MCNC: 0.7 MG/DL (ref 0.2–1)
BUN SERPL-MCNC: 14 MG/DL (ref 6–20)
BUN/CREAT SERPL: 15 (ref 12–20)
CALCIUM SERPL-MCNC: 8.4 MG/DL (ref 8.5–10.1)
CHLORIDE SERPL-SCNC: 99 MMOL/L (ref 97–108)
CO2 SERPL-SCNC: 32 MMOL/L (ref 21–32)
COMMENT:: NORMAL
CREAT SERPL-MCNC: 0.94 MG/DL (ref 0.55–1.02)
GLOBULIN SER CALC-MCNC: 3.8 G/DL (ref 2–4)
GLUCOSE SERPL-MCNC: 125 MG/DL (ref 65–100)
POTASSIUM SERPL-SCNC: 3.9 MMOL/L (ref 3.5–5.1)
PROT SERPL-MCNC: 6.2 G/DL (ref 6.4–8.2)
SODIUM SERPL-SCNC: 133 MMOL/L (ref 136–145)
SPECIMEN HOLD: NORMAL

## 2024-02-21 PROCEDURE — 97116 GAIT TRAINING THERAPY: CPT

## 2024-02-21 PROCEDURE — 6360000002 HC RX W HCPCS: Performed by: STUDENT IN AN ORGANIZED HEALTH CARE EDUCATION/TRAINING PROGRAM

## 2024-02-21 PROCEDURE — 97164 PT RE-EVAL EST PLAN CARE: CPT

## 2024-02-21 PROCEDURE — 2580000003 HC RX 258: Performed by: STUDENT IN AN ORGANIZED HEALTH CARE EDUCATION/TRAINING PROGRAM

## 2024-02-21 PROCEDURE — 80053 COMPREHEN METABOLIC PANEL: CPT

## 2024-02-21 PROCEDURE — 6370000000 HC RX 637 (ALT 250 FOR IP): Performed by: STUDENT IN AN ORGANIZED HEALTH CARE EDUCATION/TRAINING PROGRAM

## 2024-02-21 PROCEDURE — 6370000000 HC RX 637 (ALT 250 FOR IP): Performed by: HOSPITALIST

## 2024-02-21 PROCEDURE — 6360000002 HC RX W HCPCS: Performed by: HOSPITALIST

## 2024-02-21 PROCEDURE — 94760 N-INVAS EAR/PLS OXIMETRY 1: CPT

## 2024-02-21 PROCEDURE — 1100000000 HC RM PRIVATE

## 2024-02-21 PROCEDURE — 94640 AIRWAY INHALATION TREATMENT: CPT

## 2024-02-21 PROCEDURE — 97168 OT RE-EVAL EST PLAN CARE: CPT

## 2024-02-21 PROCEDURE — 97535 SELF CARE MNGMENT TRAINING: CPT

## 2024-02-21 PROCEDURE — 36415 COLL VENOUS BLD VENIPUNCTURE: CPT

## 2024-02-21 PROCEDURE — 2700000000 HC OXYGEN THERAPY PER DAY

## 2024-02-21 RX ORDER — LANOLIN ALCOHOL/MO/W.PET/CERES
100 CREAM (GRAM) TOPICAL DAILY
Status: DISCONTINUED | OUTPATIENT
Start: 2024-02-21 | End: 2024-02-22 | Stop reason: HOSPADM

## 2024-02-21 RX ADMIN — Medication 100 MG: at 15:09

## 2024-02-21 RX ADMIN — MIRTAZAPINE 7.5 MG: 15 TABLET, FILM COATED ORAL at 20:59

## 2024-02-21 RX ADMIN — SODIUM CHLORIDE, PRESERVATIVE FREE 10 ML: 5 INJECTION INTRAVENOUS at 21:00

## 2024-02-21 RX ADMIN — SODIUM CHLORIDE, PRESERVATIVE FREE 10 ML: 5 INJECTION INTRAVENOUS at 07:40

## 2024-02-21 RX ADMIN — OXYCODONE AND ACETAMINOPHEN 1 TABLET: 5; 325 TABLET ORAL at 20:09

## 2024-02-21 RX ADMIN — HEPARIN SODIUM 5000 UNITS: 5000 INJECTION INTRAVENOUS; SUBCUTANEOUS at 21:00

## 2024-02-21 RX ADMIN — HEPARIN SODIUM 5000 UNITS: 5000 INJECTION INTRAVENOUS; SUBCUTANEOUS at 09:02

## 2024-02-21 RX ADMIN — SENNOSIDES AND DOCUSATE SODIUM 2 TABLET: 8.6; 5 TABLET ORAL at 20:59

## 2024-02-21 RX ADMIN — METOPROLOL TARTRATE 50 MG: 50 TABLET, FILM COATED ORAL at 09:02

## 2024-02-21 RX ADMIN — TRAZODONE HYDROCHLORIDE 50 MG: 50 TABLET ORAL at 21:00

## 2024-02-21 RX ADMIN — OXYCODONE AND ACETAMINOPHEN 1 TABLET: 5; 325 TABLET ORAL at 09:02

## 2024-02-21 RX ADMIN — ARFORMOTEROL TARTRATE: 15 SOLUTION RESPIRATORY (INHALATION) at 07:41

## 2024-02-21 RX ADMIN — METOPROLOL TARTRATE 50 MG: 50 TABLET, FILM COATED ORAL at 21:00

## 2024-02-21 RX ADMIN — OXYCODONE AND ACETAMINOPHEN 1 TABLET: 5; 325 TABLET ORAL at 15:09

## 2024-02-21 RX ADMIN — ARFORMOTEROL TARTRATE: 15 SOLUTION RESPIRATORY (INHALATION) at 20:06

## 2024-02-21 RX ADMIN — PANTOPRAZOLE SODIUM 40 MG: 40 TABLET, DELAYED RELEASE ORAL at 05:10

## 2024-02-21 RX ADMIN — ATORVASTATIN CALCIUM 20 MG: 10 TABLET, FILM COATED ORAL at 20:59

## 2024-02-21 ASSESSMENT — PAIN DESCRIPTION - LOCATION: LOCATION: BACK

## 2024-02-21 ASSESSMENT — PAIN SCALES - GENERAL: PAINLEVEL_OUTOF10: 7

## 2024-02-21 ASSESSMENT — PAIN DESCRIPTION - DESCRIPTORS: DESCRIPTORS: THROBBING

## 2024-02-21 NOTE — CARE COORDINATION
02/21/24 1221   Condition of Participation: Discharge Planning   The Plan for Transition of Care is related to the following treatment goals: IPR vs SNF   The Patient and/or Patient Representative was provided with a Choice of Provider? Patient   The Patient and/Or Patient Representative agree with the Discharge Plan? Yes   Freedom of Choice list was provided with basic dialogue that supports the patient's individualized plan of care/goals, treatment preferences, and shares the quality data associated with the providers?  Yes     Magnolia Hernandez, MS  [phone]

## 2024-02-21 NOTE — CARE COORDINATION
Transition of Care Plan:    RUR: 14% Low  Prior Level of Functioning: Independent from home  Disposition: IPR, if not then SNF  If SNF or IPR: Date FOC offered: 2/21/24  Date FOC received: 2/21/24  Accepting facility:   Date authorization started with reference number:   Date authorization received and expires:   Follow up appointments: PCP, Specialist  DME needed: No, owns rolling walker  Transportation at discharge: TBD  IM/IMM Medicare/ letter given: 2/15/24  Caregiver Contact: Libertad Bailey, [phone]  Discharge Caregiver contacted prior to discharge? NA  Care Conference needed? No  Barriers to discharge: None    CM received update in IDRs, patient is planned for DC Friday. Patient remains on 2L oxygen. Kyphoplasty performed yesterday. MD approves initiaiting IPR and SNF referrals. CM met with patient who is agreeable to IPR and SNF. She prefers [facility] as her first choice for SNF, referral placed in epic link. CM to monitor.    Magnolia Hernandez, MS  [phone]

## 2024-02-22 VITALS
OXYGEN SATURATION: 95 % | RESPIRATION RATE: 16 BRPM | TEMPERATURE: 97.9 F | SYSTOLIC BLOOD PRESSURE: 149 MMHG | DIASTOLIC BLOOD PRESSURE: 62 MMHG | HEART RATE: 59 BPM | WEIGHT: 105.16 LBS | HEIGHT: 61 IN | BODY MASS INDEX: 19.85 KG/M2

## 2024-02-22 LAB
BASOPHILS # BLD: 0 K/UL (ref 0–0.1)
BASOPHILS NFR BLD: 1 % (ref 0–1)
DIFFERENTIAL METHOD BLD: ABNORMAL
EOSINOPHIL # BLD: 0.2 K/UL (ref 0–0.4)
EOSINOPHIL NFR BLD: 3 % (ref 0–7)
ERYTHROCYTE [DISTWIDTH] IN BLOOD BY AUTOMATED COUNT: 16.5 % (ref 11.5–14.5)
HCT VFR BLD AUTO: 31.2 % (ref 35–47)
HGB BLD-MCNC: 10.3 G/DL (ref 11.5–16)
IMM GRANULOCYTES # BLD AUTO: 0 K/UL (ref 0–0.04)
IMM GRANULOCYTES NFR BLD AUTO: 1 % (ref 0–0.5)
LYMPHOCYTES # BLD: 2.5 K/UL (ref 0.8–3.5)
LYMPHOCYTES NFR BLD: 34 % (ref 12–49)
MCH RBC QN AUTO: 31 PG (ref 26–34)
MCHC RBC AUTO-ENTMCNC: 33 G/DL (ref 30–36.5)
MCV RBC AUTO: 94 FL (ref 80–99)
MONOCYTES # BLD: 1 K/UL (ref 0–1)
MONOCYTES NFR BLD: 14 % (ref 5–13)
NEUTS SEG # BLD: 3.6 K/UL (ref 1.8–8)
NEUTS SEG NFR BLD: 47 % (ref 32–75)
NRBC # BLD: 0 K/UL (ref 0–0.01)
NRBC BLD-RTO: 0 PER 100 WBC
PLATELET # BLD AUTO: 359 K/UL (ref 150–400)
PMV BLD AUTO: 9.6 FL (ref 8.9–12.9)
RBC # BLD AUTO: 3.32 M/UL (ref 3.8–5.2)
WBC # BLD AUTO: 7.4 K/UL (ref 3.6–11)

## 2024-02-22 PROCEDURE — 2580000003 HC RX 258: Performed by: STUDENT IN AN ORGANIZED HEALTH CARE EDUCATION/TRAINING PROGRAM

## 2024-02-22 PROCEDURE — 6370000000 HC RX 637 (ALT 250 FOR IP): Performed by: HOSPITALIST

## 2024-02-22 PROCEDURE — 97110 THERAPEUTIC EXERCISES: CPT

## 2024-02-22 PROCEDURE — 36415 COLL VENOUS BLD VENIPUNCTURE: CPT

## 2024-02-22 PROCEDURE — 97116 GAIT TRAINING THERAPY: CPT

## 2024-02-22 PROCEDURE — 94640 AIRWAY INHALATION TREATMENT: CPT

## 2024-02-22 PROCEDURE — 6370000000 HC RX 637 (ALT 250 FOR IP): Performed by: STUDENT IN AN ORGANIZED HEALTH CARE EDUCATION/TRAINING PROGRAM

## 2024-02-22 PROCEDURE — 6360000002 HC RX W HCPCS: Performed by: HOSPITALIST

## 2024-02-22 PROCEDURE — 97535 SELF CARE MNGMENT TRAINING: CPT

## 2024-02-22 PROCEDURE — 85025 COMPLETE CBC W/AUTO DIFF WBC: CPT

## 2024-02-22 PROCEDURE — 6360000002 HC RX W HCPCS: Performed by: STUDENT IN AN ORGANIZED HEALTH CARE EDUCATION/TRAINING PROGRAM

## 2024-02-22 RX ORDER — LANOLIN ALCOHOL/MO/W.PET/CERES
100 CREAM (GRAM) TOPICAL DAILY
Qty: 30 TABLET | Refills: 3 | Status: SHIPPED | OUTPATIENT
Start: 2024-02-23

## 2024-02-22 RX ORDER — LIDOCAINE 50 MG/G
1 PATCH TOPICAL DAILY
Qty: 10 PATCH | Refills: 0 | Status: SHIPPED | OUTPATIENT
Start: 2024-02-22 | End: 2024-03-03

## 2024-02-22 RX ORDER — OXYCODONE HYDROCHLORIDE AND ACETAMINOPHEN 5; 325 MG/1; MG/1
1 TABLET ORAL EVERY 8 HOURS PRN
Qty: 10 TABLET | Refills: 0 | Status: SHIPPED | OUTPATIENT
Start: 2024-02-22 | End: 2024-02-25

## 2024-02-22 RX ADMIN — SODIUM CHLORIDE, PRESERVATIVE FREE 10 ML: 5 INJECTION INTRAVENOUS at 09:25

## 2024-02-22 RX ADMIN — ARFORMOTEROL TARTRATE: 15 SOLUTION RESPIRATORY (INHALATION) at 08:18

## 2024-02-22 RX ADMIN — PANTOPRAZOLE SODIUM 40 MG: 40 TABLET, DELAYED RELEASE ORAL at 06:39

## 2024-02-22 RX ADMIN — Medication 100 MG: at 08:46

## 2024-02-22 RX ADMIN — OXYCODONE AND ACETAMINOPHEN 1 TABLET: 5; 325 TABLET ORAL at 08:46

## 2024-02-22 RX ADMIN — METOPROLOL TARTRATE 50 MG: 50 TABLET, FILM COATED ORAL at 08:46

## 2024-02-22 RX ADMIN — HEPARIN SODIUM 5000 UNITS: 5000 INJECTION INTRAVENOUS; SUBCUTANEOUS at 08:46

## 2024-02-22 RX ADMIN — OXYCODONE AND ACETAMINOPHEN 1 TABLET: 5; 325 TABLET ORAL at 14:00

## 2024-02-22 ASSESSMENT — PAIN DESCRIPTION - DESCRIPTORS: DESCRIPTORS: ACHING;DULL

## 2024-02-22 ASSESSMENT — PAIN DESCRIPTION - LOCATION: LOCATION: BACK

## 2024-02-22 ASSESSMENT — PAIN SCALES - GENERAL
PAINLEVEL_OUTOF10: 4
PAINLEVEL_OUTOF10: 8

## 2024-02-22 ASSESSMENT — PAIN DESCRIPTION - PAIN TYPE: TYPE: SURGICAL PAIN

## 2024-02-22 ASSESSMENT — PAIN DESCRIPTION - ORIENTATION: ORIENTATION: MID

## 2024-02-22 NOTE — DISCHARGE SUMMARY
for emphysema  Patient was weaned off from oxygen in the hospital  Outpatient follow-up with PCP and pulmonology    Alcohol use disorder  Alcoholic hepatitis improved  Elevated T. bili due to above resolved  -Patient was counseled extensively refrain from alcohol  -Continue thiamine and multivitamins    Failure to thrive  Electrolyte  imbalances due to above resolved  -Continue mirtazapine    PENDING TEST RESULTS:   At the time of discharge the following test results are still pending: None    FOLLOW UP APPOINTMENTS:    [unfilled]     ADDITIONAL CARE RECOMMENDATIONS: Follow-up PCP    DIET: regular diet    ACTIVITY: activity as tolerated    WOUND CARE: None    EQUIPMENT needed: None      DISCHARGE MEDICATIONS:     Medication List        START taking these medications      oxyCODONE-acetaminophen 5-325 MG per tablet  Commonly known as: Percocet  Take 1 tablet by mouth every 8 hours as needed for Pain for up to 3 days. Intended supply: 7 days. Take lowest dose possible to manage pain Max Daily Amount: 3 tablets     thiamine 100 MG tablet  Take 1 tablet by mouth daily  Start taking on: February 23, 2024            CONTINUE taking these medications      acetaminophen 325 MG tablet  Commonly known as: TYLENOL     amLODIPine 10 MG tablet  Commonly known as: NORVASC     aspirin 81 MG EC tablet     atorvastatin 20 MG tablet  Commonly known as: LIPITOR  Take 1 tablet by mouth daily     diazePAM 2 MG tablet  Commonly known as: VALIUM     lidocaine 5 %  Commonly known as: LIDODERM  Place 1 patch onto the skin daily for 10 days 12 hours on, 12 hours off.     metoprolol tartrate 50 MG tablet  Commonly known as: LOPRESSOR  Take 1 tablet by mouth 2 times daily     mirtazapine 7.5 MG tablet  Commonly known as: REMERON  Take 1 tablet by mouth nightly     pantoprazole 40 MG tablet  Commonly known as: PROTONIX  Take 1 tablet by mouth 2 times daily     potassium chloride 20 MEQ extended release tablet  Commonly known as: KLOR-CON M

## 2024-02-22 NOTE — CARE COORDINATION
Transition of Care Plan:    RUR: 14%  Prior Level of Functioning: Indpt  Disposition: SNF  Stevan  Report - [phone]  # - 119 A  If SNF or IPR: Date FOC offered: 2/21/24  Date FOC received: 2/21/24  Accepting facility: [facility]  Date authorization started with reference number:   Date authorization received and expires: 2/22/24  Follow up appointments: PCP  Transportation at discharge: [facility] at 2:45PM  Wheelchair  Unit to cover because patient is unable to   IM/IMM Medicare/ letter given: Received 2/22/24  Caregiver Contact:  Libertad Bailey, [phone]        Transition of Care Plan to SNF/Rehab    Communication to Patient/Family:  Met with patient and family and they are agreeable to the transition plan. The Plan for Transition of Care is related to the following treatment goals:     The Patient and/or patient representative was provided with a choice of provider and agrees  with the discharge plan.      Yes [x] No []    A Freedom of choice list was provided with basic dialogue that supports the patient's individualized plan of care/goals and shares the quality data associated with the providers.       Yes [x] No []    SNF/Rehab Transition:  Patient has been accepted to [facility], and meets criteria for admission.   Patient will transported by [facility], Wheelchair  and expected to leave at 2:45 PM    Communication to SNF/Rehab:  Bedside RN, Izzy, has been notified to update the transition plan to the facility and call report (phone number).  Discharge information has been updated on the AVS. And communicated to facility via FullStory/All Scripts, or CC link.     Discharge instructions to be fax'd to facility at (Fax #).       Nursing Please include all hard scripts for controlled substances, med rec and dc summary, and AVS in packet.     Reviewed and confirmed with facility, , can manage the patient care needs for the following:     Livan with (X) only those

## 2024-02-23 ENCOUNTER — OFFICE VISIT (OUTPATIENT)
Facility: CLINIC | Age: 79
End: 2024-02-23

## 2024-02-23 DIAGNOSIS — F10.11 ALCOHOL ABUSE, IN REMISSION: ICD-10-CM

## 2024-02-23 DIAGNOSIS — E87.1 HYPONATREMIA: Primary | ICD-10-CM

## 2024-02-23 DIAGNOSIS — F41.9 ANXIETY: ICD-10-CM

## 2024-02-23 DIAGNOSIS — S32.020A CLOSED COMPRESSION FRACTURE OF L2 LUMBAR VERTEBRA, INITIAL ENCOUNTER (HCC): ICD-10-CM

## 2024-02-23 DIAGNOSIS — I10 HYPERTENSION, UNSPECIFIED TYPE: ICD-10-CM

## 2024-02-23 NOTE — ADT AUTH CERT
0700 Bedside and Verbal shift change report given to Gilberto Starkey (oncoming nurse) by Edie Galindo (offgoing nurse). Report included the following information SBAR, Kardex, Intake/Output, MAR, Recent Results and Cardiac Rhythm ST.   0800 pt assessed per flowsheet. Pain much better controled, abd soft and less tender to palpation. Her only complaint is that she cannot urinate while laying in bed. Checked ortho bp's, which were negative, assisted pt up to bathroom. Pt able to void 600 ml clear deshawn urine without any difficulty. Pt assisted back to bed, only concern was her HR increased to 150's upon return to bed, quickly settled back to baseline of ST in 120-130's. Pt denies further pain, SOB or concerns. 0930 pt gretchen clear liquids well, yet does not like the diet, therefore she is tolerating water and ginger ale well. 1010 Dr Christopher Emerson in to assess pt, OK for transfer to to step down per Dr Jayda Kruse order. Will also adm fluid bolus for ST. Spoke with Dr Ginette Grimaldo for consult r/t tachycardia  1130 clarified transfer order with Dr Carlos Eduardo Henriquez, he would like PCU transfer, he does not feel that she is ready for surgical floor yet. He did state that he would advance her diet to full liquid. Dr Ginette Grimaldo in to assess pt for consult  1200 pt reassessed per flowsheet, assisted up to BR, gretchen well, HR only up to 130's, no SOB  1235 Destini with Dr Laverne Bourgeois called r/t consult, echo has been ordered, chart has been reviewed, plan to assess pt in the am, available if needed prior to this. 1430 Dr Laverne Bourgeois in to assess pt with new orders received  1600 pt reassessed per flowsheet. Temp has spiked to 102.9 orally. Dr Carlos Eduardo Henriquez paged  [phone] new orders received from Dr Carlos Eduardo Henriquez. Difficulty noted with obtaining a new PIV after left AC leaking and right AC tender. PICC team paged for assist  1800 labs colleted and sent PIV x2 placed by PICC team, temp down to 101.5, pt felt up to bathing with minimal assist provided.   1900 Bedside shift change report given to [facility] (oncoming nurse) by Dale Ip (offgoing nurse).  Report included the following information SBAR, Kardex, Procedure Summary, Intake/Output, MAR, Recent Results and Cardiac Rhythm ST. acute distress,   HEENT: PEERL, EOMI, moist mucus membrane, TM clear  Neck: supple, no JVD, no meningeal signs  Chest: Clear to auscultation bilaterally   CVS: S1 S2 heard, Capillary refill less than 2 seconds  Abd: soft/ non tender, non distended, BS physiological, focal ,   Ext: no clubbing, no cyanosis, no edema, brisk 2+ DP pulses  Neuro/Psych: pleasant mood and affect, CN 2-12 grossly intact, no focal weakness   Skin: warm      MD CONSULTS/ASSESSMENT AND PLAN:  IM:  L2 compression fracture, likely acute   Ground-level fall  Chronic L1 compression fracture  Back pain due to above  -CT lumbar spine reviewed with acute appearing L2 fracture.  -IR consult for kyphoplasty: LUMBAR MRI 2/15 reviewed, d/w IR, kypho  reschedule to 2/19 due to her hypoxia require anesthesiologist involvement   -PT OT  -Pain management  -Vitamin D and calcium supplementation  Kypho in am      Acute hypoxia  Due to  emphysematous lung with atelectasis   Long time smoker   CTA of chest reviewed, no interstitial edema , no PE   -echo normal EF   Added nebs tx   -wean O2.         Alcohol use disorder  Alcoholic hepatitis  Elevated T. bili due to above  History of cholecystectomy  Leukocytosis due to above  -T. bili 3.0  AST 63 alk phos 389.  ABD us no CBD dilatation   Trending LFT   - INR 1.2   -[facility] protocol  -Symptom triggered Ativan  -Seizure precautions  -Thiamine folic acid     Failure to thrive  Electrolyte imbalances  -Sodium 129 potassium 2.9 suspect hypotonic hyponatremia and hypokalemia from alcohol use  -D5 half-normal saline with Kcl  -Trend sodium and potassium levels  -Check magnesium  -Continue mirtazapine  -Nutrition consulted        MEDICATIONS:  Lipitor 20mg PO nightly, heparin 5000 units SC BID, lidocaine patch daily, mag ox 400mg PO BID, lopressor 50mg PO BID, remeron 7.5mg PO nightly, protonix 40mg PO daily, thiamine 100mg IV Q24h, desyrel 50mg PO nightly, percocet 5-325mg PO Q4h PRN X3

## 2024-02-23 NOTE — PROGRESS NOTES
PLACE OF SERVICE:  [facility] [address] [place] [address]    History and physical      Chief Complaint: Acute traumatic L2 vertebral body compression fracture  Pain management  Severe anxiety    HPI  Very pleasant 78-year-old female with medical history significant for hypertension, hyponatremia, SIADH, severe anxiety presented to the hospital with ground-level fall and complaints of acute back pain related to acute traumatic L2 vertebral body compression fracture.  Patient was admitted under interventional radiology services for percutaneous kyphoplasty.  Patient was also found to have elevated liver enzymes in the ER with a background history of alcohol use.  At the time of presentation patient denied any chest pain shortness of breath cough fever chills or dysuria symptoms.  A CT scan lumbar spine showed acute L2 compression fracture.  She underwent kyphoplasty on 2/28/2024 with resolution of her symptoms.  A CT angiogram chest was also done in the setting of acute hypoxic respiratory failure in the hospital.  This was negative for PE.  Patient will need to follow-up with pulmonary outpatient basis as per schedule.  After treatment and stabilization in the hospital patient is now being admitted to [place] [facility] for further skilled nursing and rehabilitation.  Overall pain is well-managed on current regimen which includes Percocet.    PMH:   Hypertension, hyponatremia, SIADH, alcohol use disorder, anxiety    ROS:   The following system review was negative:  Constitutional; Respiratory; Cardiovascular; Genitourinary; Gastrointestinal; Psychiatric; Ear-Nose-Throat; Musculoskeletal; Neurologic; Endocrine; Hematologic; Skin; Eyes; denies any    Medications: Reviewed in [facility] EMR and assessment /  plan    Social History / Family History: Positive tobacco use, quit in 2014.  Current alcohol use.  No street drug abuse.  Family history positive for mother with kidney

## 2024-02-23 NOTE — PROGRESS NOTES
Hospitalist Progress Note  Mckenzie Serrano MD  Answering service: [phone]        Date of Service:  2024  NAME:  Vidya Das  :  1945  MRN:  [id number]      Admission Summary:   Vidya Das is a 78 y.o. female who presents with ground-level fall.  Found to have compression fracture.  Patient endorsing back pain.  Found to have elevated liver enzymes in the ER history of alcohol use        Interval history / Subjective:   S/p kypho    Back pain much better   Breathing stable , still  on O2     CTA CHEST W WO CONTRAST    Result Date: 2024  1. No acute pulmonary interval is 2. Emphysema 3. L2 fracture     XR CHEST PORTABLE    Result Date: 2024  1. There is mild interstitial edema.       XR CHEST PORTABLE    Result Date: 2024  1. There is mild interstitial edema.    MRI LUMBAR SPINE WO CONTRAST    Result Date: 2/15/2024  1.  Acute/subacute fracture of L2. Chronic fracture of L1. 2.  At L1-2, there is a disc bulge and central disc protrusion with mild canal stenosis.    Assessment & Plan:      L2 compression fracture, acute   Ground-level fall  Chronic L1 compression fracture  Back pain due to above  -CT lumbar spine reviewed with acute appearing L2 fracture.  -IR consult for kyphoplasty: LUMBAR MRI 2/15 reviewed, d/w IR, kypho  reschedule to  due to her hypoxia require anesthesiologist involvement   -Pain management  -Vitamin D and calcium supplementation  Kypho   PT/OT re-eval pending     Acute hypoxia  respiratory failure   Due to  emphysematous lung with atelectasis   Long time smoker   CTA of chest reviewed, no interstitial edema , no PE   -echo normal EF   Added nebs tx   -wean O2.   May need 6mw to assess home O2 if pt goes home       Alcohol use disorder  Alcoholic hepatitis  Elevated T. bili due to above  History of cholecystectomy  Leukocytosis due to above  -T. bili 3.0 ALT 
                                                                                                Hospitalist Progress Note  Mckenzie Serrano MD  Answering service: [phone]        Date of Service:  2/15/2024  NAME:  Vidya Das  :  1945  MRN:  [id number]      Admission Summary:   Vidya Das is a 78 y.o. female who presents with ground-level fall.  Found to have compression fracture.  Patient endorsing back pain.  Found to have elevated liver enzymes in the ER history of alcohol use        Interval history / Subjective:   Back pain, tolerate po percocet        Assessment & Plan:      L2 compression fracture, likely acute   Ground-level fall  Chronic L1 compression fracture  Back pain due to above  -CT lumbar spine reviewed with acute appearing L2 fracture.  -IR consult for kyphoplasty: LUMBAR MRI pending   -PT OT  -Pain management  -Vitamin D and calcium supplementation     Alcohol use disorder  Alcoholic hepatitis  Elevated T. bili due to above  History of cholecystectomy  Leukocytosis due to above  -T. bili 3.0  AST 63 alk phos 389.  ABD us no CBD dilatation   Trending LFT   - INR 1.2   -WA protocol  -Symptom triggered Ativan  -Seizure precautions  -Thiamine folic acid     Failure to thrive  Electrolyte imbalances  -Sodium 129 potassium 2.9 suspect hypotonic hyponatremia and hypokalemia from alcohol use  -D5 half-normal saline with Kcl  -Trend sodium and potassium levels  -Check magnesium  -Continue mirtazapine  -Nutrition consulted          Code status: full   Prophylaxis: hold sc heparin   Care Plan discussed with: pt, rn , will update with daughter   Anticipated Disposition: TBD   Central Line:   none              Review of Systems:   Pertinent items are noted in HPI.         Vital Signs:    Last 24hrs VS reviewed since prior progress note. Most recent are:  Vitals:    02/15/24 1252   BP: 124/60   Pulse: 57   Resp: 18   Temp: 97.9 °F (36.6 °C)   SpO2: 95%       No intake or output data in the 24 
                                                                                                Hospitalist Progress Note  Mckenzie Serrano MD  Answering service: [phone]        Date of Service:  2024  NAME:  Vidya Das  :  1945  MRN:  [id number]      Admission Summary:   Vidya Das is a 78 y.o. female who presents with ground-level fall.  Found to have compression fracture.  Patient endorsing back pain.  Found to have elevated liver enzymes in the ER history of alcohol use        Interval history / Subjective:   Back pain, tolerate po percocet   Breathing better       CTA CHEST W WO CONTRAST    Result Date: 2024  1. No acute pulmonary interval is 2. Emphysema 3. L2 fracture     XR CHEST PORTABLE    Result Date: 2024  1. There is mild interstitial edema.       XR CHEST PORTABLE    Result Date: 2024  1. There is mild interstitial edema.    MRI LUMBAR SPINE WO CONTRAST    Result Date: 2/15/2024  1.  Acute/subacute fracture of L2. Chronic fracture of L1. 2.  At L1-2, there is a disc bulge and central disc protrusion with mild canal stenosis.    Assessment & Plan:      L2 compression fracture, likely acute   Ground-level fall  Chronic L1 compression fracture  Back pain due to above  -CT lumbar spine reviewed with acute appearing L2 fracture.  -IR consult for kyphoplasty: LUMBAR MRI 2/15 reviewed, d/w IR, kypho  reschedule to  due to her hypoxia require anesthesiologist involvement   -PT OT  -Pain management  -Vitamin D and calcium supplementation  Kypho rescheduled tomorrow due to anesthesiologist availability     Acute hypoxia  Due to  emphysematous lung with atelectasis   Long time smoker   CTA of chest reviewed, no interstitial edema , no PE   -echo normal EF   Added nebs tx   -wean O2.       Alcohol use disorder  Alcoholic hepatitis  Elevated T. bili due to above  History of cholecystectomy  Leukocytosis due to above  -T. bili 3.0  AST 63 alk phos 389.  ABD us no CBD 
                                                                                                Hospitalist Progress Note  Mckenzie Serrano MD  Answering service: [phone]        Date of Service:  2024  NAME:  Vidya Das  :  1945  MRN:  [id number]      Admission Summary:   Vidya Das is a 78 y.o. female who presents with ground-level fall.  Found to have compression fracture.  Patient endorsing back pain.  Found to have elevated liver enzymes in the ER history of alcohol use        Interval history / Subjective:   Back pain, tolerate po percocet   Noticed hyoxia need O2  Smoking 1ppd        XR CHEST PORTABLE    Result Date: 2024  1. There is mild interstitial edema.    MRI LUMBAR SPINE WO CONTRAST    Result Date: 2/15/2024  1.  Acute/subacute fracture of L2. Chronic fracture of L1. 2.  At L1-2, there is a disc bulge and central disc protrusion with mild canal stenosis.    Assessment & Plan:      L2 compression fracture, likely acute   Ground-level fall  Chronic L1 compression fracture  Back pain due to above  -CT lumbar spine reviewed with acute appearing L2 fracture.  -IR consult for kyphoplasty: LUMBAR MRI 2/15 reviewed, d/w IR, kypho  reschedule to  due to her hypoxia require anesthesiologist involvement   -PT OT  -Pain management  -Vitamin D and calcium supplementation    Acute hypoxia  Long time smoker, may have COPD   -cxr reivewed, mild interstitial edema, no chf history  May have atelectasis due to pain and pain  meds   -give lasix x one dose now empirically  -echo   Check D dimer d dimer elevated   -cta of chest to rule out PE ,and further eval lung  Added nebs tx       Alcohol use disorder  Alcoholic hepatitis  Elevated T. bili due to above  History of cholecystectomy  Leukocytosis due to above  -T. bili 3.0  AST 63 alk phos 389.  ABD us no CBD dilatation   Trending LFT   - INR 1.2   -CIWA protocol  -Symptom triggered Ativan  -Seizure precautions  -Thiamine folic acid   
                                                                                                Hospitalist Progress Note  Mckenzie Serrano MD  Answering service: [phone]        Date of Service:  2024  NAME:  Vidya Das  :  1945  MRN:  [id number]      Admission Summary:   Vidya Das is a 78 y.o. female who presents with ground-level fall.  Found to have compression fracture.  Patient endorsing back pain.  Found to have elevated liver enzymes in the ER history of alcohol use        Interval history / Subjective:   Back pain, tolerate po percocet   Noticed hyoxia anne down to 80's, on O2 now        XR CHEST PORTABLE    Result Date: 2024  1. There is mild interstitial edema.    MRI LUMBAR SPINE WO CONTRAST    Result Date: 2/15/2024  1.  Acute/subacute fracture of L2. Chronic fracture of L1. 2.  At L1-2, there is a disc bulge and central disc protrusion with mild canal stenosis.    Assessment & Plan:      L2 compression fracture, likely acute   Ground-level fall  Chronic L1 compression fracture  Back pain due to above  -CT lumbar spine reviewed with acute appearing L2 fracture.  -IR consult for kyphoplasty: LUMBAR MRI 2/15 reviewed, d/w IR, kypho  reschedule to  due to her hypoxia require anesthesiologist involvement   -PT OT  -Pain management  -Vitamin D and calcium supplementation    Acute hypoxia  -cxr reivewed, mild interstitial edema, no chf history  May have atelectasis due to pain and pain  meds   -give lasix x one dose now empirically  -echo   Check D dimer, if high will consider cta       Alcohol use disorder  Alcoholic hepatitis  Elevated T. bili due to above  History of cholecystectomy  Leukocytosis due to above  -T. bili 3.0  AST 63 alk phos 389.  ABD us no CBD dilatation   Trending LFT   - INR 1.2   -CIWA protocol  -Symptom triggered Ativan  -Seizure precautions  -Thiamine folic acid     Failure to thrive  Electrolyte imbalances  -Sodium 129 potassium 2.9 suspect hypotonic 
  Physician Progress Note      PATIENT:               CON TORRES  CSN #:                  [id number]  :                       1945  ADMIT DATE:       2024 10:11 AM  DISCH DATE:        2024 2:42 PM  RESPONDING  PROVIDER #:        Mckenzie Serrano MD          QUERY TEXT:    Noted documentation of acute respiratory failure in the  Progress note.   There is also documentation of no acute distress. In order to support the   diagnosis of acute respiratory failure, please include additional clinical   indicators in your documentation that indicate an increased work of breathing.    Or please document if the diagnosis of acute respiratory failure has been   ruled out after further study.    The medical record reflects the following:  Risk Factors: Long time smoker, hx of emphysema, atelectasis    Clinical Indicators:   Adm VS: 97.7, 97, 18, 171/73, 94 on r/a  20:08 , RR23, O2 Sat 91% on r/a, placed on 2L O2  2/15 16:10 HR 64, RR 16, 88% on 2L O2, increased to 5L    H&P- no acute distress, chest clear     PN: Noticed hypoxia anne down to 80's, on O2 now. Acute hypoxia-cxr   reviewed, mild interstitial edema, no chf history  May have atelectasis due to pain and pain  meds?-give lasix x one dose now   empirically, echo  No acute distress, chest clear to auscultation bilaterally     PN: Acute hypoxia Due to emphysematous lung with atelectasis-echo normal   EF  Added nebs tx-wean O2.     PN: Acute hypoxia   PN: Acute hypoxia     PN: Acute hypoxia respiratory failure due to emphysematous lung with   atelectasis. No acute distress, chest clear    Treatment: O2 2-5L, Duonebs    Acute Respiratory Failure Clinical Indicators per [employer] Training Guide and   Quick Reference Guide:  pO2 < 60 mmHg or SpO2 (pulse oximetry) < 91% breathing room air  pCO2 > 50 and pH < 7.35  P/F ratio (pO2 / FIO2) < 300  pO2 decrease or pCO2 increase by 10 mmHg from baseline (if known)  Supplemental oxygen of 
  Physician Progress Note      PATIENT:               CON TORRES  CSN #:                  [id number]  :                       1945  ADMIT DATE:       2024 10:11 AM  DISCH DATE:  RESPONDING  PROVIDER #:        Tyson Mon MD          QUERY TEXT:    Patient admitted with compression fracture and Failure to thrive. Noted to   have documentation of weight loss, dietician assessment with malnutrition   diagnosis in  conuslt note. If possible, please document in progress   notes and discharge summary if you are evaluating and /or treating any of the   following:    The medical record reflects  Risk factors: poor appetite, alcohol use disorder, emphysema    Clinical Indicators:  H&P- Failure to thrive  Electrolyte imbalances  -Sodium 129 potassium 2.9 suspect hypotonic hyponatremia and hypokalemia from   alcohol use     consult- Malnutrition Assessment:  Malnutrition Status:  Severe malnutrition (24 [id number])  Context:  Acute Illness  Findings of the 6 clinical characteristics of malnutrition:  Energy Intake:  50% or less of estimated energy requirements for 5 or more   days  Weight Loss:  Greater than 2% over 1 week  Body Fat Loss:  Moderate body fat loss Orbital, Buccal region  Muscle Mass Loss:  Moderate muscle mass loss Temples (temporalis), Clavicles   (pectoralis & deltoids)  Fluid Accumulation:  No significant fluid accumulation   Strength:  Not Performed  Nutrition Diagnosis:  -         Severe malnutrition related to inadequate protein-energy intake as   evidenced by Criteria as identified in malnutrition assessment    Treatment: Remeron, Regular diet to promote PO intakes, Will d/c Ensure per pt   request and order Magic Cup BID, RD to follow    ASPEN Criteria:    https://aspenjournals.onlinelibrary.parrish.com/doi/full/10.1177/[email or web address]  5    Thank you  Fadia Moreira RN  Clinical Documentation  [phone], or via Perfect Serve  Options provided:  -- Protein 
0715: TRANSFER - IN REPORT:    Verbal report received from RN on Vidya Das  being received from [address] [place] [address] for ordered procedure      Report consisted of patient's Situation, Background, Assessment and   Recommendations(SBAR).     Information from the following report(s) Nurse Handoff Report, Adult Overview, MAR, Recent Results, and Neuro Assessment was reviewed with the receiving nurse.    Opportunity for questions and clarification was provided.      Assessment completed upon patient's arrival to unit and care assumed.      0735: Pt to Angio via bed with RN x2.     0745: MD at bedside for consent.     0924: Pt tolerated procedure well.   
1411: CMU called to say that patient had 7 beats of v tach, patient is okay. MD notified.  
Clinical Pharmacy Note: IV to PO Automatic Conversion  Please note: Vidya Das’s medication(s) (Thiamine) has/have been changed from IV to PO based on the following critiera:    Patient is tolerating oral medications  Patient is tolerating a diet more advanced than clear liquids  Patient is not requiring vasopressors    This IV to PO conversion is based on the P&T approved automatic conversion policy for eligible patients.  Please call with questions.   
Comprehensive Nutrition Assessment    Type and Reason for Visit: Reassess    Nutrition Recommendations/Plan:   Continue with Regular diet to promote PO intakes  Will d/c Ensure per pt request and order Magic Cup BID  Replete Phos       Malnutrition Assessment:  Malnutrition Status:  Severe malnutrition (02/16/24 1155)    Context:  Acute Illness     Findings of the 6 clinical characteristics of malnutrition:  Energy Intake:  50% or less of estimated energy requirements for 5 or more days  Weight Loss:  Greater than 2% over 1 week     Body Fat Loss:  Moderate body fat loss Orbital, Buccal region   Muscle Mass Loss:  Moderate muscle mass loss Temples (temporalis), Clavicles (pectoralis & deltoids)  Fluid Accumulation:  No significant fluid accumulation     Strength:  Not Performed       Nutrition Assessment:    PMHx: HTN, h/o SIADH, h/o etoh use disorder     2/20:  Follow up.  S/P kyphoplasty today after it was delayed due to hypoxia.  Regular diet resumed since surgery but ONS reordered as Ensure.  Spoke with pt at bedside.  States she ate almost all of her lunch because she did not get breakfast so she was hungry.  She does not like Ensure and is asking for us to stop sending that.  Previously had Magic Cup ordered which she liked (berry flavor).  Offered Ensure clear but she states she has tried that too and does not really like it.  She drinks Boost or Premier Protein shakes at home.  Feels like her appetite is improving and she is eating better.  Intakes documented as 51-75% meals.  Weight stable since last assessment.  Labs: phos 1.5      2/16: 78 y.o. female admitted with spinal compression fracture after a ground level fall at home. Pt was seen in ED 2/5 for same compression fracture, but did not follow up with ortho. Lumbar MRI pending, IR consulted for kyphoplasty. Visited with pt at bedside- MD consult for wt loss.  Pt endorsed a 5-6# wt loss x 1 week pta. \"I wasn't hungry so I wasn't eating.\" Loss is 
Occupational Therapy   02.15.2024    Orders acknowledged and chart reviewed in prep for OT evaluation. Noted patient with acute L2 compression fx and has not been evaluated by ortho. Also noted plan to consult IR for possible kyphoplasty. Will defer and follow up once plan is determined. Thank you.     Sheridan Colmenares MS, OTR/L  
Occupational Therapy  02/16/24    Orders received and chart reviewed up to this date. Noted pt with L2 acute compression fracture and awaiting IR consult for possible kyphoplasty. Will defer OT evaluation at this time and continue to follow up as able/medically appropriate.     Gail Urbina, OTD, OTR/L    
Occupational Therapy  02/20/24    Chart reviewed up to this date, spoke to RN who reports pt is off unit in OR for Kyphoplasty. OT will defer and continue to follow up as marie/medically appropriate.     Gail Urbina, PAULINE, OTR/L    
Physical Therapy 2/15/2024    Orders received and chart reviewed up to date. Pt with L2 acute compression fracture. Awaiting IR consult for possible kyphoplasty. Will defer PT and continue to follow.    Thank you.  Dominique Porter, PT, DPT    
Physical Therapy 2/16/2024     Orders received and chart reviewed up to date. Pt with L2 acute compression fracture. Awaiting IR consult for possible kyphoplasty. Will defer PT and continue to follow.     Thank you.  Randa Eduardo, DPT, PT    
Pt currently awaiting kypho at this time. Will continue to follow and progress as able    Randa Eduardo, MAXINET, PT    
Spiritual Care Assessment/Progress Note  [facility]    Name: Vidya Das MRN: [id number]    Age: 78 y.o.     Sex: female   Language: English     Date: 2/16/2024            Total Time Calculated: 5 min              Spiritual Assessment begun in [facility] MED SURG  Service Provided For:: Patient  Referral/Consult From:: Rounding  Encounter Overview/Reason : Initial Encounter    Spiritual beliefs:      [] Involved in a heide tradition/spiritual practice:      [] Supported by a heide community:      [] Claims no spiritual orientation:      [] Seeking spiritual identity:           [] Adheres to an individual form of spirituality:      [x] Not able to assess:                Identified resources for coping and support system:           [] Prayer                  [] Devotional reading               [] Music                  [] Guided Imagery     [] Pet visits                                        [] Other: (COMMENT)     Specific area/focus of visit   Encounter:    Crisis:    Spiritual/Emotional needs: Type: Other (comment) (Unable to assess.)  Ritual, Rites and Sacraments:    Grief, Loss, and Adjustments:    Ethics/Mediation:    Behavioral Health:    Palliative Care:    Advance Care Planning:      Plan/Referrals: Continue Support (comment) (Will visit another day.)    Narrative:     This is a random initial visit to a pt in . Pt was soundly asleep and did not respond to the visit of the .  will revisit the pt another time.     Rafael fleming  [phone]  
TRANSFER - IN REPORT:    Verbal report received from Narciso RN on Vidya Das  being received from procedure for routine post-op      Report consisted of patient's Situation, Background, Assessment and   Recommendations(SBAR).     Information from the following report(s) Nurse Handoff Report and Surgery Report was reviewed with the receiving nurse.    Opportunity for questions and clarification was provided.      Assessment completed upon patient's arrival to unit and care assumed.  B  
This is a follow up visit to a patient in 2N. Pt is awake and welcomed the . Pt is pretty satisfied of her surgery that morning, is feeling good and no present concerns. Pt says prayer or heide is important in her life. It is part of her morning routine and helps her calm down. She considers herself renetta for the strong family support that she is experiencing.  provided active listening and supportive presence.  assures pt of his continued prayers. Pt is informed about the availability of the .    Chaplain Rafael fleming  [phone]  
coordination needs.     Labs:     Recent Labs     02/16/24 0429 02/17/24 0314   WBC 8.5 8.1   HGB 11.1* 11.1*   HCT 32.5* 31.8*    182       Recent Labs     02/16/24 0429 02/17/24 0314    138   K 3.4* 3.5    105   CO2 26 29   BUN 13 13   MG 1.6  --    PHOS 1.5*  --        Recent Labs     02/16/24 0429 02/17/24 0314   ALT 96* 76   GLOB 3.2 3.3       No results for input(s): \"INR\", \"APTT\" in the last 72 hours.    Invalid input(s): \"PTP\"     No results for input(s): \"TIBC\", \"FERR\" in the last 72 hours.    Invalid input(s): \"FE\", \"PSAT\"   No results found for: \"FOL\", \"RBCF\"   No results for input(s): \"PH\", \"PCO2\", \"PO2\" in the last 72 hours.  No results for input(s): \"CPK\" in the last 72 hours.    Invalid input(s): \"CPKMB\", \"CKNDX\", \"TROIQ\"  Lab Results   Component Value Date/Time    CHOL 200 01/04/2024 01:43 PM    HDL 87 01/04/2024 01:43 PM     No results found for: \"GLUCPOC\"  [unfilled]      Medications Reviewed:     Current Facility-Administered Medications   Medication Dose Route Frequency    arformoterol 15 mcg-budesonide 0.5 mg neb solution   Nebulization BID RT    ipratropium 0.5 mg-albuterol 2.5 mg (DUONEB) nebulizer solution 1 Dose  1 Dose Inhalation Q4H PRN    pantoprazole (PROTONIX) tablet 40 mg  40 mg Oral QAM AC    calcium carbonate (TUMS) chewable tablet 500 mg  500 mg Oral TID PRN    magnesium oxide (MAG-OX) tablet 400 mg  400 mg Oral BID    nicotine (NICODERM CQ) 14 MG/24HR 1 patch  1 patch TransDERmal Daily    sennosides-docusate sodium (SENOKOT-S) 8.6-50 MG tablet 2 tablet  2 tablet Oral QHS    oxyCODONE-acetaminophen (PERCOCET) 5-325 MG per tablet 1 tablet  1 tablet Oral Q4H PRN    lidocaine 4 % external patch 1 patch  1 patch TransDERmal Daily    HYDROmorphone HCl PF (DILAUDID) injection 0.5 mg  0.5 mg IntraVENous Q4H PRN    sodium chloride flush 0.9 % injection 5-40 mL  5-40 mL IntraVENous 2 times per day    sodium chloride flush 0.9 % injection 5-40 mL  5-40 mL IntraVENous PRN 
mL  5-40 mL IntraVENous PRN    0.9 % sodium chloride infusion   IntraVENous PRN    heparin (porcine) injection 5,000 Units  5,000 Units SubCUTAneous BID    ondansetron (ZOFRAN-ODT) disintegrating tablet 4 mg  4 mg Oral Q8H PRN    Or    ondansetron (ZOFRAN) injection 4 mg  4 mg IntraVENous Q6H PRN    polyethylene glycol (GLYCOLAX) packet 17 g  17 g Oral Daily PRN    sodium chloride flush 0.9 % injection 5-40 mL  5-40 mL IntraVENous 2 times per day    sodium chloride flush 0.9 % injection 5-40 mL  5-40 mL IntraVENous PRN    0.9 % sodium chloride infusion   IntraVENous PRN    LORazepam (ATIVAN) tablet 1 mg  1 mg Oral Q1H PRN    Or    LORazepam (ATIVAN) tablet 0.5 mg  0.5 mg Oral Q4H PRN    Or    LORazepam (ATIVAN) tablet 2 mg  2 mg Oral Q1H PRN    Or    LORazepam (ATIVAN) tablet 0.5 mg  0.5 mg Oral Q4H PRN    Or    LORazepam (ATIVAN) tablet 3 mg  3 mg Oral Q1H PRN    Or    LORazepam (ATIVAN) tablet 0.5 mg  0.5 mg Oral Q4H PRN    Or    LORazepam (ATIVAN) tablet 4 mg  4 mg Oral Q1H PRN    Or    LORazepam (ATIVAN) 2 MG/ML concentrated solution 1 mg  1 mg Oral Q8H PRN    atorvastatin (LIPITOR) tablet 20 mg  20 mg Oral Nightly    metoprolol tartrate (LOPRESSOR) tablet 50 mg  50 mg Oral BID    mirtazapine (REMERON) tablet 7.5 mg  7.5 mg Oral Nightly    traZODone (DESYREL) tablet 50 mg  50 mg Oral Nightly    thiamine (B-1) 100 mg in sodium chloride 0.9 % 100 mL IVPB  100 mg IntraVENous Q24H     ______________________________________________________________________  EXPECTED LENGTH OF STAY: 8  ACTUAL LENGTH OF STAY:          6                 Mckenzie Serrano MD

## 2024-02-26 ENCOUNTER — OFFICE VISIT (OUTPATIENT)
Facility: CLINIC | Age: 79
End: 2024-02-26
Payer: MEDICARE

## 2024-02-26 DIAGNOSIS — F41.9 ANXIETY: ICD-10-CM

## 2024-02-26 DIAGNOSIS — S32.020A CLOSED COMPRESSION FRACTURE OF L2 VERTEBRA, INITIAL ENCOUNTER (HCC): ICD-10-CM

## 2024-02-26 DIAGNOSIS — E87.6 HYPOKALEMIA: ICD-10-CM

## 2024-02-26 DIAGNOSIS — E78.5 DYSLIPIDEMIA: ICD-10-CM

## 2024-02-26 DIAGNOSIS — I10 HYPERTENSION, UNSPECIFIED TYPE: Primary | ICD-10-CM

## 2024-02-26 PROCEDURE — 1123F ACP DISCUSS/DSCN MKR DOCD: CPT | Performed by: NURSE PRACTITIONER

## 2024-02-26 PROCEDURE — 99309 SBSQ NF CARE MODERATE MDM 30: CPT | Performed by: NURSE PRACTITIONER

## 2024-02-27 ENCOUNTER — OFFICE VISIT (OUTPATIENT)
Facility: CLINIC | Age: 79
End: 2024-02-27
Payer: MEDICARE

## 2024-02-27 DIAGNOSIS — F41.9 ANXIETY: ICD-10-CM

## 2024-02-27 DIAGNOSIS — S32.020A CLOSED COMPRESSION FRACTURE OF L2 VERTEBRA, INITIAL ENCOUNTER (HCC): ICD-10-CM

## 2024-02-27 DIAGNOSIS — I10 HYPERTENSION, UNSPECIFIED TYPE: ICD-10-CM

## 2024-02-27 DIAGNOSIS — E78.5 DYSLIPIDEMIA: Primary | ICD-10-CM

## 2024-02-27 PROCEDURE — 99309 SBSQ NF CARE MODERATE MDM 30: CPT | Performed by: FAMILY MEDICINE

## 2024-02-27 PROCEDURE — 1123F ACP DISCUSS/DSCN MKR DOCD: CPT | Performed by: FAMILY MEDICINE

## 2024-02-27 NOTE — ASSESSMENT & PLAN NOTE
S/P kyphoplasty on 2/20/2024.  Continue pain management.  Continues to work with OT and PT notes reviewed.

## 2024-02-27 NOTE — PROGRESS NOTES
PLACE OF SERVICE:  [facility] [address] [place] [address]    SKILLED VISIT    2/26/2024    Chief Complaint:  Acute traumatic L2 vertebral body compression fracture  Pain management  Severe anxiety       HPI : Vidya Das is a 78 y.o. female patient seen today for follow up.  Patient is alert and oriented able to make her needs known.  She denies signs and symptoms of respiratory distress lungs clear upon assessment.  Patient current pain medication of Percocet changed to tramadol 50 mg every 6 hours as needed for pain along with Tylenol 650 mg every 6 hours as needed for pain. S/P kyphoplasty.on 2/20/2024. Patient continues on current dose of Amlodipine, Metoprolol 50 mg and aspirin for cardiovascular prevention. Current B/P 142/56 HR 64. Continue pn diazepam and trazodone for anxiety and depression. Also on mirtazapine at bedtime for appetite which is also effective depression symptoms. A CT angiogram chest was also done in the setting of acute hypoxic respiratory failure in the hospital.This was negative for PE. Patient will need to follow-up with pulmonary outpatient basis as per schedule. He remained hemodynamically stable at risk for decompensation and rehospitalization. No new concerns reported at this time we will continue to monitor closely.     PMH:   Hypertension, hyponatremia, SIADH, Alcohol use, Anxiety  Disorder, SIADH      Social History / Family History:  Positive for tobacco use 1 pack/day for last 40 years,  Current alcohol use up to 3 drinks per week. No history of street drug abuse.    Medications: Reviewed in EMR and assessment and plan    ROS:   The following system review was negative:  Constitutional; Respiratory; Cardiovascular; Genitourinary; Gastrointestinal; Psychiatric; Ear-Nose-Throat; Musculoskeletal; Neurologic; Endocrine; Hematologic; Skin; Eyes; denies any      Vitals:   Blood pressure 142/56 heart rate 64 temperature 96.9 respiration 19

## 2024-02-27 NOTE — PROGRESS NOTES
PLACE OF SERVICE:  [facility] [address] [place] [address]    SKILLED VISIT      Chief Complaint: Generalized deconditioning and weakness  Medication review and to follow-up progress    HPI  Very pleasant patient who has been admitted for skilled nursing and rehab after recent hospitalization, seen today for follow-up    Reports no acute complaints of chest pain palpitation shortness of breath, denies any nausea vomiting fever chills or dizzy spells.  Fair appetite and good sleep and moving bowels.  Continues on diazepam for severe anxiety.  Nursing report no acute change in condition.  All medications reviewed today and found to be appropriate.  Pain is well-managed on current regimen of tramadol every 6 hours as needed.    PMH:   Depression, GERD, dyslipidemia, anxiety, hypertension, chronic pain, recent compression fracture    ROS:   The following system review was negative:  Constitutional; Respiratory; Cardiovascular; Genitourinary; Gastrointestinal; Psychiatric; Ear-Nose-Throat; Musculoskeletal; Neurologic; Endocrine; Hematologic; Skin; Eyes; denies any    Medications: Reviewed in [facility] EMR and assessment /  plan    Social History / Family History: Reviewed-no change       Vitals:   Blood pressure 152/64 temperature 98 pulse 81 respiratory rate 18      Exam:  Constitutional: No acute distress;   Eyes: Sclera clear, PERRLA;   Ears/nose/mouth/throat:mmm, OP clear, trachea midline;  Cardiovascular: RRR,nml S1 and S2, no rubs murmurs or gallops, no edema, no cyanosis;   Respiratory: Clear to auscultation, symmetric, no respiratory distress;  Gastrointestinal: Abdomen soft, NT, ND, no masses, normal bowel sounds;  Neurologic: Cranial nerves II through VII grossly intact, no speech or motor deficits A&O in time place and person  Skin: No rash, warm and dry;  Musculoskeletal: No erythema swelling or joint tenderness, extremities without cyanosis, neck supple, ROM intact

## 2024-02-28 ENCOUNTER — OFFICE VISIT (OUTPATIENT)
Facility: CLINIC | Age: 79
End: 2024-02-28
Payer: MEDICARE

## 2024-02-28 DIAGNOSIS — I10 HYPERTENSION, UNSPECIFIED TYPE: ICD-10-CM

## 2024-02-28 DIAGNOSIS — E78.5 DYSLIPIDEMIA: Primary | ICD-10-CM

## 2024-02-28 DIAGNOSIS — S32.020A CLOSED COMPRESSION FRACTURE OF L2 VERTEBRA, INITIAL ENCOUNTER (HCC): ICD-10-CM

## 2024-02-28 DIAGNOSIS — F41.9 ANXIETY: ICD-10-CM

## 2024-02-28 PROCEDURE — 99309 SBSQ NF CARE MODERATE MDM 30: CPT | Performed by: NURSE PRACTITIONER

## 2024-02-28 PROCEDURE — 1123F ACP DISCUSS/DSCN MKR DOCD: CPT | Performed by: NURSE PRACTITIONER

## 2024-02-28 NOTE — PROGRESS NOTES
denies any      Vitals:   Blood pressure 150/64 heart rate 81 temperature 98 respiration 18 and O2 sat 94% on room air      Exam:  Constitutional: No acute distress;   Eyes: Sclera clear, PERRLA;   Ears/nose/mouth/throat:mmm, OP clear, trachea midline;  Cardiovascular: RRR,nml S1 and S2, no rubs murmurs or gallops, no edema, no cyanosis;   Respiratory: Clear to auscultation, symmetric, no respiratory distress;  Gastrointestinal: Abdomen soft, NT, ND, no masses, normal bowel sounds;  Neurologic: Cranial nerves II through VII grossly intact, no speech or motor deficits A&O in time place and person  Skin: No rash, warm and dry;  Musculoskeletal: No erythema swelling or joint tenderness, extremities without cyanosis, neck supple, ROM intact spine and extremities;  Psychiatric: Not agitated.  Appropriate affect, mood, judgment and insight.  Genitourinary: No suprapubic tenderness or flank tenderness  Heme, lymph, immuno: No pallor;       Labs:       Assessment/Plans:   1. Dyslipidemia  Assessment & Plan:  Stable on current dose of Atorvastatin 20 mg daily.   2. Hypertension, unspecified type  Assessment & Plan:  Stable on current dose of Amlodipine daily and Metoprolol 50 mg twice a day. Continue aspirin for cardiovascular prevention.  3. Anxiety  Assessment & Plan:  Stable on current dose of Diazepam and Trazodone.  Continues on Mirtazapine at bedtime  4. Closed compression fracture of L2 vertebra, initial encounter ([facility])  Assessment & Plan:  S/P kyphoplasty on 2/20/2024.  Continue pain management.  Continues to work with OT and PT notes reviewed.            Bozena Bennett, APRN - CNP

## 2024-02-29 ENCOUNTER — OFFICE VISIT (OUTPATIENT)
Facility: CLINIC | Age: 79
End: 2024-02-29

## 2024-02-29 DIAGNOSIS — F41.9 ANXIETY: ICD-10-CM

## 2024-02-29 DIAGNOSIS — I10 HYPERTENSION, UNSPECIFIED TYPE: Primary | ICD-10-CM

## 2024-02-29 DIAGNOSIS — E87.1 HYPONATREMIA: ICD-10-CM

## 2024-02-29 DIAGNOSIS — S32.020A CLOSED COMPRESSION FRACTURE OF L2 LUMBAR VERTEBRA, INITIAL ENCOUNTER (HCC): ICD-10-CM

## 2024-02-29 DIAGNOSIS — E87.6 HYPOKALEMIA: ICD-10-CM

## 2024-02-29 DIAGNOSIS — E78.5 DYSLIPIDEMIA: ICD-10-CM

## 2024-02-29 NOTE — ASSESSMENT & PLAN NOTE
Stable on current dose of Amlodipine daily and Metoprolol 50 mg twice a day. Continue aspirin for cardiovascular prevention.

## 2024-03-01 NOTE — PROGRESS NOTES
insight.  Genitourinary: No suprapubic tenderness or flank tenderness  Heme, lymph, immuno: No pallor;       Labs:        Assessment/Plans:   Generalized deconditioning weakness-continue to work with skilled nursing and rehab    Compression fracture-status post kyphoplasty.  Continue to work with PT OT    Pain-well-managed and stable on current regimen which includes tramadol    Anxiety-continue diazepam    Depression-symptoms stable on mirtazapine    Hypertension-continue metoprolol      Arpan Art MD

## 2024-03-04 ENCOUNTER — OFFICE VISIT (OUTPATIENT)
Facility: CLINIC | Age: 79
End: 2024-03-04
Payer: MEDICARE

## 2024-03-04 DIAGNOSIS — E78.5 DYSLIPIDEMIA: ICD-10-CM

## 2024-03-04 DIAGNOSIS — S32.020A CLOSED COMPRESSION FRACTURE OF L2 VERTEBRA, INITIAL ENCOUNTER (HCC): ICD-10-CM

## 2024-03-04 DIAGNOSIS — F41.9 ANXIETY: Primary | ICD-10-CM

## 2024-03-04 DIAGNOSIS — I10 HYPERTENSION, UNSPECIFIED TYPE: ICD-10-CM

## 2024-03-04 PROCEDURE — 99309 SBSQ NF CARE MODERATE MDM 30: CPT | Performed by: NURSE PRACTITIONER

## 2024-03-04 PROCEDURE — 1123F ACP DISCUSS/DSCN MKR DOCD: CPT | Performed by: NURSE PRACTITIONER

## 2024-03-04 NOTE — ASSESSMENT & PLAN NOTE
Stable on current dose of Diazepam and Trazodone for anxiety.  Continues on Mirtazapine at bedtime for depression with effective results

## 2024-03-04 NOTE — ASSESSMENT & PLAN NOTE
S/P kyphoplasty on 2/20/2024.  Continue pain management.  Continues to work with OT and PT notes reviewed.  Large home tomorrow will continue OT and PT as outpatient.

## 2024-03-04 NOTE — PROGRESS NOTES
PLACE OF SERVICE:  [facility] [address] [place] [address]    SKILLED VISIT    3/4/2024    Chief Complaint:  Acute traumatic L2 vertebral body compression fracture  Pain management  Severe anxiety       HPI : Vidya Das is a 78 y.o. female patient seen today for follow up.  Patient is alert and oriented.  No signs or symptoms of respiratory distress noted, lungs clear upon assessment.  Patient does have a cough as she continues to smoke.  Patient educated on the importance of not smoking when oxygen is in use.  Patient is scheduled for discharge home tomorrow or prescription signed and order for discharge in place.  S/P kyphoplasty on 2/20/2024, continues on tramadol 50 mg every 6 hours for pain. She denies pain of discomfort voiced at time of assessment.  Schedule  follow-up appointment with pulmonary outpatient basis as per schedule. Patient continues to work with PT/OT, patient seen ambulation with rollator walker during session. She remained hemodynamically stable at risk for decompensation and rehospitalization. No furter concerns reported at this time, we will continue to monitor closely.     PMH:   Hypertension, hyponatremia, SIADH, Alcohol use, Anxiety  Disorder, SIADH      Social History / Family History:  Positive for tobacco use 1 pack/day for last 40 years,  Current alcohol use up to 3 drinks per week. No history of street drug abuse.    Medications: Reviewed in EMR and assessment and plan    ROS:   The following system review was negative:  Constitutional; Respiratory; Cardiovascular; Genitourinary; Gastrointestinal; Psychiatric; Ear-Nose-Throat; Musculoskeletal; Neurologic; Endocrine; Hematologic; Skin; Eyes; denies any      Vitals:   Blood pressure 150/64 heart rate 81 temperature 98 respiration 18 and O2 sat 94% on room air      Exam:  Constitutional: No acute distress;   Eyes: Sclera clear, PERRLA;   Ears/nose/mouth/throat:mmm, OP clear, trachea

## 2024-03-05 ENCOUNTER — HOME HEALTH ADMISSION (OUTPATIENT)
Dept: HOME HEALTH SERVICES | Facility: HOME HEALTH | Age: 79
End: 2024-03-05
Payer: MEDICARE

## 2024-03-05 ENCOUNTER — OFFICE VISIT (OUTPATIENT)
Facility: CLINIC | Age: 79
End: 2024-03-05
Payer: MEDICARE

## 2024-03-05 DIAGNOSIS — E87.6 HYPOKALEMIA: ICD-10-CM

## 2024-03-05 DIAGNOSIS — E87.1 HYPONATREMIA: ICD-10-CM

## 2024-03-05 DIAGNOSIS — F41.9 ANXIETY: Primary | ICD-10-CM

## 2024-03-05 DIAGNOSIS — F10.11 ALCOHOL ABUSE, IN REMISSION: ICD-10-CM

## 2024-03-05 DIAGNOSIS — E78.5 DYSLIPIDEMIA: ICD-10-CM

## 2024-03-05 DIAGNOSIS — S32.020A CLOSED COMPRESSION FRACTURE OF L2 VERTEBRA, INITIAL ENCOUNTER (HCC): ICD-10-CM

## 2024-03-05 PROCEDURE — 99309 SBSQ NF CARE MODERATE MDM 30: CPT | Performed by: FAMILY MEDICINE

## 2024-03-05 PROCEDURE — 1123F ACP DISCUSS/DSCN MKR DOCD: CPT | Performed by: FAMILY MEDICINE

## 2024-03-05 NOTE — PROGRESS NOTES
Labs:        Assessment/Plans:   Anxiety-stable on current regimen of diazepam    Depression-stable continue mirtazapine    Hypertension-stable on current regimen.  Monitor blood pressure closely.  Continue aspirin for prevention.    Insomnia-stable.  Patient is on trazodone for depression which helps with insomnia.    GERD-stable on Protonix    Dyslipidemia-continue atorvastatin    Compression fracture-status post kyphoplasty.  Continue to work with PT OT      Arpan Art MD

## 2024-03-07 ENCOUNTER — TELEPHONE (OUTPATIENT)
Age: 79
End: 2024-03-07

## 2024-03-07 NOTE — TELEPHONE ENCOUNTER
nformation for Provider? Pt called to ask office to contact her regarding   if she needs appt or not. Pt was in rehab and has a VV on 04/01 but rehab   wants this to be in person. Pt would like to talk to someone regarding   this.

## 2024-03-08 ENCOUNTER — HOME CARE VISIT (OUTPATIENT)
Facility: HOME HEALTH | Age: 79
End: 2024-03-08
Payer: MEDICARE

## 2024-03-08 VITALS
RESPIRATION RATE: 18 BRPM | SYSTOLIC BLOOD PRESSURE: 108 MMHG | DIASTOLIC BLOOD PRESSURE: 70 MMHG | TEMPERATURE: 97 F | HEART RATE: 84 BPM | OXYGEN SATURATION: 95 %

## 2024-03-08 PROCEDURE — G0299 HHS/HOSPICE OF RN EA 15 MIN: HCPCS

## 2024-03-08 RX ORDER — TRAMADOL HYDROCHLORIDE 50 MG/1
50 TABLET ORAL EVERY 6 HOURS PRN
OUTPATIENT
Start: 2024-03-08

## 2024-03-08 RX ORDER — TRAMADOL HYDROCHLORIDE 50 MG/1
50 TABLET ORAL EVERY 6 HOURS PRN
COMMUNITY
Start: 2024-03-04

## 2024-03-08 ASSESSMENT — ENCOUNTER SYMPTOMS: PAIN LOCATION - PAIN QUALITY: SHARP

## 2024-03-08 NOTE — TELEPHONE ENCOUNTER
Vidya (nurse ) with [facility] would like a call from nurse regarding medication she can be reached @ [phone]

## 2024-03-08 NOTE — HOME HEALTH
Reason for referral, [facility] SUMMARY of clinical condition: Vidya Das admitted to home care for Wedge compression fracture of second lumbar vertebra. Nursing, Physical Therapy and Occupational Therapy needed for post-rehab assessment, education on medication compliance and home safety.     Clinical Assessment/Skilled reason for admission to home health (What this means for the patient overall and need for ongoing skilled care): Vidya Das is a 78 year old female who lives alone in 2 bedroom, 1.5 bathroom apartment. Ms. Das is s/p hospitalization 2/14 through 2/22 following a ground level fall and was found to have a compression fracture. Per documentation, she was found to have elevated liver enzymes in the ER with history of alcohol abuse. She is s/p kyphoplasty 2/20. She had an episode of acute hypoxic respiratory failure in the hospital, and was weaned off oxygen in the hospital. She was discharged to [place] for rehab and discharged home 3/5. PMHx. inclues HTN, glaucoma, alcohol abuse, anxiety, dyslipidemia, depression, current smoker. Patient states she still smokes about a pack per day. She ambulates in apartment with front-wheeled walker. Still having back pain with increased activity. States daughter is staying with her for the time being and she also has supportive neighbors. She said she hasn't driven for about 4 or 5 years ago ever since falling and breaking left hip. Visits are needed for post-rehab assessment and education on medication compliance to reduce risk of re-hospitalization.     Diagnosis: Wedge compression fracture of second lumbar vertebra    Subjective (statement from pt/cg that is relative to why you are there): \"I can't stand for long periods of time, it makes my back ache. But I'm sure it's gonna get better. I just gotta build my strength back up.\"    Caregiver: daughter.  Caregiver assists with: Medications, Meals, Bathing, ADL, IADL, Transportation and Housekeeping Caregiver

## 2024-03-11 ENCOUNTER — HOME CARE VISIT (OUTPATIENT)
Facility: HOME HEALTH | Age: 79
End: 2024-03-11
Payer: MEDICARE

## 2024-03-11 VITALS
DIASTOLIC BLOOD PRESSURE: 60 MMHG | RESPIRATION RATE: 18 BRPM | SYSTOLIC BLOOD PRESSURE: 110 MMHG | OXYGEN SATURATION: 96 % | HEART RATE: 86 BPM | TEMPERATURE: 98.1 F

## 2024-03-11 PROCEDURE — G0151 HHCP-SERV OF PT,EA 15 MIN: HCPCS

## 2024-03-11 ASSESSMENT — ENCOUNTER SYMPTOMS: PAIN LOCATION - PAIN QUALITY: ACHING

## 2024-03-11 NOTE — HOME HEALTH
Subjective: Patient states she is having a hard time standing >5 minutes for activities.   Falls since last visit No(if yes complete the Fall Tracking Form and include bsrifallreport):   Caregiver involvement changes: None  Home health supplies by type and quantity ordered/delivered this visit include: None    Clinician asked if patient has had any physician contact since last home care visit and patient states: NO  Clinician asked if patient has any new or changed medications and patient states:  NO   If Yes, were medications reconciled? N/A   Was the certifying physician notified of changes in medications? N/A     Clinical assessment (what this visit means for the patient overall and need for ongoing skilled care) and progress or lack of progress towards SPECIFIC goals: Patient is a 77yo female admitted to home health for management of Wedge compression fracture of second lumbar vertebra, initial encounter for closed fracture after kyphoplasty in february and [facility]. She reports difficulty standing for >5 minutes before pain increases and limited activity in the home d/t pain, which her goal is to improve these issues. She demonstrates impaired strength as noted with MMT of LLE 3+/5; RLE 4-/5, decreased balance with Tinetti of 16/28 and limited tolerance for exercises d/t weakness and pain. She benefits from skilled PT at 1d1/1w1/2w5 to address impairments and ensure improved mobility.    Written Teaching Material Utilized: HEP    Interdisciplinary communication with: Vidya Silva RN for the purpose of [employer] collaboration and Diana Avelar APRN - NP and Betsy Felder for POC collaboration    Discharge planning as follows: When goals are met    Specific plan for next visit: gait and exercise

## 2024-03-12 ENCOUNTER — HOME CARE VISIT (OUTPATIENT)
Dept: HOME HEALTH SERVICES | Facility: HOME HEALTH | Age: 79
End: 2024-03-12
Payer: MEDICARE

## 2024-03-13 ENCOUNTER — HOME CARE VISIT (OUTPATIENT)
Facility: HOME HEALTH | Age: 79
End: 2024-03-13
Payer: MEDICARE

## 2024-03-13 VITALS
DIASTOLIC BLOOD PRESSURE: 60 MMHG | SYSTOLIC BLOOD PRESSURE: 110 MMHG | TEMPERATURE: 98 F | OXYGEN SATURATION: 96 % | RESPIRATION RATE: 16 BRPM | HEART RATE: 60 BPM

## 2024-03-13 PROCEDURE — G0300 HHS/HOSPICE OF LPN EA 15 MIN: HCPCS

## 2024-03-13 ASSESSMENT — ENCOUNTER SYMPTOMS
PAIN LOCATION - PAIN QUALITY: ACHE
DYSPNEA ACTIVITY LEVEL: AFTER AMBULATING MORE THAN 20 FT

## 2024-03-13 NOTE — HOME HEALTH
Subjective: \"I'm feeling alright.\"  Falls since last visit No(if yes complete the Fall Tracking Form and include bsrifallreport):   Caregiver involvement changes: No  Home health supplies by type and quantity ordered/delivered this visit include: n/a    Clinician asked if patient has had any physician contact since last home care visit and patient states: NO  Clinician asked if patient has any new or changed medications and patient states:  NO   If Yes, were medications reconciled? N/A   Was the certifying physician notified of changes in medications? N/A     Clinical assessment (what this visit means for the patient overall and need for ongoing skilled care) and progress or lack of progress towards SPECIFIC goals: Pt at risk for rehospitalization r/t fall risk, risk for DVT, risk for pneumonia.    Written Teaching Material Utilized: N/A    Interdisciplinary communication with: N/A    Discharge planning as follows: Will discharge when the patient has reached their maximum functional potential and maximum safety in their home    Specific plan for next visit: Assessment, education as needed

## 2024-03-14 ENCOUNTER — CLINICAL DOCUMENTATION (OUTPATIENT)
Age: 79
End: 2024-03-14

## 2024-03-14 ENCOUNTER — HOME CARE VISIT (OUTPATIENT)
Facility: HOME HEALTH | Age: 79
End: 2024-03-14
Payer: MEDICARE

## 2024-03-14 VITALS
RESPIRATION RATE: 18 BRPM | TEMPERATURE: 97.5 F | SYSTOLIC BLOOD PRESSURE: 110 MMHG | OXYGEN SATURATION: 95 % | DIASTOLIC BLOOD PRESSURE: 62 MMHG | HEART RATE: 58 BPM

## 2024-03-14 DIAGNOSIS — S32.020D CLOSED COMPRESSION FRACTURE OF L2 LUMBAR VERTEBRA WITH ROUTINE HEALING, SUBSEQUENT ENCOUNTER: Primary | ICD-10-CM

## 2024-03-14 DIAGNOSIS — Z98.890 S/P KYPHOPLASTY: ICD-10-CM

## 2024-03-14 PROCEDURE — G0157 HHC PT ASSISTANT EA 15: HCPCS

## 2024-03-14 RX ORDER — TRAMADOL HYDROCHLORIDE 50 MG/1
50 TABLET ORAL EVERY 8 HOURS PRN
Qty: 45 TABLET | Refills: 0 | Status: SHIPPED | OUTPATIENT
Start: 2024-03-14 | End: 2024-03-28

## 2024-03-14 RX ORDER — TRAMADOL HYDROCHLORIDE 50 MG/1
50 TABLET ORAL EVERY 6 HOURS PRN
OUTPATIENT
Start: 2024-03-14

## 2024-03-14 ASSESSMENT — ENCOUNTER SYMPTOMS: DYSPNEA ACTIVITY LEVEL: AFTER AMBULATING 10 - 20 FT

## 2024-03-14 NOTE — PROGRESS NOTES
Patient contacted and made aware refill for Tramadol was refused by provider Dr. Siu due to another provider prescribing narcotic medication last refilled on 3/4/24 patient will need an office visit to refill. Patient was notified and offered appointment with another provider due to current provider on leave. Patient declined appointment stated home health will come today and she will see if they can help her with getting the medication refilled. Patient advised that she could contact provider who prescribed medication and also follow up with Ortho. Patient voiced understanding.

## 2024-03-14 NOTE — TELEPHONE ENCOUNTER
This med is listed as historical. Please input missing prescribing info and sign if appropriate.    Last appointment: 1/4/24  Next appointment: 4/9/24    Requested Prescriptions     Pending Prescriptions Disp Refills    traMADol (ULTRAM) 50 MG tablet       Sig: Take 1 tablet by mouth every 6 hours as needed for Pain. Max Daily Amount: 200 mg         For Pharmacy Admin Tracking Only    Program: Medication Refill  CPA in place:    Recommendation Provided To:   Intervention Detail: New Rx: 1, reason: Patient Preference  Intervention Accepted By:   Gap Closed?:    Time Spent (min): 5

## 2024-03-15 ENCOUNTER — HOME CARE VISIT (OUTPATIENT)
Facility: HOME HEALTH | Age: 79
End: 2024-03-15
Payer: MEDICARE

## 2024-03-15 ENCOUNTER — TELEMEDICINE (OUTPATIENT)
Age: 79
End: 2024-03-15

## 2024-03-15 VITALS
DIASTOLIC BLOOD PRESSURE: 60 MMHG | TEMPERATURE: 98 F | HEART RATE: 66 BPM | OXYGEN SATURATION: 92 % | SYSTOLIC BLOOD PRESSURE: 124 MMHG

## 2024-03-15 DIAGNOSIS — S32.020D CLOSED COMPRESSION FRACTURE OF L2 LUMBAR VERTEBRA WITH ROUTINE HEALING, SUBSEQUENT ENCOUNTER: Primary | ICD-10-CM

## 2024-03-15 PROCEDURE — G0152 HHCP-SERV OF OT,EA 15 MIN: HCPCS

## 2024-03-15 NOTE — HOME HEALTH
Subjective: Pt reported back pain upon arrival   Falls since last visit No(if yes complete the Fall Tracking Form and include bsrifallreport):   Caregiver involvement changes: none.  Home health supplies by type and quantity ordered/delivered this visit include: none.    Clinician asked if patient has had any physician contact since last home care visit and patient states: NO  Clinician asked if patient has any new or changed medications and patient states:  NO   If Yes, were medications reconciled? N/A   Was the certifying physician notified of changes in medications? N/A     Clinical assessment (what this visit means for the patient overall and need for ongoing skilled care) and progress or lack of progress towards SPECIFIC goals: Todays visit allowed for pt to receive gait training and bed mob training to decrease LBP. Gait and balance goals have not been met.    Written Teaching Material Utilized: N/A    Interdisciplinary communication with:  PT for the purpose of POC collaboration    Discharge planning as follows: When goals are met    Specific plan for next visit: Instruct caregiver/patient in gait , balance and B LE strength training.

## 2024-03-19 ENCOUNTER — HOME CARE VISIT (OUTPATIENT)
Facility: HOME HEALTH | Age: 79
End: 2024-03-19
Payer: MEDICARE

## 2024-03-19 VITALS
RESPIRATION RATE: 18 BRPM | DIASTOLIC BLOOD PRESSURE: 63 MMHG | OXYGEN SATURATION: 95 % | TEMPERATURE: 97.5 F | HEART RATE: 55 BPM | SYSTOLIC BLOOD PRESSURE: 120 MMHG

## 2024-03-19 PROCEDURE — G0157 HHC PT ASSISTANT EA 15: HCPCS

## 2024-03-19 PROCEDURE — G0152 HHCP-SERV OF OT,EA 15 MIN: HCPCS

## 2024-03-19 ASSESSMENT — ENCOUNTER SYMPTOMS: PAIN LOCATION - PAIN QUALITY: ACHING

## 2024-03-20 VITALS
RESPIRATION RATE: 18 BRPM | HEART RATE: 55 BPM | SYSTOLIC BLOOD PRESSURE: 120 MMHG | OXYGEN SATURATION: 95 % | DIASTOLIC BLOOD PRESSURE: 83 MMHG | TEMPERATURE: 97.5 F

## 2024-03-20 ASSESSMENT — ENCOUNTER SYMPTOMS: PAIN LOCATION - PAIN QUALITY: ACHE

## 2024-03-20 NOTE — HOME HEALTH
Subjective: Pt reported   Falls since last visit No(if yes complete the Fall Tracking Form and include bsrifallreport):   Caregiver involvement changes: none.  Home health supplies by type and quantity ordered/delivered this visit include: none.    Clinician asked if patient has had any physician contact since last home care visit and patient states: NO  Clinician asked if patient has any new or changed medications and patient states:  NO   If Yes, were medications reconciled? N/A   Was the certifying physician notified of changes in medications? N/A     Clinical assessment (what this visit means for the patient overall and need for ongoing skilled care) and progress or lack of progress towards SPECIFIC goals: Todays visit allowed for pt to receive training during gait, B strenthening and balance act. Goals have not been met.    Written Teaching Material Utilized: N/A    Interdisciplinary communication with:  PT for the purpose of POC collaboration    Discharge planning as follows: When goals are met    Specific plan for next visit: Instruct caregiver/patient in gait , balance and B LE strength training.

## 2024-03-21 ENCOUNTER — HOME CARE VISIT (OUTPATIENT)
Facility: HOME HEALTH | Age: 79
End: 2024-03-21
Payer: MEDICARE

## 2024-03-21 ENCOUNTER — HOME CARE VISIT (OUTPATIENT)
Dept: HOME HEALTH SERVICES | Facility: HOME HEALTH | Age: 79
End: 2024-03-21
Payer: MEDICARE

## 2024-03-21 VITALS
RESPIRATION RATE: 16 BRPM | TEMPERATURE: 97.4 F | OXYGEN SATURATION: 98 % | SYSTOLIC BLOOD PRESSURE: 140 MMHG | HEART RATE: 60 BPM | DIASTOLIC BLOOD PRESSURE: 64 MMHG

## 2024-03-21 VITALS
OXYGEN SATURATION: 96 % | HEART RATE: 69 BPM | RESPIRATION RATE: 18 BRPM | DIASTOLIC BLOOD PRESSURE: 62 MMHG | SYSTOLIC BLOOD PRESSURE: 100 MMHG

## 2024-03-21 PROCEDURE — G0152 HHCP-SERV OF OT,EA 15 MIN: HCPCS

## 2024-03-21 PROCEDURE — G0300 HHS/HOSPICE OF LPN EA 15 MIN: HCPCS

## 2024-03-21 PROCEDURE — G0157 HHC PT ASSISTANT EA 15: HCPCS

## 2024-03-21 ASSESSMENT — ENCOUNTER SYMPTOMS
DIARRHEA: 1
PAIN LOCATION - PAIN QUALITY: ACHE
FLATUS: 1
PAIN LOCATION - PAIN QUALITY: ACHING

## 2024-03-21 NOTE — HOME HEALTH
Subjective: Pt stated: \" I want to get in the shower.\"    Falls since last visit No(if yes complete the Fall Tracking Form and include bsrifallreport):     Caregiver involvement changes: none    Home health supplies by type and quantity ordered/delivered this visit include: none     Clinician asked if patient has had any physician contact since last home care visit and patient states: NO    Clinician asked if patient has any new or changed medications and patient states:  NO     If Yes, were medications reconciled? N/A     Was the certifying physician notified of changes in medications? N/A      Clinical assessment (what this visit means for the patient overall and need for ongoing skilled care) and progress or lack of progress towards SPECIFIC goals: Today's tx focus was on shower safety; transfer training; ADL setup; dressing; and fall prevention. Today pt was able to transfer in and out of shower with SBA-CGA. Pt was able to complete bathing once in shower with SBA. Pt dressing self with modified iND. Continue to teach and train pt/cg on fall risk prevention strategies, including using reacher instead of bending to the floor, ensure ADL DME/equipment is maintained correctly; wear non slip footwear, keep environment well lit, monitor medication that may alter mental status, slow down when turning, maintain wide base of support when turning and remove clutter. Pt progressing with OT intervention but goals are not met. see interventions for details.      Written Teaching Material Utilized: N/A     Interdisciplinary communication with: DEBORAH Ha and MARGARET Meyer for the purpose of POC collaboration     Discharge planning as follows: When goals are met      Specific plan for next visit: ADL safety; fall prevention; IADL's; and home safety; and improved standing tolerance in prep for ADL/IADL task.

## 2024-03-21 NOTE — HOME HEALTH
Subjective: Pt reported diarrhea since yesterday.  Falls since last visit No(if yes complete the Fall Tracking Form and include bsrifallreport):   Caregiver involvement changes: none.  Home health supplies by type and quantity ordered/delivered this visit include: none.    Clinician asked if patient has had any physician contact since last home care visit and patient states: NO  Clinician asked if patient has any new or changed medications and patient states:  NO   If Yes, were medications reconciled? N/A   Was the certifying physician notified of changes in medications? N/A     Clinical assessment (what this visit means for the patient overall and need for ongoing skilled care) and progress or lack of progress towards SPECIFIC goals: Todays visit allowed for pt to receive gait training to decrease fall risk. Gait and balance goals have not been met.    Written Teaching Material Utilized: N/A    Interdisciplinary communication with:  PT for the purpose of POC collaboration    Discharge planning as follows: When goals are met    Specific plan for next visit: Instruct caregiver/patient in gait , balance and B LE strength training.

## 2024-03-22 VITALS
RESPIRATION RATE: 18 BRPM | HEART RATE: 56 BPM | DIASTOLIC BLOOD PRESSURE: 70 MMHG | SYSTOLIC BLOOD PRESSURE: 128 MMHG | OXYGEN SATURATION: 98 % | TEMPERATURE: 97.8 F

## 2024-03-22 ASSESSMENT — ENCOUNTER SYMPTOMS: PAIN LOCATION - PAIN QUALITY: ACHE

## 2024-03-22 NOTE — HOME HEALTH
Subjective: Pt stated: \" I have had diarrhea since last night. I am feeling a little better.\"     Falls since last visit No(if yes complete the Fall Tracking Form and include bsrifallreport):      Caregiver involvement changes: none    Home health supplies by type and quantity ordered/delivered this visit include: none     Clinician asked if patient has had any physician contact since last home care visit and patient states: NO     Clinician asked if patient has any new or changed medications and patient states:  NO      If Yes, were medications reconciled? N/A      Was the certifying physician notified of changes in medications? N/A      Clinical assessment (what this visit means for the patient overall and need for ongoing skilled care) and progress or lack of progress towards SPECIFIC goals: Today's pt was having diarrhea and loose stools. Continue instruction in transfer training; ADL setup; dressing; and fall prevention.Pt dressing self with modified iND. Continue to teach and train pt/cg on fall risk prevention strategies, including using reacher instead of bending to the floor, ensure ADL DME/equipment is maintained correctly; wear non slip footwear, keep environment well lit, monitor medication that may alter mental status, slow down when turning, maintain wide base of support when turning and remove clutter. Pt progressing with OT intervention but goals are not met. see interventions for details.      Written Teaching Material Utilized: N/A     Interdisciplinary communication with: DEBORAH Ha and MARGARET Meyer for the purpose of POC collaboration      Discharge planning as follows: When goals are met       Specific plan for next visit: ADL safety; fall prevention; IADL's; and home safety; and improved standing tolerance in prep for ADL/IADL task.

## 2024-03-22 NOTE — HOME HEALTH
Subjective: \"I was feeling pretty bad yesterday, but I'm better today.\"  Falls since last visit No(if yes complete the Fall Tracking Form and include bsrifallreport):   Caregiver involvement changes: No  Home health supplies by type and quantity ordered/delivered this visit include: n/a    Clinician asked if patient has had any physician contact since last home care visit and patient states: NO  Clinician asked if patient has any new or changed medications and patient states:  NO   If Yes, were medications reconciled? N/A   Was the certifying physician notified of changes in medications? N/A     Clinical assessment (what this visit means for the patient overall and need for ongoing skilled care) and progress or lack of progress towards SPECIFIC goals: Pt at risk for rehospitalization r/t fall risk, infection, incision exacerbation.    Written Teaching Material Utilized: N/A    Interdisciplinary communication with: N/A    Discharge planning as follows: Will discharge when the patient has reached their maximum functional potential and maximum safety in their home    Specific plan for next visit: Assessment, education as needed

## 2024-03-26 ENCOUNTER — HOME CARE VISIT (OUTPATIENT)
Facility: HOME HEALTH | Age: 79
End: 2024-03-26
Payer: MEDICARE

## 2024-03-26 PROCEDURE — G0157 HHC PT ASSISTANT EA 15: HCPCS

## 2024-03-27 ENCOUNTER — HOME CARE VISIT (OUTPATIENT)
Facility: HOME HEALTH | Age: 79
End: 2024-03-27
Payer: MEDICARE

## 2024-03-27 VITALS
OXYGEN SATURATION: 98 % | HEART RATE: 69 BPM | DIASTOLIC BLOOD PRESSURE: 62 MMHG | TEMPERATURE: 97.5 F | RESPIRATION RATE: 18 BRPM | SYSTOLIC BLOOD PRESSURE: 115 MMHG

## 2024-03-27 ASSESSMENT — ENCOUNTER SYMPTOMS: PAIN LOCATION - PAIN QUALITY: ACHING

## 2024-03-27 NOTE — HOME HEALTH
Subjective: Pt reported SOB after gait training.  Falls since last visit No(if yes complete the Fall Tracking Form and include bsrifallreport):   Caregiver involvement changes: none.  Home health supplies by type and quantity ordered/delivered this visit include: none.    Clinician asked if patient has had any physician contact since last home care visit and patient states: NO  Clinician asked if patient has any new or changed medications and patient states:  NO   If Yes, were medications reconciled? N/A   Was the certifying physician notified of changes in medications? N/A     Clinical assessment (what this visit means for the patient overall and need for ongoing skilled care) and progress or lack of progress towards SPECIFIC goals: Todays visit allowed for pt to receive gait training outdoors and upgrade HEP. Strength and gait goals have not been met.    Written Teaching Material Utilized: PTA educated pt and caretaker : upgraded HEP B LE in std: TR/HR's, marching, mini squatd, SLR ABD, Hamstring curls with B UE support to increase B LE strength and std balance with use of handout with instructions and pics.    Interdisciplinary communication with:  PT for the purpose of POC collaboration    Discharge planning as follows: When goals are met    Specific plan for next visit: Instruct caregiver/patient in gait , balance and B LE strength training.

## 2024-03-28 ENCOUNTER — HOME CARE VISIT (OUTPATIENT)
Facility: HOME HEALTH | Age: 79
End: 2024-03-28
Payer: MEDICARE

## 2024-03-28 PROCEDURE — G0157 HHC PT ASSISTANT EA 15: HCPCS

## 2024-03-29 ENCOUNTER — HOME CARE VISIT (OUTPATIENT)
Facility: HOME HEALTH | Age: 79
End: 2024-03-29
Payer: MEDICARE

## 2024-03-29 VITALS
HEART RATE: 67 BPM | TEMPERATURE: 98.2 F | DIASTOLIC BLOOD PRESSURE: 62 MMHG | RESPIRATION RATE: 18 BRPM | SYSTOLIC BLOOD PRESSURE: 114 MMHG | OXYGEN SATURATION: 93 %

## 2024-03-29 VITALS
HEART RATE: 62 BPM | RESPIRATION RATE: 20 BRPM | DIASTOLIC BLOOD PRESSURE: 63 MMHG | SYSTOLIC BLOOD PRESSURE: 115 MMHG | OXYGEN SATURATION: 93 % | TEMPERATURE: 96.9 F

## 2024-03-29 PROCEDURE — G0299 HHS/HOSPICE OF RN EA 15 MIN: HCPCS

## 2024-03-29 ASSESSMENT — ENCOUNTER SYMPTOMS
PAIN LOCATION - PAIN QUALITY: THROBBING
PAIN LOCATION - PAIN QUALITY: ACHING

## 2024-03-29 NOTE — HOME HEALTH
Subjective: Pt reported increased discomfort in back due to cold wet weather.  Falls since last visit No(if yes complete the Fall Tracking Form and include bsrifallreport):   Caregiver involvement changes: none.  Home health supplies by type and quantity ordered/delivered this visit include: none.    Clinician asked if patient has had any physician contact since last home care visit and patient states: NO  Clinician asked if patient has any new or changed medications and patient states:  NO   If Yes, were medications reconciled? N/A   Was the certifying physician notified of changes in medications? N/A     Clinical assessment (what this visit means for the patient overall and need for ongoing skilled care) and progress or lack of progress towards SPECIFIC goals: Todays visit allowed for pt to receive gait , B LE strength training. Gait and balance goals have not been met.    Written Teaching Material Utilized: N/A    Interdisciplinary communication with:  PT for the purpose of POC collaboration    Discharge planning as follows: When goals are met    Specific plan for next visit: Instruct caregiver/patient in gait , balance and B LE strength training.

## 2024-03-29 NOTE — HOME HEALTH
Subjective: \"The pain in my back in right in the center of my lower back.\" \"My wound is looking really good and healing up.\"  Falls since last visit No(if yes complete the Fall Tracking Form and include bsrifallreport):   Caregiver involvement changes: NA    Clinician asked if patient has had any physician contact since last home care visit and patient states: NO  Clinician asked if patient has any new or changed medications and patient states:  NO   If Yes, were medications reconciled? N/A   Was the certifying physician notified of changes in medications? N/A     A list of reconciled medications has been uploaded to media.      Clinical assessment (what this visit means for the patient overall and need for ongoing skilled care) and progress or lack of progress towards SPECIFIC goals: Pt lives alone in lower level apartment assesssed by stairs. However pt has a back door with no stairs and that is the way she exists the home.Pt reports she has 1 daughter and 2 grand daughter that come and check on her and occassionally stay the night with her to monitor her recovery. Pt ambulates safely with a walker and will continue PT and OT. Pt ambulates over grass with walker and reports that PT is taking her for walks to show safe mobility techniques. Wound on pt back has healed and shows no s/s of infection. SN performed assessment, education and VS.    Discharge Instructions:    Written Teaching Material Utilized: Discharge paperwork    Instructed patient/caregiver on the following: Take all medications exactly as prescribed by physician. Updated medication list present in the home at discharge, Keep all scheduled medical appointments, Wash hands frequently to control the spread of infection, Follow safety and fall prevention education to prevent falls and Continue home exercises as instructed by your therapist    Call physician for: continuous pain, abnormal bleeding, high fever over 100.4 degrees, increased pain, new

## 2024-04-02 ENCOUNTER — HOME CARE VISIT (OUTPATIENT)
Facility: HOME HEALTH | Age: 79
End: 2024-04-02
Payer: MEDICARE

## 2024-04-02 VITALS
OXYGEN SATURATION: 95 % | HEART RATE: 65 BPM | DIASTOLIC BLOOD PRESSURE: 58 MMHG | RESPIRATION RATE: 18 BRPM | TEMPERATURE: 97.7 F | SYSTOLIC BLOOD PRESSURE: 120 MMHG

## 2024-04-02 VITALS — DIASTOLIC BLOOD PRESSURE: 64 MMHG | SYSTOLIC BLOOD PRESSURE: 112 MMHG

## 2024-04-02 PROCEDURE — G0157 HHC PT ASSISTANT EA 15: HCPCS

## 2024-04-02 PROCEDURE — G0152 HHCP-SERV OF OT,EA 15 MIN: HCPCS

## 2024-04-02 ASSESSMENT — ENCOUNTER SYMPTOMS: PAIN LOCATION - PAIN QUALITY: ACHING

## 2024-04-04 ENCOUNTER — HOME CARE VISIT (OUTPATIENT)
Facility: HOME HEALTH | Age: 79
End: 2024-04-04
Payer: MEDICARE

## 2024-04-04 PROCEDURE — G0157 HHC PT ASSISTANT EA 15: HCPCS

## 2024-04-05 VITALS
OXYGEN SATURATION: 94 % | DIASTOLIC BLOOD PRESSURE: 62 MMHG | RESPIRATION RATE: 18 BRPM | SYSTOLIC BLOOD PRESSURE: 140 MMHG | HEART RATE: 74 BPM | TEMPERATURE: 97.7 F

## 2024-04-05 ASSESSMENT — ENCOUNTER SYMPTOMS: PAIN LOCATION - PAIN QUALITY: ACHING

## 2024-04-05 NOTE — HOME HEALTH
Subjective: Pt reported increased functional act janet since eval.  Falls since last visit No(if yes complete the Fall Tracking Form and include bsrifallreport):   Caregiver involvement changes: none.  Home health supplies by type and quantity ordered/delivered this visit include: none.    Clinician asked if patient has had any physician contact since last home care visit and patient states: NO  Clinician asked if patient has any new or changed medications and patient states:  NO   If Yes, were medications reconciled? N/A   Was the certifying physician notified of changes in medications? N/A     Clinical assessment (what this visit means for the patient overall and need for ongoing skilled care) and progress or lack of progress towards SPECIFIC goals: Todays visit allowed for pt to recdive balance and gait training to decrease fall risk. gait and balance goals have not been met.    Written Teaching Material Utilized: PTA educated pt and caretaker : upgraded HEP B LE in std: TR/HR's, marching, mini squatd, SLR ABD, Hamstring curls with B UE support to increase B LE strength and std balance with use of handout with instructions and pics.    Interdisciplinary communication with:  N/A  Discharge planning as follows: When goals are met    Specific plan for next visit: Instruct caregiver/patient in gait , balance and B LE strength training.

## 2024-04-08 ENCOUNTER — HOME CARE VISIT (OUTPATIENT)
Facility: HOME HEALTH | Age: 79
End: 2024-04-08
Payer: MEDICARE

## 2024-04-08 VITALS
SYSTOLIC BLOOD PRESSURE: 120 MMHG | HEART RATE: 62 BPM | RESPIRATION RATE: 16 BRPM | TEMPERATURE: 97.8 F | DIASTOLIC BLOOD PRESSURE: 60 MMHG | OXYGEN SATURATION: 93 %

## 2024-04-08 PROCEDURE — G0151 HHCP-SERV OF PT,EA 15 MIN: HCPCS

## 2024-04-08 ASSESSMENT — ENCOUNTER SYMPTOMS: PAIN LOCATION - PAIN QUALITY: SORE

## 2024-04-09 NOTE — HOME HEALTH
Subjective: Patient states she has been getting around better and feels stronger. Ready for discharge  Falls since last visit No(if yes complete the Fall Tracking Form and include bsrifallreport):   Caregiver involvement changes: None    Clinician asked if patient has had any physician contact since last home care visit and patient states: NO  Clinician asked if patient has any new or changed medications and patient states:  NO   If Yes, were medications reconciled? N/A   Was the certifying physician notified of changes in medications? N/A     A list of reconciled medications has been given to the patient/caregiver  and uploaded to media.      Clinical assessment (what this visit means for the patient overall and need for ongoing skilled care) and progress or lack of progress towards SPECIFIC goals: Patient received skilled PT, OT, and nursing to address complications after recent kyphoplasty and hospitalization/SNF stay. She was educated on home safety, shower management, medication management, and pain management interventions. She demonstrates improved strength BLE with MMT of 4+/5, improved balance with Tinetti of 21/28, and independence with home exercise. She is bathing independently and is able to walk on all surfaces with use of walker. She is appropriate for discharge at this time.    Discharge Instructions:    Written Teaching Material Utilized: discharge instructions    Instructed patient/caregiver on the following: Take all medications exactly as prescribed by physician. Updated medication list present in the home at discharge, Keep all scheduled medical appointments, Wash hands frequently to control the spread of infection, Follow safety and fall prevention education to prevent falls and Continue home exercises as instructed by your therapist    Call physician for: increased pain, new problem and frequent falls    The patient/caregiver expressed knowledge and understanding of Discharge

## 2024-04-15 ENCOUNTER — CLINICAL DOCUMENTATION (OUTPATIENT)
Age: 79
End: 2024-04-15

## 2024-04-29 ENCOUNTER — CLINICAL DOCUMENTATION (OUTPATIENT)
Age: 79
End: 2024-04-29

## 2024-06-13 NOTE — TELEPHONE ENCOUNTER
Last appointment: 3/15/24  Next appointment: 9/25/24  Previous refill encounter(s): 4/20/23    Requested Prescriptions     Pending Prescriptions Disp Refills    amLODIPine (NORVASC) 10 MG tablet 90 tablet 0     Sig: Take 1 tablet by mouth daily         For Pharmacy Admin Tracking Only    Program: Medication Refill  CPA in place:    Recommendation Provided To:   Intervention Detail: New Rx: 1, reason: Patient Preference  Intervention Accepted By:   Gap Closed?:    Time Spent (min): 5

## 2024-06-14 RX ORDER — AMLODIPINE BESYLATE 10 MG/1
10 TABLET ORAL DAILY
Qty: 90 TABLET | Refills: 0 | Status: SHIPPED | OUTPATIENT
Start: 2024-06-14

## 2024-08-21 RX ORDER — AMLODIPINE BESYLATE 10 MG/1
10 TABLET ORAL DAILY
Qty: 90 TABLET | Refills: 1 | Status: SHIPPED | OUTPATIENT
Start: 2024-08-21

## 2024-08-21 NOTE — TELEPHONE ENCOUNTER
Last appointment: 1/4/24  Next appointment: 9/25/24  Previous refill encounter(s): 6/14/24 #90    Requested Prescriptions     Pending Prescriptions Disp Refills    amLODIPine (NORVASC) 10 MG tablet [Pharmacy Med Name: AMLODIPINE BESYLATE 10MG TABLETS] 90 tablet 0     Sig: TAKE 1 TABLET BY MOUTH DAILY         For Pharmacy Admin Tracking Only    Program: Medication Refill  CPA in place:    Recommendation Provided To:   Intervention Detail: New Rx: 1, reason: Patient Preference  Intervention Accepted By:   Gap Closed?:    Time Spent (min): 5

## 2024-08-28 DIAGNOSIS — K29.50 CHRONIC GASTRITIS WITHOUT BLEEDING, UNSPECIFIED GASTRITIS TYPE: ICD-10-CM

## 2024-08-28 DIAGNOSIS — I10 ESSENTIAL (PRIMARY) HYPERTENSION: ICD-10-CM

## 2024-08-28 RX ORDER — METOPROLOL TARTRATE 50 MG
50 TABLET ORAL 2 TIMES DAILY
Qty: 60 TABLET | Refills: 0 | Status: SHIPPED | OUTPATIENT
Start: 2024-08-28 | End: 2025-02-24

## 2024-08-28 RX ORDER — PANTOPRAZOLE SODIUM 40 MG/1
40 TABLET, DELAYED RELEASE ORAL 2 TIMES DAILY
Qty: 60 TABLET | Refills: 0 | Status: SHIPPED | OUTPATIENT
Start: 2024-08-28 | End: 2025-02-24

## 2024-08-28 NOTE — TELEPHONE ENCOUNTER
Last appointment: 3/15/24  Next appointment: 9/25/24  Previous refill encounter(s): 9/5/23    Requested Prescriptions     Pending Prescriptions Disp Refills    metoprolol tartrate (LOPRESSOR) 50 MG tablet 60 tablet 0     Sig: Take 1 tablet by mouth 2 times daily    pantoprazole (PROTONIX) 40 MG tablet 60 tablet 0     Sig: Take 1 tablet by mouth 2 times daily         For Pharmacy Admin Tracking Only    Program: Medication Refill  CPA in place:    Recommendation Provided To:   Intervention Detail: New Rx: 2, reason: Patient Preference  Intervention Accepted By:   Gap Closed?:    Time Spent (min): 5

## 2024-09-12 ENCOUNTER — APPOINTMENT (OUTPATIENT)
Facility: HOSPITAL | Age: 79
DRG: 189 | End: 2024-09-12
Payer: MEDICARE

## 2024-09-12 ENCOUNTER — HOSPITAL ENCOUNTER (INPATIENT)
Facility: HOSPITAL | Age: 79
LOS: 7 days | Discharge: NURSING, REHAB OR LONG-TERM CARE | DRG: 189 | End: 2024-09-19
Attending: EMERGENCY MEDICINE | Admitting: FAMILY MEDICINE
Payer: MEDICARE

## 2024-09-12 DIAGNOSIS — J44.1 COPD EXACERBATION (HCC): Primary | ICD-10-CM

## 2024-09-12 DIAGNOSIS — E87.6 HYPOKALEMIA: ICD-10-CM

## 2024-09-12 DIAGNOSIS — E87.1 HYPONATREMIA: ICD-10-CM

## 2024-09-12 LAB
ALBUMIN SERPL-MCNC: 2.1 G/DL (ref 3.5–5)
ALBUMIN SERPL-MCNC: 2.2 G/DL (ref 3.5–5)
ALBUMIN/GLOB SERPL: 0.6 (ref 1.1–2.2)
ALBUMIN/GLOB SERPL: 0.6 (ref 1.1–2.2)
ALP SERPL-CCNC: 180 U/L (ref 45–117)
ALP SERPL-CCNC: 183 U/L (ref 45–117)
ALT SERPL-CCNC: 17 U/L (ref 12–78)
ALT SERPL-CCNC: 18 U/L (ref 12–78)
ANION GAP SERPL CALC-SCNC: 13 MMOL/L (ref 2–12)
ANION GAP SERPL CALC-SCNC: 13 MMOL/L (ref 2–12)
ANION GAP SERPL CALC-SCNC: 14 MMOL/L (ref 2–12)
ANION GAP SERPL CALC-SCNC: 15 MMOL/L (ref 2–12)
ARTERIAL PATENCY WRIST A: POSITIVE
AST SERPL-CCNC: 35 U/L (ref 15–37)
AST SERPL-CCNC: 38 U/L (ref 15–37)
B PERT DNA SPEC QL NAA+PROBE: NOT DETECTED
BASE EXCESS BLD CALC-SCNC: 4.5 MMOL/L
BASOPHILS # BLD: 0.1 K/UL (ref 0–0.1)
BASOPHILS NFR BLD: 1 % (ref 0–1)
BDY SITE: ABNORMAL
BILIRUB SERPL-MCNC: 2 MG/DL (ref 0.2–1)
BILIRUB SERPL-MCNC: 2.2 MG/DL (ref 0.2–1)
BORDETELLA PARAPERTUSSIS BY PCR: NOT DETECTED
BUN SERPL-MCNC: 10 MG/DL (ref 6–20)
BUN SERPL-MCNC: 11 MG/DL (ref 6–20)
BUN SERPL-MCNC: 12 MG/DL (ref 6–20)
BUN SERPL-MCNC: 13 MG/DL (ref 6–20)
BUN/CREAT SERPL: 19 (ref 12–20)
BUN/CREAT SERPL: 21 (ref 12–20)
BUN/CREAT SERPL: 22 (ref 12–20)
BUN/CREAT SERPL: 28 (ref 12–20)
C PNEUM DNA SPEC QL NAA+PROBE: NOT DETECTED
CALCIUM SERPL-MCNC: 6.7 MG/DL (ref 8.5–10.1)
CALCIUM SERPL-MCNC: 8.2 MG/DL (ref 8.5–10.1)
CALCIUM SERPL-MCNC: 8.3 MG/DL (ref 8.5–10.1)
CALCIUM SERPL-MCNC: 8.7 MG/DL (ref 8.5–10.1)
CHLORIDE SERPL-SCNC: 83 MMOL/L (ref 97–108)
CHLORIDE SERPL-SCNC: 86 MMOL/L (ref 97–108)
CHLORIDE SERPL-SCNC: 88 MMOL/L (ref 97–108)
CHLORIDE SERPL-SCNC: 94 MMOL/L (ref 97–108)
CO2 SERPL-SCNC: 24 MMOL/L (ref 21–32)
CO2 SERPL-SCNC: 26 MMOL/L (ref 21–32)
CO2 SERPL-SCNC: 28 MMOL/L (ref 21–32)
CO2 SERPL-SCNC: 30 MMOL/L (ref 21–32)
COMMENT:: NORMAL
COMMENT:: NORMAL
CREAT SERPL-MCNC: 0.36 MG/DL (ref 0.55–1.02)
CREAT SERPL-MCNC: 0.57 MG/DL (ref 0.55–1.02)
CREAT SERPL-MCNC: 0.58 MG/DL (ref 0.55–1.02)
CREAT SERPL-MCNC: 0.6 MG/DL (ref 0.55–1.02)
DIFFERENTIAL METHOD BLD: ABNORMAL
EOSINOPHIL # BLD: 0 K/UL (ref 0–0.4)
EOSINOPHIL NFR BLD: 0 % (ref 0–7)
ERYTHROCYTE [DISTWIDTH] IN BLOOD BY AUTOMATED COUNT: 14.1 % (ref 11.5–14.5)
FLUAV SUBTYP SPEC NAA+PROBE: NOT DETECTED
FLUBV RNA SPEC QL NAA+PROBE: NOT DETECTED
GAS FLOW.O2 O2 DELIVERY SYS: ABNORMAL
GLOBULIN SER CALC-MCNC: 3.8 G/DL (ref 2–4)
GLOBULIN SER CALC-MCNC: 4 G/DL (ref 2–4)
GLUCOSE SERPL-MCNC: 111 MG/DL (ref 65–100)
GLUCOSE SERPL-MCNC: 75 MG/DL (ref 65–100)
GLUCOSE SERPL-MCNC: 87 MG/DL (ref 65–100)
GLUCOSE SERPL-MCNC: 95 MG/DL (ref 65–100)
HADV DNA SPEC QL NAA+PROBE: NOT DETECTED
HCO3 BLD-SCNC: 28 MMOL/L (ref 21–28)
HCOV 229E RNA SPEC QL NAA+PROBE: NOT DETECTED
HCOV HKU1 RNA SPEC QL NAA+PROBE: NOT DETECTED
HCOV NL63 RNA SPEC QL NAA+PROBE: NOT DETECTED
HCOV OC43 RNA SPEC QL NAA+PROBE: NOT DETECTED
HCT VFR BLD AUTO: 34.7 % (ref 35–47)
HGB BLD-MCNC: 12.4 G/DL (ref 11.5–16)
HMPV RNA SPEC QL NAA+PROBE: NOT DETECTED
HPIV1 RNA SPEC QL NAA+PROBE: NOT DETECTED
HPIV2 RNA SPEC QL NAA+PROBE: NOT DETECTED
HPIV3 RNA SPEC QL NAA+PROBE: NOT DETECTED
HPIV4 RNA SPEC QL NAA+PROBE: NOT DETECTED
IMM GRANULOCYTES # BLD AUTO: 0.1 K/UL (ref 0–0.04)
IMM GRANULOCYTES NFR BLD AUTO: 1 % (ref 0–0.5)
LACTATE SERPL-SCNC: 1.1 MMOL/L (ref 0.4–2)
LYMPHOCYTES # BLD: 0.8 K/UL (ref 0.8–3.5)
LYMPHOCYTES NFR BLD: 7 % (ref 12–49)
M PNEUMO DNA SPEC QL NAA+PROBE: NOT DETECTED
MAGNESIUM SERPL-MCNC: 1.5 MG/DL (ref 1.6–2.4)
MCH RBC QN AUTO: 34.3 PG (ref 26–34)
MCHC RBC AUTO-ENTMCNC: 35.7 G/DL (ref 30–36.5)
MCV RBC AUTO: 96.1 FL (ref 80–99)
MONOCYTES # BLD: 1.1 K/UL (ref 0–1)
MONOCYTES NFR BLD: 9 % (ref 5–13)
NEUTS SEG # BLD: 10.3 K/UL (ref 1.8–8)
NEUTS SEG NFR BLD: 82 % (ref 32–75)
NRBC # BLD: 0 K/UL (ref 0–0.01)
NRBC BLD-RTO: 0 PER 100 WBC
PCO2 BLD: 36.9 MMHG (ref 35–48)
PH BLD: 7.49 (ref 7.35–7.45)
PLATELET # BLD AUTO: 225 K/UL (ref 150–400)
PMV BLD AUTO: 9.9 FL (ref 8.9–12.9)
PO2 BLD: 215 MMHG (ref 83–108)
POTASSIUM SERPL-SCNC: 1.9 MMOL/L (ref 3.5–5.1)
POTASSIUM SERPL-SCNC: 2 MMOL/L (ref 3.5–5.1)
POTASSIUM SERPL-SCNC: 2.1 MMOL/L (ref 3.5–5.1)
POTASSIUM SERPL-SCNC: 3.4 MMOL/L (ref 3.5–5.1)
PROCALCITONIN SERPL-MCNC: 1.56 NG/ML
PROT SERPL-MCNC: 5.9 G/DL (ref 6.4–8.2)
PROT SERPL-MCNC: 6.2 G/DL (ref 6.4–8.2)
RBC # BLD AUTO: 3.61 M/UL (ref 3.8–5.2)
RSV RNA SPEC QL NAA+PROBE: NOT DETECTED
RV+EV RNA SPEC QL NAA+PROBE: DETECTED
SAO2 % BLD: 99.8 % (ref 92–97)
SARS-COV-2 RNA RESP QL NAA+PROBE: NOT DETECTED
SODIUM SERPL-SCNC: 127 MMOL/L (ref 136–145)
SODIUM SERPL-SCNC: 133 MMOL/L (ref 136–145)
SPECIMEN HOLD: NORMAL
SPECIMEN TYPE: ABNORMAL
TROPONIN I SERPL HS-MCNC: 26 NG/L (ref 0–51)
WBC # BLD AUTO: 12.4 K/UL (ref 3.6–11)

## 2024-09-12 PROCEDURE — 0202U NFCT DS 22 TRGT SARS-COV-2: CPT

## 2024-09-12 PROCEDURE — 6370000000 HC RX 637 (ALT 250 FOR IP): Performed by: EMERGENCY MEDICINE

## 2024-09-12 PROCEDURE — 84145 PROCALCITONIN (PCT): CPT

## 2024-09-12 PROCEDURE — 99291 CRITICAL CARE FIRST HOUR: CPT

## 2024-09-12 PROCEDURE — 93005 ELECTROCARDIOGRAM TRACING: CPT | Performed by: EMERGENCY MEDICINE

## 2024-09-12 PROCEDURE — 6370000000 HC RX 637 (ALT 250 FOR IP): Performed by: PHYSICIAN ASSISTANT

## 2024-09-12 PROCEDURE — 71045 X-RAY EXAM CHEST 1 VIEW: CPT

## 2024-09-12 PROCEDURE — 6360000002 HC RX W HCPCS: Performed by: PHYSICIAN ASSISTANT

## 2024-09-12 PROCEDURE — 94640 AIRWAY INHALATION TREATMENT: CPT

## 2024-09-12 PROCEDURE — 2060000000 HC ICU INTERMEDIATE R&B

## 2024-09-12 PROCEDURE — 85025 COMPLETE CBC W/AUTO DIFF WBC: CPT

## 2024-09-12 PROCEDURE — 83735 ASSAY OF MAGNESIUM: CPT

## 2024-09-12 PROCEDURE — 2580000003 HC RX 258: Performed by: PHYSICIAN ASSISTANT

## 2024-09-12 PROCEDURE — 36415 COLL VENOUS BLD VENIPUNCTURE: CPT

## 2024-09-12 PROCEDURE — 80053 COMPREHEN METABOLIC PANEL: CPT

## 2024-09-12 PROCEDURE — 6370000000 HC RX 637 (ALT 250 FOR IP): Performed by: INTERNAL MEDICINE

## 2024-09-12 PROCEDURE — 82803 BLOOD GASES ANY COMBINATION: CPT

## 2024-09-12 PROCEDURE — 96374 THER/PROPH/DIAG INJ IV PUSH: CPT

## 2024-09-12 PROCEDURE — 94761 N-INVAS EAR/PLS OXIMETRY MLT: CPT

## 2024-09-12 PROCEDURE — 87449 NOS EACH ORGANISM AG IA: CPT

## 2024-09-12 PROCEDURE — 36600 WITHDRAWAL OF ARTERIAL BLOOD: CPT

## 2024-09-12 PROCEDURE — 84484 ASSAY OF TROPONIN QUANT: CPT

## 2024-09-12 PROCEDURE — 83605 ASSAY OF LACTIC ACID: CPT

## 2024-09-12 PROCEDURE — 5A09357 ASSISTANCE WITH RESPIRATORY VENTILATION, LESS THAN 24 CONSECUTIVE HOURS, CONTINUOUS POSITIVE AIRWAY PRESSURE: ICD-10-PCS | Performed by: STUDENT IN AN ORGANIZED HEALTH CARE EDUCATION/TRAINING PROGRAM

## 2024-09-12 PROCEDURE — 6360000002 HC RX W HCPCS: Performed by: EMERGENCY MEDICINE

## 2024-09-12 RX ORDER — MAGNESIUM SULFATE IN WATER 40 MG/ML
2000 INJECTION, SOLUTION INTRAVENOUS ONCE
Status: COMPLETED | OUTPATIENT
Start: 2024-09-12 | End: 2024-09-12

## 2024-09-12 RX ORDER — ONDANSETRON 2 MG/ML
4 INJECTION INTRAMUSCULAR; INTRAVENOUS EVERY 6 HOURS PRN
Status: DISCONTINUED | OUTPATIENT
Start: 2024-09-12 | End: 2024-09-19 | Stop reason: HOSPADM

## 2024-09-12 RX ORDER — SODIUM CHLORIDE 0.9 % (FLUSH) 0.9 %
5-40 SYRINGE (ML) INJECTION EVERY 12 HOURS SCHEDULED
Status: DISCONTINUED | OUTPATIENT
Start: 2024-09-12 | End: 2024-09-19 | Stop reason: HOSPADM

## 2024-09-12 RX ORDER — POTASSIUM CHLORIDE 750 MG/1
40 TABLET, EXTENDED RELEASE ORAL ONCE
Status: DISCONTINUED | OUTPATIENT
Start: 2024-09-12 | End: 2024-09-12

## 2024-09-12 RX ORDER — GUAIFENESIN 600 MG/1
600 TABLET, EXTENDED RELEASE ORAL 2 TIMES DAILY
Status: DISCONTINUED | OUTPATIENT
Start: 2024-09-12 | End: 2024-09-19 | Stop reason: HOSPADM

## 2024-09-12 RX ORDER — IPRATROPIUM BROMIDE AND ALBUTEROL SULFATE 2.5; .5 MG/3ML; MG/3ML
1 SOLUTION RESPIRATORY (INHALATION) EVERY 8 HOURS
Status: DISCONTINUED | OUTPATIENT
Start: 2024-09-12 | End: 2024-09-13

## 2024-09-12 RX ORDER — ACETYLCYSTEINE 200 MG/ML
600 SOLUTION ORAL; RESPIRATORY (INHALATION)
Status: COMPLETED | OUTPATIENT
Start: 2024-09-12 | End: 2024-09-14

## 2024-09-12 RX ORDER — SODIUM CHLORIDE 9 MG/ML
INJECTION, SOLUTION INTRAVENOUS PRN
Status: DISCONTINUED | OUTPATIENT
Start: 2024-09-12 | End: 2024-09-19 | Stop reason: HOSPADM

## 2024-09-12 RX ORDER — SODIUM CHLORIDE 0.9 % (FLUSH) 0.9 %
5-40 SYRINGE (ML) INJECTION PRN
Status: DISCONTINUED | OUTPATIENT
Start: 2024-09-12 | End: 2024-09-19 | Stop reason: HOSPADM

## 2024-09-12 RX ORDER — POLYETHYLENE GLYCOL 3350 17 G/17G
17 POWDER, FOR SOLUTION ORAL DAILY PRN
Status: DISCONTINUED | OUTPATIENT
Start: 2024-09-12 | End: 2024-09-19 | Stop reason: HOSPADM

## 2024-09-12 RX ORDER — GUAIFENESIN/DEXTROMETHORPHAN 100-10MG/5
5 SYRUP ORAL EVERY 4 HOURS PRN
Status: DISCONTINUED | OUTPATIENT
Start: 2024-09-12 | End: 2024-09-19 | Stop reason: HOSPADM

## 2024-09-12 RX ORDER — IPRATROPIUM BROMIDE AND ALBUTEROL SULFATE 2.5; .5 MG/3ML; MG/3ML
1 SOLUTION RESPIRATORY (INHALATION)
Status: DISCONTINUED | OUTPATIENT
Start: 2024-09-12 | End: 2024-09-12

## 2024-09-12 RX ORDER — ACETAMINOPHEN 325 MG/1
650 TABLET ORAL EVERY 6 HOURS PRN
Status: DISCONTINUED | OUTPATIENT
Start: 2024-09-12 | End: 2024-09-19 | Stop reason: HOSPADM

## 2024-09-12 RX ORDER — SODIUM CHLORIDE AND POTASSIUM CHLORIDE 150; 900 MG/100ML; MG/100ML
INJECTION, SOLUTION INTRAVENOUS CONTINUOUS
Status: DISCONTINUED | OUTPATIENT
Start: 2024-09-12 | End: 2024-09-13

## 2024-09-12 RX ORDER — POTASSIUM CHLORIDE 7.45 MG/ML
10 INJECTION INTRAVENOUS ONCE
Status: COMPLETED | OUTPATIENT
Start: 2024-09-12 | End: 2024-09-12

## 2024-09-12 RX ORDER — ACETYLCYSTEINE 200 MG/ML
600 SOLUTION ORAL; RESPIRATORY (INHALATION)
Status: DISCONTINUED | OUTPATIENT
Start: 2024-09-12 | End: 2024-09-12

## 2024-09-12 RX ORDER — ONDANSETRON 4 MG/1
4 TABLET, ORALLY DISINTEGRATING ORAL EVERY 8 HOURS PRN
Status: DISCONTINUED | OUTPATIENT
Start: 2024-09-12 | End: 2024-09-19 | Stop reason: HOSPADM

## 2024-09-12 RX ORDER — ENOXAPARIN SODIUM 100 MG/ML
40 INJECTION SUBCUTANEOUS DAILY
Status: DISCONTINUED | OUTPATIENT
Start: 2024-09-12 | End: 2024-09-14

## 2024-09-12 RX ORDER — IPRATROPIUM BROMIDE AND ALBUTEROL SULFATE 2.5; .5 MG/3ML; MG/3ML
1 SOLUTION RESPIRATORY (INHALATION) EVERY 20 MIN
Status: COMPLETED | OUTPATIENT
Start: 2024-09-12 | End: 2024-09-12

## 2024-09-12 RX ORDER — ACETAMINOPHEN 650 MG/1
650 SUPPOSITORY RECTAL EVERY 6 HOURS PRN
Status: DISCONTINUED | OUTPATIENT
Start: 2024-09-12 | End: 2024-09-19 | Stop reason: HOSPADM

## 2024-09-12 RX ORDER — BENZONATATE 100 MG/1
200 CAPSULE ORAL 3 TIMES DAILY PRN
Status: DISCONTINUED | OUTPATIENT
Start: 2024-09-12 | End: 2024-09-19 | Stop reason: HOSPADM

## 2024-09-12 RX ORDER — POTASSIUM CHLORIDE 7.45 MG/ML
10 INJECTION INTRAVENOUS
Status: COMPLETED | OUTPATIENT
Start: 2024-09-12 | End: 2024-09-12

## 2024-09-12 RX ADMIN — IPRATROPIUM BROMIDE AND ALBUTEROL SULFATE 1 DOSE: .5; 3 SOLUTION RESPIRATORY (INHALATION) at 09:47

## 2024-09-12 RX ADMIN — IPRATROPIUM BROMIDE AND ALBUTEROL SULFATE 1 DOSE: .5; 3 SOLUTION RESPIRATORY (INHALATION) at 10:12

## 2024-09-12 RX ADMIN — ACETYLCYSTEINE 600 MG: 200 INHALANT RESPIRATORY (INHALATION) at 21:47

## 2024-09-12 RX ADMIN — AZITHROMYCIN MONOHYDRATE 500 MG: 500 INJECTION, POWDER, LYOPHILIZED, FOR SOLUTION INTRAVENOUS at 12:34

## 2024-09-12 RX ADMIN — POTASSIUM CHLORIDE 10 MEQ: 7.46 INJECTION, SOLUTION INTRAVENOUS at 13:32

## 2024-09-12 RX ADMIN — IPRATROPIUM BROMIDE AND ALBUTEROL SULFATE 1 DOSE: .5; 3 SOLUTION RESPIRATORY (INHALATION) at 21:47

## 2024-09-12 RX ADMIN — ENOXAPARIN SODIUM 40 MG: 100 INJECTION SUBCUTANEOUS at 12:49

## 2024-09-12 RX ADMIN — POTASSIUM CHLORIDE 10 MEQ: 7.46 INJECTION, SOLUTION INTRAVENOUS at 14:45

## 2024-09-12 RX ADMIN — WATER 40 MG: 1 INJECTION INTRAMUSCULAR; INTRAVENOUS; SUBCUTANEOUS at 17:50

## 2024-09-12 RX ADMIN — IPRATROPIUM BROMIDE AND ALBUTEROL SULFATE 1 DOSE: .5; 3 SOLUTION RESPIRATORY (INHALATION) at 10:00

## 2024-09-12 RX ADMIN — ACETYLCYSTEINE 600 MG: 200 INHALANT RESPIRATORY (INHALATION) at 14:01

## 2024-09-12 RX ADMIN — POTASSIUM BICARBONATE 40 MEQ: 782 TABLET, EFFERVESCENT ORAL at 14:39

## 2024-09-12 RX ADMIN — WATER 40 MG: 1 INJECTION INTRAMUSCULAR; INTRAVENOUS; SUBCUTANEOUS at 12:55

## 2024-09-12 RX ADMIN — MAGNESIUM SULFATE HEPTAHYDRATE 2000 MG: 40 INJECTION, SOLUTION INTRAVENOUS at 12:40

## 2024-09-12 RX ADMIN — IPRATROPIUM BROMIDE AND ALBUTEROL SULFATE 1 DOSE: .5; 3 SOLUTION RESPIRATORY (INHALATION) at 14:01

## 2024-09-12 RX ADMIN — POTASSIUM CHLORIDE AND SODIUM CHLORIDE: 900; 150 INJECTION, SOLUTION INTRAVENOUS at 11:09

## 2024-09-12 RX ADMIN — POTASSIUM CHLORIDE 10 MEQ: 7.46 INJECTION, SOLUTION INTRAVENOUS at 12:18

## 2024-09-12 RX ADMIN — WATER 1000 MG: 1 INJECTION INTRAMUSCULAR; INTRAVENOUS; SUBCUTANEOUS at 12:25

## 2024-09-12 RX ADMIN — SODIUM CHLORIDE, PRESERVATIVE FREE 10 ML: 5 INJECTION INTRAVENOUS at 12:55

## 2024-09-12 RX ADMIN — POTASSIUM CHLORIDE 10 MEQ: 7.46 INJECTION, SOLUTION INTRAVENOUS at 11:04

## 2024-09-12 ASSESSMENT — PAIN - FUNCTIONAL ASSESSMENT
PAIN_FUNCTIONAL_ASSESSMENT: NONE - DENIES PAIN
PAIN_FUNCTIONAL_ASSESSMENT: NONE - DENIES PAIN

## 2024-09-12 ASSESSMENT — PAIN SCALES - GENERAL: PAINLEVEL_OUTOF10: 1

## 2024-09-13 LAB
ANION GAP SERPL CALC-SCNC: 8 MMOL/L (ref 2–12)
BUN SERPL-MCNC: 14 MG/DL (ref 6–20)
BUN/CREAT SERPL: 22 (ref 12–20)
CALCIUM SERPL-MCNC: 8.7 MG/DL (ref 8.5–10.1)
CHLORIDE SERPL-SCNC: 94 MMOL/L (ref 97–108)
CO2 SERPL-SCNC: 28 MMOL/L (ref 21–32)
CREAT SERPL-MCNC: 0.65 MG/DL (ref 0.55–1.02)
GLUCOSE SERPL-MCNC: 222 MG/DL (ref 65–100)
MAGNESIUM SERPL-MCNC: 1.9 MG/DL (ref 1.6–2.4)
POTASSIUM SERPL-SCNC: 3.6 MMOL/L (ref 3.5–5.1)
SODIUM SERPL-SCNC: 130 MMOL/L (ref 136–145)

## 2024-09-13 PROCEDURE — 6370000000 HC RX 637 (ALT 250 FOR IP): Performed by: HOSPITALIST

## 2024-09-13 PROCEDURE — 6370000000 HC RX 637 (ALT 250 FOR IP): Performed by: PHYSICIAN ASSISTANT

## 2024-09-13 PROCEDURE — 2580000003 HC RX 258: Performed by: PHYSICIAN ASSISTANT

## 2024-09-13 PROCEDURE — 83735 ASSAY OF MAGNESIUM: CPT

## 2024-09-13 PROCEDURE — 6360000002 HC RX W HCPCS: Performed by: HOSPITALIST

## 2024-09-13 PROCEDURE — 6360000002 HC RX W HCPCS: Performed by: PHYSICIAN ASSISTANT

## 2024-09-13 PROCEDURE — 94761 N-INVAS EAR/PLS OXIMETRY MLT: CPT

## 2024-09-13 PROCEDURE — 94640 AIRWAY INHALATION TREATMENT: CPT

## 2024-09-13 PROCEDURE — 80048 BASIC METABOLIC PNL TOTAL CA: CPT

## 2024-09-13 PROCEDURE — 2060000000 HC ICU INTERMEDIATE R&B

## 2024-09-13 PROCEDURE — 36415 COLL VENOUS BLD VENIPUNCTURE: CPT

## 2024-09-13 PROCEDURE — 6360000002 HC RX W HCPCS: Performed by: EMERGENCY MEDICINE

## 2024-09-13 PROCEDURE — 94760 N-INVAS EAR/PLS OXIMETRY 1: CPT

## 2024-09-13 PROCEDURE — 2700000000 HC OXYGEN THERAPY PER DAY

## 2024-09-13 PROCEDURE — 6370000000 HC RX 637 (ALT 250 FOR IP): Performed by: INTERNAL MEDICINE

## 2024-09-13 RX ORDER — IPRATROPIUM BROMIDE AND ALBUTEROL SULFATE 2.5; .5 MG/3ML; MG/3ML
1 SOLUTION RESPIRATORY (INHALATION)
Status: DISCONTINUED | OUTPATIENT
Start: 2024-09-13 | End: 2024-09-15

## 2024-09-13 RX ORDER — MAGNESIUM SULFATE IN WATER 40 MG/ML
2000 INJECTION, SOLUTION INTRAVENOUS ONCE
Status: COMPLETED | OUTPATIENT
Start: 2024-09-13 | End: 2024-09-13

## 2024-09-13 RX ORDER — FUROSEMIDE 10 MG/ML
20 INJECTION INTRAMUSCULAR; INTRAVENOUS ONCE
Status: COMPLETED | OUTPATIENT
Start: 2024-09-13 | End: 2024-09-13

## 2024-09-13 RX ORDER — POTASSIUM CHLORIDE 750 MG/1
40 TABLET, EXTENDED RELEASE ORAL ONCE
Status: DISCONTINUED | OUTPATIENT
Start: 2024-09-13 | End: 2024-09-13

## 2024-09-13 RX ADMIN — IPRATROPIUM BROMIDE AND ALBUTEROL SULFATE 1 DOSE: .5; 3 SOLUTION RESPIRATORY (INHALATION) at 14:02

## 2024-09-13 RX ADMIN — SODIUM CHLORIDE, PRESERVATIVE FREE 10 ML: 5 INJECTION INTRAVENOUS at 09:56

## 2024-09-13 RX ADMIN — SODIUM CHLORIDE, PRESERVATIVE FREE 10 ML: 5 INJECTION INTRAVENOUS at 22:03

## 2024-09-13 RX ADMIN — AZITHROMYCIN MONOHYDRATE 500 MG: 500 INJECTION, POWDER, LYOPHILIZED, FOR SOLUTION INTRAVENOUS at 11:39

## 2024-09-13 RX ADMIN — POTASSIUM CHLORIDE AND SODIUM CHLORIDE: 900; 150 INJECTION, SOLUTION INTRAVENOUS at 02:56

## 2024-09-13 RX ADMIN — ACETYLCYSTEINE 600 MG: 200 INHALANT RESPIRATORY (INHALATION) at 19:45

## 2024-09-13 RX ADMIN — GUAIFENESIN 600 MG: 600 TABLET, EXTENDED RELEASE ORAL at 22:03

## 2024-09-13 RX ADMIN — WATER 1000 MG: 1 INJECTION INTRAMUSCULAR; INTRAVENOUS; SUBCUTANEOUS at 11:35

## 2024-09-13 RX ADMIN — MAGNESIUM SULFATE HEPTAHYDRATE 2000 MG: 40 INJECTION, SOLUTION INTRAVENOUS at 10:02

## 2024-09-13 RX ADMIN — IPRATROPIUM BROMIDE AND ALBUTEROL SULFATE 1 DOSE: .5; 3 SOLUTION RESPIRATORY (INHALATION) at 19:45

## 2024-09-13 RX ADMIN — WATER 40 MG: 1 INJECTION INTRAMUSCULAR; INTRAVENOUS; SUBCUTANEOUS at 17:59

## 2024-09-13 RX ADMIN — WATER 40 MG: 1 INJECTION INTRAMUSCULAR; INTRAVENOUS; SUBCUTANEOUS at 02:51

## 2024-09-13 RX ADMIN — POTASSIUM BICARBONATE 40 MEQ: 782 TABLET, EFFERVESCENT ORAL at 10:16

## 2024-09-13 RX ADMIN — FUROSEMIDE 20 MG: 10 INJECTION, SOLUTION INTRAMUSCULAR; INTRAVENOUS at 16:03

## 2024-09-13 RX ADMIN — POTASSIUM BICARBONATE 20 MEQ: 782 TABLET, EFFERVESCENT ORAL at 16:03

## 2024-09-13 RX ADMIN — ACETYLCYSTEINE 600 MG: 200 INHALANT RESPIRATORY (INHALATION) at 07:58

## 2024-09-13 RX ADMIN — IPRATROPIUM BROMIDE AND ALBUTEROL SULFATE 1 DOSE: .5; 3 SOLUTION RESPIRATORY (INHALATION) at 07:57

## 2024-09-13 RX ADMIN — ENOXAPARIN SODIUM 40 MG: 100 INJECTION SUBCUTANEOUS at 09:47

## 2024-09-13 RX ADMIN — IPRATROPIUM BROMIDE AND ALBUTEROL SULFATE 1 DOSE: .5; 3 SOLUTION RESPIRATORY (INHALATION) at 05:51

## 2024-09-13 RX ADMIN — WATER 40 MG: 1 INJECTION INTRAMUSCULAR; INTRAVENOUS; SUBCUTANEOUS at 09:48

## 2024-09-13 ASSESSMENT — PAIN SCALES - GENERAL: PAINLEVEL_OUTOF10: 0

## 2024-09-14 LAB
ANION GAP SERPL CALC-SCNC: 4 MMOL/L (ref 2–12)
BASOPHILS # BLD: 0.2 K/UL (ref 0–0.1)
BASOPHILS NFR BLD: 1 % (ref 0–1)
BUN SERPL-MCNC: 12 MG/DL (ref 6–20)
BUN/CREAT SERPL: 18 (ref 12–20)
CALCIUM SERPL-MCNC: 8.4 MG/DL (ref 8.5–10.1)
CHLORIDE SERPL-SCNC: 95 MMOL/L (ref 97–108)
CO2 SERPL-SCNC: 33 MMOL/L (ref 21–32)
CREAT SERPL-MCNC: 0.68 MG/DL (ref 0.55–1.02)
DIFFERENTIAL METHOD BLD: ABNORMAL
EKG ATRIAL RATE: 102 BPM
EKG DIAGNOSIS: NORMAL
EKG P AXIS: 85 DEGREES
EKG P-R INTERVAL: 130 MS
EKG Q-T INTERVAL: 366 MS
EKG QRS DURATION: 82 MS
EKG QTC CALCULATION (BAZETT): 477 MS
EKG R AXIS: -70 DEGREES
EKG T AXIS: 80 DEGREES
EKG VENTRICULAR RATE: 102 BPM
EOSINOPHIL # BLD: 0 K/UL (ref 0–0.4)
EOSINOPHIL NFR BLD: 0 % (ref 0–7)
ERYTHROCYTE [DISTWIDTH] IN BLOOD BY AUTOMATED COUNT: 14.6 % (ref 11.5–14.5)
GLUCOSE SERPL-MCNC: 146 MG/DL (ref 65–100)
HCT VFR BLD AUTO: 29.5 % (ref 35–47)
HGB BLD-MCNC: 10.4 G/DL (ref 11.5–16)
IMM GRANULOCYTES # BLD AUTO: 0 K/UL
IMM GRANULOCYTES NFR BLD AUTO: 0 %
LYMPHOCYTES # BLD: 1.1 K/UL (ref 0.8–3.5)
LYMPHOCYTES NFR BLD: 5 % (ref 12–49)
MAGNESIUM SERPL-MCNC: 2 MG/DL (ref 1.6–2.4)
MCH RBC QN AUTO: 35 PG (ref 26–34)
MCHC RBC AUTO-ENTMCNC: 35.3 G/DL (ref 30–36.5)
MCV RBC AUTO: 99.3 FL (ref 80–99)
MONOCYTES # BLD: 1.5 K/UL (ref 0–1)
MONOCYTES NFR BLD: 7 % (ref 5–13)
NEUTS BAND NFR BLD MANUAL: 13 % (ref 0–6)
NEUTS SEG # BLD: 18.7 K/UL (ref 1.8–8)
NEUTS SEG NFR BLD: 74 % (ref 32–75)
NRBC # BLD: 0 K/UL (ref 0–0.01)
NRBC BLD-RTO: 0 PER 100 WBC
PLATELET # BLD AUTO: 355 K/UL (ref 150–400)
PMV BLD AUTO: 8.8 FL (ref 8.9–12.9)
POTASSIUM SERPL-SCNC: 4.3 MMOL/L (ref 3.5–5.1)
RBC # BLD AUTO: 2.97 M/UL (ref 3.8–5.2)
RBC MORPH BLD: ABNORMAL
RBC MORPH BLD: ABNORMAL
SODIUM SERPL-SCNC: 132 MMOL/L (ref 136–145)
WBC # BLD AUTO: 21.5 K/UL (ref 3.6–11)
WBC MORPH BLD: ABNORMAL

## 2024-09-14 PROCEDURE — 83735 ASSAY OF MAGNESIUM: CPT

## 2024-09-14 PROCEDURE — 94640 AIRWAY INHALATION TREATMENT: CPT

## 2024-09-14 PROCEDURE — 6360000002 HC RX W HCPCS: Performed by: PHYSICIAN ASSISTANT

## 2024-09-14 PROCEDURE — 93010 ELECTROCARDIOGRAM REPORT: CPT | Performed by: INTERNAL MEDICINE

## 2024-09-14 PROCEDURE — 6370000000 HC RX 637 (ALT 250 FOR IP): Performed by: PHYSICIAN ASSISTANT

## 2024-09-14 PROCEDURE — 2060000000 HC ICU INTERMEDIATE R&B

## 2024-09-14 PROCEDURE — 85025 COMPLETE CBC W/AUTO DIFF WBC: CPT

## 2024-09-14 PROCEDURE — 6370000000 HC RX 637 (ALT 250 FOR IP): Performed by: HOSPITALIST

## 2024-09-14 PROCEDURE — 80048 BASIC METABOLIC PNL TOTAL CA: CPT

## 2024-09-14 PROCEDURE — 2580000003 HC RX 258: Performed by: PHYSICIAN ASSISTANT

## 2024-09-14 PROCEDURE — 36415 COLL VENOUS BLD VENIPUNCTURE: CPT

## 2024-09-14 PROCEDURE — 2700000000 HC OXYGEN THERAPY PER DAY

## 2024-09-14 RX ORDER — ENOXAPARIN SODIUM 100 MG/ML
30 INJECTION SUBCUTANEOUS DAILY
Status: DISCONTINUED | OUTPATIENT
Start: 2024-09-15 | End: 2024-09-19 | Stop reason: HOSPADM

## 2024-09-14 RX ADMIN — ENOXAPARIN SODIUM 40 MG: 100 INJECTION SUBCUTANEOUS at 09:57

## 2024-09-14 RX ADMIN — IPRATROPIUM BROMIDE AND ALBUTEROL SULFATE 1 DOSE: .5; 3 SOLUTION RESPIRATORY (INHALATION) at 20:20

## 2024-09-14 RX ADMIN — SODIUM CHLORIDE, PRESERVATIVE FREE 10 ML: 5 INJECTION INTRAVENOUS at 09:57

## 2024-09-14 RX ADMIN — GUAIFENESIN 600 MG: 600 TABLET, EXTENDED RELEASE ORAL at 09:56

## 2024-09-14 RX ADMIN — WATER 1000 MG: 1 INJECTION INTRAMUSCULAR; INTRAVENOUS; SUBCUTANEOUS at 12:03

## 2024-09-14 RX ADMIN — WATER 40 MG: 1 INJECTION INTRAMUSCULAR; INTRAVENOUS; SUBCUTANEOUS at 21:06

## 2024-09-14 RX ADMIN — SODIUM CHLORIDE, PRESERVATIVE FREE 10 ML: 5 INJECTION INTRAVENOUS at 21:06

## 2024-09-14 RX ADMIN — ACETYLCYSTEINE 600 MG: 200 INHALANT RESPIRATORY (INHALATION) at 08:06

## 2024-09-14 RX ADMIN — AZITHROMYCIN MONOHYDRATE 500 MG: 500 INJECTION, POWDER, LYOPHILIZED, FOR SOLUTION INTRAVENOUS at 12:02

## 2024-09-14 RX ADMIN — GUAIFENESIN 600 MG: 600 TABLET, EXTENDED RELEASE ORAL at 21:06

## 2024-09-14 RX ADMIN — IPRATROPIUM BROMIDE AND ALBUTEROL SULFATE 1 DOSE: .5; 3 SOLUTION RESPIRATORY (INHALATION) at 08:06

## 2024-09-14 RX ADMIN — IPRATROPIUM BROMIDE AND ALBUTEROL SULFATE 1 DOSE: .5; 3 SOLUTION RESPIRATORY (INHALATION) at 13:53

## 2024-09-14 RX ADMIN — WATER 40 MG: 1 INJECTION INTRAMUSCULAR; INTRAVENOUS; SUBCUTANEOUS at 00:43

## 2024-09-14 ASSESSMENT — PAIN SCALES - GENERAL: PAINLEVEL_OUTOF10: 0

## 2024-09-15 ENCOUNTER — APPOINTMENT (OUTPATIENT)
Facility: HOSPITAL | Age: 79
DRG: 189 | End: 2024-09-15
Payer: MEDICARE

## 2024-09-15 LAB
ARTERIAL PATENCY WRIST A: POSITIVE
BASE EXCESS BLD CALC-SCNC: 8.1 MMOL/L
BDY SITE: ABNORMAL
HCO3 BLD-SCNC: 30.9 MMOL/L (ref 21–28)
L PNEUMO1 AG UR QL IA: NEGATIVE
PCO2 BLD: 35.4 MMHG (ref 35–48)
PH BLD: 7.55 (ref 7.35–7.45)
PO2 BLD: 49 MMHG (ref 83–108)
SAO2 % BLD: 89 % (ref 92–97)
SERVICE CMNT-IMP: ABNORMAL
SPECIMEN SOURCE: NORMAL
SPECIMEN TYPE: ABNORMAL

## 2024-09-15 PROCEDURE — 97161 PT EVAL LOW COMPLEX 20 MIN: CPT | Performed by: PHYSICAL THERAPIST

## 2024-09-15 PROCEDURE — 97165 OT EVAL LOW COMPLEX 30 MIN: CPT

## 2024-09-15 PROCEDURE — 2580000003 HC RX 258: Performed by: PHYSICIAN ASSISTANT

## 2024-09-15 PROCEDURE — 71045 X-RAY EXAM CHEST 1 VIEW: CPT

## 2024-09-15 PROCEDURE — 36600 WITHDRAWAL OF ARTERIAL BLOOD: CPT

## 2024-09-15 PROCEDURE — 97530 THERAPEUTIC ACTIVITIES: CPT | Performed by: PHYSICAL THERAPIST

## 2024-09-15 PROCEDURE — 2700000000 HC OXYGEN THERAPY PER DAY

## 2024-09-15 PROCEDURE — 2060000000 HC ICU INTERMEDIATE R&B

## 2024-09-15 PROCEDURE — 6360000002 HC RX W HCPCS: Performed by: PHYSICIAN ASSISTANT

## 2024-09-15 PROCEDURE — 6370000000 HC RX 637 (ALT 250 FOR IP): Performed by: HOSPITALIST

## 2024-09-15 PROCEDURE — 82803 BLOOD GASES ANY COMBINATION: CPT

## 2024-09-15 PROCEDURE — 97535 SELF CARE MNGMENT TRAINING: CPT

## 2024-09-15 PROCEDURE — 6370000000 HC RX 637 (ALT 250 FOR IP): Performed by: PHYSICIAN ASSISTANT

## 2024-09-15 PROCEDURE — 6360000002 HC RX W HCPCS: Performed by: HOSPITALIST

## 2024-09-15 PROCEDURE — 94640 AIRWAY INHALATION TREATMENT: CPT

## 2024-09-15 RX ORDER — BUMETANIDE 0.25 MG/ML
0.5 INJECTION INTRAMUSCULAR; INTRAVENOUS 2 TIMES DAILY
Status: COMPLETED | OUTPATIENT
Start: 2024-09-15 | End: 2024-09-15

## 2024-09-15 RX ORDER — IPRATROPIUM BROMIDE AND ALBUTEROL SULFATE 2.5; .5 MG/3ML; MG/3ML
1 SOLUTION RESPIRATORY (INHALATION)
Status: DISCONTINUED | OUTPATIENT
Start: 2024-09-16 | End: 2024-09-19 | Stop reason: HOSPADM

## 2024-09-15 RX ADMIN — GUAIFENESIN 600 MG: 600 TABLET, EXTENDED RELEASE ORAL at 20:49

## 2024-09-15 RX ADMIN — WATER 40 MG: 1 INJECTION INTRAMUSCULAR; INTRAVENOUS; SUBCUTANEOUS at 17:55

## 2024-09-15 RX ADMIN — WATER 40 MG: 1 INJECTION INTRAMUSCULAR; INTRAVENOUS; SUBCUTANEOUS at 20:49

## 2024-09-15 RX ADMIN — SODIUM CHLORIDE, PRESERVATIVE FREE 10 ML: 5 INJECTION INTRAVENOUS at 08:33

## 2024-09-15 RX ADMIN — BUMETANIDE 0.5 MG: 0.25 INJECTION INTRAMUSCULAR; INTRAVENOUS at 11:00

## 2024-09-15 RX ADMIN — WATER 40 MG: 1 INJECTION INTRAMUSCULAR; INTRAVENOUS; SUBCUTANEOUS at 08:32

## 2024-09-15 RX ADMIN — IPRATROPIUM BROMIDE AND ALBUTEROL SULFATE 1 DOSE: .5; 3 SOLUTION RESPIRATORY (INHALATION) at 08:15

## 2024-09-15 RX ADMIN — SODIUM CHLORIDE, PRESERVATIVE FREE 10 ML: 5 INJECTION INTRAVENOUS at 20:50

## 2024-09-15 RX ADMIN — GUAIFENESIN 600 MG: 600 TABLET, EXTENDED RELEASE ORAL at 08:31

## 2024-09-15 RX ADMIN — ENOXAPARIN SODIUM 30 MG: 100 INJECTION SUBCUTANEOUS at 08:31

## 2024-09-15 RX ADMIN — WATER 1000 MG: 1 INJECTION INTRAMUSCULAR; INTRAVENOUS; SUBCUTANEOUS at 11:01

## 2024-09-15 RX ADMIN — BUMETANIDE 0.5 MG: 0.25 INJECTION INTRAMUSCULAR; INTRAVENOUS at 17:53

## 2024-09-15 ASSESSMENT — PAIN SCALES - GENERAL
PAINLEVEL_OUTOF10: 0
PAINLEVEL_OUTOF10: 0

## 2024-09-16 LAB
ANION GAP SERPL CALC-SCNC: 6 MMOL/L (ref 2–12)
BASOPHILS # BLD: 0 K/UL (ref 0–0.1)
BASOPHILS NFR BLD: 0 % (ref 0–1)
BUN SERPL-MCNC: 16 MG/DL (ref 6–20)
BUN/CREAT SERPL: 21 (ref 12–20)
CALCIUM SERPL-MCNC: 8.2 MG/DL (ref 8.5–10.1)
CHLORIDE SERPL-SCNC: 90 MMOL/L (ref 97–108)
CO2 SERPL-SCNC: 32 MMOL/L (ref 21–32)
CREAT SERPL-MCNC: 0.77 MG/DL (ref 0.55–1.02)
DIFFERENTIAL METHOD BLD: ABNORMAL
EOSINOPHIL # BLD: 0 K/UL (ref 0–0.4)
EOSINOPHIL NFR BLD: 0 % (ref 0–7)
ERYTHROCYTE [DISTWIDTH] IN BLOOD BY AUTOMATED COUNT: 14.7 % (ref 11.5–14.5)
GLUCOSE SERPL-MCNC: 120 MG/DL (ref 65–100)
HCT VFR BLD AUTO: 31.2 % (ref 35–47)
HGB BLD-MCNC: 10.9 G/DL (ref 11.5–16)
IMM GRANULOCYTES # BLD AUTO: 0 K/UL
IMM GRANULOCYTES NFR BLD AUTO: 0 %
LYMPHOCYTES # BLD: 1.5 K/UL (ref 0.8–3.5)
LYMPHOCYTES NFR BLD: 9 % (ref 12–49)
MAGNESIUM SERPL-MCNC: 1.6 MG/DL (ref 1.6–2.4)
MCH RBC QN AUTO: 34.8 PG (ref 26–34)
MCHC RBC AUTO-ENTMCNC: 34.9 G/DL (ref 30–36.5)
MCV RBC AUTO: 99.7 FL (ref 80–99)
MONOCYTES # BLD: 0.7 K/UL (ref 0–1)
MONOCYTES NFR BLD: 4 % (ref 5–13)
NEUTS SEG # BLD: 14.8 K/UL (ref 1.8–8)
NEUTS SEG NFR BLD: 87 % (ref 32–75)
NRBC # BLD: 0 K/UL (ref 0–0.01)
NRBC BLD-RTO: 0 PER 100 WBC
PLATELET # BLD AUTO: 356 K/UL (ref 150–400)
PMV BLD AUTO: 8.7 FL (ref 8.9–12.9)
POTASSIUM SERPL-SCNC: 3.8 MMOL/L (ref 3.5–5.1)
RBC # BLD AUTO: 3.13 M/UL (ref 3.8–5.2)
RBC MORPH BLD: ABNORMAL
SODIUM SERPL-SCNC: 128 MMOL/L (ref 136–145)
WBC # BLD AUTO: 17 K/UL (ref 3.6–11)
WBC MORPH BLD: ABNORMAL

## 2024-09-16 PROCEDURE — 80048 BASIC METABOLIC PNL TOTAL CA: CPT

## 2024-09-16 PROCEDURE — 85025 COMPLETE CBC W/AUTO DIFF WBC: CPT

## 2024-09-16 PROCEDURE — 2580000003 HC RX 258: Performed by: PHYSICIAN ASSISTANT

## 2024-09-16 PROCEDURE — 97110 THERAPEUTIC EXERCISES: CPT

## 2024-09-16 PROCEDURE — 83735 ASSAY OF MAGNESIUM: CPT

## 2024-09-16 PROCEDURE — 2060000000 HC ICU INTERMEDIATE R&B

## 2024-09-16 PROCEDURE — 94010 BREATHING CAPACITY TEST: CPT

## 2024-09-16 PROCEDURE — 6360000002 HC RX W HCPCS: Performed by: HOSPITALIST

## 2024-09-16 PROCEDURE — 6370000000 HC RX 637 (ALT 250 FOR IP): Performed by: PHYSICIAN ASSISTANT

## 2024-09-16 PROCEDURE — 6370000000 HC RX 637 (ALT 250 FOR IP): Performed by: HOSPITALIST

## 2024-09-16 PROCEDURE — 6360000002 HC RX W HCPCS: Performed by: PHYSICIAN ASSISTANT

## 2024-09-16 PROCEDURE — 94640 AIRWAY INHALATION TREATMENT: CPT

## 2024-09-16 PROCEDURE — 2700000000 HC OXYGEN THERAPY PER DAY

## 2024-09-16 PROCEDURE — 94760 N-INVAS EAR/PLS OXIMETRY 1: CPT

## 2024-09-16 PROCEDURE — 97535 SELF CARE MNGMENT TRAINING: CPT

## 2024-09-16 PROCEDURE — 2500000003 HC RX 250 WO HCPCS: Performed by: PHYSICIAN ASSISTANT

## 2024-09-16 PROCEDURE — 97530 THERAPEUTIC ACTIVITIES: CPT

## 2024-09-16 PROCEDURE — 36415 COLL VENOUS BLD VENIPUNCTURE: CPT

## 2024-09-16 RX ADMIN — WATER 1000 MG: 1 INJECTION INTRAMUSCULAR; INTRAVENOUS; SUBCUTANEOUS at 11:19

## 2024-09-16 RX ADMIN — GUAIFENESIN 600 MG: 600 TABLET, EXTENDED RELEASE ORAL at 21:00

## 2024-09-16 RX ADMIN — SODIUM CHLORIDE, PRESERVATIVE FREE 10 ML: 5 INJECTION INTRAVENOUS at 08:47

## 2024-09-16 RX ADMIN — ENOXAPARIN SODIUM 30 MG: 100 INJECTION SUBCUTANEOUS at 08:46

## 2024-09-16 RX ADMIN — WATER 40 MG: 1 INJECTION INTRAMUSCULAR; INTRAVENOUS; SUBCUTANEOUS at 17:36

## 2024-09-16 RX ADMIN — GUAIFENESIN 600 MG: 600 TABLET, EXTENDED RELEASE ORAL at 08:45

## 2024-09-16 RX ADMIN — DOXYCYCLINE 100 MG: 100 INJECTION, POWDER, LYOPHILIZED, FOR SOLUTION INTRAVENOUS at 11:23

## 2024-09-16 RX ADMIN — SODIUM CHLORIDE, PRESERVATIVE FREE 10 ML: 5 INJECTION INTRAVENOUS at 21:00

## 2024-09-16 RX ADMIN — WATER 40 MG: 1 INJECTION INTRAMUSCULAR; INTRAVENOUS; SUBCUTANEOUS at 02:59

## 2024-09-16 RX ADMIN — IPRATROPIUM BROMIDE AND ALBUTEROL SULFATE 1 DOSE: .5; 3 SOLUTION RESPIRATORY (INHALATION) at 07:27

## 2024-09-16 RX ADMIN — WATER 40 MG: 1 INJECTION INTRAMUSCULAR; INTRAVENOUS; SUBCUTANEOUS at 20:59

## 2024-09-16 RX ADMIN — WATER 40 MG: 1 INJECTION INTRAMUSCULAR; INTRAVENOUS; SUBCUTANEOUS at 08:46

## 2024-09-16 RX ADMIN — DOXYCYCLINE 100 MG: 100 INJECTION, POWDER, LYOPHILIZED, FOR SOLUTION INTRAVENOUS at 21:19

## 2024-09-16 RX ADMIN — IPRATROPIUM BROMIDE AND ALBUTEROL SULFATE 1 DOSE: .5; 3 SOLUTION RESPIRATORY (INHALATION) at 20:59

## 2024-09-16 ASSESSMENT — COPD QUESTIONNAIRES
QUESTION6_LEAVINGHOUSE: 0
QUESTION1_COUGHFREQUENCY: 4
QUESTION8_ENERGYLEVEL: 3
QUESTION4_WALKINCLINE: 5
TOTAL_EXACERBATIONS_PASTYEAR: 1
CAT_TOTALSCORE: 22
QUESTION7_SLEEPQUALITY: 3
QUESTION3_CHESTTIGHTNESS: 3
QUESTION2_CHESTPHLEGM: 4
QUESTION5_HOMEACTIVITIES: 0

## 2024-09-16 ASSESSMENT — PAIN SCALES - GENERAL: PAINLEVEL_OUTOF10: 0

## 2024-09-17 LAB
ANION GAP SERPL CALC-SCNC: 7 MMOL/L (ref 2–12)
BUN SERPL-MCNC: 17 MG/DL (ref 6–20)
BUN/CREAT SERPL: 22 (ref 12–20)
CALCIUM SERPL-MCNC: 8.5 MG/DL (ref 8.5–10.1)
CHLORIDE SERPL-SCNC: 89 MMOL/L (ref 97–108)
CO2 SERPL-SCNC: 30 MMOL/L (ref 21–32)
CREAT SERPL-MCNC: 0.76 MG/DL (ref 0.55–1.02)
ERYTHROCYTE [DISTWIDTH] IN BLOOD BY AUTOMATED COUNT: 14.6 % (ref 11.5–14.5)
GLUCOSE SERPL-MCNC: 110 MG/DL (ref 65–100)
HCT VFR BLD AUTO: 34.6 % (ref 35–47)
HGB BLD-MCNC: 11.8 G/DL (ref 11.5–16)
MAGNESIUM SERPL-MCNC: 1.6 MG/DL (ref 1.6–2.4)
MCH RBC QN AUTO: 34.1 PG (ref 26–34)
MCHC RBC AUTO-ENTMCNC: 34.1 G/DL (ref 30–36.5)
MCV RBC AUTO: 100 FL (ref 80–99)
NRBC # BLD: 0 K/UL (ref 0–0.01)
NRBC BLD-RTO: 0 PER 100 WBC
PLATELET # BLD AUTO: 351 K/UL (ref 150–400)
PMV BLD AUTO: 8.7 FL (ref 8.9–12.9)
POTASSIUM SERPL-SCNC: 3.7 MMOL/L (ref 3.5–5.1)
RBC # BLD AUTO: 3.46 M/UL (ref 3.8–5.2)
SODIUM SERPL-SCNC: 126 MMOL/L (ref 136–145)
WBC # BLD AUTO: 17.2 K/UL (ref 3.6–11)

## 2024-09-17 PROCEDURE — 2500000003 HC RX 250 WO HCPCS: Performed by: PHYSICIAN ASSISTANT

## 2024-09-17 PROCEDURE — 2060000000 HC ICU INTERMEDIATE R&B

## 2024-09-17 PROCEDURE — 6370000000 HC RX 637 (ALT 250 FOR IP): Performed by: HOSPITALIST

## 2024-09-17 PROCEDURE — 97530 THERAPEUTIC ACTIVITIES: CPT | Performed by: OCCUPATIONAL THERAPIST

## 2024-09-17 PROCEDURE — 80048 BASIC METABOLIC PNL TOTAL CA: CPT

## 2024-09-17 PROCEDURE — 97110 THERAPEUTIC EXERCISES: CPT | Performed by: OCCUPATIONAL THERAPIST

## 2024-09-17 PROCEDURE — 6360000002 HC RX W HCPCS: Performed by: HOSPITALIST

## 2024-09-17 PROCEDURE — 83735 ASSAY OF MAGNESIUM: CPT

## 2024-09-17 PROCEDURE — 97530 THERAPEUTIC ACTIVITIES: CPT

## 2024-09-17 PROCEDURE — 2700000000 HC OXYGEN THERAPY PER DAY

## 2024-09-17 PROCEDURE — 2580000003 HC RX 258: Performed by: PHYSICIAN ASSISTANT

## 2024-09-17 PROCEDURE — 6370000000 HC RX 637 (ALT 250 FOR IP): Performed by: PHYSICIAN ASSISTANT

## 2024-09-17 PROCEDURE — 94640 AIRWAY INHALATION TREATMENT: CPT

## 2024-09-17 PROCEDURE — 85027 COMPLETE CBC AUTOMATED: CPT

## 2024-09-17 PROCEDURE — 6360000002 HC RX W HCPCS: Performed by: PHYSICIAN ASSISTANT

## 2024-09-17 PROCEDURE — 94760 N-INVAS EAR/PLS OXIMETRY 1: CPT

## 2024-09-17 PROCEDURE — 36415 COLL VENOUS BLD VENIPUNCTURE: CPT

## 2024-09-17 PROCEDURE — 97535 SELF CARE MNGMENT TRAINING: CPT | Performed by: OCCUPATIONAL THERAPIST

## 2024-09-17 RX ORDER — PREDNISONE 10 MG/1
10 TABLET ORAL DAILY
Status: DISCONTINUED | OUTPATIENT
Start: 2024-09-23 | End: 2024-09-19 | Stop reason: HOSPADM

## 2024-09-17 RX ORDER — PREDNISONE 20 MG/1
40 TABLET ORAL DAILY
Status: COMPLETED | OUTPATIENT
Start: 2024-09-17 | End: 2024-09-19

## 2024-09-17 RX ORDER — DOXYCYCLINE HYCLATE 100 MG
100 TABLET ORAL EVERY 12 HOURS SCHEDULED
Status: DISCONTINUED | OUTPATIENT
Start: 2024-09-17 | End: 2024-09-18

## 2024-09-17 RX ORDER — PREDNISONE 20 MG/1
20 TABLET ORAL DAILY
Status: DISCONTINUED | OUTPATIENT
Start: 2024-09-20 | End: 2024-09-19 | Stop reason: HOSPADM

## 2024-09-17 RX ADMIN — SODIUM CHLORIDE, PRESERVATIVE FREE 10 ML: 5 INJECTION INTRAVENOUS at 22:39

## 2024-09-17 RX ADMIN — SODIUM CHLORIDE, PRESERVATIVE FREE 10 ML: 5 INJECTION INTRAVENOUS at 11:07

## 2024-09-17 RX ADMIN — ENOXAPARIN SODIUM 30 MG: 100 INJECTION SUBCUTANEOUS at 10:55

## 2024-09-17 RX ADMIN — WATER 40 MG: 1 INJECTION INTRAMUSCULAR; INTRAVENOUS; SUBCUTANEOUS at 03:37

## 2024-09-17 RX ADMIN — DOXYCYCLINE HYCLATE 100 MG: 100 TABLET, COATED ORAL at 22:37

## 2024-09-17 RX ADMIN — GUAIFENESIN 600 MG: 600 TABLET, EXTENDED RELEASE ORAL at 10:55

## 2024-09-17 RX ADMIN — IPRATROPIUM BROMIDE AND ALBUTEROL SULFATE 1 DOSE: .5; 3 SOLUTION RESPIRATORY (INHALATION) at 07:40

## 2024-09-17 RX ADMIN — DOXYCYCLINE 100 MG: 100 INJECTION, POWDER, LYOPHILIZED, FOR SOLUTION INTRAVENOUS at 11:00

## 2024-09-17 RX ADMIN — PREDNISONE 40 MG: 20 TABLET ORAL at 10:54

## 2024-09-17 RX ADMIN — GUAIFENESIN 600 MG: 600 TABLET, EXTENDED RELEASE ORAL at 22:37

## 2024-09-17 RX ADMIN — IPRATROPIUM BROMIDE AND ALBUTEROL SULFATE 1 DOSE: .5; 3 SOLUTION RESPIRATORY (INHALATION) at 20:07

## 2024-09-17 ASSESSMENT — PAIN SCALES - GENERAL: PAINLEVEL_OUTOF10: 0

## 2024-09-18 ENCOUNTER — APPOINTMENT (OUTPATIENT)
Facility: HOSPITAL | Age: 79
DRG: 189 | End: 2024-09-18
Payer: MEDICARE

## 2024-09-18 LAB
ANION GAP SERPL CALC-SCNC: 5 MMOL/L (ref 2–12)
BUN SERPL-MCNC: 18 MG/DL (ref 6–20)
BUN/CREAT SERPL: 23 (ref 12–20)
CALCIUM SERPL-MCNC: 8.3 MG/DL (ref 8.5–10.1)
CHLORIDE SERPL-SCNC: 91 MMOL/L (ref 97–108)
CO2 SERPL-SCNC: 31 MMOL/L (ref 21–32)
CREAT SERPL-MCNC: 0.8 MG/DL (ref 0.55–1.02)
ERYTHROCYTE [DISTWIDTH] IN BLOOD BY AUTOMATED COUNT: 14.8 % (ref 11.5–14.5)
GLUCOSE SERPL-MCNC: 77 MG/DL (ref 65–100)
HCT VFR BLD AUTO: 33.6 % (ref 35–47)
HGB BLD-MCNC: 11.6 G/DL (ref 11.5–16)
MAGNESIUM SERPL-MCNC: 1.6 MG/DL (ref 1.6–2.4)
MCH RBC QN AUTO: 34.3 PG (ref 26–34)
MCHC RBC AUTO-ENTMCNC: 34.5 G/DL (ref 30–36.5)
MCV RBC AUTO: 99.4 FL (ref 80–99)
NRBC # BLD: 0 K/UL (ref 0–0.01)
NRBC BLD-RTO: 0 PER 100 WBC
PLATELET # BLD AUTO: 351 K/UL (ref 150–400)
PMV BLD AUTO: 8.8 FL (ref 8.9–12.9)
POTASSIUM SERPL-SCNC: 4.1 MMOL/L (ref 3.5–5.1)
RBC # BLD AUTO: 3.38 M/UL (ref 3.8–5.2)
SODIUM SERPL-SCNC: 127 MMOL/L (ref 136–145)
WBC # BLD AUTO: 15 K/UL (ref 3.6–11)

## 2024-09-18 PROCEDURE — 6370000000 HC RX 637 (ALT 250 FOR IP): Performed by: HOSPITALIST

## 2024-09-18 PROCEDURE — 85027 COMPLETE CBC AUTOMATED: CPT

## 2024-09-18 PROCEDURE — 71045 X-RAY EXAM CHEST 1 VIEW: CPT

## 2024-09-18 PROCEDURE — 6360000002 HC RX W HCPCS: Performed by: HOSPITALIST

## 2024-09-18 PROCEDURE — 2580000003 HC RX 258: Performed by: PHYSICIAN ASSISTANT

## 2024-09-18 PROCEDURE — 6370000000 HC RX 637 (ALT 250 FOR IP): Performed by: PHYSICIAN ASSISTANT

## 2024-09-18 PROCEDURE — 94640 AIRWAY INHALATION TREATMENT: CPT

## 2024-09-18 PROCEDURE — 80048 BASIC METABOLIC PNL TOTAL CA: CPT

## 2024-09-18 PROCEDURE — 2060000000 HC ICU INTERMEDIATE R&B

## 2024-09-18 PROCEDURE — 36415 COLL VENOUS BLD VENIPUNCTURE: CPT

## 2024-09-18 PROCEDURE — 83735 ASSAY OF MAGNESIUM: CPT

## 2024-09-18 PROCEDURE — 2700000000 HC OXYGEN THERAPY PER DAY

## 2024-09-18 PROCEDURE — 94760 N-INVAS EAR/PLS OXIMETRY 1: CPT

## 2024-09-18 RX ORDER — AMLODIPINE BESYLATE 5 MG/1
10 TABLET ORAL DAILY
Status: DISCONTINUED | OUTPATIENT
Start: 2024-09-18 | End: 2024-09-19 | Stop reason: HOSPADM

## 2024-09-18 RX ORDER — ASPIRIN 81 MG/1
81 TABLET ORAL DAILY
Status: DISCONTINUED | OUTPATIENT
Start: 2024-09-18 | End: 2024-09-19 | Stop reason: HOSPADM

## 2024-09-18 RX ORDER — DIAZEPAM 2 MG
2 TABLET ORAL EVERY 8 HOURS PRN
Status: DISCONTINUED | OUTPATIENT
Start: 2024-09-18 | End: 2024-09-19 | Stop reason: HOSPADM

## 2024-09-18 RX ORDER — ATORVASTATIN CALCIUM 20 MG/1
20 TABLET, FILM COATED ORAL DAILY
Status: DISCONTINUED | OUTPATIENT
Start: 2024-09-18 | End: 2024-09-18

## 2024-09-18 RX ORDER — LANOLIN ALCOHOL/MO/W.PET/CERES
400 CREAM (GRAM) TOPICAL 2 TIMES DAILY
Status: COMPLETED | OUTPATIENT
Start: 2024-09-18 | End: 2024-09-18

## 2024-09-18 RX ORDER — METOPROLOL TARTRATE 50 MG
50 TABLET ORAL 2 TIMES DAILY
Status: DISCONTINUED | OUTPATIENT
Start: 2024-09-18 | End: 2024-09-19 | Stop reason: HOSPADM

## 2024-09-18 RX ADMIN — AMLODIPINE BESYLATE 10 MG: 5 TABLET ORAL at 15:10

## 2024-09-18 RX ADMIN — IPRATROPIUM BROMIDE AND ALBUTEROL SULFATE 1 DOSE: .5; 3 SOLUTION RESPIRATORY (INHALATION) at 20:55

## 2024-09-18 RX ADMIN — MAGNESIUM OXIDE TAB 400 MG (241.3 MG ELEMENTAL MG) 400 MG: 400 (241.3 MG) TAB at 20:45

## 2024-09-18 RX ADMIN — ASPIRIN 81 MG: 81 TABLET, COATED ORAL at 15:10

## 2024-09-18 RX ADMIN — GUAIFENESIN 600 MG: 600 TABLET, EXTENDED RELEASE ORAL at 20:45

## 2024-09-18 RX ADMIN — PREDNISONE 40 MG: 20 TABLET ORAL at 10:25

## 2024-09-18 RX ADMIN — SODIUM CHLORIDE, PRESERVATIVE FREE 10 ML: 5 INJECTION INTRAVENOUS at 10:26

## 2024-09-18 RX ADMIN — IPRATROPIUM BROMIDE AND ALBUTEROL SULFATE 1 DOSE: .5; 3 SOLUTION RESPIRATORY (INHALATION) at 08:00

## 2024-09-18 RX ADMIN — METOPROLOL TARTRATE 50 MG: 50 TABLET, FILM COATED ORAL at 20:44

## 2024-09-18 RX ADMIN — ENOXAPARIN SODIUM 30 MG: 100 INJECTION SUBCUTANEOUS at 10:25

## 2024-09-18 RX ADMIN — SODIUM CHLORIDE, PRESERVATIVE FREE 10 ML: 5 INJECTION INTRAVENOUS at 20:45

## 2024-09-18 RX ADMIN — METOPROLOL TARTRATE 50 MG: 50 TABLET, FILM COATED ORAL at 15:10

## 2024-09-18 RX ADMIN — MAGNESIUM OXIDE TAB 400 MG (241.3 MG ELEMENTAL MG) 400 MG: 400 (241.3 MG) TAB at 10:25

## 2024-09-18 RX ADMIN — GUAIFENESIN 600 MG: 600 TABLET, EXTENDED RELEASE ORAL at 10:25

## 2024-09-18 ASSESSMENT — PAIN SCALES - GENERAL
PAINLEVEL_OUTOF10: 0
PAINLEVEL_OUTOF10: 0

## 2024-09-19 VITALS
TEMPERATURE: 97.5 F | SYSTOLIC BLOOD PRESSURE: 133 MMHG | RESPIRATION RATE: 17 BRPM | HEIGHT: 61 IN | HEART RATE: 70 BPM | OXYGEN SATURATION: 97 % | BODY MASS INDEX: 18.73 KG/M2 | WEIGHT: 99.21 LBS | DIASTOLIC BLOOD PRESSURE: 69 MMHG

## 2024-09-19 PROBLEM — J44.1 COPD EXACERBATION (HCC): Status: RESOLVED | Noted: 2024-09-12 | Resolved: 2024-09-19

## 2024-09-19 LAB
ANION GAP SERPL CALC-SCNC: 5 MMOL/L (ref 2–12)
BUN SERPL-MCNC: 17 MG/DL (ref 6–20)
BUN/CREAT SERPL: 23 (ref 12–20)
CALCIUM SERPL-MCNC: 8.1 MG/DL (ref 8.5–10.1)
CHLORIDE SERPL-SCNC: 92 MMOL/L (ref 97–108)
CO2 SERPL-SCNC: 32 MMOL/L (ref 21–32)
CREAT SERPL-MCNC: 0.74 MG/DL (ref 0.55–1.02)
ERYTHROCYTE [DISTWIDTH] IN BLOOD BY AUTOMATED COUNT: 14.6 % (ref 11.5–14.5)
GLUCOSE SERPL-MCNC: 73 MG/DL (ref 65–100)
HCT VFR BLD AUTO: 32.7 % (ref 35–47)
HGB BLD-MCNC: 11.5 G/DL (ref 11.5–16)
MAGNESIUM SERPL-MCNC: 1.7 MG/DL (ref 1.6–2.4)
MCH RBC QN AUTO: 34.4 PG (ref 26–34)
MCHC RBC AUTO-ENTMCNC: 35.2 G/DL (ref 30–36.5)
MCV RBC AUTO: 97.9 FL (ref 80–99)
NRBC # BLD: 0 K/UL (ref 0–0.01)
NRBC BLD-RTO: 0 PER 100 WBC
PLATELET # BLD AUTO: 334 K/UL (ref 150–400)
PMV BLD AUTO: 8.9 FL (ref 8.9–12.9)
POTASSIUM SERPL-SCNC: 3.8 MMOL/L (ref 3.5–5.1)
RBC # BLD AUTO: 3.34 M/UL (ref 3.8–5.2)
SODIUM SERPL-SCNC: 129 MMOL/L (ref 136–145)
WBC # BLD AUTO: 11.3 K/UL (ref 3.6–11)

## 2024-09-19 PROCEDURE — 6370000000 HC RX 637 (ALT 250 FOR IP): Performed by: PHYSICIAN ASSISTANT

## 2024-09-19 PROCEDURE — 6360000002 HC RX W HCPCS: Performed by: HOSPITALIST

## 2024-09-19 PROCEDURE — 6370000000 HC RX 637 (ALT 250 FOR IP): Performed by: HOSPITALIST

## 2024-09-19 PROCEDURE — 36415 COLL VENOUS BLD VENIPUNCTURE: CPT

## 2024-09-19 PROCEDURE — 83735 ASSAY OF MAGNESIUM: CPT

## 2024-09-19 PROCEDURE — 94640 AIRWAY INHALATION TREATMENT: CPT

## 2024-09-19 PROCEDURE — 97110 THERAPEUTIC EXERCISES: CPT

## 2024-09-19 PROCEDURE — 80048 BASIC METABOLIC PNL TOTAL CA: CPT

## 2024-09-19 PROCEDURE — 2580000003 HC RX 258: Performed by: PHYSICIAN ASSISTANT

## 2024-09-19 PROCEDURE — 97530 THERAPEUTIC ACTIVITIES: CPT

## 2024-09-19 PROCEDURE — 85027 COMPLETE CBC AUTOMATED: CPT

## 2024-09-19 RX ORDER — BUDESONIDE 0.25 MG/2ML
250 INHALANT ORAL 2 TIMES DAILY
Qty: 60 EACH | Refills: 3 | Status: SHIPPED | OUTPATIENT
Start: 2024-09-19

## 2024-09-19 RX ORDER — PREDNISONE 10 MG/1
10 TABLET ORAL DAILY
Qty: 3 TABLET | Refills: 0 | Status: SHIPPED | OUTPATIENT
Start: 2024-09-23 | End: 2024-09-26

## 2024-09-19 RX ORDER — PREDNISONE 20 MG/1
20 TABLET ORAL DAILY
Qty: 3 TABLET | Refills: 0 | Status: SHIPPED | OUTPATIENT
Start: 2024-09-20 | End: 2024-09-23

## 2024-09-19 RX ORDER — GUAIFENESIN 600 MG/1
600 TABLET, EXTENDED RELEASE ORAL 2 TIMES DAILY
Qty: 6 TABLET | Refills: 0 | Status: SHIPPED | OUTPATIENT
Start: 2024-09-19 | End: 2024-09-22

## 2024-09-19 RX ORDER — BENZONATATE 200 MG/1
200 CAPSULE ORAL 3 TIMES DAILY PRN
Qty: 12 CAPSULE | Refills: 0 | Status: SHIPPED | OUTPATIENT
Start: 2024-09-19 | End: 2024-09-26

## 2024-09-19 RX ORDER — ARFORMOTEROL TARTRATE 15 UG/2ML
1 SOLUTION RESPIRATORY (INHALATION) 2 TIMES DAILY
Qty: 120 ML | Refills: 3 | Status: SHIPPED | OUTPATIENT
Start: 2024-09-19

## 2024-09-19 RX ADMIN — PREDNISONE 40 MG: 20 TABLET ORAL at 10:49

## 2024-09-19 RX ADMIN — GUAIFENESIN 600 MG: 600 TABLET, EXTENDED RELEASE ORAL at 10:50

## 2024-09-19 RX ADMIN — SODIUM CHLORIDE, PRESERVATIVE FREE 10 ML: 5 INJECTION INTRAVENOUS at 10:50

## 2024-09-19 RX ADMIN — AMLODIPINE BESYLATE 10 MG: 5 TABLET ORAL at 10:48

## 2024-09-19 RX ADMIN — METOPROLOL TARTRATE 50 MG: 50 TABLET, FILM COATED ORAL at 10:49

## 2024-09-19 RX ADMIN — IPRATROPIUM BROMIDE AND ALBUTEROL SULFATE 1 DOSE: .5; 3 SOLUTION RESPIRATORY (INHALATION) at 07:41

## 2024-09-19 RX ADMIN — ASPIRIN 81 MG: 81 TABLET, COATED ORAL at 10:48

## 2024-09-19 RX ADMIN — ENOXAPARIN SODIUM 30 MG: 100 INJECTION SUBCUTANEOUS at 10:50

## 2024-09-23 ENCOUNTER — TELEPHONE (OUTPATIENT)
Age: 79
End: 2024-09-23

## 2024-10-18 ENCOUNTER — TELEPHONE (OUTPATIENT)
Age: 79
End: 2024-10-18

## 2024-10-18 NOTE — TELEPHONE ENCOUNTER
[facility] would like to know if patient can be followed for Home Care Services. Please call Intake at [phone].

## 2024-10-21 NOTE — TELEPHONE ENCOUNTER
Priya contacted and made aware that PCP will be leaving practice and previous PCP seen is out on medical leave.

## 2024-12-13 ENCOUNTER — HOSPITAL ENCOUNTER (INPATIENT)
Facility: HOSPITAL | Age: 79
LOS: 3 days | Discharge: NURSING, REHAB OR LONG-TERM CARE | DRG: 190 | End: 2024-12-16
Attending: EMERGENCY MEDICINE | Admitting: FAMILY MEDICINE
Payer: MEDICARE

## 2024-12-13 ENCOUNTER — APPOINTMENT (OUTPATIENT)
Facility: HOSPITAL | Age: 79
DRG: 190 | End: 2024-12-13
Payer: MEDICARE

## 2024-12-13 DIAGNOSIS — R06.02 SHORTNESS OF BREATH: ICD-10-CM

## 2024-12-13 DIAGNOSIS — E87.6 HYPOKALEMIA: ICD-10-CM

## 2024-12-13 DIAGNOSIS — E87.1 HYPONATREMIA: Primary | ICD-10-CM

## 2024-12-13 PROBLEM — N17.9 AKI (ACUTE KIDNEY INJURY) (HCC): Status: ACTIVE | Noted: 2024-12-13

## 2024-12-13 PROBLEM — R19.7 DIARRHEA: Status: ACTIVE | Noted: 2024-12-13

## 2024-12-13 PROBLEM — E80.6 HYPERBILIRUBINEMIA: Status: ACTIVE | Noted: 2024-12-13

## 2024-12-13 PROBLEM — E87.8 ELECTROLYTE IMBALANCE: Status: ACTIVE | Noted: 2024-12-13

## 2024-12-13 LAB
ALBUMIN SERPL-MCNC: 2.4 G/DL (ref 3.5–5)
ALBUMIN/GLOB SERPL: 0.8 (ref 1.1–2.2)
ALP SERPL-CCNC: 125 U/L (ref 45–117)
ALT SERPL-CCNC: 35 U/L (ref 12–78)
ANION GAP SERPL CALC-SCNC: 14 MMOL/L (ref 2–12)
AST SERPL-CCNC: 64 U/L (ref 15–37)
BASOPHILS # BLD: 0 K/UL (ref 0–0.1)
BASOPHILS NFR BLD: 0 % (ref 0–1)
BILIRUB SERPL-MCNC: 1.5 MG/DL (ref 0.2–1)
BUN SERPL-MCNC: 31 MG/DL (ref 6–20)
BUN/CREAT SERPL: 21 (ref 12–20)
CALCIUM SERPL-MCNC: 8.6 MG/DL (ref 8.5–10.1)
CHLORIDE SERPL-SCNC: 84 MMOL/L (ref 97–108)
CHLORIDE UR-SCNC: <10 MMOL/L
CK SERPL-CCNC: 36 U/L (ref 26–192)
CO2 SERPL-SCNC: 28 MMOL/L (ref 21–32)
COMMENT:: NORMAL
CREAT SERPL-MCNC: 1.46 MG/DL (ref 0.55–1.02)
CREAT UR-MCNC: 104 MG/DL
DIFFERENTIAL METHOD BLD: ABNORMAL
EOSINOPHIL # BLD: 0 K/UL (ref 0–0.4)
EOSINOPHIL NFR BLD: 0 % (ref 0–7)
ERYTHROCYTE [DISTWIDTH] IN BLOOD BY AUTOMATED COUNT: 18.4 % (ref 11.5–14.5)
GLOBULIN SER CALC-MCNC: 3.2 G/DL (ref 2–4)
GLUCOSE BLD STRIP.AUTO-MCNC: 116 MG/DL (ref 65–117)
GLUCOSE SERPL-MCNC: 133 MG/DL (ref 65–100)
HCT VFR BLD AUTO: 36 % (ref 35–47)
HGB BLD-MCNC: 12.4 G/DL (ref 11.5–16)
IMM GRANULOCYTES # BLD AUTO: 0 K/UL
IMM GRANULOCYTES NFR BLD AUTO: 0 %
LACTATE SERPL-SCNC: 1.9 MMOL/L (ref 0.4–2)
LYMPHOCYTES # BLD: 0.6 K/UL (ref 0.8–3.5)
LYMPHOCYTES NFR BLD: 5 % (ref 12–49)
MCH RBC QN AUTO: 31.9 PG (ref 26–34)
MCHC RBC AUTO-ENTMCNC: 34.4 G/DL (ref 30–36.5)
MCV RBC AUTO: 92.5 FL (ref 80–99)
MONOCYTES # BLD: 0.9 K/UL (ref 0–1)
MONOCYTES NFR BLD: 8 % (ref 5–13)
NEUTS SEG # BLD: 9.5 K/UL (ref 1.8–8)
NEUTS SEG NFR BLD: 87 % (ref 32–75)
NRBC # BLD: 0.02 K/UL (ref 0–0.01)
NRBC BLD-RTO: 0.2 PER 100 WBC
OSMOLALITY SERPL: 279 MOSM/KG H2O
PLATELET # BLD AUTO: 136 K/UL (ref 150–400)
PMV BLD AUTO: 9 FL (ref 8.9–12.9)
POTASSIUM SERPL-SCNC: 2.2 MMOL/L (ref 3.5–5.1)
POTASSIUM UR-SCNC: 8 MMOL/L
PROT SERPL-MCNC: 5.6 G/DL (ref 6.4–8.2)
PROT UR-MCNC: 146 MG/DL (ref 0–11.9)
RBC # BLD AUTO: 3.89 M/UL (ref 3.8–5.2)
RBC MORPH BLD: ABNORMAL
SERVICE CMNT-IMP: NORMAL
SODIUM SERPL-SCNC: 126 MMOL/L (ref 136–145)
SODIUM UR-SCNC: 6 MMOL/L
SPECIMEN HOLD: NORMAL
UUN UR-MCNC: 743 MG/DL
WBC # BLD AUTO: 11 K/UL (ref 3.6–11)

## 2024-12-13 PROCEDURE — 6370000000 HC RX 637 (ALT 250 FOR IP): Performed by: FAMILY MEDICINE

## 2024-12-13 PROCEDURE — 84540 ASSAY OF URINE/UREA-N: CPT

## 2024-12-13 PROCEDURE — 82550 ASSAY OF CK (CPK): CPT

## 2024-12-13 PROCEDURE — 82962 GLUCOSE BLOOD TEST: CPT

## 2024-12-13 PROCEDURE — 83930 ASSAY OF BLOOD OSMOLALITY: CPT

## 2024-12-13 PROCEDURE — 85025 COMPLETE CBC W/AUTO DIFF WBC: CPT

## 2024-12-13 PROCEDURE — 84300 ASSAY OF URINE SODIUM: CPT

## 2024-12-13 PROCEDURE — 71045 X-RAY EXAM CHEST 1 VIEW: CPT

## 2024-12-13 PROCEDURE — 82436 ASSAY OF URINE CHLORIDE: CPT

## 2024-12-13 PROCEDURE — 99285 EMERGENCY DEPT VISIT HI MDM: CPT

## 2024-12-13 PROCEDURE — 84133 ASSAY OF URINE POTASSIUM: CPT

## 2024-12-13 PROCEDURE — 6360000002 HC RX W HCPCS: Performed by: FAMILY MEDICINE

## 2024-12-13 PROCEDURE — 87040 BLOOD CULTURE FOR BACTERIA: CPT

## 2024-12-13 PROCEDURE — 83605 ASSAY OF LACTIC ACID: CPT

## 2024-12-13 PROCEDURE — 80053 COMPREHEN METABOLIC PANEL: CPT

## 2024-12-13 PROCEDURE — 82570 ASSAY OF URINE CREATININE: CPT

## 2024-12-13 PROCEDURE — 83935 ASSAY OF URINE OSMOLALITY: CPT

## 2024-12-13 PROCEDURE — 96374 THER/PROPH/DIAG INJ IV PUSH: CPT

## 2024-12-13 PROCEDURE — 84156 ASSAY OF PROTEIN URINE: CPT

## 2024-12-13 PROCEDURE — 2060000000 HC ICU INTERMEDIATE R&B

## 2024-12-13 PROCEDURE — 6360000002 HC RX W HCPCS: Performed by: EMERGENCY MEDICINE

## 2024-12-13 PROCEDURE — 36415 COLL VENOUS BLD VENIPUNCTURE: CPT

## 2024-12-13 PROCEDURE — 87205 SMEAR GRAM STAIN: CPT

## 2024-12-13 PROCEDURE — 2580000003 HC RX 258: Performed by: FAMILY MEDICINE

## 2024-12-13 PROCEDURE — 2580000003 HC RX 258: Performed by: EMERGENCY MEDICINE

## 2024-12-13 RX ORDER — SODIUM CHLORIDE 9 MG/ML
INJECTION, SOLUTION INTRAVENOUS PRN
Status: DISCONTINUED | OUTPATIENT
Start: 2024-12-13 | End: 2024-12-16 | Stop reason: HOSPADM

## 2024-12-13 RX ORDER — ONDANSETRON 2 MG/ML
4 INJECTION INTRAMUSCULAR; INTRAVENOUS EVERY 6 HOURS PRN
Status: DISCONTINUED | OUTPATIENT
Start: 2024-12-13 | End: 2024-12-16 | Stop reason: HOSPADM

## 2024-12-13 RX ORDER — SODIUM CHLORIDE 0.9 % (FLUSH) 0.9 %
5-40 SYRINGE (ML) INJECTION EVERY 12 HOURS SCHEDULED
Status: DISCONTINUED | OUTPATIENT
Start: 2024-12-13 | End: 2024-12-16 | Stop reason: HOSPADM

## 2024-12-13 RX ORDER — 0.9 % SODIUM CHLORIDE 0.9 %
500 INTRAVENOUS SOLUTION INTRAVENOUS ONCE
Status: COMPLETED | OUTPATIENT
Start: 2024-12-13 | End: 2024-12-13

## 2024-12-13 RX ORDER — ONDANSETRON 4 MG/1
4 TABLET, ORALLY DISINTEGRATING ORAL EVERY 8 HOURS PRN
Status: DISCONTINUED | OUTPATIENT
Start: 2024-12-13 | End: 2024-12-16 | Stop reason: HOSPADM

## 2024-12-13 RX ORDER — DIPHENHYDRAMINE HYDROCHLORIDE 50 MG/ML
25 INJECTION INTRAMUSCULAR; INTRAVENOUS EVERY 6 HOURS PRN
Status: DISCONTINUED | OUTPATIENT
Start: 2024-12-13 | End: 2024-12-13

## 2024-12-13 RX ORDER — ARFORMOTEROL TARTRATE 15 UG/2ML
15 SOLUTION RESPIRATORY (INHALATION) 2 TIMES DAILY
Status: DISCONTINUED | OUTPATIENT
Start: 2024-12-13 | End: 2024-12-13

## 2024-12-13 RX ORDER — PANTOPRAZOLE SODIUM 40 MG/1
40 TABLET, DELAYED RELEASE ORAL 2 TIMES DAILY
Status: DISCONTINUED | OUTPATIENT
Start: 2024-12-14 | End: 2024-12-16 | Stop reason: HOSPADM

## 2024-12-13 RX ORDER — ASPIRIN 81 MG/1
81 TABLET ORAL DAILY
Status: DISCONTINUED | OUTPATIENT
Start: 2024-12-14 | End: 2024-12-16 | Stop reason: HOSPADM

## 2024-12-13 RX ORDER — IPRATROPIUM BROMIDE AND ALBUTEROL SULFATE 2.5; .5 MG/3ML; MG/3ML
1 SOLUTION RESPIRATORY (INHALATION) EVERY 4 HOURS PRN
Status: DISCONTINUED | OUTPATIENT
Start: 2024-12-13 | End: 2024-12-16 | Stop reason: HOSPADM

## 2024-12-13 RX ORDER — BUDESONIDE 0.25 MG/2ML
250 INHALANT ORAL 2 TIMES DAILY
Status: DISCONTINUED | OUTPATIENT
Start: 2024-12-13 | End: 2024-12-13

## 2024-12-13 RX ORDER — BUDESONIDE 0.25 MG/2ML
250 INHALANT ORAL
Status: DISCONTINUED | OUTPATIENT
Start: 2024-12-14 | End: 2024-12-16 | Stop reason: HOSPADM

## 2024-12-13 RX ORDER — PREDNISONE 20 MG/1
40 TABLET ORAL DAILY
Status: DISCONTINUED | OUTPATIENT
Start: 2024-12-14 | End: 2024-12-16 | Stop reason: HOSPADM

## 2024-12-13 RX ORDER — LANOLIN ALCOHOL/MO/W.PET/CERES
100 CREAM (GRAM) TOPICAL DAILY
Status: DISCONTINUED | OUTPATIENT
Start: 2024-12-14 | End: 2024-12-16 | Stop reason: HOSPADM

## 2024-12-13 RX ORDER — POTASSIUM CHLORIDE 7.45 MG/ML
10 INJECTION INTRAVENOUS
Status: COMPLETED | OUTPATIENT
Start: 2024-12-13 | End: 2024-12-13

## 2024-12-13 RX ORDER — POTASSIUM CHLORIDE 7.45 MG/ML
10 INJECTION INTRAVENOUS ONCE
Status: COMPLETED | OUTPATIENT
Start: 2024-12-13 | End: 2024-12-13

## 2024-12-13 RX ORDER — AMLODIPINE BESYLATE 5 MG/1
10 TABLET ORAL DAILY
Status: DISCONTINUED | OUTPATIENT
Start: 2024-12-14 | End: 2024-12-14

## 2024-12-13 RX ORDER — ARFORMOTEROL TARTRATE 15 UG/2ML
15 SOLUTION RESPIRATORY (INHALATION)
Status: DISCONTINUED | OUTPATIENT
Start: 2024-12-14 | End: 2024-12-16 | Stop reason: HOSPADM

## 2024-12-13 RX ORDER — METOPROLOL TARTRATE 50 MG
50 TABLET ORAL 2 TIMES DAILY
Status: DISCONTINUED | OUTPATIENT
Start: 2024-12-13 | End: 2024-12-14

## 2024-12-13 RX ORDER — SODIUM CHLORIDE 0.9 % (FLUSH) 0.9 %
5-40 SYRINGE (ML) INJECTION PRN
Status: DISCONTINUED | OUTPATIENT
Start: 2024-12-13 | End: 2024-12-16 | Stop reason: HOSPADM

## 2024-12-13 RX ORDER — POTASSIUM CHLORIDE 7.45 MG/ML
10 INJECTION INTRAVENOUS
Status: DISPENSED | OUTPATIENT
Start: 2024-12-13 | End: 2024-12-14

## 2024-12-13 RX ADMIN — METOPROLOL TARTRATE 50 MG: 50 TABLET, FILM COATED ORAL at 22:29

## 2024-12-13 RX ADMIN — WATER 125 MG: 1 INJECTION INTRAMUSCULAR; INTRAVENOUS; SUBCUTANEOUS at 22:40

## 2024-12-13 RX ADMIN — POTASSIUM CHLORIDE 10 MEQ: 7.46 INJECTION, SOLUTION INTRAVENOUS at 20:04

## 2024-12-13 RX ADMIN — POTASSIUM CHLORIDE 10 MEQ: 7.46 INJECTION, SOLUTION INTRAVENOUS at 22:01

## 2024-12-13 RX ADMIN — SODIUM CHLORIDE 500 ML: 9 INJECTION, SOLUTION INTRAVENOUS at 19:50

## 2024-12-13 RX ADMIN — POTASSIUM CHLORIDE 10 MEQ: 10 INJECTION, SOLUTION INTRAVENOUS at 23:33

## 2024-12-13 RX ADMIN — SODIUM CHLORIDE: 9 INJECTION, SOLUTION INTRAVENOUS at 23:34

## 2024-12-13 RX ADMIN — WATER 60 MG: 1 INJECTION INTRAMUSCULAR; INTRAVENOUS; SUBCUTANEOUS at 22:24

## 2024-12-13 RX ADMIN — SODIUM CHLORIDE: 9 INJECTION, SOLUTION INTRAVENOUS at 22:44

## 2024-12-13 ASSESSMENT — LIFESTYLE VARIABLES
HOW MANY STANDARD DRINKS CONTAINING ALCOHOL DO YOU HAVE ON A TYPICAL DAY: 1 OR 2
HOW OFTEN DO YOU HAVE A DRINK CONTAINING ALCOHOL: 4 OR MORE TIMES A WEEK

## 2024-12-13 ASSESSMENT — PAIN SCALES - GENERAL
PAINLEVEL_OUTOF10: 0
PAINLEVEL_OUTOF10: 0

## 2024-12-13 NOTE — ED TRIAGE NOTES
Pt presents to the ER via EMS from home with c/o of weakness, diarrhea x 5 days, denies fever, nausea and vomiting.  EMS reported wheezing and administered a nebulizer in route to ER.  Pt denies shortness of breath, 95% room air.   Pt reports smoking 1 pack a day and drinks a glass of scotch daily.  No sick contacts.

## 2024-12-13 NOTE — ED PROVIDER NOTES
[facility] EMERGENCY DEP  EMERGENCY DEPARTMENT ENCOUNTER      Pt Name: Vidya Das  MRN: [id number]  Birthdate 1945  Date of evaluation: 12/13/2024  Provider: Stone Polanco MD    CHIEF COMPLAINT       Chief Complaint   Patient presents with    Fatigue         HISTORY OF PRESENT ILLNESS    This is a 79-year-old female who presents to the ED with a complaint initially of diarrhea, then of fatigue and weakness and then of some shortness of breath.  She has a history of hyponatremia and hypertension.  She is also got a remote history of alcohol abuse and she smokes heavily.  Additionally there is remote history of hypokalemia.  She had a fracture in her left hip operated on approximately a year ago when she has had some intermittent weakness in that leg and states over the last week or so she has had increasing weakness in that left leg to the extent that she has had to have someone pick her up 3 times today because she could not get up.  She has not had any fever or chill and no nausea or vomiting.  On Monday she had heavy diarrhea and took some Imodium for that.  On Tuesday the diarrhea began to subside.  On Wednesday she started developing some increasing generalized weakness and without the falls and the fact that she is not able to eat or drink anything or take her medications, she decided to come to the hospital.  And route, she was complaining of some shortness of breath and was given a nebulizer treatment.            Review of External Medical Records:     Nursing Notes were reviewed.    REVIEW OF SYSTEMS       Review of Systems   Constitutional:  Positive for appetite change and fatigue. Negative for activity change, chills and fever.   HENT:  Negative for congestion, ear pain, rhinorrhea, sinus pressure, sinus pain, sore throat and trouble swallowing.    Eyes: Negative.    Respiratory:  Positive for shortness of breath. Negative for cough, chest tightness, wheezing and stridor.    Cardiovascular:   anomaly    Perfect Serve Consult for Admission  7:54 PM    ED Room Number: ER22/22  Patient Name and age:  Vidya Das 79 y.o.  female  Working Diagnosis:   1. Hyponatremia    2. Hypokalemia    3. Shortness of breath        COVID-19 Suspicion: No  Sepsis present:  No  Reassessment needed: No  Code Status:  Full Code  Readmission: No  Isolation Requirements: no  Recommended Level of Care: telemetry  Department: [facility] Adult ED - [phone]  Consulting Provider:         Total critical care time spent exclusive of procedures:  40 minutes.     CONSULTS:  None    PROCEDURES:  Unless otherwise noted below, none     Procedures      FINAL IMPRESSION    No diagnosis found.        DISPOSITION/PLAN   DISPOSITION        PATIENT REFERRED TO:  No follow-up provider specified.    DISCHARGE MEDICATIONS:  New Prescriptions    No medications on file         (Please note that portions of this note were completed with a voice recognition program.  Efforts were made to edit the dictations but occasionally words are mis-transcribed.)    Stone Polanco MD (electronically signed)  Emergency Attending Physician            Stone Polanco MD  12/15/24 1424

## 2024-12-14 ENCOUNTER — APPOINTMENT (OUTPATIENT)
Facility: HOSPITAL | Age: 79
DRG: 190 | End: 2024-12-14
Payer: MEDICARE

## 2024-12-14 LAB
ALBUMIN SERPL-MCNC: 2 G/DL (ref 3.5–5)
ALBUMIN/GLOB SERPL: 0.8 (ref 1.1–2.2)
ALP SERPL-CCNC: 101 U/L (ref 45–117)
ALT SERPL-CCNC: 26 U/L (ref 12–78)
ANION GAP SERPL CALC-SCNC: 11 MMOL/L (ref 2–12)
APTT PPP: 28.5 SEC (ref 22.1–31)
AST SERPL-CCNC: 57 U/L (ref 15–37)
BASOPHILS # BLD: 0 K/UL (ref 0–0.1)
BASOPHILS NFR BLD: 0 % (ref 0–1)
BILIRUB SERPL-MCNC: 1 MG/DL (ref 0.2–1)
BUN SERPL-MCNC: 29 MG/DL (ref 6–20)
BUN/CREAT SERPL: 27 (ref 12–20)
CALCIUM SERPL-MCNC: 7.5 MG/DL (ref 8.5–10.1)
CHLORIDE SERPL-SCNC: 95 MMOL/L (ref 97–108)
CO2 SERPL-SCNC: 25 MMOL/L (ref 21–32)
CREAT SERPL-MCNC: 1.06 MG/DL (ref 0.55–1.02)
DIFFERENTIAL METHOD BLD: ABNORMAL
EOSINOPHIL # BLD: 0 K/UL (ref 0–0.4)
EOSINOPHIL #/AREA URNS HPF: NEGATIVE
EOSINOPHIL NFR BLD: 0 % (ref 0–7)
ERYTHROCYTE [DISTWIDTH] IN BLOOD BY AUTOMATED COUNT: 18.5 % (ref 11.5–14.5)
GLOBULIN SER CALC-MCNC: 2.6 G/DL (ref 2–4)
GLUCOSE SERPL-MCNC: 105 MG/DL (ref 65–100)
HCT VFR BLD AUTO: 31.5 % (ref 35–47)
HGB BLD-MCNC: 10.7 G/DL (ref 11.5–16)
IMM GRANULOCYTES # BLD AUTO: 0 K/UL
IMM GRANULOCYTES NFR BLD AUTO: 0 %
INR PPP: 1.2 (ref 0.9–1.1)
LYMPHOCYTES # BLD: 0.2 K/UL (ref 0.8–3.5)
LYMPHOCYTES NFR BLD: 3 % (ref 12–49)
MAGNESIUM SERPL-MCNC: 1.6 MG/DL (ref 1.6–2.4)
MCH RBC QN AUTO: 31.8 PG (ref 26–34)
MCHC RBC AUTO-ENTMCNC: 34 G/DL (ref 30–36.5)
MCV RBC AUTO: 93.5 FL (ref 80–99)
MONOCYTES # BLD: 0.6 K/UL (ref 0–1)
MONOCYTES NFR BLD: 10 % (ref 5–13)
NEUTS SEG # BLD: 4.7 K/UL (ref 1.8–8)
NEUTS SEG NFR BLD: 87 % (ref 32–75)
NRBC # BLD: 0 K/UL (ref 0–0.01)
NRBC BLD-RTO: 0 PER 100 WBC
OSMOLALITY UR: 411 MOSM/KG H2O
PLATELET # BLD AUTO: 105 K/UL (ref 150–400)
PMV BLD AUTO: 9.4 FL (ref 8.9–12.9)
POTASSIUM SERPL-SCNC: 3.2 MMOL/L (ref 3.5–5.1)
PREALB SERPL-MCNC: 4.2 MG/DL (ref 20–40)
PROT SERPL-MCNC: 4.6 G/DL (ref 6.4–8.2)
PROTHROMBIN TIME: 12.3 SEC (ref 9–11.1)
RBC # BLD AUTO: 3.37 M/UL (ref 3.8–5.2)
RBC MORPH BLD: ABNORMAL
SODIUM SERPL-SCNC: 131 MMOL/L (ref 136–145)
THERAPEUTIC RANGE: NORMAL SECS (ref 58–77)
WBC # BLD AUTO: 5.5 K/UL (ref 3.6–11)

## 2024-12-14 PROCEDURE — 2580000003 HC RX 258: Performed by: INTERNAL MEDICINE

## 2024-12-14 PROCEDURE — 6360000002 HC RX W HCPCS: Performed by: FAMILY MEDICINE

## 2024-12-14 PROCEDURE — 2060000000 HC ICU INTERMEDIATE R&B

## 2024-12-14 PROCEDURE — 51798 US URINE CAPACITY MEASURE: CPT

## 2024-12-14 PROCEDURE — 36415 COLL VENOUS BLD VENIPUNCTURE: CPT

## 2024-12-14 PROCEDURE — 2580000003 HC RX 258: Performed by: FAMILY MEDICINE

## 2024-12-14 PROCEDURE — 6370000000 HC RX 637 (ALT 250 FOR IP): Performed by: INTERNAL MEDICINE

## 2024-12-14 PROCEDURE — 85730 THROMBOPLASTIN TIME PARTIAL: CPT

## 2024-12-14 PROCEDURE — 97161 PT EVAL LOW COMPLEX 20 MIN: CPT

## 2024-12-14 PROCEDURE — 85025 COMPLETE CBC W/AUTO DIFF WBC: CPT

## 2024-12-14 PROCEDURE — 97535 SELF CARE MNGMENT TRAINING: CPT

## 2024-12-14 PROCEDURE — 97165 OT EVAL LOW COMPLEX 30 MIN: CPT

## 2024-12-14 PROCEDURE — 6360000002 HC RX W HCPCS: Performed by: INTERNAL MEDICINE

## 2024-12-14 PROCEDURE — 83735 ASSAY OF MAGNESIUM: CPT

## 2024-12-14 PROCEDURE — 71250 CT THORAX DX C-: CPT

## 2024-12-14 PROCEDURE — 80053 COMPREHEN METABOLIC PANEL: CPT

## 2024-12-14 PROCEDURE — 94640 AIRWAY INHALATION TREATMENT: CPT

## 2024-12-14 PROCEDURE — 84134 ASSAY OF PREALBUMIN: CPT

## 2024-12-14 PROCEDURE — 6370000000 HC RX 637 (ALT 250 FOR IP): Performed by: FAMILY MEDICINE

## 2024-12-14 PROCEDURE — 85610 PROTHROMBIN TIME: CPT

## 2024-12-14 PROCEDURE — 97116 GAIT TRAINING THERAPY: CPT

## 2024-12-14 RX ORDER — SODIUM CHLORIDE 9 MG/ML
INJECTION, SOLUTION INTRAVENOUS PRN
Status: DISCONTINUED | OUTPATIENT
Start: 2024-12-14 | End: 2024-12-16 | Stop reason: HOSPADM

## 2024-12-14 RX ORDER — LORAZEPAM 1 MG/1
2 TABLET ORAL
Status: DISCONTINUED | OUTPATIENT
Start: 2024-12-14 | End: 2024-12-16 | Stop reason: HOSPADM

## 2024-12-14 RX ORDER — AZITHROMYCIN 250 MG/1
500 TABLET, FILM COATED ORAL DAILY
Status: DISCONTINUED | OUTPATIENT
Start: 2024-12-14 | End: 2024-12-15

## 2024-12-14 RX ORDER — FOLIC ACID 1 MG/1
1 TABLET ORAL DAILY
Status: DISCONTINUED | OUTPATIENT
Start: 2024-12-14 | End: 2024-12-16 | Stop reason: HOSPADM

## 2024-12-14 RX ORDER — NICOTINE 21 MG/24HR
1 PATCH, TRANSDERMAL 24 HOURS TRANSDERMAL DAILY
Status: DISCONTINUED | OUTPATIENT
Start: 2024-12-14 | End: 2024-12-16 | Stop reason: HOSPADM

## 2024-12-14 RX ORDER — LORAZEPAM 1 MG/1
3 TABLET ORAL
Status: DISCONTINUED | OUTPATIENT
Start: 2024-12-14 | End: 2024-12-16 | Stop reason: HOSPADM

## 2024-12-14 RX ORDER — POTASSIUM CHLORIDE 7.45 MG/ML
10 INJECTION INTRAVENOUS
Status: DISPENSED | OUTPATIENT
Start: 2024-12-14 | End: 2024-12-14

## 2024-12-14 RX ORDER — SODIUM CHLORIDE 0.9 % (FLUSH) 0.9 %
5-40 SYRINGE (ML) INJECTION EVERY 12 HOURS SCHEDULED
Status: DISCONTINUED | OUTPATIENT
Start: 2024-12-14 | End: 2024-12-16 | Stop reason: HOSPADM

## 2024-12-14 RX ORDER — LORAZEPAM 2 MG/ML
4 INJECTION INTRAMUSCULAR
Status: DISCONTINUED | OUTPATIENT
Start: 2024-12-14 | End: 2024-12-16 | Stop reason: HOSPADM

## 2024-12-14 RX ORDER — LORAZEPAM 2 MG/ML
1 INJECTION INTRAMUSCULAR
Status: DISCONTINUED | OUTPATIENT
Start: 2024-12-14 | End: 2024-12-16 | Stop reason: HOSPADM

## 2024-12-14 RX ORDER — LORAZEPAM 1 MG/1
1 TABLET ORAL
Status: DISCONTINUED | OUTPATIENT
Start: 2024-12-14 | End: 2024-12-16 | Stop reason: HOSPADM

## 2024-12-14 RX ORDER — LORAZEPAM 2 MG/ML
3 INJECTION INTRAMUSCULAR
Status: DISCONTINUED | OUTPATIENT
Start: 2024-12-14 | End: 2024-12-16 | Stop reason: HOSPADM

## 2024-12-14 RX ORDER — LORAZEPAM 2 MG/ML
2 INJECTION INTRAMUSCULAR
Status: DISCONTINUED | OUTPATIENT
Start: 2024-12-14 | End: 2024-12-16 | Stop reason: HOSPADM

## 2024-12-14 RX ORDER — SODIUM CHLORIDE 0.9 % (FLUSH) 0.9 %
5-40 SYRINGE (ML) INJECTION PRN
Status: DISCONTINUED | OUTPATIENT
Start: 2024-12-14 | End: 2024-12-16 | Stop reason: HOSPADM

## 2024-12-14 RX ORDER — MULTIVITAMIN WITH IRON
1 TABLET ORAL DAILY
Status: DISCONTINUED | OUTPATIENT
Start: 2024-12-14 | End: 2024-12-16 | Stop reason: HOSPADM

## 2024-12-14 RX ORDER — LORAZEPAM 1 MG/1
4 TABLET ORAL
Status: DISCONTINUED | OUTPATIENT
Start: 2024-12-14 | End: 2024-12-16 | Stop reason: HOSPADM

## 2024-12-14 RX ORDER — SODIUM CHLORIDE 9 MG/ML
INJECTION, SOLUTION INTRAVENOUS CONTINUOUS
Status: DISCONTINUED | OUTPATIENT
Start: 2024-12-14 | End: 2024-12-15

## 2024-12-14 RX ORDER — BENZONATATE 100 MG/1
100 CAPSULE ORAL 3 TIMES DAILY PRN
Status: DISCONTINUED | OUTPATIENT
Start: 2024-12-14 | End: 2024-12-16 | Stop reason: HOSPADM

## 2024-12-14 RX ORDER — POTASSIUM CHLORIDE 750 MG/1
40 TABLET, EXTENDED RELEASE ORAL ONCE
Status: COMPLETED | OUTPATIENT
Start: 2024-12-14 | End: 2024-12-14

## 2024-12-14 RX ADMIN — ASPIRIN 81 MG: 81 TABLET, COATED ORAL at 08:33

## 2024-12-14 RX ADMIN — ARFORMOTEROL TARTRATE 15 MCG: 15 SOLUTION RESPIRATORY (INHALATION) at 08:20

## 2024-12-14 RX ADMIN — ARFORMOTEROL TARTRATE 15 MCG: 15 SOLUTION RESPIRATORY (INHALATION) at 19:33

## 2024-12-14 RX ADMIN — PANTOPRAZOLE SODIUM 40 MG: 40 TABLET, DELAYED RELEASE ORAL at 08:33

## 2024-12-14 RX ADMIN — POTASSIUM CHLORIDE 10 MEQ: 7.46 INJECTION, SOLUTION INTRAVENOUS at 03:01

## 2024-12-14 RX ADMIN — SODIUM CHLORIDE, PRESERVATIVE FREE 10 ML: 5 INJECTION INTRAVENOUS at 10:59

## 2024-12-14 RX ADMIN — SODIUM CHLORIDE: 9 INJECTION, SOLUTION INTRAVENOUS at 16:33

## 2024-12-14 RX ADMIN — POTASSIUM CHLORIDE 10 MEQ: 7.46 INJECTION, SOLUTION INTRAVENOUS at 05:09

## 2024-12-14 RX ADMIN — POTASSIUM CHLORIDE 10 MEQ: 10 INJECTION, SOLUTION INTRAVENOUS at 00:00

## 2024-12-14 RX ADMIN — AZITHROMYCIN DIHYDRATE 500 MG: 250 TABLET ORAL at 14:30

## 2024-12-14 RX ADMIN — LORAZEPAM 1 MG: 2 INJECTION INTRAMUSCULAR; INTRAVENOUS at 15:36

## 2024-12-14 RX ADMIN — SODIUM CHLORIDE, PRESERVATIVE FREE 10 ML: 5 INJECTION INTRAVENOUS at 10:58

## 2024-12-14 RX ADMIN — SODIUM CHLORIDE: 9 INJECTION, SOLUTION INTRAVENOUS at 05:14

## 2024-12-14 RX ADMIN — Medication 1 MG: at 14:30

## 2024-12-14 RX ADMIN — BUDESONIDE 250 MCG: 0.25 INHALANT RESPIRATORY (INHALATION) at 19:33

## 2024-12-14 RX ADMIN — SODIUM CHLORIDE: 9 INJECTION, SOLUTION INTRAVENOUS at 23:55

## 2024-12-14 RX ADMIN — BUDESONIDE 250 MCG: 0.25 INHALANT RESPIRATORY (INHALATION) at 08:20

## 2024-12-14 RX ADMIN — PREDNISONE 40 MG: 20 TABLET ORAL at 08:33

## 2024-12-14 RX ADMIN — THERA TABS 1 TABLET: TAB at 14:30

## 2024-12-14 RX ADMIN — PANTOPRAZOLE SODIUM 40 MG: 40 TABLET, DELAYED RELEASE ORAL at 15:36

## 2024-12-14 RX ADMIN — POTASSIUM CHLORIDE 40 MEQ: 750 TABLET, EXTENDED RELEASE ORAL at 10:58

## 2024-12-14 RX ADMIN — Medication 100 MG: at 08:33

## 2024-12-14 ASSESSMENT — PAIN SCALES - GENERAL
PAINLEVEL_OUTOF10: 0

## 2024-12-14 NOTE — PLAN OF CARE
Pt snores as she sleeps and pulse OX @ 96 on 1LNC. At 96 on 0.5 L and then after 10 minutes 95% off O2

## 2024-12-14 NOTE — PLAN OF CARE
Patient  has only made 120 mL of urine for the day. She was incontinent @ 0730 but very small amount. IV fluid is at 75 cc hour about 580 in and 360 po. Bladder scan shows 350 but pt denies need to urinate at this time. CT scan done MD informed via [employer]

## 2024-12-14 NOTE — PLAN OF CARE
Problem: Occupational Therapy - Adult  Goal: By Discharge: Performs self-care activities at highest level of function for planned discharge setting.  See evaluation for individualized goals.  Description: FUNCTIONAL STATUS PRIOR TO ADMISSION:  Patient reports she is IND-Mod I for ADLs and functional mobility with vs without AD, daughter assists with IADLs. Hx of L hip fx with baseline LLE weakness.     Occupational Therapy Goals:  Initiated 12/14/2024  1.  Patient will perform at least 2 standing grooming tasks with Contact Guard Assist within 7 day(s).  2.  Patient will perform bathing with Minimal Assist within 7 day(s).  3.  Patient will perform lower body dressing with Minimal Assist within 7 day(s).  4.  Patient will perform toilet transfers with Contact Guard Assist  within 7 day(s).  5.  Patient will perform all aspects of toileting with Minimal Assist within 7 day(s).  6.  Patient will participate in upper extremity therapeutic exercise/activities with Contact Guard Assist for 5 minutes within 7 day(s).    7.  Patient will utilize energy conservation techniques during functional activities with verbal cues within 7 day(s).    Outcome: Progressing     OCCUPATIONAL THERAPY EVALUATION    Patient: Vidya Das (79 y.o. female)  Date: 12/14/2024  Primary Diagnosis: Shortness of breath [R06.02]  Hypokalemia [E87.6]  Hyponatremia [E87.1]  Electrolyte imbalance [E87.8]         Precautions: Fall Risk                  ASSESSMENT :  The patient is limited by decreased functional mobility, independence in ADLs, ROM, strength, body mechanics, activity tolerance, endurance, safety awareness, attention/concentration, coordination, balance, and distal lower body access s/p admission for SOB and electrolyte imbalance.    She is IND-Mod I for BADLs and mobility with intermittent AD use, daughter assists with IADLs. She now requires Min-Mod A for BADLs and Min A for limited functional mobility with RW support. Frequent rest  LUMBAR FIRST LEVEL 2/20/2024 [facility] RAD ANGIO IR    THROMBECTOMY Right     leg    TUBAL LIGATION      UPPER GASTROINTESTINAL ENDOSCOPY N/A 7/13/2023    EGD ESOPHAGOGASTRODUODENOSCOPY performed by Derrell Stacy MD at [facility] ENDOSCOPY    UPPER GASTROINTESTINAL ENDOSCOPY N/A 7/13/2023    EGD BIOPSY performed by Derrell Stacy MD at [facility] ENDOSCOPY          Expanded or extensive additional review of patient history:   Social/Functional History  Lives With: Alone  Type of Home: Apartment  Home Layout: One level  Home Access: Level entry  Bathroom Shower/Tub: Tub/Shower unit  Bathroom Equipment: Shower chair, Grab bars in shower  Home Equipment: Walker - Rolling  Has the patient had two or more falls in the past year or any fall with injury in the past year?: Yes (patient reports 6 falls in the last year related to L SUNNI mckeeling)  Prior Level of Assist for ADLs: Independent  Prior Level of Assist for Homemaking: Independent  Prior Level of Assist for Ambulation: Independent household ambulator, with or without device  Prior Level of Assist for Transfers: Independent  Active : No  Patient's  Info: daughter  Mode of Transportation: Car      Hand Dominance: right     EXAMINATION OF PERFORMANCE DEFICITS:    Cognitive/Behavioral Status:  Orientation  Overall Orientation Status: Within Normal Limits  Orientation Level: Oriented X4  Cognition  Overall Cognitive Status: Exceptions  Arousal/Alertness: Appears intact  Following Commands: Appears intact  Attention Span: Appears intact  Memory: Appears intact  Safety Judgement: Decreased awareness of need for safety  Problem Solving: Decreased awareness of errors  Insights: Decreased awareness of deficits  Initiation: Does not require cues  Sequencing: Requires cues for some    Skin: Appears intact    Edema: None    Range of Motion:   AROM: Generally decreased, functional (LLE decreased at baseline from prior hip fx)  PROM: Generally decreased, functional      Strength:  Strength:

## 2024-12-14 NOTE — PLAN OF CARE
Problem: Discharge Planning  Goal: Discharge to home or other facility with appropriate resources  12/14/2024 1126 by Stephenie Salinas RN  Outcome: Progressing  12/14/2024 0159 by Leticia Westfall, RN  Outcome: Progressing  Flowsheets  Taken 12/14/2024 0159  Discharge to home or other facility with appropriate resources:   Identify barriers to discharge with patient and caregiver   Arrange for needed discharge resources and transportation as appropriate   Identify discharge learning needs (meds, wound care, etc)  Taken 12/14/2024 0020  Discharge to home or other facility with appropriate resources:   Identify barriers to discharge with patient and caregiver   Arrange for needed discharge resources and transportation as appropriate   Identify discharge learning needs (meds, wound care, etc)   Refer to discharge planning if patient needs post-hospital services based on physician order or complex needs related to functional status, cognitive ability or social support system     Problem: Pain  Goal: Verbalizes/displays adequate comfort level or baseline comfort level  12/14/2024 1126 by Stephenie Salinas RN  Outcome: Progressing  Flowsheets (Taken 12/14/2024 1058)  Verbalizes/displays adequate comfort level or baseline comfort level:   Encourage patient to monitor pain and request assistance   Assess pain using appropriate pain scale   Administer analgesics based on type and severity of pain and evaluate response   Implement non-pharmacological measures as appropriate and evaluate response   Consider cultural and social influences on pain and pain management   Notify Licensed Independent Practitioner if interventions unsuccessful or patient reports new pain  12/14/2024 0159 by Leticia Westfall, RN  Outcome: Progressing  Flowsheets  Taken 12/14/2024 0159  Verbalizes/displays adequate comfort level or baseline comfort level:   Encourage patient to monitor pain and request assistance   Assess pain using appropriate pain

## 2024-12-14 NOTE — PLAN OF CARE
Pt's neighbor called to report that she often has to go to Ms. Thiago garcia to get her off the floor, the toilet and to help her dress. Pt is very weak and lives alone. No one is there to help her. She reports neglect and that the pt is an alcoholic. Drunk most of the time. CIWA maybe needed.  She called while I was in the pt's room and the pt says, \"That's only been going on for 2 weeks.\" Therapy will be needed on discharge. Pt is very weak and not motivated to walk or sit in chair over 20 minutes. She reports body aches. MD informed.

## 2024-12-14 NOTE — PLAN OF CARE
Problem: Discharge Planning  Goal: Discharge to home or other facility with appropriate resources  Outcome: Progressing  Flowsheets (Taken 12/14/2024 0159)  Discharge to home or other facility with appropriate resources:   Identify barriers to discharge with patient and caregiver   Arrange for needed discharge resources and transportation as appropriate   Identify discharge learning needs (meds, wound care, etc)     Problem: Pain  Goal: Verbalizes/displays adequate comfort level or baseline comfort level  Outcome: Progressing  Flowsheets  Taken 12/14/2024 0159  Verbalizes/displays adequate comfort level or baseline comfort level:   Encourage patient to monitor pain and request assistance   Assess pain using appropriate pain scale   Implement non-pharmacological measures as appropriate and evaluate response   Administer analgesics based on type and severity of pain and evaluate response  Taken 12/14/2024 0048  Verbalizes/displays adequate comfort level or baseline comfort level:   Encourage patient to monitor pain and request assistance   Assess pain using appropriate pain scale   Administer analgesics based on type and severity of pain and evaluate response   Implement non-pharmacological measures as appropriate and evaluate response     Problem: Skin/Tissue Integrity  Goal: Absence of new skin breakdown  Description: 1.  Monitor for areas of redness and/or skin breakdown  2.  Assess vascular access sites hourly  3.  Every 4-6 hours minimum:  Change oxygen saturation probe site  4.  Every 4-6 hours:  If on nasal continuous positive airway pressure, respiratory therapy assess nares and determine need for appliance change or resting period.  Outcome: Progressing

## 2024-12-14 NOTE — PLAN OF CARE
Problem: Physical Therapy - Adult  Goal: By Discharge: Performs mobility at highest level of function for planned discharge setting.  See evaluation for individualized goals.  Description: FUNCTIONAL STATUS PRIOR TO ADMISSION: Patient was modified independent using a rolling walker for functional mobility. Lives alone in an apartment, supportive daughter in area who provides assist for groceries, laundry, drives, etc. >5 falls in the last year.     HOME SUPPORT PRIOR TO ADMISSION: The patient lived alone with local daughter to provide assistance (unable to provide 24/7 assist)     Physical Therapy Goals  Initiated 12/14/2024  1.  Patient will move from supine to sit and sit to supine in bed with contact guard assist within 7 day(s).    2.  Patient will perform sit to stand with contact guard assist within 7 day(s).  3.  Patient will transfer from bed to chair and chair to bed with contact guard assist using the least restrictive device within 7 day(s).  4.  Patient will ambulate with contact guard assist for 30 feet with the least restrictive device within 7 day(s).   Outcome: Progressing   PHYSICAL THERAPY EVALUATION    Patient: Vidya Das (79 y.o. female)  Date: 12/14/2024  Primary Diagnosis: Shortness of breath [R06.02]  Hypokalemia [E87.6]  Hyponatremia [E87.1]  Electrolyte imbalance [E87.8]       Precautions: Restrictions/Precautions: Fall Risk                      ASSESSMENT :   DEFICITS/IMPAIRMENTS:   The patient is limited by decreased functional mobility, independence in ADLs, ROM, strength, activity tolerance, safety awareness, coordination, balance, and increased risk for falls in setting of hospital admission for COPD exacerbation, diarrhea, PATRICK, and electrolyte imbalance. Patient received semi-fowlers in bed, alert, and agreeable to PT. Of note, patient appears frail with significant bilateral LE muscle wasting. Overall requiring min A x 1 for bed mobility, sit <> stand transfers, and to take a few  Acute Care Hospitalization in Select Patient Groups: A Retrospective, Observational Study. Arch Rehabil Res Clin Transl. 2022;4(3):564425. doi: 10.1016/j.arrct..318633. PMID: 76875624; PMCID: [id number].  4. Karishma Mckinney S, Genoveva WAkilah. AM-PAC Short Forms Manual 4.0. Revised 2020.                                                                                                                                                                                                                               Pain Ratin/10   Pain Intervention(s):   pain is at a level acceptable to the patient    Activity Tolerance:   Good, requires frequent rest breaks, observed shortness of breath on exertion, and SpO2 stable on room air    After treatment:   Patient left in no apparent distress sitting up in chair, Call bell within reach, and Bed/ chair alarm activated    COMMUNICATION/EDUCATION:   The patient's plan of care was discussed with: occupational therapist and registered nurse    Patient Education  Education Given To: Patient  Education Provided: Role of Therapy;Plan of Care;Transfer Training;Mobility Training;Energy Conservation;Fall Prevention Strategies  Education Method: Verbal;Demonstration  Barriers to Learning: None  Education Outcome: Verbalized understanding;Continued education needed    Thank you for this referral.  Stella Herron PT, DPT  Minutes: 30      Physical Therapy Evaluation Charge Determination   History Examination Presentation Decision-Making   MEDIUM  Complexity : 1-2 comorbidities / personal factors will impact the outcome/ POC  LOW Complexity : 1-2 Standardized tests and measures addressing body structure, function, activity limitation and / or participation in recreation  LOW Complexity : Stable, uncomplicated  AM-PAC  HIGH    Based on the above components, the patient evaluation is determined to be of the following complexity level: Low

## 2024-12-14 NOTE — H&P
Patient is on the stretcher with PCC at bedside.   14 (*)     Glucose 133 (*)     BUN 31 (*)     Creatinine 1.46 (*)     BUN/Creatinine Ratio 21 (*)     Est, Glom Filt Rate 37 (*)     Total Bilirubin 1.5 (*)     AST 64 (*)     Alk Phosphatase 125 (*)     Total Protein 5.6 (*)     Albumin 2.4 (*)     Albumin/Globulin Ratio 0.8 (*)     All other components within normal limits       [unfilled]    IMAGING:   XR CHEST PORTABLE   Final Result      No acute process on portable chest.         Electronically signed by Livan Hernandez MD           ECG/ECHO:  [unfilled]       Notes reviewed from all clinical/nonclinical/nursing services involved in patient's clinical care. Care coordination discussions were held with appropriate clinical/nonclinical/ nursing providers based on care coordination needs.     Assessment:   Given the patient's current clinical presentation, there is a high level of concern for decompensation if discharged from the emergency department. Complex decision making was performed, which includes reviewing the patient's available past medical records, laboratory results, and imaging studies.    Principal Problem:    Electrolyte imbalance  Active Problems:    Hyponatremia    Hypokalemia    SOB (shortness of breath)    PATRICK (acute kidney injury) (HCC)    Hyperbilirubinemia    Diarrhea  Resolved Problems:    * No resolved hospital problems. *      Plan:     Electrolyte imbalance        Hyponatremia        Hypokalemia  -Admit to IMCU  -ED ordered KCl 10 mEq IV over 1 hour x 2 runs  -Order additional KCl 10 mEq IV over 1 hour x 4 runs  -Repeat K level post replacement  -Order urine sodium and osmolality  -Add on serum osmolality  -Repeat serum sodium and proceed toward slow correction    2.  SOB (shortness of breath)  -Provide supplemental oxygen  -Titrate O2 to keep O2sat > 94%  -Continuous pulse oximetry monitoring    3.  PATRICK (acute kidney injury)  -BUN 31, creatinine 1.46, GFR 37  -repeat renal panel in a.m.  -continue IV fluids  -order UA with  microscopy with reflex culture    4.  Generalized weakness       Inability to walk  -patient unable to care for self  -place on neuro checks and fall precautions  -consult PT/OT  -ambulate with assistance only    5.  Diarrhea  -order stool for C diff  -order fecal occult blood test    6.  COPD exacerbation  -wheezing  -order Duoneb nebulizer treatments q 6 hours  -continue Brovana  -order blood cultures  -order Azithromycin 500 mg IV every 24 hours    7.  Persistent cough  -Order Tessalon Perles 100 mg p.o. 3 times daily as needed    8.  Essential hypertension  -Resume home BP medications:  Amlodipine 10 mg p.o. daily  Metoprolol tartrate 50 mg p.o. twice daily    8.  Hyperlipidemia  -Resume home statin: Atorvastatin 20 mg p.o. daily    9.  Underweight  -BMI 18.89 kg/M2  -Encourage nutritional supplementation  -Order prealbumin level    DIET: ADULT DIET; Regular; Low Fat/Low Chol/High Fiber/AYALA   ISOLATION PRECAUTIONS: No active isolations  CODE STATUS: DNR  Central Line:   none  DVT PROPHYLAXIS: SCDs  FUNCTIONAL STATUS PRIOR TO HOSPITALIZATION: Ambulatory and capable of self-care but relies on assistive devices (rolling walker/cane).   Ambulatory status/function: Ambulates with assistance:  Unable to ambulate   EARLY MOBILITY ASSESSMENT: Recommend routine ambulation while hospitalized with the assistance of nursing staff and Recommend an assessment from physical therapy and/or occupational therapy  ANTICIPATED DISCHARGE: Greater than 48 hours.  ANTICIPATED DISPOSITION: Home with Home Healthcare  EMERGENCY CONTACT/SURROGATE DECISION MAKER:   Libertad Bailey (Child)  [phone] (Mobile)       CRITICAL CARE WAS PERFORMED FOR THIS ENCOUNTER: YES. I had a face to face encounter with the patient, reviewed and interpreted patient data including clinical events, labs, images, vital signs, I/O's, and examined patient.  I have discussed the case and the plan and management of the patient's care with the consulting services,

## 2024-12-14 NOTE — ED NOTES
TRANSFER - OUT REPORT:    Verbal report given to VIVIANE Gordon on Vidya Das  being transferred to [facility] for routine progression of patient care       Report consisted of patient's Situation, Background, Assessment and   Recommendations(SBAR).     Information from the following report(s) Nurse Handoff Report, ED Encounter Summary, ED SBAR, Intake/Output, MAR, Recent Results, Cardiac Rhythm (sinus rhythm), Neuro Assessment, and Event Log was reviewed with the receiving nurse.    [place] Fall Assessment:    Presents to emergency department  because of falls (Syncope, seizure, or loss of consciousness): No  Age > 70: Yes  Altered Mental Status, Intoxication with alcohol or substance confusion (Disorientation, impaired judgment, poor safety awaremess, or inability to follow instructions): No  Impaired Mobility: Ambulates or transfers with assistive devices or assistance; Unable to ambulate or transer.: Yes  Nursing Judgement: Yes          Lines:   Peripheral IV 12/13/24 Left;Anterior Forearm (Active)       Peripheral IV 12/13/24 Right;Anterior Forearm (Active)   Site Assessment Clean, dry & intact 12/13/24 2241   Line Status Blood return noted;Flushed;Specimen collected 12/13/24 2241   Line Care Connections checked and tightened 12/13/24 2241   Phlebitis Assessment No symptoms 12/13/24 2241   Infiltration Assessment 0 12/13/24 2241   Alcohol Cap Used No 12/13/24 2241   Dressing Status New dressing applied;Clean, dry & intact 12/13/24 2241   Dressing Type Transparent 12/13/24 2241   Dressing Intervention New 12/13/24 2241        Opportunity for questions and clarification was provided.      Patient transported with:  Monitor, O2 @ 2lpm, and Registered Nurse

## 2024-12-14 NOTE — PROGRESS NOTES
[facility]                                                                                          Hospitalist Progress Note  Sushma Madala, MD  Office Phone: [phone]        Date of Service:  2024  NAME:  Vidya Das  :  1945  MRN:  [id number]       Admission Summary:   Vidya Das is a 79 y.o. female with past history of COPD, hypertension hyperlipidemia, hyponatremia, hypokalemia, SIADH UTI presented to emergency department via EMS from home with multiple complaints including shortness of breath, generalized weakness, fatigue, heavy diarrhea.  Symptoms started about 5 days ago, has worsened, now severe, without specific alleviating factors.  She reportedly had a left hip fracture about 1 year ago with residual intermittent weakness.  However over the past week she has had increased weakness and requiring assistance to stand and walk.  She reports having heavy diarrhea despite using Imodium.  There is no reports of bloody stools.  EMS was called to the scene noted the patient was wheezing and administered nebulizer treatment and route.  She has not report sick contacts.  On arrival in ED, initial reported vital signs are temperature 97.9 °F, /72, heart rate 105, respiratory 25, O2 saturation 90% on room air.  She was placed on 2 L oxygen via nasal cannula.  Chest x-ray portable showed no acute cardiopulmonary process.  Abnormal labs included sodium 126, K2.2, chloride 84, BUN 31, creatinine 1.46, GFR 37, blood glucose 133, albumin 2.4, alkaline phosphatase 125, AST 64, total bilirubin 1.5, platelets 136, neutrophils 87%.  ED ordered 0.9% normal saline 500 mL IV fluid bolus x 1 and KCl 10 mEq IV over 1 hour x 2 months.  Patient is now seen for admission to the hospitalist service.        Interval history / Subjective:   Patient is seen and examined at bedside this AM. She feels tired. Denied any diarrhea now. No abd pain or nausea. No sob

## 2024-12-15 LAB
ANION GAP SERPL CALC-SCNC: 8 MMOL/L (ref 2–12)
BUN SERPL-MCNC: 32 MG/DL (ref 6–20)
BUN/CREAT SERPL: 30 (ref 12–20)
CALCIUM SERPL-MCNC: 7.6 MG/DL (ref 8.5–10.1)
CHLORIDE SERPL-SCNC: 103 MMOL/L (ref 97–108)
CO2 SERPL-SCNC: 23 MMOL/L (ref 21–32)
CREAT SERPL-MCNC: 1.06 MG/DL (ref 0.55–1.02)
GLUCOSE SERPL-MCNC: 111 MG/DL (ref 65–100)
POTASSIUM SERPL-SCNC: 3.1 MMOL/L (ref 3.5–5.1)
SODIUM SERPL-SCNC: 134 MMOL/L (ref 136–145)

## 2024-12-15 PROCEDURE — 36415 COLL VENOUS BLD VENIPUNCTURE: CPT

## 2024-12-15 PROCEDURE — 6370000000 HC RX 637 (ALT 250 FOR IP): Performed by: INTERNAL MEDICINE

## 2024-12-15 PROCEDURE — 2580000003 HC RX 258: Performed by: INTERNAL MEDICINE

## 2024-12-15 PROCEDURE — 2700000000 HC OXYGEN THERAPY PER DAY

## 2024-12-15 PROCEDURE — 87449 NOS EACH ORGANISM AG IA: CPT

## 2024-12-15 PROCEDURE — 2060000000 HC ICU INTERMEDIATE R&B

## 2024-12-15 PROCEDURE — 6360000002 HC RX W HCPCS: Performed by: INTERNAL MEDICINE

## 2024-12-15 PROCEDURE — 94640 AIRWAY INHALATION TREATMENT: CPT

## 2024-12-15 PROCEDURE — 6360000002 HC RX W HCPCS: Performed by: FAMILY MEDICINE

## 2024-12-15 PROCEDURE — 87324 CLOSTRIDIUM AG IA: CPT

## 2024-12-15 PROCEDURE — 6370000000 HC RX 637 (ALT 250 FOR IP): Performed by: FAMILY MEDICINE

## 2024-12-15 PROCEDURE — 2580000003 HC RX 258: Performed by: FAMILY MEDICINE

## 2024-12-15 PROCEDURE — 80048 BASIC METABOLIC PNL TOTAL CA: CPT

## 2024-12-15 RX ORDER — POTASSIUM CHLORIDE 750 MG/1
40 TABLET, EXTENDED RELEASE ORAL ONCE
Status: COMPLETED | OUTPATIENT
Start: 2024-12-15 | End: 2024-12-15

## 2024-12-15 RX ORDER — METRONIDAZOLE 500 MG/100ML
500 INJECTION, SOLUTION INTRAVENOUS EVERY 8 HOURS
Status: DISCONTINUED | OUTPATIENT
Start: 2024-12-15 | End: 2024-12-16 | Stop reason: HOSPADM

## 2024-12-15 RX ADMIN — WATER 1000 MG: 1 INJECTION INTRAMUSCULAR; INTRAVENOUS; SUBCUTANEOUS at 08:58

## 2024-12-15 RX ADMIN — METRONIDAZOLE 500 MG: 500 INJECTION, SOLUTION INTRAVENOUS at 09:17

## 2024-12-15 RX ADMIN — PREDNISONE 40 MG: 20 TABLET ORAL at 08:53

## 2024-12-15 RX ADMIN — ARFORMOTEROL TARTRATE 15 MCG: 15 SOLUTION RESPIRATORY (INHALATION) at 07:43

## 2024-12-15 RX ADMIN — BUDESONIDE 250 MCG: 0.25 INHALANT RESPIRATORY (INHALATION) at 07:43

## 2024-12-15 RX ADMIN — SODIUM CHLORIDE: 9 INJECTION, SOLUTION INTRAVENOUS at 07:18

## 2024-12-15 RX ADMIN — ARFORMOTEROL TARTRATE 15 MCG: 15 SOLUTION RESPIRATORY (INHALATION) at 20:02

## 2024-12-15 RX ADMIN — PANTOPRAZOLE SODIUM 40 MG: 40 TABLET, DELAYED RELEASE ORAL at 08:53

## 2024-12-15 RX ADMIN — LORAZEPAM 1 MG: 1 TABLET ORAL at 18:30

## 2024-12-15 RX ADMIN — POTASSIUM CHLORIDE 40 MEQ: 750 TABLET, EXTENDED RELEASE ORAL at 08:57

## 2024-12-15 RX ADMIN — SODIUM CHLORIDE, PRESERVATIVE FREE 20 ML: 5 INJECTION INTRAVENOUS at 09:02

## 2024-12-15 RX ADMIN — BUDESONIDE 250 MCG: 0.25 INHALANT RESPIRATORY (INHALATION) at 20:02

## 2024-12-15 RX ADMIN — Medication 1 MG: at 08:53

## 2024-12-15 RX ADMIN — LORAZEPAM 1 MG: 2 INJECTION INTRAMUSCULAR; INTRAVENOUS at 22:18

## 2024-12-15 RX ADMIN — Medication 100 MG: at 08:53

## 2024-12-15 RX ADMIN — ASPIRIN 81 MG: 81 TABLET, COATED ORAL at 08:53

## 2024-12-15 RX ADMIN — SODIUM CHLORIDE, PRESERVATIVE FREE 10 ML: 5 INJECTION INTRAVENOUS at 09:02

## 2024-12-15 RX ADMIN — SODIUM CHLORIDE: 9 INJECTION, SOLUTION INTRAVENOUS at 18:17

## 2024-12-15 RX ADMIN — THERA TABS 1 TABLET: TAB at 08:53

## 2024-12-15 RX ADMIN — PANTOPRAZOLE SODIUM 40 MG: 40 TABLET, DELAYED RELEASE ORAL at 15:50

## 2024-12-15 RX ADMIN — METRONIDAZOLE 500 MG: 500 INJECTION, SOLUTION INTRAVENOUS at 18:17

## 2024-12-15 ASSESSMENT — PAIN SCALES - GENERAL
PAINLEVEL_OUTOF10: 0

## 2024-12-15 NOTE — PLAN OF CARE
Problem: Discharge Planning  Goal: Discharge to home or other facility with appropriate resources  Outcome: Progressing  Flowsheets (Taken 12/15/2024 0800)  Discharge to home or other facility with appropriate resources:   Identify barriers to discharge with patient and caregiver   Arrange for needed discharge resources and transportation as appropriate   Identify discharge learning needs (meds, wound care, etc)   Refer to discharge planning if patient needs post-hospital services based on physician order or complex needs related to functional status, cognitive ability or social support system     Problem: Pain  Goal: Verbalizes/displays adequate comfort level or baseline comfort level  Outcome: Progressing  Flowsheets (Taken 12/15/2024 0900)  Verbalizes/displays adequate comfort level or baseline comfort level:   Encourage patient to monitor pain and request assistance   Assess pain using appropriate pain scale   Administer analgesics based on type and severity of pain and evaluate response   Implement non-pharmacological measures as appropriate and evaluate response   Consider cultural and social influences on pain and pain management   Notify Licensed Independent Practitioner if interventions unsuccessful or patient reports new pain     Problem: Safety - Adult  Goal: Free from fall injury  Outcome: Progressing     Problem: Skin/Tissue Integrity  Goal: Absence of new skin breakdown  Description: 1.  Monitor for areas of redness and/or skin breakdown  2.  Assess vascular access sites hourly  3.  Every 4-6 hours minimum:  Change oxygen saturation probe site  4.  Every 4-6 hours:  If on nasal continuous positive airway pressure, respiratory therapy assess nares and determine need for appliance change or resting period.  Outcome: Progressing     Problem: ABCDS Injury Assessment  Goal: Absence of physical injury  Outcome: Progressing     Problem: Chronic Conditions and Co-morbidities  Goal: Patient's chronic

## 2024-12-15 NOTE — PROGRESS NOTES
[facility]                                                                                          Hospitalist Progress Note  Sushma Madala, MD  Office Phone: [phone]        Date of Service:  12/15/2024  NAME:  Vidya Das  :  1945  MRN:  [id number]       Admission Summary:   Vidya Das is a 79 y.o. female with past history of COPD, hypertension hyperlipidemia, hyponatremia, hypokalemia, SIADH UTI presented to emergency department via EMS from home with multiple complaints including shortness of breath, generalized weakness, fatigue, heavy diarrhea.  Symptoms started about 5 days ago, has worsened, now severe, without specific alleviating factors.  She reportedly had a left hip fracture about 1 year ago with residual intermittent weakness.  However over the past week she has had increased weakness and requiring assistance to stand and walk.  She reports having heavy diarrhea despite using Imodium.  There is no reports of bloody stools.  EMS was called to the scene noted the patient was wheezing and administered nebulizer treatment and route.  She has not report sick contacts.  On arrival in ED, initial reported vital signs are temperature 97.9 °F, /72, heart rate 105, respiratory 25, O2 saturation 90% on room air.  She was placed on 2 L oxygen via nasal cannula.  Chest x-ray portable showed no acute cardiopulmonary process.  Abnormal labs included sodium 126, K2.2, chloride 84, BUN 31, creatinine 1.46, GFR 37, blood glucose 133, albumin 2.4, alkaline phosphatase 125, AST 64, total bilirubin 1.5, platelets 136, neutrophils 87%.  ED ordered 0.9% normal saline 500 mL IV fluid bolus x 1 and KCl 10 mEq IV over 1 hour x 2 months.  Patient is now seen for admission to the hospitalist service.        Interval history / Subjective:   Patient is seen and examined at bedside this AM. She feels tired. 1 episode of loose bm last night. Explained CT chest  \"GLUCPOC\"  [unfilled]    Notes reviewed from all clinical/nonclinical/nursing services involved in patient's clinical care. Care coordination discussions were held with appropriate clinical/nonclinical/ nursing providers based on care coordination needs.         Patients current active Medications were reviewed, considered, added and adjusted based on the clinical condition today.      Home Medications were reconciled to the best of my ability given all available resources at the time of admission. Route is PO if not otherwise noted.      Admission Status:[id number]:::1}      Medications Reviewed:     Current Facility-Administered Medications   Medication Dose Route Frequency    metroNIDAZOLE (FLAGYL) 500 mg in 0.9% NaCl 100 mL IVPB premix  500 mg IntraVENous Q8H    cefTRIAXone (ROCEPHIN) 1,000 mg in sterile water 10 mL IV syringe  1,000 mg IntraVENous Q24H    benzonatate (TESSALON) capsule 100 mg  100 mg Oral TID PRN    nicotine (NICODERM CQ) 14 MG/24HR 1 patch  1 patch TransDERmal Daily    sodium chloride flush 0.9 % injection 5-40 mL  5-40 mL IntraVENous 2 times per day    sodium chloride flush 0.9 % injection 5-40 mL  5-40 mL IntraVENous PRN    0.9 % sodium chloride infusion   IntraVENous PRN    LORazepam (ATIVAN) tablet 1 mg  1 mg Oral Q1H PRN    Or    LORazepam (ATIVAN) injection 1 mg  1 mg IntraVENous Q1H PRN    Or    LORazepam (ATIVAN) tablet 2 mg  2 mg Oral Q1H PRN    Or    LORazepam (ATIVAN) injection 2 mg  2 mg IntraVENous Q1H PRN    Or    LORazepam (ATIVAN) tablet 3 mg  3 mg Oral Q1H PRN    Or    LORazepam (ATIVAN) injection 3 mg  3 mg IntraVENous Q1H PRN    Or    LORazepam (ATIVAN) tablet 4 mg  4 mg Oral Q1H PRN    Or    LORazepam (ATIVAN) injection 4 mg  4 mg IntraVENous Q1H PRN    folic acid (FOLVITE) tablet 1 mg  1 mg Oral Daily    multivitamin 1 tablet  1 tablet Oral Daily    sodium chloride flush 0.9 % injection 5-40 mL  5-40 mL IntraVENous 2 times per day    sodium chloride flush 0.9 % injection 5-40

## 2024-12-15 NOTE — PLAN OF CARE
Problem: Discharge Planning  Goal: Discharge to home or other facility with appropriate resources  12/15/2024 0002 by Leticia Westfall RN  Outcome: Progressing  Flowsheets (Taken 12/15/2024 0002)  Discharge to home or other facility with appropriate resources:   Identify barriers to discharge with patient and caregiver   Arrange for needed discharge resources and transportation as appropriate   Identify discharge learning needs (meds, wound care, etc)   Refer to discharge planning if patient needs post-hospital services based on physician order or complex needs related to functional status, cognitive ability or social support system  12/14/2024 1126 by Stephenie Salinas RN  Outcome: Progressing     Problem: Cardiovascular - Adult  Goal: Maintains optimal cardiac output and hemodynamic stability  12/15/2024 0002 by Leticia Westfall RN  Outcome: Progressing  Flowsheets (Taken 12/15/2024 0002)  Maintains optimal cardiac output and hemodynamic stability:   Monitor blood pressure and heart rate   Monitor urine output and notify Licensed Independent Practitioner for values outside of normal range   Assess for signs of decreased cardiac output   Administer fluid and/or volume expanders as ordered     Problem: Gastrointestinal - Adult  Goal: Maintains or returns to baseline bowel function  12/15/2024 0002 by Leticia Westfall RN  Outcome: Progressing  Flowsheets (Taken 12/15/2024 0002)  Maintains or returns to baseline bowel function:   Assess bowel function   Encourage oral fluids to ensure adequate hydration   Administer ordered medications as needed   Administer IV fluids as ordered to ensure adequate hydration   Encourage mobilization and activity   Nutrition consult to assist patient with appropriate food choices     Problem: Hematologic - Adult  Goal: Maintains hematologic stability  12/15/2024 0002 by Leticia Westfall RN  Outcome: Progressing  Flowsheets (Taken 12/15/2024 0002)  Maintains hematologic

## 2024-12-15 NOTE — CARE COORDINATION
Care Management Initial Assessment       RUR:19%  Readmission? No  1st IM letter given? Yes   1st  letter given: No       12/15/24 1047   Service Assessment   Patient Orientation Alert and Oriented   Cognition Alert   History Provided By Patient   Primary Caregiver Self   Support Systems None   Patient's Healthcare Decision Maker is: Legal Next of Kin   PCP Verified by CM Yes  (PCP retired)   Last Visit to PCP   (had apt Sept 2024 but facility canceled as patient PCP retired)   Prior Functional Level Independent in ADLs/IADLs   Current Functional Level Independent in ADLs/IADLs   Can patient return to prior living arrangement Yes   Ability to make needs known: Good   Family able to assist with home care needs: Yes   Would you like for me to discuss the discharge plan with any other family members/significant others, and if so, who? No   Financial Resources Medicare   Community Resources None   Social/Functional History   Lives With Alone   Type of Home Apartment   Bathroom Shower/Tub None   Bathroom Equipment None   Home Equipment None   Receives Help From Family   Prior Level of Assist for ADLs Independent   Prior Level of Assist for Homemaking Independent   Homemaking Responsibilities Yes   Ambulation Assistance Independent   Prior Level of Assist for Transfers Independent   Active  No   Mode of Transportation Car;Family;Friends   Occupation Retired   Discharge Planning   Type of Residence Skilled Nursing Facility   Living Arrangements Alone   Current Services Prior To Admission None   Potential Assistance Needed N/A   DME Ordered? No   Potential Assistance Purchasing Medications No   Type of Home Care Services None   Patient expects to be discharged to: Skilled nursing facility   History of falls? 0   Services At/After Discharge   Transition of Care Consult (CM Consult) Discharge Planning   Services At/After Discharge Skilled Nursing Facility (SNF)    Resource Information Provided? No   Mode  of Transport at Discharge WC Van   Confirm Follow Up Transport Wheelchair Van     CM met with patient bedside and verified patient's demographics, insurance, and PCP.  Patient stated that she had an apt with her PCP back in Sept 2024 but was canceled because her PCP has retired.  Patient lives in an apartment alone.  Patient is independent in her ADLs/IADLs with no DME. Patient uses [email or web address] and has friends and family that provide transportation. Patient has been to [facility] before and would like to go there upon discharge.  [facility]       Medicare pt has received, reviewed, and signed 1st IM letter informing them of their right to appeal the discharge.  Signed copy has been placed on pt bedside chart.      KEYSHA Avendaño   Care Manager  Available via [employer]

## 2024-12-15 NOTE — PLAN OF CARE
Pt reports feeling anxious and teary. She asked me to call the provider for something for her \"nerves.\" Ativan given po

## 2024-12-16 VITALS
TEMPERATURE: 97.5 F | BODY MASS INDEX: 18.5 KG/M2 | RESPIRATION RATE: 18 BRPM | SYSTOLIC BLOOD PRESSURE: 111 MMHG | HEIGHT: 61 IN | HEART RATE: 83 BPM | OXYGEN SATURATION: 97 % | DIASTOLIC BLOOD PRESSURE: 59 MMHG | WEIGHT: 98 LBS

## 2024-12-16 LAB
ANION GAP SERPL CALC-SCNC: 8 MMOL/L (ref 2–12)
BUN SERPL-MCNC: 29 MG/DL (ref 6–20)
BUN/CREAT SERPL: 24 (ref 12–20)
C DIFF GDH STL QL: POSITIVE
C DIFF TOX A+B STL QL IA: POSITIVE
C DIFF TOXIN INTERPRETATION: ABNORMAL
CALCIUM SERPL-MCNC: 6.9 MG/DL (ref 8.5–10.1)
CHLORIDE SERPL-SCNC: 108 MMOL/L (ref 97–108)
CO2 SERPL-SCNC: 19 MMOL/L (ref 21–32)
COMMENT:: NORMAL
CREAT SERPL-MCNC: 1.19 MG/DL (ref 0.55–1.02)
GLUCOSE SERPL-MCNC: 108 MG/DL (ref 65–100)
POTASSIUM SERPL-SCNC: 4.5 MMOL/L (ref 3.5–5.1)
SODIUM SERPL-SCNC: 135 MMOL/L (ref 136–145)
SPECIMEN HOLD: NORMAL

## 2024-12-16 PROCEDURE — 6360000002 HC RX W HCPCS: Performed by: INTERNAL MEDICINE

## 2024-12-16 PROCEDURE — 94640 AIRWAY INHALATION TREATMENT: CPT

## 2024-12-16 PROCEDURE — 6360000002 HC RX W HCPCS: Performed by: FAMILY MEDICINE

## 2024-12-16 PROCEDURE — 97535 SELF CARE MNGMENT TRAINING: CPT

## 2024-12-16 PROCEDURE — 2580000003 HC RX 258: Performed by: FAMILY MEDICINE

## 2024-12-16 PROCEDURE — 2580000003 HC RX 258: Performed by: INTERNAL MEDICINE

## 2024-12-16 PROCEDURE — 6370000000 HC RX 637 (ALT 250 FOR IP): Performed by: FAMILY MEDICINE

## 2024-12-16 PROCEDURE — 36415 COLL VENOUS BLD VENIPUNCTURE: CPT

## 2024-12-16 PROCEDURE — 80048 BASIC METABOLIC PNL TOTAL CA: CPT

## 2024-12-16 PROCEDURE — 6370000000 HC RX 637 (ALT 250 FOR IP): Performed by: INTERNAL MEDICINE

## 2024-12-16 PROCEDURE — 97530 THERAPEUTIC ACTIVITIES: CPT

## 2024-12-16 RX ORDER — PREDNISONE 20 MG/1
40 TABLET ORAL DAILY
Qty: 10 TABLET | Refills: 0 | Status: SHIPPED | OUTPATIENT
Start: 2024-12-17 | End: 2024-12-22

## 2024-12-16 RX ORDER — NICOTINE 21 MG/24HR
1 PATCH, TRANSDERMAL 24 HOURS TRANSDERMAL DAILY
Qty: 30 PATCH | Refills: 0 | Status: SHIPPED
Start: 2024-12-17

## 2024-12-16 RX ORDER — CEPHALEXIN 500 MG/1
500 CAPSULE ORAL 4 TIMES DAILY
Qty: 28 CAPSULE | Refills: 0 | Status: SHIPPED
Start: 2024-12-16 | End: 2024-12-23

## 2024-12-16 RX ORDER — MULTIVITAMIN WITH IRON
1 TABLET ORAL DAILY
Qty: 30 TABLET | Refills: 0 | Status: SHIPPED
Start: 2024-12-17

## 2024-12-16 RX ORDER — METRONIDAZOLE 500 MG/1
500 TABLET ORAL 3 TIMES DAILY
Qty: 21 TABLET | Refills: 0 | Status: SHIPPED
Start: 2024-12-16 | End: 2024-12-23

## 2024-12-16 RX ORDER — FOLIC ACID 1 MG/1
1 TABLET ORAL DAILY
Qty: 30 TABLET | Refills: 0 | Status: SHIPPED
Start: 2024-12-17

## 2024-12-16 RX ORDER — ACETAMINOPHEN 325 MG/1
650 TABLET ORAL EVERY 4 HOURS PRN
Status: DISCONTINUED | OUTPATIENT
Start: 2024-12-16 | End: 2024-12-16 | Stop reason: HOSPADM

## 2024-12-16 RX ADMIN — THERA TABS 1 TABLET: TAB at 09:22

## 2024-12-16 RX ADMIN — METRONIDAZOLE 500 MG: 500 INJECTION, SOLUTION INTRAVENOUS at 02:04

## 2024-12-16 RX ADMIN — Medication 100 MG: at 09:23

## 2024-12-16 RX ADMIN — Medication 1 MG: at 09:23

## 2024-12-16 RX ADMIN — PREDNISONE 40 MG: 20 TABLET ORAL at 09:23

## 2024-12-16 RX ADMIN — ACETAMINOPHEN 650 MG: 325 TABLET ORAL at 11:41

## 2024-12-16 RX ADMIN — LORAZEPAM 1 MG: 2 INJECTION INTRAMUSCULAR; INTRAVENOUS at 04:11

## 2024-12-16 RX ADMIN — LORAZEPAM 1 MG: 1 TABLET ORAL at 09:22

## 2024-12-16 RX ADMIN — SODIUM CHLORIDE, PRESERVATIVE FREE 10 ML: 5 INJECTION INTRAVENOUS at 09:25

## 2024-12-16 RX ADMIN — ARFORMOTEROL TARTRATE 15 MCG: 15 SOLUTION RESPIRATORY (INHALATION) at 07:28

## 2024-12-16 RX ADMIN — BUDESONIDE 250 MCG: 0.25 INHALANT RESPIRATORY (INHALATION) at 07:28

## 2024-12-16 RX ADMIN — METRONIDAZOLE 500 MG: 500 INJECTION, SOLUTION INTRAVENOUS at 09:44

## 2024-12-16 RX ADMIN — PANTOPRAZOLE SODIUM 40 MG: 40 TABLET, DELAYED RELEASE ORAL at 09:22

## 2024-12-16 RX ADMIN — ASPIRIN 81 MG: 81 TABLET, COATED ORAL at 09:21

## 2024-12-16 RX ADMIN — WATER 1000 MG: 1 INJECTION INTRAMUSCULAR; INTRAVENOUS; SUBCUTANEOUS at 09:23

## 2024-12-16 ASSESSMENT — PAIN DESCRIPTION - DESCRIPTORS: DESCRIPTORS: ACHING

## 2024-12-16 ASSESSMENT — PAIN DESCRIPTION - ORIENTATION: ORIENTATION: OTHER (COMMENT)

## 2024-12-16 ASSESSMENT — PAIN SCALES - GENERAL
PAINLEVEL_OUTOF10: 0
PAINLEVEL_OUTOF10: 5

## 2024-12-16 ASSESSMENT — PAIN DESCRIPTION - LOCATION: LOCATION: BACK

## 2024-12-16 NOTE — CARE COORDINATION
Transition of Care Plan:    PT/OT recommending SNF. [facility] SNF has accepted. The patient is agreeable to SNF. No auth needed with Medicare. Transport TBD.     RUR: 19% Moderate  Prior Level of Functioning: Independent in ADLs/IADLs   Disposition: SNF  If SNF or IPR: Date FOC offered: 12/15/24  Date FOC received: 12/15/24  Accepting facility: [facility]   Date authorization started with reference number: No auth with Medicare  Date authorization received and expires: None  Follow up appointments: Per attending's recommendations  DME needed: Per SNF recommendations   Transportation at discharge: TBD   IM/IMM Medicare/ letter given: 12/15/24   Is patient a [place] and connected with [facility]?    If yes, was  transfer form completed and [facility] notified?   Caregiver Contact: Daughter:  Libertad Bailey: [phone]    Discharge Caregiver contacted prior to discharge? Y   Care Conference needed? No   Barriers to discharge: None    Jordana Wolfe RN/CRM  [phone]

## 2024-12-16 NOTE — CARE COORDINATION
Transition of Care Plan to SNF/Rehab    Communication to Patient/Family:  Met with patient and family and they are agreeable to the transition plan. The Plan for Transition of Care is related to the following treatment goals: [facility]. Room: 109. RN to call [phone].     The Patient and/or patient representative was provided with a choice of provider and agrees  with the discharge plan.      Yes [x] No []    A Freedom of choice list was provided with basic dialogue that supports the patient's individualized plan of care/goals and shares the quality data associated with the providers.       Yes [x] No []    SNF/Rehab Transition:  Patient has been accepted to [facility]/Rehab and meets criteria for admission.   Date of Inpatient Status Order:   Patient will transported by [facility] and expected to leave at 1500.     Communication to SNF/Rehab:  Bedside RN, Ghada, has been notified to update the transition plan to the facility and call report (phone number).  Discharge information has been updated on the AVS. And communicated to facility via [employer], or CC link.     Nursing Please include all hard scripts for controlled substances, med rec and dc summary, and AVS in packet.     Reviewed and confirmed with facility, [facility] can manage the patient care needs for the following:     Livan with (X) only those applicable:  Medication:  [x]Medications are available at the facility  []IV Antibiotics    []Controlled Substance - hard copies available sent.  []Weekly Labs    Equipment:  []CPAP/BiPAP  []Wound Vacuum  []Best or Urinary Device  []PICC/Central Line  []Nebulizer  []Ventilator    Treatment:  []Isolation (for MRSA, VRE, etc.)  []Surgical Drain Management  []Tracheostomy Care  []Dressing Changes  []Dialysis with transportation  []PEG Care  []Oxygen  []Daily Weights for Heart Failure    Dietary:  []Any diet limitations  []Tube Feedings   []Total Parenteral Management (TPN)    Financial

## 2024-12-16 NOTE — PLAN OF CARE
utilizing UE support with additional min assist needed for balance during self-feeding efforts. Note RR elevated throughout, sustaining >30 while EOB. Despite education/encouragement re: importance of regular OOB activity/mobility and benefits of upright positioning, patient declines at this time, requesting return to bed 2/2 fatigue and back pain. RN notified of patient request for pain medication and patient positioned in sidelying for comfort; alarm active and VSS with RR 20s.         PLAN :  Patient continues to benefit from skilled intervention to address the above impairments.  Continue treatment per established plan of care to address goals.    Recommendation for discharge: (in order for the patient to meet his/her long term goals):   Moderate intensity short-term skilled occupational therapy up to 5x/week    Other factors to consider for discharge: patient's current support system is unable to meet their requirements for physical assistance, poor safety awareness, impaired cognition, high risk for falls, not safe to be alone, and concern for safely navigating or managing the home environment    IF patient discharges home will need the following DME: continuing to assess with progress       SUBJECTIVE:   Patient stated “I'm so tired.”    OBJECTIVE DATA SUMMARY:   Cognitive/Behavioral Status:  Orientation  Overall Orientation Status: Within Functional Limits  Cognition  Overall Cognitive Status: Exceptions  Following Commands: Follows one step commands with increased time;Follows multistep commands with increased time  Attention Span: Attends with cues to redirect  Problem Solving: Assistance required to generate solutions;Assistance required to implement solutions  Insights: Decreased awareness of deficits  Initiation: Requires cues for some  Sequencing: Requires cues for some    Functional Mobility and Transfers for ADLs:  Bed Mobility:  Bed Mobility Training  Bed Mobility Training: Yes  Supine to Sit:

## 2024-12-16 NOTE — PLAN OF CARE
Problem: Discharge Planning  Goal: Discharge to home or other facility with appropriate resources  12/15/2024 2330 by Leticia Westfall, RN  Outcome: Progressing  Flowsheets  Taken 12/15/2024 2330  Discharge to home or other facility with appropriate resources:   Identify barriers to discharge with patient and caregiver   Arrange for needed discharge resources and transportation as appropriate   Refer to discharge planning if patient needs post-hospital services based on physician order or complex needs related to functional status, cognitive ability or social support system   Identify discharge learning needs (meds, wound care, etc)  Taken 12/15/2024 1930  Discharge to home or other facility with appropriate resources:   Identify barriers to discharge with patient and caregiver   Arrange for needed discharge resources and transportation as appropriate   Identify discharge learning needs (meds, wound care, etc)   Refer to discharge planning if patient needs post-hospital services based on physician order or complex needs related to functional status, cognitive ability or social support system     Problem: Gastrointestinal - Adult  Goal: Maintains or returns to baseline bowel function  12/15/2024 2330 by Leticia Westfall RN  Outcome: Progressing  Flowsheets  Taken 12/15/2024 2330  Maintains or returns to baseline bowel function:   Assess bowel function   Encourage oral fluids to ensure adequate hydration   Administer ordered medications as needed   Administer IV fluids as ordered to ensure adequate hydration   Encourage mobilization and activity   Nutrition consult to assist patient with appropriate food choices  Taken 12/15/2024 1930  Maintains or returns to baseline bowel function:   Assess bowel function   Encourage oral fluids to ensure adequate hydration   Administer IV fluids as ordered to ensure adequate hydration   Administer ordered medications as needed   Encourage mobilization and activity   Nutrition

## 2024-12-16 NOTE — DISCHARGE SUMMARY
Complete po antibiotics     DIET: regular diet  Oral Nutritional Supplements:     ACTIVITY: activity as tolerated    DISCHARGE MEDICATIONS:     Medication List        START taking these medications      cephALEXin 500 MG capsule  Commonly known as: KEFLEX  Take 1 capsule by mouth 4 times daily for 7 days     folic acid 1 MG tablet  Commonly known as: FOLVITE  Take 1 tablet by mouth daily  Start taking on: December 17, 2024     metroNIDAZOLE 500 MG tablet  Commonly known as: FLAGYL  Take 1 tablet by mouth 3 times daily for 7 days     multivitamin Tabs tablet  Take 1 tablet by mouth daily  Start taking on: December 17, 2024     nicotine 14 MG/24HR  Commonly known as: NICODERM CQ  Place 1 patch onto the skin daily  Start taking on: December 17, 2024     predniSONE 20 MG tablet  Commonly known as: DELTASONE  Take 2 tablets by mouth daily for 5 days  Start taking on: December 17, 2024            CHANGE how you take these medications      pantoprazole 40 MG tablet  Commonly known as: PROTONIX  What changed: Another medication with the same name was removed. Continue taking this medication, and follow the directions you see here.            CONTINUE taking these medications      acetaminophen 325 MG tablet  Commonly known as: TYLENOL     arformoterol tartrate 15 MCG/2ML Nebu  Commonly known as: BROVANA  Take 1 ampule by nebulization 2 times daily     aspirin 81 MG EC tablet     atorvastatin 20 MG tablet  Commonly known as: LIPITOR  Take 1 tablet by mouth daily     budesonide 0.25 MG/2ML nebulizer suspension  Commonly known as: Pulmicort  Take 2 mLs by nebulization 2 times daily     mirtazapine 7.5 MG tablet  Commonly known as: REMERON  Take 1 tablet by mouth nightly     potassium chloride 20 MEQ extended release tablet  Commonly known as: KLOR-CON M     thiamine 100 MG tablet  Take 1 tablet by mouth daily            STOP taking these medications      amLODIPine 10 MG tablet  Commonly known as: NORVASC     diazePAM 2 MG  extremities  Skin: warm     CHRONIC MEDICAL DIAGNOSES:      Greater than 31 minutes were spent with the patient on counseling and coordination of care    Signed:   Sushma Madala, MD  12/16/2024  11:17 AM

## 2024-12-16 NOTE — DISCHARGE INSTRUCTIONS
Discharge Instructions       PATIENT ID: Vidya Das  MRN: [id number]   YOB: 1945    DATE OF ADMISSION: [unfilled]    DATE OF DISCHARGE: 12/16/2024    PRIMARY CARE PROVIDER: @PCP@     ATTENDING PHYSICIAN: [unfilled]  DISCHARGING PROVIDER: Sushma Madala, MD    To contact this individual call [phone] and ask the  to page.   If unavailable ask to be transferred the Adult Hospitalist Department.    DISCHARGE DIAGNOSES Aspiration Pneumonia     CONSULTATIONS: [unfilled]    PROCEDURES/SURGERIES: * No surgery found *    PENDING TEST RESULTS:   At the time of discharge the following test results are still pending: none     FOLLOW UP APPOINTMENTS:   [unfilled]   PCP in 1-2 weeks     ADDITIONAL CARE RECOMMENDATIONS:   Complete oral antibiotics     DIET: regular diet  Oral Nutritional Supplements:     ACTIVITY: activity as tolerated    Radiology      DISCHARGE MEDICATIONS:   See Medication Reconciliation Form    It is important that you take the medication exactly as they are prescribed.   Keep your medication in the bottles provided by the pharmacist and keep a list of the medication names, dosages, and times to be taken in your wallet.   Do not take other medications without consulting your doctor.       NOTIFY YOUR PHYSICIAN FOR ANY OF THE FOLLOWING:   Fever over 101 degrees for 24 hours.   Chest pain, shortness of breath, fever, chills, nausea, vomiting, diarrhea, change in mentation, falling, weakness, bleeding. Severe pain or pain not relieved by medications.  Or, any other signs or symptoms that you may have questions about.      DISPOSITION:    Home With:   OT  PT  HH  RN      X SNF/Inpatient Rehab/LTAC    Independent/assisted living    Hospice    Other:       Signed:   Sushma Madala, MD  12/16/2024  11:12 AM

## 2024-12-17 NOTE — CARE COORDINATION
19:48 Hospitalist that dc'd pt today called this CM asking for assistance reaching a provider at [facility] to start pt on an additional medication for a new result.    CM called [facility] and spoke to Fabián. Fabián was going to provide pt's attending MD at [facility] with [facility] MD's phone number to call her.    CM called [facility] MD to give details. CM provided Fabián and [facility] MD with phone number to reach this CM back if there were any barriers to MD to MD conversation.     YAIR Quinonez    [facility]    or by Raul Hurd     Have [facility]  page [phone] for additional needs today until 23:00.

## 2024-12-18 LAB
BACTERIA SPEC CULT: NORMAL
SERVICE CMNT-IMP: NORMAL

## 2024-12-21 ENCOUNTER — OFFICE VISIT (OUTPATIENT)
Facility: CLINIC | Age: 79
End: 2024-12-21

## 2024-12-21 VITALS
DIASTOLIC BLOOD PRESSURE: 68 MMHG | WEIGHT: 118 LBS | RESPIRATION RATE: 17 BRPM | SYSTOLIC BLOOD PRESSURE: 109 MMHG | TEMPERATURE: 97.2 F | HEART RATE: 104 BPM | BODY MASS INDEX: 22.3 KG/M2 | OXYGEN SATURATION: 98 %

## 2024-12-21 DIAGNOSIS — I25.2 HISTORY OF ACUTE MYOCARDIAL INFARCTION OF SEPTUM: Chronic | ICD-10-CM

## 2024-12-21 DIAGNOSIS — E78.2 MIXED HYPERLIPIDEMIA: Chronic | ICD-10-CM

## 2024-12-21 DIAGNOSIS — Z87.891 HISTORY OF CIGARETTE SMOKING: Chronic | ICD-10-CM

## 2024-12-21 DIAGNOSIS — E22.2 SIADH (SYNDROME OF INAPPROPRIATE ADH PRODUCTION) (HCC): Chronic | ICD-10-CM

## 2024-12-21 DIAGNOSIS — I25.10 CORONARY ARTERY DISEASE INVOLVING NATIVE CORONARY ARTERY OF NATIVE HEART WITHOUT ANGINA PECTORIS: Chronic | ICD-10-CM

## 2024-12-21 DIAGNOSIS — F41.9 CHRONIC ANXIETY: Chronic | ICD-10-CM

## 2024-12-21 DIAGNOSIS — A04.72 C. DIFFICILE COLITIS: ICD-10-CM

## 2024-12-21 DIAGNOSIS — I51.89 DIASTOLIC DYSFUNCTION: Chronic | ICD-10-CM

## 2024-12-21 DIAGNOSIS — I10 ESSENTIAL HYPERTENSION: Chronic | ICD-10-CM

## 2024-12-21 DIAGNOSIS — I34.0 NONRHEUMATIC MITRAL VALVE REGURGITATION: Chronic | ICD-10-CM

## 2024-12-21 DIAGNOSIS — J43.1 PANLOBULAR EMPHYSEMA (HCC): Chronic | ICD-10-CM

## 2024-12-21 DIAGNOSIS — N17.0 ACUTE KIDNEY INJURY (AKI) WITH ACUTE TUBULAR NECROSIS (ATN) (HCC): ICD-10-CM

## 2024-12-21 DIAGNOSIS — Z96.642 HISTORY OF TOTAL HIP REPLACEMENT, LEFT: Chronic | ICD-10-CM

## 2024-12-21 DIAGNOSIS — J15.9 COMMUNITY ACQUIRED BACTERIAL PNEUMONIA: Primary | ICD-10-CM

## 2024-12-21 PROBLEM — E87.6 HYPOKALEMIA: Status: RESOLVED | Noted: 2024-02-26 | Resolved: 2024-12-21

## 2024-12-21 PROBLEM — E87.8 ELECTROLYTE IMBALANCE: Status: RESOLVED | Noted: 2024-12-13 | Resolved: 2024-12-21

## 2024-12-21 PROBLEM — E78.5 DYSLIPIDEMIA: Status: RESOLVED | Noted: 2024-02-26 | Resolved: 2024-12-21

## 2024-12-21 PROBLEM — R29.898 LOWER EXTREMITY WEAKNESS: Status: RESOLVED | Noted: 2020-05-13 | Resolved: 2024-12-21

## 2024-12-21 PROBLEM — R06.02 SOB (SHORTNESS OF BREATH): Status: RESOLVED | Noted: 2024-12-13 | Resolved: 2024-12-21

## 2024-12-21 PROBLEM — R19.7 DIARRHEA: Status: RESOLVED | Noted: 2024-12-13 | Resolved: 2024-12-21

## 2024-12-21 NOTE — PROGRESS NOTES
Ace Inhibitors Dizziness or Vertigo     Other reaction(s): Vertigo    Hydrochlorothiazide Other (See Comments)     Hypotension and severe hyponatremia.    Penicillins Hives     While pregnant       Social History:  Social History     Tobacco Use    Smoking status: Every Day     Current packs/day: 0.00     Types: Cigarettes     Last attempt to quit: 9/11/2014     Years since quitting: 10.2    Smokeless tobacco: Former     Quit date: 9/11/2014   Substance Use Topics    Alcohol use: Yes     Alcohol/week: 3.0 standard drinks of alcohol    Drug use: No       Current Medications:  Current Outpatient Medications on File Prior to Visit   Medication Sig Dispense Refill    Multiple Vitamin (MULTIVITAMIN) TABS tablet Take 1 tablet by mouth daily 30 tablet 0    nicotine (NICODERM CQ) 14 MG/24HR Place 1 patch onto the skin daily 30 patch 0    predniSONE (DELTASONE) 20 MG tablet Take 2 tablets by mouth daily for 5 days 10 tablet 0    folic acid (FOLVITE) 1 MG tablet Take 1 tablet by mouth daily 30 tablet 0    metroNIDAZOLE (FLAGYL) 500 MG tablet Take 1 tablet by mouth 3 times daily for 7 days 21 tablet 0    cephALEXin (KEFLEX) 500 MG capsule Take 1 capsule by mouth 4 times daily for 7 days 28 capsule 0    budesonide (PULMICORT) 0.25 MG/2ML nebulizer suspension Take 2 mLs by nebulization 2 times daily 60 each 3    arformoterol tartrate (BROVANA) 15 MCG/2ML NEBU Take 1 ampule by nebulization 2 times daily 120 mL 3    pantoprazole (PROTONIX) 40 MG tablet Take 1 tablet by mouth in the morning and 1 tablet in the evening.      thiamine 100 MG tablet Take 1 tablet by mouth daily 30 tablet 3    potassium chloride (KLOR-CON M) 20 MEQ extended release tablet Take 1 tablet by mouth daily 60 tablet     atorvastatin (LIPITOR) 20 MG tablet Take 1 tablet by mouth daily 90 tablet 1    mirtazapine (REMERON) 7.5 MG tablet Take 1 tablet by mouth nightly 30 tablet 5    acetaminophen (TYLENOL) 325 MG tablet Take 2 tablets by mouth every 6 hours

## (undated) DEVICE — DRESSING HYDROCOLLOID BORDER 35X10 IN ALUM PRIMASEAL

## (undated) DEVICE — SYSTEM NAVIGATION PALM SZ PRECIS ALIGN TECHNOLOGY DISP FOR

## (undated) DEVICE — SUTURE VCRL 2-0 L27IN ABSRB CT BRAID COAT UD J275H

## (undated) DEVICE — DERMABOND SKIN ADH 0.7ML -- DERMABOND ADVANCED 12/BX

## (undated) DEVICE — TUBING IRRIG COMPATIBLE W ERBE MEDIVATOR PMP HYDR

## (undated) DEVICE — SOLUTION IRRIG 1000ML STRL H2O USP PLAS POUR BTL

## (undated) DEVICE — SET GRAV CK VLV NEEDLESS ST 3 GANGED 4WAY STPCOCK HI FLO 10

## (undated) DEVICE — 4-PORT MANIFOLD: Brand: NEPTUNE 2

## (undated) DEVICE — GLOVE ORANGE PI 7   MSG9070

## (undated) DEVICE — ANTERIOR TOTAL HIP-SMH: Brand: MEDLINE INDUSTRIES, INC.

## (undated) DEVICE — SCRUB DRY SURG EZ SCRUB BRUSH PREOPERATIVE GRN

## (undated) DEVICE — DRAPE SURG W41XL74IN CLR FULL SZ C ARM 3 ADH POLY STRP E

## (undated) DEVICE — BLADE SURG SAW SAG S STL AGG 90MM LEN 80MM CUT DEPTH 25.4MM

## (undated) DEVICE — PREP SKN CHLRAPRP APL 26ML STR --

## (undated) DEVICE — COVER,MAYO STAND,STERILE: Brand: MEDLINE

## (undated) DEVICE — ENDOSCOPIC MUCOSAL RESECTION DEVICE: Brand: CAPTIVATOR EMR

## (undated) DEVICE — TOTAL TRAY, 16FR 10ML SIL FOLEY, URN: Brand: MEDLINE

## (undated) DEVICE — Z DUP USE 2149365 FORCEPS BX L240CM JAW DIA2.8MM L CAP W/ NDL MIC MESH TOOTH

## (undated) DEVICE — SOLUTION IRRIG 1000ML 0.9% SOD CHL USP POUR PLAS BTL

## (undated) DEVICE — GLOVE SURG SZ 65 THK91MIL LTX FREE SYN POLYISOPRENE

## (undated) DEVICE — SUTURE MCRYL SZ 3-0 L27IN ABSRB UD L19MM PS-2 3/8 CIR PRIM Y427H

## (undated) DEVICE — SUTURE VCRL SZ 2 L54IN ABSRB UD L65MM TP-1 1/2 CIR J880T

## (undated) DEVICE — GARMENT,MEDLINE,DVT,INT,CALF,MED, GEN2: Brand: MEDLINE

## (undated) DEVICE — 6619 2 PTNT ISO SYS INCISE AREA&LT;(&GT;&&LT;)&GT;P: Brand: STERI-DRAPE™ IOBAN™ 2

## (undated) DEVICE — DISPOSABLE EMR KIT: Brand: DISPOSABLE EMR KIT

## (undated) DEVICE — ELECTRODE PT RET AD L9FT HI MOIST COND ADH HYDRGEL CORDED

## (undated) DEVICE — GLOVE ORANGE PI 7 1/2   MSG9075

## (undated) DEVICE — PAD BD MATTRESS 73X32 IN STD CONVOLUTED FOAM LTX FREE